# Patient Record
Sex: MALE | Race: WHITE | NOT HISPANIC OR LATINO | Employment: FULL TIME | URBAN - METROPOLITAN AREA
[De-identification: names, ages, dates, MRNs, and addresses within clinical notes are randomized per-mention and may not be internally consistent; named-entity substitution may affect disease eponyms.]

---

## 2017-02-06 ENCOUNTER — COMMUNICATION - HEALTHEAST (OUTPATIENT)
Dept: FAMILY MEDICINE | Facility: CLINIC | Age: 62
End: 2017-02-06

## 2017-02-06 DIAGNOSIS — E03.9 HYPOTHYROIDISM: ICD-10-CM

## 2017-02-20 ENCOUNTER — COMMUNICATION - HEALTHEAST (OUTPATIENT)
Dept: FAMILY MEDICINE | Facility: CLINIC | Age: 62
End: 2017-02-20

## 2017-02-20 DIAGNOSIS — I25.10 CORONARY ATHEROSCLEROSIS: ICD-10-CM

## 2017-04-06 ENCOUNTER — COMMUNICATION - HEALTHEAST (OUTPATIENT)
Dept: FAMILY MEDICINE | Facility: CLINIC | Age: 62
End: 2017-04-06

## 2017-05-02 ENCOUNTER — COMMUNICATION - HEALTHEAST (OUTPATIENT)
Dept: FAMILY MEDICINE | Facility: CLINIC | Age: 62
End: 2017-05-02

## 2017-05-02 DIAGNOSIS — I25.10 CORONARY ATHEROSCLEROSIS: ICD-10-CM

## 2017-05-12 ENCOUNTER — RECORDS - HEALTHEAST (OUTPATIENT)
Dept: ADMINISTRATIVE | Facility: OTHER | Age: 62
End: 2017-05-12

## 2017-06-02 ENCOUNTER — RECORDS - HEALTHEAST (OUTPATIENT)
Dept: ADMINISTRATIVE | Facility: OTHER | Age: 62
End: 2017-06-02

## 2017-07-01 ENCOUNTER — COMMUNICATION - HEALTHEAST (OUTPATIENT)
Dept: FAMILY MEDICINE | Facility: CLINIC | Age: 62
End: 2017-07-01

## 2017-07-01 DIAGNOSIS — I25.10 CORONARY ATHEROSCLEROSIS: ICD-10-CM

## 2017-07-01 DIAGNOSIS — K21.9 ESOPHAGEAL REFLUX: ICD-10-CM

## 2017-08-16 ENCOUNTER — COMMUNICATION - HEALTHEAST (OUTPATIENT)
Dept: FAMILY MEDICINE | Facility: CLINIC | Age: 62
End: 2017-08-16

## 2017-08-16 DIAGNOSIS — E03.9 HYPOTHYROIDISM: ICD-10-CM

## 2017-10-06 ENCOUNTER — OFFICE VISIT - HEALTHEAST (OUTPATIENT)
Dept: FAMILY MEDICINE | Facility: CLINIC | Age: 62
End: 2017-10-06

## 2017-10-06 ENCOUNTER — COMMUNICATION - HEALTHEAST (OUTPATIENT)
Dept: TELEHEALTH | Facility: CLINIC | Age: 62
End: 2017-10-06

## 2017-10-06 ENCOUNTER — COMMUNICATION - HEALTHEAST (OUTPATIENT)
Dept: FAMILY MEDICINE | Facility: CLINIC | Age: 62
End: 2017-10-06

## 2017-10-06 DIAGNOSIS — Z00.00 ROUTINE GENERAL MEDICAL EXAMINATION AT A HEALTH CARE FACILITY: ICD-10-CM

## 2017-10-06 DIAGNOSIS — Z23 NEED FOR VACCINATION: ICD-10-CM

## 2017-10-06 DIAGNOSIS — I10 ESSENTIAL HYPERTENSION: ICD-10-CM

## 2017-10-06 DIAGNOSIS — E03.1 CONGENITAL HYPOTHYROIDISM WITHOUT GOITER: ICD-10-CM

## 2017-10-06 DIAGNOSIS — Z12.5 PROSTATE CANCER SCREENING: ICD-10-CM

## 2017-10-06 DIAGNOSIS — I25.10 ATHEROSCLEROSIS OF NATIVE CORONARY ARTERY OF NATIVE HEART WITHOUT ANGINA PECTORIS: ICD-10-CM

## 2017-10-06 DIAGNOSIS — L82.0 SEBORRHEIC KERATOSES, INFLAMED: ICD-10-CM

## 2017-10-06 DIAGNOSIS — K21.9 GASTROESOPHAGEAL REFLUX DISEASE WITHOUT ESOPHAGITIS: ICD-10-CM

## 2017-10-06 DIAGNOSIS — D64.9 ANEMIA: ICD-10-CM

## 2017-10-06 DIAGNOSIS — R10.9 ABDOMINAL PAIN, UNSPECIFIED ABDOMINAL LOCATION: ICD-10-CM

## 2017-10-06 LAB
AST SERPL W P-5'-P-CCNC: 22 U/L (ref 0–40)
CHOLEST SERPL-MCNC: 106 MG/DL
FASTING STATUS PATIENT QL REPORTED: YES
HDLC SERPL-MCNC: 45 MG/DL
LDLC SERPL CALC-MCNC: 48 MG/DL
PSA SERPL-MCNC: 1.2 NG/ML (ref 0–4.5)
TRIGL SERPL-MCNC: 64 MG/DL

## 2017-10-06 ASSESSMENT — MIFFLIN-ST. JEOR: SCORE: 1540.14

## 2017-10-10 ENCOUNTER — COMMUNICATION - HEALTHEAST (OUTPATIENT)
Dept: FAMILY MEDICINE | Facility: CLINIC | Age: 62
End: 2017-10-10

## 2017-10-10 DIAGNOSIS — I25.10 CORONARY ARTERY DISEASE INVOLVING NATIVE CORONARY ARTERY OF NATIVE HEART WITHOUT ANGINA PECTORIS: ICD-10-CM

## 2017-10-11 ENCOUNTER — COMMUNICATION - HEALTHEAST (OUTPATIENT)
Dept: FAMILY MEDICINE | Facility: CLINIC | Age: 62
End: 2017-10-11

## 2017-10-11 DIAGNOSIS — K21.9 ESOPHAGEAL REFLUX: ICD-10-CM

## 2017-10-11 DIAGNOSIS — I25.10 CORONARY ATHEROSCLEROSIS: ICD-10-CM

## 2017-10-11 DIAGNOSIS — E03.9 HYPOTHYROIDISM: ICD-10-CM

## 2017-10-13 ENCOUNTER — OFFICE VISIT - HEALTHEAST (OUTPATIENT)
Dept: SURGERY | Facility: CLINIC | Age: 62
End: 2017-10-13

## 2017-10-13 DIAGNOSIS — Z85.72 HISTORY OF LYMPHOMA: ICD-10-CM

## 2017-10-13 DIAGNOSIS — R10.9 ABDOMINAL PAIN, UNSPECIFIED ABDOMINAL LOCATION: ICD-10-CM

## 2017-10-13 DIAGNOSIS — I25.10 ATHEROSCLEROSIS OF NATIVE CORONARY ARTERY OF NATIVE HEART WITHOUT ANGINA PECTORIS: ICD-10-CM

## 2017-10-13 DIAGNOSIS — K56.600 PARTIAL SMALL BOWEL OBSTRUCTION (H): ICD-10-CM

## 2017-10-13 ASSESSMENT — MIFFLIN-ST. JEOR: SCORE: 1523.82

## 2017-10-20 ENCOUNTER — OFFICE VISIT - HEALTHEAST (OUTPATIENT)
Dept: FAMILY MEDICINE | Facility: CLINIC | Age: 62
End: 2017-10-20

## 2017-10-20 DIAGNOSIS — Z01.818 PRE-OP EXAM: ICD-10-CM

## 2017-10-20 DIAGNOSIS — R10.9 ABDOMINAL PAIN, UNSPECIFIED ABDOMINAL LOCATION: ICD-10-CM

## 2017-10-20 ASSESSMENT — MIFFLIN-ST. JEOR: SCORE: 1564.64

## 2017-10-25 LAB
ATRIAL RATE - MUSE: 58 BPM
DIASTOLIC BLOOD PRESSURE - MUSE: NORMAL MMHG
INTERPRETATION ECG - MUSE: NORMAL
P AXIS - MUSE: 76 DEGREES
PR INTERVAL - MUSE: 186 MS
QRS DURATION - MUSE: 78 MS
QT - MUSE: 440 MS
QTC - MUSE: 431 MS
R AXIS - MUSE: 38 DEGREES
SYSTOLIC BLOOD PRESSURE - MUSE: NORMAL MMHG
T AXIS - MUSE: 25 DEGREES
VENTRICULAR RATE- MUSE: 58 BPM

## 2017-11-01 ENCOUNTER — COMMUNICATION - HEALTHEAST (OUTPATIENT)
Dept: FAMILY MEDICINE | Facility: CLINIC | Age: 62
End: 2017-11-01

## 2017-11-01 DIAGNOSIS — I25.10 CORONARY ATHEROSCLEROSIS: ICD-10-CM

## 2017-11-06 ENCOUNTER — ANESTHESIA - HEALTHEAST (OUTPATIENT)
Dept: SURGERY | Facility: HOSPITAL | Age: 62
End: 2017-11-06

## 2017-11-07 ENCOUNTER — SURGERY - HEALTHEAST (OUTPATIENT)
Dept: SURGERY | Facility: HOSPITAL | Age: 62
End: 2017-11-07

## 2017-11-07 ASSESSMENT — MIFFLIN-ST. JEOR: SCORE: 1560.1

## 2017-11-10 ASSESSMENT — MIFFLIN-ST. JEOR: SCORE: 1587.32

## 2017-11-11 ASSESSMENT — MIFFLIN-ST. JEOR: SCORE: 1571.44

## 2017-11-12 ASSESSMENT — MIFFLIN-ST. JEOR: SCORE: 1541.96

## 2017-11-14 ENCOUNTER — COMMUNICATION - HEALTHEAST (OUTPATIENT)
Dept: FAMILY MEDICINE | Facility: CLINIC | Age: 62
End: 2017-11-14

## 2017-11-15 ENCOUNTER — AMBULATORY - HEALTHEAST (OUTPATIENT)
Dept: LAB | Facility: CLINIC | Age: 62
End: 2017-11-15

## 2017-11-15 ENCOUNTER — COMMUNICATION - HEALTHEAST (OUTPATIENT)
Dept: FAMILY MEDICINE | Facility: CLINIC | Age: 62
End: 2017-11-15

## 2017-11-15 ENCOUNTER — AMBULATORY - HEALTHEAST (OUTPATIENT)
Dept: SURGERY | Facility: CLINIC | Age: 62
End: 2017-11-15

## 2017-11-15 DIAGNOSIS — D62 ACUTE BLOOD LOSS ANEMIA: ICD-10-CM

## 2017-11-15 DIAGNOSIS — D64.9 ANEMIA: ICD-10-CM

## 2017-11-17 ENCOUNTER — OFFICE VISIT - HEALTHEAST (OUTPATIENT)
Dept: FAMILY MEDICINE | Facility: CLINIC | Age: 62
End: 2017-11-17

## 2017-11-17 DIAGNOSIS — D64.9 ANEMIA: ICD-10-CM

## 2017-11-17 DIAGNOSIS — I25.10 CORONARY ARTERY DISEASE INVOLVING NATIVE CORONARY ARTERY OF NATIVE HEART WITHOUT ANGINA PECTORIS: ICD-10-CM

## 2017-11-17 ASSESSMENT — MIFFLIN-ST. JEOR: SCORE: 1526.99

## 2017-11-22 ENCOUNTER — OFFICE VISIT - HEALTHEAST (OUTPATIENT)
Dept: SURGERY | Facility: CLINIC | Age: 62
End: 2017-11-22

## 2017-11-22 ENCOUNTER — AMBULATORY - HEALTHEAST (OUTPATIENT)
Dept: LAB | Facility: CLINIC | Age: 62
End: 2017-11-22

## 2017-11-22 DIAGNOSIS — D64.9 ANEMIA: ICD-10-CM

## 2017-11-22 DIAGNOSIS — K56.600 PARTIAL SMALL BOWEL OBSTRUCTION (H): ICD-10-CM

## 2017-11-27 ENCOUNTER — AMBULATORY - HEALTHEAST (OUTPATIENT)
Dept: FAMILY MEDICINE | Facility: CLINIC | Age: 62
End: 2017-11-27

## 2017-11-27 ENCOUNTER — COMMUNICATION - HEALTHEAST (OUTPATIENT)
Dept: FAMILY MEDICINE | Facility: CLINIC | Age: 62
End: 2017-11-27

## 2017-11-27 DIAGNOSIS — D64.9 ANEMIA: ICD-10-CM

## 2017-12-07 ENCOUNTER — AMBULATORY - HEALTHEAST (OUTPATIENT)
Dept: LAB | Facility: CLINIC | Age: 62
End: 2017-12-07

## 2017-12-07 DIAGNOSIS — D64.9 ANEMIA: ICD-10-CM

## 2017-12-08 ENCOUNTER — AMBULATORY - HEALTHEAST (OUTPATIENT)
Dept: LAB | Facility: CLINIC | Age: 62
End: 2017-12-08

## 2017-12-08 ENCOUNTER — COMMUNICATION - HEALTHEAST (OUTPATIENT)
Dept: TELEHEALTH | Facility: CLINIC | Age: 62
End: 2017-12-08

## 2017-12-08 DIAGNOSIS — D64.9 ANEMIA: ICD-10-CM

## 2018-03-02 ENCOUNTER — COMMUNICATION - HEALTHEAST (OUTPATIENT)
Dept: FAMILY MEDICINE | Facility: CLINIC | Age: 63
End: 2018-03-02

## 2018-04-20 ENCOUNTER — COMMUNICATION - HEALTHEAST (OUTPATIENT)
Dept: FAMILY MEDICINE | Facility: CLINIC | Age: 63
End: 2018-04-20

## 2018-04-20 DIAGNOSIS — I25.10 CORONARY ATHEROSCLEROSIS: ICD-10-CM

## 2018-05-04 ENCOUNTER — RECORDS - HEALTHEAST (OUTPATIENT)
Dept: ADMINISTRATIVE | Facility: OTHER | Age: 63
End: 2018-05-04

## 2018-05-06 ENCOUNTER — OFFICE VISIT - HEALTHEAST (OUTPATIENT)
Dept: FAMILY MEDICINE | Facility: CLINIC | Age: 63
End: 2018-05-06

## 2018-05-06 DIAGNOSIS — R05.9 COUGH: ICD-10-CM

## 2018-05-06 DIAGNOSIS — R07.1 CHEST PAIN ON BREATHING: ICD-10-CM

## 2018-05-06 DIAGNOSIS — R50.9 FEVER: ICD-10-CM

## 2018-05-06 DIAGNOSIS — R68.89 INFLUENZA-LIKE SYMPTOMS: ICD-10-CM

## 2018-05-06 LAB
D DIMER PPP FEU-MCNC: 0.56 FEU UG/ML
ERYTHROCYTE [DISTWIDTH] IN BLOOD BY AUTOMATED COUNT: 18.2 % (ref 11–14.5)
FLUAV AG SPEC QL IA: NORMAL
FLUBV AG SPEC QL IA: NORMAL
HCT VFR BLD AUTO: 36.4 % (ref 40–54)
HGB BLD-MCNC: 12.8 G/DL (ref 14–18)
MCH RBC QN AUTO: 31 PG (ref 27–34)
MCHC RBC AUTO-ENTMCNC: 35.2 G/DL (ref 32–36)
MCV RBC AUTO: 88 FL (ref 80–100)
PLATELET # BLD AUTO: 277 THOU/UL (ref 140–440)
PMV BLD AUTO: 11 FL (ref 8.5–12.5)
RBC # BLD AUTO: 4.13 MILL/UL (ref 4.4–6.2)
WBC: 18.7 THOU/UL (ref 4–11)

## 2018-05-08 ENCOUNTER — AMBULATORY - HEALTHEAST (OUTPATIENT)
Dept: INTENSIVE CARE | Facility: CLINIC | Age: 63
End: 2018-05-08

## 2018-05-08 DIAGNOSIS — J18.9 CAP (COMMUNITY ACQUIRED PNEUMONIA): ICD-10-CM

## 2018-05-14 ENCOUNTER — OFFICE VISIT - HEALTHEAST (OUTPATIENT)
Dept: FAMILY MEDICINE | Facility: CLINIC | Age: 63
End: 2018-05-14

## 2018-05-14 DIAGNOSIS — N17.9 ARF (ACUTE RENAL FAILURE) (H): ICD-10-CM

## 2018-05-14 DIAGNOSIS — J18.9 COMMUNITY ACQUIRED PNEUMONIA OF LEFT LOWER LOBE OF LUNG: ICD-10-CM

## 2018-05-14 ASSESSMENT — MIFFLIN-ST. JEOR: SCORE: 1583

## 2018-06-25 ENCOUNTER — HOSPITAL ENCOUNTER (OUTPATIENT)
Dept: CT IMAGING | Facility: HOSPITAL | Age: 63
Discharge: HOME | End: 2018-06-25
Attending: INTERNAL MEDICINE

## 2018-06-25 DIAGNOSIS — J18.9 CAP (COMMUNITY ACQUIRED PNEUMONIA): ICD-10-CM

## 2018-06-28 ENCOUNTER — OFFICE VISIT - HEALTHEAST (OUTPATIENT)
Dept: PULMONOLOGY | Facility: OTHER | Age: 63
End: 2018-06-28

## 2018-06-28 DIAGNOSIS — R91.1 LUNG NODULE: ICD-10-CM

## 2018-06-28 ASSESSMENT — MIFFLIN-ST. JEOR: SCORE: 1569.85

## 2018-08-09 ENCOUNTER — COMMUNICATION - HEALTHEAST (OUTPATIENT)
Dept: FAMILY MEDICINE | Facility: CLINIC | Age: 63
End: 2018-08-09

## 2018-08-09 DIAGNOSIS — I25.10 CORONARY ATHEROSCLEROSIS: ICD-10-CM

## 2018-08-31 ENCOUNTER — OFFICE VISIT - HEALTHEAST (OUTPATIENT)
Dept: FAMILY MEDICINE | Facility: CLINIC | Age: 63
End: 2018-08-31

## 2018-08-31 DIAGNOSIS — E03.1 CONGENITAL HYPOTHYROIDISM WITHOUT GOITER: ICD-10-CM

## 2018-08-31 DIAGNOSIS — H26.9 CATARACT: ICD-10-CM

## 2018-08-31 DIAGNOSIS — I10 ESSENTIAL HYPERTENSION: ICD-10-CM

## 2018-08-31 DIAGNOSIS — Z01.818 PREOP EXAMINATION: ICD-10-CM

## 2018-08-31 LAB
ANION GAP SERPL CALCULATED.3IONS-SCNC: 9 MMOL/L (ref 5–18)
BUN SERPL-MCNC: 24 MG/DL (ref 8–22)
CALCIUM SERPL-MCNC: 9.7 MG/DL (ref 8.5–10.5)
CHLORIDE BLD-SCNC: 109 MMOL/L (ref 98–107)
CO2 SERPL-SCNC: 25 MMOL/L (ref 22–31)
CREAT SERPL-MCNC: 1.01 MG/DL (ref 0.7–1.3)
ERYTHROCYTE [DISTWIDTH] IN BLOOD BY AUTOMATED COUNT: 11.7 % (ref 11–14.5)
GFR SERPL CREATININE-BSD FRML MDRD: >60 ML/MIN/1.73M2
GLUCOSE BLD-MCNC: 80 MG/DL (ref 70–125)
HCT VFR BLD AUTO: 38.6 % (ref 40–54)
HGB BLD-MCNC: 13 G/DL (ref 14–18)
MCH RBC QN AUTO: 31.8 PG (ref 27–34)
MCHC RBC AUTO-ENTMCNC: 33.7 G/DL (ref 32–36)
MCV RBC AUTO: 94 FL (ref 80–100)
PLATELET # BLD AUTO: 304 THOU/UL (ref 140–440)
PMV BLD AUTO: 8.1 FL (ref 7–10)
POTASSIUM BLD-SCNC: 4.4 MMOL/L (ref 3.5–5)
RBC # BLD AUTO: 4.09 MILL/UL (ref 4.4–6.2)
SODIUM SERPL-SCNC: 143 MMOL/L (ref 136–145)
TSH SERPL DL<=0.005 MIU/L-ACNC: 2.96 UIU/ML (ref 0.3–5)
WBC: 8.1 THOU/UL (ref 4–11)

## 2018-08-31 ASSESSMENT — MIFFLIN-ST. JEOR: SCORE: 1571.9

## 2018-09-24 ENCOUNTER — HOSPITAL ENCOUNTER (OUTPATIENT)
Dept: CT IMAGING | Facility: HOSPITAL | Age: 63
Discharge: HOME | End: 2018-09-24
Attending: INTERNAL MEDICINE

## 2018-09-24 DIAGNOSIS — R91.1 LUNG NODULE: ICD-10-CM

## 2018-09-26 ENCOUNTER — COMMUNICATION - HEALTHEAST (OUTPATIENT)
Dept: PULMONOLOGY | Facility: OTHER | Age: 63
End: 2018-09-26

## 2018-09-26 DIAGNOSIS — R91.1 LUNG NODULE: ICD-10-CM

## 2018-09-28 ENCOUNTER — OFFICE VISIT - HEALTHEAST (OUTPATIENT)
Dept: FAMILY MEDICINE | Facility: CLINIC | Age: 63
End: 2018-09-28

## 2018-09-28 DIAGNOSIS — Z23 NEED FOR VACCINATION: ICD-10-CM

## 2018-09-28 DIAGNOSIS — I25.10 CORONARY ARTERY DISEASE INVOLVING NATIVE CORONARY ARTERY OF NATIVE HEART WITHOUT ANGINA PECTORIS: ICD-10-CM

## 2018-09-28 DIAGNOSIS — Z01.818 PREOP EXAMINATION: ICD-10-CM

## 2018-09-28 DIAGNOSIS — R91.1 LUNG NODULE: ICD-10-CM

## 2018-09-28 LAB
ANION GAP SERPL CALCULATED.3IONS-SCNC: 8 MMOL/L (ref 5–18)
BUN SERPL-MCNC: 22 MG/DL (ref 8–22)
CALCIUM SERPL-MCNC: 10 MG/DL (ref 8.5–10.5)
CHLORIDE BLD-SCNC: 107 MMOL/L (ref 98–107)
CO2 SERPL-SCNC: 25 MMOL/L (ref 22–31)
CREAT SERPL-MCNC: 1.19 MG/DL (ref 0.7–1.3)
GFR SERPL CREATININE-BSD FRML MDRD: >60 ML/MIN/1.73M2
GLUCOSE BLD-MCNC: 99 MG/DL (ref 70–125)
HGB BLD-MCNC: 13.4 G/DL (ref 14–18)
INR PPP: 1.03 (ref 0.9–1.1)
POTASSIUM BLD-SCNC: 4.6 MMOL/L (ref 3.5–5)
SODIUM SERPL-SCNC: 140 MMOL/L (ref 136–145)

## 2018-09-28 ASSESSMENT — MIFFLIN-ST. JEOR: SCORE: 1573.26

## 2018-10-02 ENCOUNTER — HOSPITAL ENCOUNTER (OUTPATIENT)
Dept: CT IMAGING | Facility: HOSPITAL | Age: 63
Discharge: HOME | End: 2018-10-02
Attending: INTERNAL MEDICINE | Admitting: RADIOLOGY

## 2018-10-02 ENCOUNTER — RECORDS - HEALTHEAST (OUTPATIENT)
Dept: ADMINISTRATIVE | Facility: OTHER | Age: 63
End: 2018-10-02

## 2018-10-02 ENCOUNTER — HOSPITAL ENCOUNTER (OUTPATIENT)
Dept: RADIOLOGY | Facility: HOSPITAL | Age: 63
Discharge: HOME | End: 2018-10-02
Attending: RADIOLOGY

## 2018-10-02 ENCOUNTER — COMMUNICATION - HEALTHEAST (OUTPATIENT)
Dept: FAMILY MEDICINE | Facility: CLINIC | Age: 63
End: 2018-10-02

## 2018-10-02 DIAGNOSIS — R91.1 LUNG NODULE: ICD-10-CM

## 2018-10-02 DIAGNOSIS — I25.10 CORONARY ARTERY DISEASE INVOLVING NATIVE CORONARY ARTERY OF NATIVE HEART WITHOUT ANGINA PECTORIS: ICD-10-CM

## 2018-10-02 LAB
HGB BLD-MCNC: 13.3 G/DL (ref 14–18)
INR PPP: 1 (ref 0.9–1.1)
PLATELET # BLD AUTO: 305 THOU/UL (ref 140–440)

## 2018-10-02 ASSESSMENT — MIFFLIN-ST. JEOR: SCORE: 1569.17

## 2018-10-03 ENCOUNTER — COMMUNICATION - HEALTHEAST (OUTPATIENT)
Dept: INTENSIVE CARE | Facility: CLINIC | Age: 63
End: 2018-10-03

## 2018-10-03 DIAGNOSIS — C34.92 ADENOCARCINOMA, LUNG, LEFT (H): ICD-10-CM

## 2018-10-04 ENCOUNTER — COMMUNICATION - HEALTHEAST (OUTPATIENT)
Dept: ONCOLOGY | Facility: CLINIC | Age: 63
End: 2018-10-04

## 2018-10-09 ENCOUNTER — COMMUNICATION - HEALTHEAST (OUTPATIENT)
Dept: TELEHEALTH | Facility: CLINIC | Age: 63
End: 2018-10-09

## 2018-10-11 ENCOUNTER — HOSPITAL ENCOUNTER (OUTPATIENT)
Dept: PET IMAGING | Facility: HOSPITAL | Age: 63
Discharge: HOME | End: 2018-10-11
Attending: INTERNAL MEDICINE

## 2018-10-11 DIAGNOSIS — C34.92 ADENOCARCINOMA, LUNG, LEFT (H): ICD-10-CM

## 2018-10-11 LAB — GLUCOSE BLDC GLUCOMTR-MCNC: 93 MG/DL

## 2018-10-15 ENCOUNTER — AMBULATORY - HEALTHEAST (OUTPATIENT)
Dept: PULMONOLOGY | Facility: OTHER | Age: 63
End: 2018-10-15

## 2018-10-15 ENCOUNTER — OFFICE VISIT - HEALTHEAST (OUTPATIENT)
Dept: ONCOLOGY | Facility: HOSPITAL | Age: 63
End: 2018-10-15

## 2018-10-15 ENCOUNTER — AMBULATORY - HEALTHEAST (OUTPATIENT)
Dept: ONCOLOGY | Facility: CLINIC | Age: 63
End: 2018-10-15

## 2018-10-15 DIAGNOSIS — C34.90 LUNG CANCER (H): ICD-10-CM

## 2018-10-15 DIAGNOSIS — C34.92 ADENOCARCINOMA, LUNG, LEFT (H): ICD-10-CM

## 2018-10-15 DIAGNOSIS — C34.32 MALIGNANT NEOPLASM OF LOWER LOBE OF LEFT LUNG (H): ICD-10-CM

## 2018-10-15 ASSESSMENT — MIFFLIN-ST. JEOR: SCORE: 1572.35

## 2018-10-16 ENCOUNTER — COMMUNICATION - HEALTHEAST (OUTPATIENT)
Dept: ONCOLOGY | Facility: CLINIC | Age: 63
End: 2018-10-16

## 2018-10-17 ENCOUNTER — RECORDS - HEALTHEAST (OUTPATIENT)
Dept: ADMINISTRATIVE | Facility: OTHER | Age: 63
End: 2018-10-17

## 2018-10-17 ENCOUNTER — RECORDS - HEALTHEAST (OUTPATIENT)
Dept: PULMONOLOGY | Facility: OTHER | Age: 63
End: 2018-10-17

## 2018-10-17 DIAGNOSIS — C34.90 MALIGNANT NEOPLASM OF UNSPECIFIED PART OF UNSPECIFIED BRONCHUS OR LUNG (H): ICD-10-CM

## 2018-10-25 ENCOUNTER — OFFICE VISIT - HEALTHEAST (OUTPATIENT)
Dept: PULMONOLOGY | Facility: OTHER | Age: 63
End: 2018-10-25

## 2018-10-25 ENCOUNTER — AMBULATORY - HEALTHEAST (OUTPATIENT)
Dept: PULMONOLOGY | Facility: OTHER | Age: 63
End: 2018-10-25

## 2018-10-25 DIAGNOSIS — C34.32 MALIGNANT NEOPLASM OF LOWER LOBE OF LEFT LUNG (H): ICD-10-CM

## 2018-10-25 DIAGNOSIS — C34.90 LUNG CANCER (H): ICD-10-CM

## 2018-10-25 ASSESSMENT — MIFFLIN-ST. JEOR: SCORE: 1574.16

## 2018-10-26 ENCOUNTER — COMMUNICATION - HEALTHEAST (OUTPATIENT)
Dept: FAMILY MEDICINE | Facility: CLINIC | Age: 63
End: 2018-10-26

## 2018-10-26 DIAGNOSIS — C34.32 MALIGNANT NEOPLASM OF LOWER LOBE OF LEFT LUNG (H): ICD-10-CM

## 2018-10-26 DIAGNOSIS — I25.10 CORONARY ARTERY DISEASE INVOLVING NATIVE CORONARY ARTERY OF NATIVE HEART WITHOUT ANGINA PECTORIS: ICD-10-CM

## 2018-10-29 ENCOUNTER — OFFICE VISIT - HEALTHEAST (OUTPATIENT)
Dept: CARDIOLOGY | Facility: CLINIC | Age: 63
End: 2018-10-29

## 2018-10-29 ENCOUNTER — HOSPITAL ENCOUNTER (OUTPATIENT)
Dept: CARDIOLOGY | Facility: HOSPITAL | Age: 63
Discharge: HOME | End: 2018-10-29
Attending: FAMILY MEDICINE

## 2018-10-29 DIAGNOSIS — I25.10 CORONARY ARTERY DISEASE INVOLVING NATIVE CORONARY ARTERY OF NATIVE HEART WITHOUT ANGINA PECTORIS: ICD-10-CM

## 2018-10-29 DIAGNOSIS — Z01.810 PRE-OPERATIVE CARDIOVASCULAR EXAMINATION: ICD-10-CM

## 2018-10-29 DIAGNOSIS — C34.32 MALIGNANT NEOPLASM OF LOWER LOBE OF LEFT LUNG (H): ICD-10-CM

## 2018-10-29 DIAGNOSIS — I10 ESSENTIAL HYPERTENSION: ICD-10-CM

## 2018-10-29 LAB
CV STRESS CURRENT BP HE: NORMAL
CV STRESS CURRENT HR HE: 100
CV STRESS CURRENT HR HE: 102
CV STRESS CURRENT HR HE: 103
CV STRESS CURRENT HR HE: 107
CV STRESS CURRENT HR HE: 108
CV STRESS CURRENT HR HE: 110
CV STRESS CURRENT HR HE: 116
CV STRESS CURRENT HR HE: 118
CV STRESS CURRENT HR HE: 122
CV STRESS CURRENT HR HE: 123
CV STRESS CURRENT HR HE: 124
CV STRESS CURRENT HR HE: 126
CV STRESS CURRENT HR HE: 128
CV STRESS CURRENT HR HE: 129
CV STRESS CURRENT HR HE: 132
CV STRESS CURRENT HR HE: 133
CV STRESS CURRENT HR HE: 135
CV STRESS CURRENT HR HE: 138
CV STRESS CURRENT HR HE: 138
CV STRESS CURRENT HR HE: 72
CV STRESS CURRENT HR HE: 84
CV STRESS CURRENT HR HE: 86
CV STRESS CURRENT HR HE: 87
CV STRESS CURRENT HR HE: 88
CV STRESS CURRENT HR HE: 89
CV STRESS CURRENT HR HE: 90
CV STRESS CURRENT HR HE: 94
CV STRESS CURRENT HR HE: 95
CV STRESS CURRENT HR HE: 96
CV STRESS CURRENT HR HE: 97
CV STRESS CURRENT HR HE: 97
CV STRESS DEVIATION TIME HE: NORMAL
CV STRESS ECHO PERCENT HR HE: NORMAL
CV STRESS EXERCISE STAGE HE: NORMAL
CV STRESS EXERCISE STAGE REACHED HE: NORMAL
CV STRESS FINAL RESTING BP HE: NORMAL
CV STRESS FINAL RESTING HR HE: 88
CV STRESS MAX HR HE: 138
CV STRESS MAX TREADMILL GRADE HE: 16
CV STRESS MAX TREADMILL SPEED HE: 4.2
CV STRESS PEAK DIA BP HE: NORMAL
CV STRESS PEAK SYS BP HE: NORMAL
CV STRESS PHASE HE: NORMAL
CV STRESS PROTOCOL HE: NORMAL
CV STRESS RESTING PT POSITION HE: NORMAL
CV STRESS RESTING PT POSITION HE: NORMAL
CV STRESS ST DEVIATION AMOUNT HE: NORMAL
CV STRESS ST DEVIATION ELEVATION HE: NORMAL
CV STRESS ST EVELATION AMOUNT HE: NORMAL
CV STRESS TEST TYPE HE: NORMAL
CV STRESS TOTAL STAGE TIME MIN 1 HE: NORMAL
STRESS ECHO BASELINE BP: NORMAL
STRESS ECHO BASELINE HR: 86
STRESS ECHO CALCULATED PERCENT HR: 88 %
STRESS ECHO LAST STRESS BP: NORMAL
STRESS ECHO LAST STRESS HR: 138
STRESS ECHO POST ESTIMATED WORKLOAD: 12.1
STRESS ECHO POST EXERCISE DUR MIN: 11
STRESS ECHO POST EXERCISE DUR SEC: 59
STRESS ECHO TARGET HR: 138.16

## 2018-10-29 ASSESSMENT — MIFFLIN-ST. JEOR: SCORE: 1569.17

## 2018-10-30 ENCOUNTER — SURGERY - HEALTHEAST (OUTPATIENT)
Dept: CARDIOLOGY | Facility: CLINIC | Age: 63
End: 2018-10-30

## 2018-10-31 ENCOUNTER — COMMUNICATION - HEALTHEAST (OUTPATIENT)
Dept: ONCOLOGY | Facility: CLINIC | Age: 63
End: 2018-10-31

## 2018-10-31 ENCOUNTER — HOSPITAL ENCOUNTER (OUTPATIENT)
Dept: MRI IMAGING | Facility: HOSPITAL | Age: 63
Discharge: HOME | End: 2018-10-31
Attending: THORACIC SURGERY (CARDIOTHORACIC VASCULAR SURGERY)

## 2018-10-31 ENCOUNTER — ANESTHESIA - HEALTHEAST (OUTPATIENT)
Dept: SURGERY | Facility: CLINIC | Age: 63
End: 2018-10-31

## 2018-10-31 DIAGNOSIS — C34.90 LUNG CANCER (H): ICD-10-CM

## 2018-10-31 LAB
CREAT BLD-MCNC: 0.9 MG/DL
CREAT BLD-MCNC: 0.9 MG/DL (ref 0.7–1.3)
POC GFR AMER AF HE - HISTORICAL: >60 ML/MIN/1.73M2
POC GFR NON AMER AF HE - HISTORICAL: >60 ML/MIN/1.73M2

## 2018-11-01 ENCOUNTER — RECORDS - HEALTHEAST (OUTPATIENT)
Dept: ADMINISTRATIVE | Facility: OTHER | Age: 63
End: 2018-11-01

## 2018-11-01 ENCOUNTER — SURGERY - HEALTHEAST (OUTPATIENT)
Dept: SURGERY | Facility: CLINIC | Age: 63
End: 2018-11-01

## 2018-11-01 ASSESSMENT — MIFFLIN-ST. JEOR: SCORE: 1551.48

## 2018-11-02 ASSESSMENT — MIFFLIN-ST. JEOR
SCORE: 1551.94
SCORE: 1551.94

## 2018-11-03 ASSESSMENT — MIFFLIN-ST. JEOR: SCORE: 1557.38

## 2018-11-04 ASSESSMENT — MIFFLIN-ST. JEOR: SCORE: 1552.39

## 2018-11-05 ENCOUNTER — AMBULATORY - HEALTHEAST (OUTPATIENT)
Dept: CARDIOLOGY | Facility: CLINIC | Age: 63
End: 2018-11-05

## 2018-11-05 ENCOUNTER — COMMUNICATION - HEALTHEAST (OUTPATIENT)
Dept: ONCOLOGY | Facility: CLINIC | Age: 63
End: 2018-11-05

## 2018-11-05 ENCOUNTER — AMBULATORY - HEALTHEAST (OUTPATIENT)
Dept: PULMONOLOGY | Facility: OTHER | Age: 63
End: 2018-11-05

## 2018-11-05 DIAGNOSIS — C34.92 MALIGNANT NEOPLASM OF LEFT LUNG, UNSPECIFIED PART OF LUNG (H): ICD-10-CM

## 2018-11-05 DIAGNOSIS — R91.1 LUNG NODULE: ICD-10-CM

## 2018-11-06 ENCOUNTER — COMMUNICATION - HEALTHEAST (OUTPATIENT)
Dept: PULMONOLOGY | Facility: OTHER | Age: 63
End: 2018-11-06

## 2018-11-06 ENCOUNTER — AMBULATORY - HEALTHEAST (OUTPATIENT)
Dept: ONCOLOGY | Facility: HOSPITAL | Age: 63
End: 2018-11-06

## 2018-11-06 ENCOUNTER — COMMUNICATION - HEALTHEAST (OUTPATIENT)
Dept: CARE COORDINATION | Facility: CLINIC | Age: 63
End: 2018-11-06

## 2018-11-06 ENCOUNTER — AMBULATORY - HEALTHEAST (OUTPATIENT)
Dept: ONCOLOGY | Facility: CLINIC | Age: 63
End: 2018-11-06

## 2018-11-20 ENCOUNTER — COMMUNICATION - HEALTHEAST (OUTPATIENT)
Dept: FAMILY MEDICINE | Facility: CLINIC | Age: 63
End: 2018-11-20

## 2018-11-20 DIAGNOSIS — I25.10 CORONARY ATHEROSCLEROSIS: ICD-10-CM

## 2018-11-20 DIAGNOSIS — K21.9 ESOPHAGEAL REFLUX: ICD-10-CM

## 2018-12-03 ENCOUNTER — OFFICE VISIT - HEALTHEAST (OUTPATIENT)
Dept: ONCOLOGY | Facility: HOSPITAL | Age: 63
End: 2018-12-03

## 2018-12-03 ENCOUNTER — INFUSION - HEALTHEAST (OUTPATIENT)
Dept: INFUSION THERAPY | Facility: HOSPITAL | Age: 63
End: 2018-12-03

## 2018-12-03 ENCOUNTER — HOSPITAL ENCOUNTER (OUTPATIENT)
Dept: RADIOLOGY | Facility: HOSPITAL | Age: 63
Discharge: HOME | End: 2018-12-03
Attending: THORACIC SURGERY (CARDIOTHORACIC VASCULAR SURGERY)

## 2018-12-03 DIAGNOSIS — C34.92 MALIGNANT NEOPLASM OF LEFT LUNG (H): ICD-10-CM

## 2018-12-03 DIAGNOSIS — Z51.11 ENCOUNTER FOR ANTINEOPLASTIC CHEMOTHERAPY: ICD-10-CM

## 2018-12-03 DIAGNOSIS — C34.32 MALIGNANT NEOPLASM OF LOWER LOBE OF LEFT LUNG (H): ICD-10-CM

## 2018-12-03 DIAGNOSIS — C34.92 MALIGNANT NEOPLASM OF LEFT LUNG, UNSPECIFIED PART OF LUNG (H): ICD-10-CM

## 2018-12-04 ENCOUNTER — COMMUNICATION - HEALTHEAST (OUTPATIENT)
Dept: ONCOLOGY | Facility: CLINIC | Age: 63
End: 2018-12-04

## 2018-12-06 ENCOUNTER — OFFICE VISIT - HEALTHEAST (OUTPATIENT)
Dept: PULMONOLOGY | Facility: OTHER | Age: 63
End: 2018-12-06

## 2018-12-06 DIAGNOSIS — C34.32 MALIGNANT NEOPLASM OF LOWER LOBE OF LEFT LUNG (H): ICD-10-CM

## 2018-12-06 ASSESSMENT — MIFFLIN-ST. JEOR: SCORE: 1569.17

## 2018-12-10 ENCOUNTER — RECORDS - HEALTHEAST (OUTPATIENT)
Dept: ADMINISTRATIVE | Facility: OTHER | Age: 63
End: 2018-12-10

## 2018-12-14 ENCOUNTER — AMBULATORY - HEALTHEAST (OUTPATIENT)
Dept: INFUSION THERAPY | Facility: HOSPITAL | Age: 63
End: 2018-12-14

## 2018-12-14 ENCOUNTER — INFUSION - HEALTHEAST (OUTPATIENT)
Dept: INFUSION THERAPY | Facility: HOSPITAL | Age: 63
End: 2018-12-14

## 2018-12-14 DIAGNOSIS — C34.32 MALIGNANT NEOPLASM OF LOWER LOBE OF LEFT LUNG (H): ICD-10-CM

## 2018-12-14 DIAGNOSIS — Z51.11 ENCOUNTER FOR ANTINEOPLASTIC CHEMOTHERAPY: ICD-10-CM

## 2018-12-14 LAB
ALBUMIN SERPL-MCNC: 3.7 G/DL (ref 3.5–5)
ALP SERPL-CCNC: 108 U/L (ref 45–120)
ALT SERPL W P-5'-P-CCNC: 10 U/L (ref 0–45)
ANION GAP SERPL CALCULATED.3IONS-SCNC: 6 MMOL/L (ref 5–18)
AST SERPL W P-5'-P-CCNC: 20 U/L (ref 0–40)
BASOPHILS # BLD AUTO: 0 THOU/UL (ref 0–0.2)
BASOPHILS NFR BLD AUTO: 0 % (ref 0–2)
BILIRUB SERPL-MCNC: 0.9 MG/DL (ref 0–1)
BUN SERPL-MCNC: 30 MG/DL (ref 8–22)
CALCIUM SERPL-MCNC: 10.3 MG/DL (ref 8.5–10.5)
CHLORIDE BLD-SCNC: 108 MMOL/L (ref 98–107)
CO2 SERPL-SCNC: 27 MMOL/L (ref 22–31)
CREAT SERPL-MCNC: 1.02 MG/DL (ref 0.7–1.3)
EOSINOPHIL # BLD AUTO: 0 THOU/UL (ref 0–0.4)
EOSINOPHIL NFR BLD AUTO: 0 % (ref 0–6)
ERYTHROCYTE [DISTWIDTH] IN BLOOD BY AUTOMATED COUNT: 16.4 % (ref 11–14.5)
GFR SERPL CREATININE-BSD FRML MDRD: >60 ML/MIN/1.73M2
GLUCOSE BLD-MCNC: 92 MG/DL (ref 70–125)
HCT VFR BLD AUTO: 37 % (ref 40–54)
HGB BLD-MCNC: 12.6 G/DL (ref 14–18)
LYMPHOCYTES # BLD AUTO: 1.7 THOU/UL (ref 0.8–4.4)
LYMPHOCYTES NFR BLD AUTO: 9 % (ref 20–40)
MAGNESIUM SERPL-MCNC: 2 MG/DL (ref 1.8–2.6)
MCH RBC QN AUTO: 32.3 PG (ref 27–34)
MCHC RBC AUTO-ENTMCNC: 34.1 G/DL (ref 32–36)
MCV RBC AUTO: 95 FL (ref 80–100)
MONOCYTES # BLD AUTO: 1.7 THOU/UL (ref 0–0.9)
MONOCYTES NFR BLD AUTO: 9 % (ref 2–10)
NEUTROPHILS # BLD AUTO: 15.7 THOU/UL (ref 2–7.7)
NEUTROPHILS NFR BLD AUTO: 82 % (ref 50–70)
PLATELET # BLD AUTO: 440 THOU/UL (ref 140–440)
PMV BLD AUTO: 10.6 FL (ref 8.5–12.5)
POTASSIUM BLD-SCNC: 4.4 MMOL/L (ref 3.5–5)
PROT SERPL-MCNC: 7.3 G/DL (ref 6–8)
RBC # BLD AUTO: 3.9 MILL/UL (ref 4.4–6.2)
SODIUM SERPL-SCNC: 141 MMOL/L (ref 136–145)
WBC: 19.3 THOU/UL (ref 4–11)

## 2018-12-14 ASSESSMENT — MIFFLIN-ST. JEOR: SCORE: 1585.33

## 2018-12-21 ENCOUNTER — OFFICE VISIT - HEALTHEAST (OUTPATIENT)
Dept: ONCOLOGY | Facility: HOSPITAL | Age: 63
End: 2018-12-21

## 2018-12-21 ENCOUNTER — INFUSION - HEALTHEAST (OUTPATIENT)
Dept: INFUSION THERAPY | Facility: HOSPITAL | Age: 63
End: 2018-12-21

## 2018-12-21 ENCOUNTER — AMBULATORY - HEALTHEAST (OUTPATIENT)
Dept: INFUSION THERAPY | Facility: HOSPITAL | Age: 63
End: 2018-12-21

## 2018-12-21 DIAGNOSIS — C34.32 MALIGNANT NEOPLASM OF LOWER LOBE OF LEFT LUNG (H): ICD-10-CM

## 2018-12-21 DIAGNOSIS — Z51.11 ENCOUNTER FOR ANTINEOPLASTIC CHEMOTHERAPY: ICD-10-CM

## 2018-12-21 DIAGNOSIS — N28.9 RENAL INSUFFICIENCY: ICD-10-CM

## 2018-12-21 LAB
ALBUMIN SERPL-MCNC: 3.1 G/DL (ref 3.5–5)
ALP SERPL-CCNC: 88 U/L (ref 45–120)
ALT SERPL W P-5'-P-CCNC: 15 U/L (ref 0–45)
ANION GAP SERPL CALCULATED.3IONS-SCNC: 8 MMOL/L (ref 5–18)
AST SERPL W P-5'-P-CCNC: 20 U/L (ref 0–40)
BASOPHILS # BLD AUTO: 0 THOU/UL (ref 0–0.2)
BASOPHILS NFR BLD AUTO: 0 % (ref 0–2)
BILIRUB SERPL-MCNC: 0.8 MG/DL (ref 0–1)
BUN SERPL-MCNC: 64 MG/DL (ref 8–22)
CALCIUM SERPL-MCNC: 9.2 MG/DL (ref 8.5–10.5)
CHLORIDE BLD-SCNC: 103 MMOL/L (ref 98–107)
CO2 SERPL-SCNC: 29 MMOL/L (ref 22–31)
CREAT SERPL-MCNC: 2.15 MG/DL (ref 0.7–1.3)
EOSINOPHIL # BLD AUTO: 0.4 THOU/UL (ref 0–0.4)
EOSINOPHIL NFR BLD AUTO: 5 % (ref 0–6)
ERYTHROCYTE [DISTWIDTH] IN BLOOD BY AUTOMATED COUNT: 14.9 % (ref 11–14.5)
GFR SERPL CREATININE-BSD FRML MDRD: 31 ML/MIN/1.73M2
GLUCOSE BLD-MCNC: 96 MG/DL (ref 70–125)
HCT VFR BLD AUTO: 38.3 % (ref 40–54)
HGB BLD-MCNC: 13.4 G/DL (ref 14–18)
LYMPHOCYTES # BLD AUTO: 2.7 THOU/UL (ref 0.8–4.4)
LYMPHOCYTES NFR BLD AUTO: 31 % (ref 20–40)
MCH RBC QN AUTO: 32.3 PG (ref 27–34)
MCHC RBC AUTO-ENTMCNC: 35 G/DL (ref 32–36)
MCV RBC AUTO: 92 FL (ref 80–100)
MONOCYTES # BLD AUTO: 1 THOU/UL (ref 0–0.9)
MONOCYTES NFR BLD AUTO: 12 % (ref 2–10)
NEUTROPHILS # BLD AUTO: 4.6 THOU/UL (ref 2–7.7)
NEUTROPHILS NFR BLD AUTO: 53 % (ref 50–70)
PLATELET # BLD AUTO: 372 THOU/UL (ref 140–440)
PMV BLD AUTO: 10.4 FL (ref 8.5–12.5)
POTASSIUM BLD-SCNC: 4.7 MMOL/L (ref 3.5–5)
PROT SERPL-MCNC: 6 G/DL (ref 6–8)
RBC # BLD AUTO: 4.15 MILL/UL (ref 4.4–6.2)
SODIUM SERPL-SCNC: 140 MMOL/L (ref 136–145)
WBC: 8.8 THOU/UL (ref 4–11)

## 2018-12-26 ENCOUNTER — INFUSION - HEALTHEAST (OUTPATIENT)
Dept: INFUSION THERAPY | Facility: HOSPITAL | Age: 63
End: 2018-12-26

## 2018-12-26 DIAGNOSIS — N28.9 RENAL INSUFFICIENCY: ICD-10-CM

## 2018-12-26 LAB
ANION GAP SERPL CALCULATED.3IONS-SCNC: 9 MMOL/L (ref 5–18)
BUN SERPL-MCNC: 38 MG/DL (ref 8–22)
CALCIUM SERPL-MCNC: 9.1 MG/DL (ref 8.5–10.5)
CHLORIDE BLD-SCNC: 108 MMOL/L (ref 98–107)
CO2 SERPL-SCNC: 21 MMOL/L (ref 22–31)
CREAT SERPL-MCNC: 1.76 MG/DL (ref 0.7–1.3)
GFR SERPL CREATININE-BSD FRML MDRD: 39 ML/MIN/1.73M2
GLUCOSE BLD-MCNC: 84 MG/DL (ref 70–125)
POTASSIUM BLD-SCNC: 4.8 MMOL/L (ref 3.5–5)
SODIUM SERPL-SCNC: 138 MMOL/L (ref 136–145)

## 2019-01-02 ENCOUNTER — COMMUNICATION - HEALTHEAST (OUTPATIENT)
Dept: FAMILY MEDICINE | Facility: CLINIC | Age: 64
End: 2019-01-02

## 2019-01-04 ENCOUNTER — OFFICE VISIT - HEALTHEAST (OUTPATIENT)
Dept: ONCOLOGY | Facility: HOSPITAL | Age: 64
End: 2019-01-04

## 2019-01-04 ENCOUNTER — INFUSION - HEALTHEAST (OUTPATIENT)
Dept: INFUSION THERAPY | Facility: HOSPITAL | Age: 64
End: 2019-01-04

## 2019-01-04 ENCOUNTER — AMBULATORY - HEALTHEAST (OUTPATIENT)
Dept: INFUSION THERAPY | Facility: HOSPITAL | Age: 64
End: 2019-01-04

## 2019-01-04 DIAGNOSIS — C34.32 MALIGNANT NEOPLASM OF LOWER LOBE OF LEFT LUNG (H): ICD-10-CM

## 2019-01-04 DIAGNOSIS — Z51.11 ENCOUNTER FOR ANTINEOPLASTIC CHEMOTHERAPY: ICD-10-CM

## 2019-01-04 LAB
ALBUMIN SERPL-MCNC: 3.4 G/DL (ref 3.5–5)
ALP SERPL-CCNC: 92 U/L (ref 45–120)
ALT SERPL W P-5'-P-CCNC: 15 U/L (ref 0–45)
ANION GAP SERPL CALCULATED.3IONS-SCNC: 9 MMOL/L (ref 5–18)
AST SERPL W P-5'-P-CCNC: 18 U/L (ref 0–40)
BASOPHILS # BLD AUTO: 0 THOU/UL (ref 0–0.2)
BASOPHILS NFR BLD AUTO: 0 % (ref 0–2)
BILIRUB SERPL-MCNC: 0.4 MG/DL (ref 0–1)
BUN SERPL-MCNC: 50 MG/DL (ref 8–22)
CALCIUM SERPL-MCNC: 9.8 MG/DL (ref 8.5–10.5)
CHLORIDE BLD-SCNC: 109 MMOL/L (ref 98–107)
CO2 SERPL-SCNC: 24 MMOL/L (ref 22–31)
CREAT SERPL-MCNC: 1.47 MG/DL (ref 0.7–1.3)
EOSINOPHIL # BLD AUTO: 0 THOU/UL (ref 0–0.4)
EOSINOPHIL NFR BLD AUTO: 0 % (ref 0–6)
ERYTHROCYTE [DISTWIDTH] IN BLOOD BY AUTOMATED COUNT: 15.6 % (ref 11–14.5)
GFR SERPL CREATININE-BSD FRML MDRD: 48 ML/MIN/1.73M2
GLUCOSE BLD-MCNC: 114 MG/DL (ref 70–125)
HCT VFR BLD AUTO: 34.3 % (ref 40–54)
HGB BLD-MCNC: 11.6 G/DL (ref 14–18)
LYMPHOCYTES # BLD AUTO: 2.4 THOU/UL (ref 0.8–4.4)
LYMPHOCYTES NFR BLD AUTO: 20 % (ref 20–40)
MAGNESIUM SERPL-MCNC: 1.8 MG/DL (ref 1.8–2.6)
MCH RBC QN AUTO: 32.1 PG (ref 27–34)
MCHC RBC AUTO-ENTMCNC: 33.8 G/DL (ref 32–36)
MCV RBC AUTO: 95 FL (ref 80–100)
MONOCYTES # BLD AUTO: 1.5 THOU/UL (ref 0–0.9)
MONOCYTES NFR BLD AUTO: 13 % (ref 2–10)
NEUTROPHILS # BLD AUTO: 7.6 THOU/UL (ref 2–7.7)
NEUTROPHILS NFR BLD AUTO: 67 % (ref 50–70)
PLATELET # BLD AUTO: 510 THOU/UL (ref 140–440)
PMV BLD AUTO: 10.6 FL (ref 8.5–12.5)
POTASSIUM BLD-SCNC: 5 MMOL/L (ref 3.5–5)
PROT SERPL-MCNC: 6.7 G/DL (ref 6–8)
RBC # BLD AUTO: 3.61 MILL/UL (ref 4.4–6.2)
SODIUM SERPL-SCNC: 142 MMOL/L (ref 136–145)
WBC: 11.5 THOU/UL (ref 4–11)

## 2019-01-08 ENCOUNTER — INFUSION - HEALTHEAST (OUTPATIENT)
Dept: INFUSION THERAPY | Facility: HOSPITAL | Age: 64
End: 2019-01-08

## 2019-01-08 ENCOUNTER — AMBULATORY - HEALTHEAST (OUTPATIENT)
Dept: INFUSION THERAPY | Facility: HOSPITAL | Age: 64
End: 2019-01-08

## 2019-01-08 DIAGNOSIS — E86.0 DEHYDRATION: ICD-10-CM

## 2019-01-08 DIAGNOSIS — N28.9 RENAL INSUFFICIENCY: ICD-10-CM

## 2019-01-08 DIAGNOSIS — C34.32 MALIGNANT NEOPLASM OF LOWER LOBE OF LEFT LUNG (H): ICD-10-CM

## 2019-01-08 LAB
ALBUMIN SERPL-MCNC: 3 G/DL (ref 3.5–5)
ALP SERPL-CCNC: 79 U/L (ref 45–120)
ALT SERPL W P-5'-P-CCNC: 18 U/L (ref 0–45)
ANION GAP SERPL CALCULATED.3IONS-SCNC: 6 MMOL/L (ref 5–18)
AST SERPL W P-5'-P-CCNC: 20 U/L (ref 0–40)
BASOPHILS # BLD AUTO: 0 THOU/UL (ref 0–0.2)
BASOPHILS NFR BLD AUTO: 0 % (ref 0–2)
BILIRUB SERPL-MCNC: 1 MG/DL (ref 0–1)
BUN SERPL-MCNC: 49 MG/DL (ref 8–22)
CALCIUM SERPL-MCNC: 8.8 MG/DL (ref 8.5–10.5)
CHLORIDE BLD-SCNC: 104 MMOL/L (ref 98–107)
CO2 SERPL-SCNC: 28 MMOL/L (ref 22–31)
CREAT SERPL-MCNC: 1.52 MG/DL (ref 0.7–1.3)
EOSINOPHIL # BLD AUTO: 0 THOU/UL (ref 0–0.4)
EOSINOPHIL NFR BLD AUTO: 0 % (ref 0–6)
ERYTHROCYTE [DISTWIDTH] IN BLOOD BY AUTOMATED COUNT: 15.1 % (ref 11–14.5)
GFR SERPL CREATININE-BSD FRML MDRD: 47 ML/MIN/1.73M2
GLUCOSE BLD-MCNC: 99 MG/DL (ref 70–125)
HCT VFR BLD AUTO: 36.7 % (ref 40–54)
HGB BLD-MCNC: 12.8 G/DL (ref 14–18)
LYMPHOCYTES # BLD AUTO: 2.9 THOU/UL (ref 0.8–4.4)
LYMPHOCYTES NFR BLD AUTO: 32 % (ref 20–40)
MCH RBC QN AUTO: 32.2 PG (ref 27–34)
MCHC RBC AUTO-ENTMCNC: 34.9 G/DL (ref 32–36)
MCV RBC AUTO: 92 FL (ref 80–100)
MONOCYTES # BLD AUTO: 0.4 THOU/UL (ref 0–0.9)
MONOCYTES NFR BLD AUTO: 4 % (ref 2–10)
NEUTROPHILS # BLD AUTO: 5.6 THOU/UL (ref 2–7.7)
NEUTROPHILS NFR BLD AUTO: 63 % (ref 50–70)
PLATELET # BLD AUTO: 528 THOU/UL (ref 140–440)
PMV BLD AUTO: 10.6 FL (ref 8.5–12.5)
POTASSIUM BLD-SCNC: 4.1 MMOL/L (ref 3.5–5)
PROT SERPL-MCNC: 5.8 G/DL (ref 6–8)
RBC # BLD AUTO: 3.97 MILL/UL (ref 4.4–6.2)
SODIUM SERPL-SCNC: 138 MMOL/L (ref 136–145)
WBC: 8.9 THOU/UL (ref 4–11)

## 2019-01-10 ENCOUNTER — COMMUNICATION - HEALTHEAST (OUTPATIENT)
Dept: ONCOLOGY | Facility: HOSPITAL | Age: 64
End: 2019-01-10

## 2019-01-10 ENCOUNTER — AMBULATORY - HEALTHEAST (OUTPATIENT)
Dept: ONCOLOGY | Facility: HOSPITAL | Age: 64
End: 2019-01-10

## 2019-01-10 ENCOUNTER — INFUSION - HEALTHEAST (OUTPATIENT)
Dept: INFUSION THERAPY | Facility: HOSPITAL | Age: 64
End: 2019-01-10

## 2019-01-10 DIAGNOSIS — N28.9 RENAL INSUFFICIENCY: ICD-10-CM

## 2019-01-10 DIAGNOSIS — C34.32 MALIGNANT NEOPLASM OF LOWER LOBE OF LEFT LUNG (H): ICD-10-CM

## 2019-01-10 LAB
ALBUMIN SERPL-MCNC: 2.9 G/DL (ref 3.5–5)
ALP SERPL-CCNC: 83 U/L (ref 45–120)
ALT SERPL W P-5'-P-CCNC: 17 U/L (ref 0–45)
ANION GAP SERPL CALCULATED.3IONS-SCNC: 8 MMOL/L (ref 5–18)
AST SERPL W P-5'-P-CCNC: 16 U/L (ref 0–40)
BILIRUB SERPL-MCNC: 0.5 MG/DL (ref 0–1)
BUN SERPL-MCNC: 51 MG/DL (ref 8–22)
CALCIUM SERPL-MCNC: 8.7 MG/DL (ref 8.5–10.5)
CHLORIDE BLD-SCNC: 108 MMOL/L (ref 98–107)
CO2 SERPL-SCNC: 24 MMOL/L (ref 22–31)
CREAT SERPL-MCNC: 1.37 MG/DL (ref 0.7–1.3)
GFR SERPL CREATININE-BSD FRML MDRD: 52 ML/MIN/1.73M2
GLUCOSE BLD-MCNC: 117 MG/DL (ref 70–125)
MAGNESIUM SERPL-MCNC: 1.5 MG/DL (ref 1.8–2.6)
POTASSIUM BLD-SCNC: 4.7 MMOL/L (ref 3.5–5)
PROT SERPL-MCNC: 5.5 G/DL (ref 6–8)
SODIUM SERPL-SCNC: 140 MMOL/L (ref 136–145)

## 2019-01-14 ENCOUNTER — INFUSION - HEALTHEAST (OUTPATIENT)
Dept: INFUSION THERAPY | Facility: HOSPITAL | Age: 64
End: 2019-01-14

## 2019-01-14 ENCOUNTER — COMMUNICATION - HEALTHEAST (OUTPATIENT)
Dept: ONCOLOGY | Facility: HOSPITAL | Age: 64
End: 2019-01-14

## 2019-01-14 ENCOUNTER — OFFICE VISIT - HEALTHEAST (OUTPATIENT)
Dept: ONCOLOGY | Facility: HOSPITAL | Age: 64
End: 2019-01-14

## 2019-01-14 ENCOUNTER — AMBULATORY - HEALTHEAST (OUTPATIENT)
Dept: INFUSION THERAPY | Facility: HOSPITAL | Age: 64
End: 2019-01-14

## 2019-01-14 DIAGNOSIS — C34.32 MALIGNANT NEOPLASM OF LOWER LOBE OF LEFT LUNG (H): ICD-10-CM

## 2019-01-14 DIAGNOSIS — Z51.11 ENCOUNTER FOR ANTINEOPLASTIC CHEMOTHERAPY: ICD-10-CM

## 2019-01-14 DIAGNOSIS — N28.9 RENAL INSUFFICIENCY: ICD-10-CM

## 2019-01-14 LAB
ANION GAP SERPL CALCULATED.3IONS-SCNC: 9 MMOL/L (ref 5–18)
BUN SERPL-MCNC: 39 MG/DL (ref 8–22)
CALCIUM SERPL-MCNC: 9.4 MG/DL (ref 8.5–10.5)
CHLORIDE BLD-SCNC: 108 MMOL/L (ref 98–107)
CO2 SERPL-SCNC: 25 MMOL/L (ref 22–31)
CREAT SERPL-MCNC: 1.52 MG/DL (ref 0.7–1.3)
GFR SERPL CREATININE-BSD FRML MDRD: 47 ML/MIN/1.73M2
GLUCOSE BLD-MCNC: 91 MG/DL (ref 70–125)
MAGNESIUM SERPL-MCNC: 1.6 MG/DL (ref 1.8–2.6)
POTASSIUM BLD-SCNC: 5.2 MMOL/L (ref 3.5–5)
SODIUM SERPL-SCNC: 142 MMOL/L (ref 136–145)

## 2019-01-24 ENCOUNTER — OFFICE VISIT - HEALTHEAST (OUTPATIENT)
Dept: ONCOLOGY | Facility: HOSPITAL | Age: 64
End: 2019-01-24

## 2019-01-24 ENCOUNTER — AMBULATORY - HEALTHEAST (OUTPATIENT)
Dept: INFUSION THERAPY | Facility: HOSPITAL | Age: 64
End: 2019-01-24

## 2019-01-24 DIAGNOSIS — Z51.11 ENCOUNTER FOR ANTINEOPLASTIC CHEMOTHERAPY: ICD-10-CM

## 2019-01-24 DIAGNOSIS — C34.32 MALIGNANT NEOPLASM OF LOWER LOBE OF LEFT LUNG (H): ICD-10-CM

## 2019-01-24 LAB
ALBUMIN SERPL-MCNC: 3.3 G/DL (ref 3.5–5)
ALP SERPL-CCNC: 98 U/L (ref 45–120)
ALT SERPL W P-5'-P-CCNC: 10 U/L (ref 0–45)
ANION GAP SERPL CALCULATED.3IONS-SCNC: 4 MMOL/L (ref 5–18)
AST SERPL W P-5'-P-CCNC: 18 U/L (ref 0–40)
BASOPHILS # BLD AUTO: 0 THOU/UL (ref 0–0.2)
BASOPHILS NFR BLD AUTO: 0 % (ref 0–2)
BILIRUB SERPL-MCNC: 0.5 MG/DL (ref 0–1)
BUN SERPL-MCNC: 36 MG/DL (ref 8–22)
CALCIUM SERPL-MCNC: 9.4 MG/DL (ref 8.5–10.5)
CHLORIDE BLD-SCNC: 107 MMOL/L (ref 98–107)
CO2 SERPL-SCNC: 30 MMOL/L (ref 22–31)
CREAT SERPL-MCNC: 1.34 MG/DL (ref 0.7–1.3)
EOSINOPHIL # BLD AUTO: 0.4 THOU/UL (ref 0–0.4)
EOSINOPHIL NFR BLD AUTO: 8 % (ref 0–6)
ERYTHROCYTE [DISTWIDTH] IN BLOOD BY AUTOMATED COUNT: 15.6 % (ref 11–14.5)
GFR SERPL CREATININE-BSD FRML MDRD: 54 ML/MIN/1.73M2
GLUCOSE BLD-MCNC: 78 MG/DL (ref 70–125)
HCT VFR BLD AUTO: 34.8 % (ref 40–54)
HGB BLD-MCNC: 12 G/DL (ref 14–18)
LYMPHOCYTES # BLD AUTO: 2.5 THOU/UL (ref 0.8–4.4)
LYMPHOCYTES NFR BLD AUTO: 46 % (ref 20–40)
MAGNESIUM SERPL-MCNC: 1.7 MG/DL (ref 1.8–2.6)
MCH RBC QN AUTO: 31.9 PG (ref 27–34)
MCHC RBC AUTO-ENTMCNC: 34.5 G/DL (ref 32–36)
MCV RBC AUTO: 93 FL (ref 80–100)
MONOCYTES # BLD AUTO: 1 THOU/UL (ref 0–0.9)
MONOCYTES NFR BLD AUTO: 18 % (ref 2–10)
NEUTROPHILS # BLD AUTO: 1.5 THOU/UL (ref 2–7.7)
NEUTROPHILS NFR BLD AUTO: 28 % (ref 50–70)
PLATELET # BLD AUTO: 443 THOU/UL (ref 140–440)
PMV BLD AUTO: 9.8 FL (ref 8.5–12.5)
POTASSIUM BLD-SCNC: 4.7 MMOL/L (ref 3.5–5)
PROT SERPL-MCNC: 6.3 G/DL (ref 6–8)
RBC # BLD AUTO: 3.76 MILL/UL (ref 4.4–6.2)
SODIUM SERPL-SCNC: 141 MMOL/L (ref 136–145)
WBC: 5.4 THOU/UL (ref 4–11)

## 2019-01-25 ENCOUNTER — AMBULATORY - HEALTHEAST (OUTPATIENT)
Dept: ONCOLOGY | Facility: HOSPITAL | Age: 64
End: 2019-01-25

## 2019-01-25 ENCOUNTER — INFUSION - HEALTHEAST (OUTPATIENT)
Dept: INFUSION THERAPY | Facility: HOSPITAL | Age: 64
End: 2019-01-25

## 2019-01-25 DIAGNOSIS — C34.32 MALIGNANT NEOPLASM OF LOWER LOBE OF LEFT LUNG (H): ICD-10-CM

## 2019-01-25 DIAGNOSIS — Z51.11 ENCOUNTER FOR ANTINEOPLASTIC CHEMOTHERAPY: ICD-10-CM

## 2019-01-29 ENCOUNTER — INFUSION - HEALTHEAST (OUTPATIENT)
Dept: INFUSION THERAPY | Facility: HOSPITAL | Age: 64
End: 2019-01-29

## 2019-01-29 DIAGNOSIS — N28.9 RENAL INSUFFICIENCY: ICD-10-CM

## 2019-02-01 ENCOUNTER — INFUSION - HEALTHEAST (OUTPATIENT)
Dept: INFUSION THERAPY | Facility: HOSPITAL | Age: 64
End: 2019-02-01

## 2019-02-01 DIAGNOSIS — N28.9 RENAL INSUFFICIENCY: ICD-10-CM

## 2019-02-01 DIAGNOSIS — C34.32 MALIGNANT NEOPLASM OF LOWER LOBE OF LEFT LUNG (H): ICD-10-CM

## 2019-02-01 LAB
ALBUMIN SERPL-MCNC: 2.7 G/DL (ref 3.5–5)
ALP SERPL-CCNC: 86 U/L (ref 45–120)
ALT SERPL W P-5'-P-CCNC: 14 U/L (ref 0–45)
ANION GAP SERPL CALCULATED.3IONS-SCNC: 5 MMOL/L (ref 5–18)
AST SERPL W P-5'-P-CCNC: 16 U/L (ref 0–40)
BILIRUB SERPL-MCNC: 0.3 MG/DL (ref 0–1)
BUN SERPL-MCNC: 44 MG/DL (ref 8–22)
CALCIUM SERPL-MCNC: 8.4 MG/DL (ref 8.5–10.5)
CHLORIDE BLD-SCNC: 107 MMOL/L (ref 98–107)
CO2 SERPL-SCNC: 26 MMOL/L (ref 22–31)
CREAT SERPL-MCNC: 1.31 MG/DL (ref 0.7–1.3)
GFR SERPL CREATININE-BSD FRML MDRD: 55 ML/MIN/1.73M2
GLUCOSE BLD-MCNC: 90 MG/DL (ref 70–125)
MAGNESIUM SERPL-MCNC: 1.4 MG/DL (ref 1.8–2.6)
POTASSIUM BLD-SCNC: 4.7 MMOL/L (ref 3.5–5)
PROT SERPL-MCNC: 5.1 G/DL (ref 6–8)
SODIUM SERPL-SCNC: 138 MMOL/L (ref 136–145)

## 2019-02-02 ENCOUNTER — COMMUNICATION - HEALTHEAST (OUTPATIENT)
Dept: ONCOLOGY | Facility: HOSPITAL | Age: 64
End: 2019-02-02

## 2019-02-02 DIAGNOSIS — Z51.11 ENCOUNTER FOR ANTINEOPLASTIC CHEMOTHERAPY: ICD-10-CM

## 2019-02-02 DIAGNOSIS — C34.32 MALIGNANT NEOPLASM OF LOWER LOBE OF LEFT LUNG (H): ICD-10-CM

## 2019-02-04 ENCOUNTER — INFUSION - HEALTHEAST (OUTPATIENT)
Dept: INFUSION THERAPY | Facility: HOSPITAL | Age: 64
End: 2019-02-04

## 2019-02-04 ENCOUNTER — AMBULATORY - HEALTHEAST (OUTPATIENT)
Dept: INFUSION THERAPY | Facility: HOSPITAL | Age: 64
End: 2019-02-04

## 2019-02-04 DIAGNOSIS — N28.9 RENAL INSUFFICIENCY: ICD-10-CM

## 2019-02-04 DIAGNOSIS — E86.0 DEHYDRATION: ICD-10-CM

## 2019-02-04 LAB
ANION GAP SERPL CALCULATED.3IONS-SCNC: 5 MMOL/L (ref 5–18)
BUN SERPL-MCNC: 35 MG/DL (ref 8–22)
CALCIUM SERPL-MCNC: 8.8 MG/DL (ref 8.5–10.5)
CHLORIDE BLD-SCNC: 106 MMOL/L (ref 98–107)
CO2 SERPL-SCNC: 28 MMOL/L (ref 22–31)
CREAT SERPL-MCNC: 1.51 MG/DL (ref 0.7–1.3)
GFR SERPL CREATININE-BSD FRML MDRD: 47 ML/MIN/1.73M2
GLUCOSE BLD-MCNC: 89 MG/DL (ref 70–125)
MAGNESIUM SERPL-MCNC: 1.5 MG/DL (ref 1.8–2.6)
POTASSIUM BLD-SCNC: 5.2 MMOL/L (ref 3.5–5)
SODIUM SERPL-SCNC: 139 MMOL/L (ref 136–145)

## 2019-02-07 ENCOUNTER — INFUSION - HEALTHEAST (OUTPATIENT)
Dept: INFUSION THERAPY | Facility: HOSPITAL | Age: 64
End: 2019-02-07

## 2019-02-07 ENCOUNTER — AMBULATORY - HEALTHEAST (OUTPATIENT)
Dept: INFUSION THERAPY | Facility: HOSPITAL | Age: 64
End: 2019-02-07

## 2019-02-07 DIAGNOSIS — N28.9 RENAL INSUFFICIENCY: ICD-10-CM

## 2019-02-07 LAB
MAGNESIUM SERPL-MCNC: 1.5 MG/DL (ref 1.8–2.6)
POTASSIUM BLD-SCNC: 4.6 MMOL/L (ref 3.5–5)

## 2019-02-15 ENCOUNTER — AMBULATORY - HEALTHEAST (OUTPATIENT)
Dept: INFUSION THERAPY | Facility: HOSPITAL | Age: 64
End: 2019-02-15

## 2019-02-15 ENCOUNTER — OFFICE VISIT - HEALTHEAST (OUTPATIENT)
Dept: ONCOLOGY | Facility: HOSPITAL | Age: 64
End: 2019-02-15

## 2019-02-15 ENCOUNTER — INFUSION - HEALTHEAST (OUTPATIENT)
Dept: INFUSION THERAPY | Facility: HOSPITAL | Age: 64
End: 2019-02-15

## 2019-02-15 ENCOUNTER — COMMUNICATION - HEALTHEAST (OUTPATIENT)
Dept: ONCOLOGY | Facility: HOSPITAL | Age: 64
End: 2019-02-15

## 2019-02-15 DIAGNOSIS — C34.32 MALIGNANT NEOPLASM OF LOWER LOBE OF LEFT LUNG (H): ICD-10-CM

## 2019-02-15 DIAGNOSIS — Z51.11 ENCOUNTER FOR ANTINEOPLASTIC CHEMOTHERAPY: ICD-10-CM

## 2019-02-15 LAB
ALBUMIN SERPL-MCNC: 3.4 G/DL (ref 3.5–5)
ALP SERPL-CCNC: 103 U/L (ref 45–120)
ALT SERPL W P-5'-P-CCNC: 10 U/L (ref 0–45)
ANION GAP SERPL CALCULATED.3IONS-SCNC: 6 MMOL/L (ref 5–18)
AST SERPL W P-5'-P-CCNC: 16 U/L (ref 0–40)
BASOPHILS # BLD AUTO: 0 THOU/UL (ref 0–0.2)
BASOPHILS NFR BLD AUTO: 0 % (ref 0–2)
BILIRUB SERPL-MCNC: 0.5 MG/DL (ref 0–1)
BUN SERPL-MCNC: 32 MG/DL (ref 8–22)
CALCIUM SERPL-MCNC: 9.9 MG/DL (ref 8.5–10.5)
CHLORIDE BLD-SCNC: 108 MMOL/L (ref 98–107)
CO2 SERPL-SCNC: 29 MMOL/L (ref 22–31)
CREAT SERPL-MCNC: 1.29 MG/DL (ref 0.7–1.3)
EOSINOPHIL COUNT (ABSOLUTE): 0.2 THOU/UL (ref 0–0.4)
EOSINOPHIL NFR BLD AUTO: 2 % (ref 0–6)
ERYTHROCYTE [DISTWIDTH] IN BLOOD BY AUTOMATED COUNT: 16.8 % (ref 11–14.5)
GFR SERPL CREATININE-BSD FRML MDRD: 56 ML/MIN/1.73M2
GLUCOSE BLD-MCNC: 72 MG/DL (ref 70–125)
HCT VFR BLD AUTO: 30.6 % (ref 40–54)
HGB BLD-MCNC: 10.6 G/DL (ref 14–18)
LYMPHOCYTES # BLD AUTO: 3.1 THOU/UL (ref 0.8–4.4)
LYMPHOCYTES NFR BLD AUTO: 34 % (ref 20–40)
MAGNESIUM SERPL-MCNC: 1.6 MG/DL (ref 1.8–2.6)
MCH RBC QN AUTO: 32.2 PG (ref 27–34)
MCHC RBC AUTO-ENTMCNC: 34.6 G/DL (ref 32–36)
MCV RBC AUTO: 93 FL (ref 80–100)
MONOCYTES # BLD AUTO: 1.7 THOU/UL (ref 0–0.9)
MONOCYTES NFR BLD AUTO: 19 % (ref 2–10)
OVALOCYTES: ABNORMAL
PLAT MORPH BLD: ABNORMAL
PLATELET # BLD AUTO: 528 THOU/UL (ref 140–440)
PMV BLD AUTO: 9.8 FL (ref 8.5–12.5)
POTASSIUM BLD-SCNC: 4.4 MMOL/L (ref 3.5–5)
PROT SERPL-MCNC: 6.7 G/DL (ref 6–8)
RBC # BLD AUTO: 3.29 MILL/UL (ref 4.4–6.2)
SCHISTOCYTES: ABNORMAL
SODIUM SERPL-SCNC: 143 MMOL/L (ref 136–145)
TOTAL NEUTROPHILS-ABS(DIFF): 4.1 THOU/UL (ref 2–7.7)
TOTAL NEUTROPHILS-REL(DIFF): 45 % (ref 50–70)
WBC: 9.2 THOU/UL (ref 4–11)

## 2019-02-18 ENCOUNTER — INFUSION - HEALTHEAST (OUTPATIENT)
Dept: INFUSION THERAPY | Facility: HOSPITAL | Age: 64
End: 2019-02-18

## 2019-02-18 DIAGNOSIS — N28.9 RENAL INSUFFICIENCY: ICD-10-CM

## 2019-02-21 ENCOUNTER — INFUSION - HEALTHEAST (OUTPATIENT)
Dept: INFUSION THERAPY | Facility: HOSPITAL | Age: 64
End: 2019-02-21

## 2019-02-21 ENCOUNTER — AMBULATORY - HEALTHEAST (OUTPATIENT)
Dept: INFUSION THERAPY | Facility: HOSPITAL | Age: 64
End: 2019-02-21

## 2019-02-21 DIAGNOSIS — C34.32 MALIGNANT NEOPLASM OF LOWER LOBE OF LEFT LUNG (H): ICD-10-CM

## 2019-02-21 DIAGNOSIS — N28.9 RENAL INSUFFICIENCY: ICD-10-CM

## 2019-02-21 LAB
ALBUMIN SERPL-MCNC: 3 G/DL (ref 3.5–5)
ALP SERPL-CCNC: 90 U/L (ref 45–120)
ALT SERPL W P-5'-P-CCNC: 16 U/L (ref 0–45)
ANION GAP SERPL CALCULATED.3IONS-SCNC: 6 MMOL/L (ref 5–18)
AST SERPL W P-5'-P-CCNC: 21 U/L (ref 0–40)
BILIRUB SERPL-MCNC: 0.6 MG/DL (ref 0–1)
BUN SERPL-MCNC: 42 MG/DL (ref 8–22)
CALCIUM SERPL-MCNC: 9.1 MG/DL (ref 8.5–10.5)
CHLORIDE BLD-SCNC: 106 MMOL/L (ref 98–107)
CO2 SERPL-SCNC: 30 MMOL/L (ref 22–31)
CREAT SERPL-MCNC: 1.45 MG/DL (ref 0.7–1.3)
GFR SERPL CREATININE-BSD FRML MDRD: 49 ML/MIN/1.73M2
GLUCOSE BLD-MCNC: 110 MG/DL (ref 70–125)
MAGNESIUM SERPL-MCNC: 1.3 MG/DL (ref 1.8–2.6)
POTASSIUM BLD-SCNC: 4.7 MMOL/L (ref 3.5–5)
PROT SERPL-MCNC: 5.8 G/DL (ref 6–8)
SODIUM SERPL-SCNC: 142 MMOL/L (ref 136–145)

## 2019-02-25 ENCOUNTER — INFUSION - HEALTHEAST (OUTPATIENT)
Dept: INFUSION THERAPY | Facility: HOSPITAL | Age: 64
End: 2019-02-25

## 2019-02-25 ENCOUNTER — AMBULATORY - HEALTHEAST (OUTPATIENT)
Dept: INFUSION THERAPY | Facility: HOSPITAL | Age: 64
End: 2019-02-25

## 2019-02-25 DIAGNOSIS — C34.32 MALIGNANT NEOPLASM OF LOWER LOBE OF LEFT LUNG (H): ICD-10-CM

## 2019-02-25 DIAGNOSIS — N28.9 RENAL INSUFFICIENCY: ICD-10-CM

## 2019-02-25 LAB
ALBUMIN SERPL-MCNC: 3.1 G/DL (ref 3.5–5)
ALP SERPL-CCNC: 102 U/L (ref 45–120)
ALT SERPL W P-5'-P-CCNC: 12 U/L (ref 0–45)
ANION GAP SERPL CALCULATED.3IONS-SCNC: 5 MMOL/L (ref 5–18)
AST SERPL W P-5'-P-CCNC: 22 U/L (ref 0–40)
BILIRUB SERPL-MCNC: 0.5 MG/DL (ref 0–1)
BUN SERPL-MCNC: 30 MG/DL (ref 8–22)
CALCIUM SERPL-MCNC: 9.2 MG/DL (ref 8.5–10.5)
CHLORIDE BLD-SCNC: 107 MMOL/L (ref 98–107)
CO2 SERPL-SCNC: 27 MMOL/L (ref 22–31)
CREAT SERPL-MCNC: 1.28 MG/DL (ref 0.7–1.3)
GFR SERPL CREATININE-BSD FRML MDRD: 57 ML/MIN/1.73M2
GLUCOSE BLD-MCNC: 91 MG/DL (ref 70–125)
MAGNESIUM SERPL-MCNC: 1.6 MG/DL (ref 1.8–2.6)
POTASSIUM BLD-SCNC: 5 MMOL/L (ref 3.5–5)
PROT SERPL-MCNC: 6.3 G/DL (ref 6–8)
SODIUM SERPL-SCNC: 139 MMOL/L (ref 136–145)

## 2019-03-07 ENCOUNTER — OFFICE VISIT - HEALTHEAST (OUTPATIENT)
Dept: ONCOLOGY | Facility: HOSPITAL | Age: 64
End: 2019-03-07

## 2019-03-07 ENCOUNTER — AMBULATORY - HEALTHEAST (OUTPATIENT)
Dept: INFUSION THERAPY | Facility: HOSPITAL | Age: 64
End: 2019-03-07

## 2019-03-07 DIAGNOSIS — C34.32 MALIGNANT NEOPLASM OF LOWER LOBE OF LEFT LUNG (H): ICD-10-CM

## 2019-03-07 LAB
ALBUMIN SERPL-MCNC: 3.1 G/DL (ref 3.5–5)
ALP SERPL-CCNC: 91 U/L (ref 45–120)
ALT SERPL W P-5'-P-CCNC: 10 U/L (ref 0–45)
ANION GAP SERPL CALCULATED.3IONS-SCNC: 6 MMOL/L (ref 5–18)
AST SERPL W P-5'-P-CCNC: 21 U/L (ref 0–40)
BASOPHILS # BLD AUTO: 0 THOU/UL (ref 0–0.2)
BASOPHILS NFR BLD AUTO: 1 % (ref 0–2)
BILIRUB SERPL-MCNC: 0.5 MG/DL (ref 0–1)
BUN SERPL-MCNC: 29 MG/DL (ref 8–22)
CALCIUM SERPL-MCNC: 9.6 MG/DL (ref 8.5–10.5)
CHLORIDE BLD-SCNC: 107 MMOL/L (ref 98–107)
CO2 SERPL-SCNC: 27 MMOL/L (ref 22–31)
CREAT SERPL-MCNC: 1.41 MG/DL (ref 0.7–1.3)
EOSINOPHIL # BLD AUTO: 0.3 THOU/UL (ref 0–0.4)
EOSINOPHIL NFR BLD AUTO: 4 % (ref 0–6)
ERYTHROCYTE [DISTWIDTH] IN BLOOD BY AUTOMATED COUNT: 18 % (ref 11–14.5)
GFR SERPL CREATININE-BSD FRML MDRD: 51 ML/MIN/1.73M2
GLUCOSE BLD-MCNC: 106 MG/DL (ref 70–125)
HCT VFR BLD AUTO: 28.8 % (ref 40–54)
HGB BLD-MCNC: 9.7 G/DL (ref 14–18)
LYMPHOCYTES # BLD AUTO: 2.1 THOU/UL (ref 0.8–4.4)
LYMPHOCYTES NFR BLD AUTO: 29 % (ref 20–40)
MAGNESIUM SERPL-MCNC: 1.5 MG/DL (ref 1.8–2.6)
MCH RBC QN AUTO: 32.2 PG (ref 27–34)
MCHC RBC AUTO-ENTMCNC: 33.7 G/DL (ref 32–36)
MCV RBC AUTO: 96 FL (ref 80–100)
MONOCYTES # BLD AUTO: 1.4 THOU/UL (ref 0–0.9)
MONOCYTES NFR BLD AUTO: 19 % (ref 2–10)
NEUTROPHILS # BLD AUTO: 3.5 THOU/UL (ref 2–7.7)
NEUTROPHILS NFR BLD AUTO: 48 % (ref 50–70)
PLATELET # BLD AUTO: 470 THOU/UL (ref 140–440)
PMV BLD AUTO: 9.3 FL (ref 8.5–12.5)
POTASSIUM BLD-SCNC: 4.6 MMOL/L (ref 3.5–5)
PROT SERPL-MCNC: 6.7 G/DL (ref 6–8)
RBC # BLD AUTO: 3.01 MILL/UL (ref 4.4–6.2)
SODIUM SERPL-SCNC: 140 MMOL/L (ref 136–145)
WBC: 7.4 THOU/UL (ref 4–11)

## 2019-03-11 ENCOUNTER — COMMUNICATION - HEALTHEAST (OUTPATIENT)
Dept: ONCOLOGY | Facility: HOSPITAL | Age: 64
End: 2019-03-11

## 2019-03-11 DIAGNOSIS — E83.42 HYPOMAGNESEMIA: ICD-10-CM

## 2019-03-12 ENCOUNTER — COMMUNICATION - HEALTHEAST (OUTPATIENT)
Dept: ONCOLOGY | Facility: HOSPITAL | Age: 64
End: 2019-03-12

## 2019-03-12 DIAGNOSIS — C34.32 MALIGNANT NEOPLASM OF LOWER LOBE OF LEFT LUNG (H): ICD-10-CM

## 2019-03-12 DIAGNOSIS — Z51.11 ENCOUNTER FOR ANTINEOPLASTIC CHEMOTHERAPY: ICD-10-CM

## 2019-04-19 ENCOUNTER — COMMUNICATION - HEALTHEAST (OUTPATIENT)
Dept: ADMINISTRATIVE | Facility: HOSPITAL | Age: 64
End: 2019-04-19

## 2019-05-21 ENCOUNTER — HOSPITAL ENCOUNTER (OUTPATIENT)
Dept: CT IMAGING | Facility: HOSPITAL | Age: 64
Setting detail: RADIATION/ONCOLOGY SERIES
Discharge: STILL ADMITTED OR ADMINISTRATIVE | End: 2019-05-21
Attending: INTERNAL MEDICINE

## 2019-05-21 DIAGNOSIS — C34.32 MALIGNANT NEOPLASM OF LOWER LOBE OF LEFT LUNG (H): ICD-10-CM

## 2019-05-24 ENCOUNTER — COMMUNICATION - HEALTHEAST (OUTPATIENT)
Dept: INFUSION THERAPY | Facility: HOSPITAL | Age: 64
End: 2019-05-24

## 2019-05-24 ENCOUNTER — AMBULATORY - HEALTHEAST (OUTPATIENT)
Dept: INFUSION THERAPY | Facility: HOSPITAL | Age: 64
End: 2019-05-24

## 2019-05-24 ENCOUNTER — OFFICE VISIT - HEALTHEAST (OUTPATIENT)
Dept: ONCOLOGY | Facility: HOSPITAL | Age: 64
End: 2019-05-24

## 2019-05-24 DIAGNOSIS — C34.32 MALIGNANT NEOPLASM OF LOWER LOBE OF LEFT LUNG (H): ICD-10-CM

## 2019-05-24 LAB
ALBUMIN SERPL-MCNC: 3.7 G/DL (ref 3.5–5)
ALP SERPL-CCNC: 111 U/L (ref 45–120)
ALT SERPL W P-5'-P-CCNC: 21 U/L (ref 0–45)
ANION GAP SERPL CALCULATED.3IONS-SCNC: 8 MMOL/L (ref 5–18)
AST SERPL W P-5'-P-CCNC: 27 U/L (ref 0–40)
BASOPHILS # BLD AUTO: 0 THOU/UL (ref 0–0.2)
BASOPHILS NFR BLD AUTO: 1 % (ref 0–2)
BILIRUB SERPL-MCNC: 0.4 MG/DL (ref 0–1)
BUN SERPL-MCNC: 26 MG/DL (ref 8–22)
CALCIUM SERPL-MCNC: 9.9 MG/DL (ref 8.5–10.5)
CHLORIDE BLD-SCNC: 106 MMOL/L (ref 98–107)
CO2 SERPL-SCNC: 28 MMOL/L (ref 22–31)
CREAT SERPL-MCNC: 1.59 MG/DL (ref 0.7–1.3)
EOSINOPHIL # BLD AUTO: 0.3 THOU/UL (ref 0–0.4)
EOSINOPHIL NFR BLD AUTO: 4 % (ref 0–6)
ERYTHROCYTE [DISTWIDTH] IN BLOOD BY AUTOMATED COUNT: 13.3 % (ref 11–14.5)
GFR SERPL CREATININE-BSD FRML MDRD: 44 ML/MIN/1.73M2
GLUCOSE BLD-MCNC: 97 MG/DL (ref 70–125)
HCT VFR BLD AUTO: 39.3 % (ref 40–54)
HGB BLD-MCNC: 12.7 G/DL (ref 14–18)
LYMPHOCYTES # BLD AUTO: 3.7 THOU/UL (ref 0.8–4.4)
LYMPHOCYTES NFR BLD AUTO: 47 % (ref 20–40)
MAGNESIUM SERPL-MCNC: 1.9 MG/DL (ref 1.8–2.6)
MCH RBC QN AUTO: 31.4 PG (ref 27–34)
MCHC RBC AUTO-ENTMCNC: 32.3 G/DL (ref 32–36)
MCV RBC AUTO: 97 FL (ref 80–100)
MONOCYTES # BLD AUTO: 1 THOU/UL (ref 0–0.9)
MONOCYTES NFR BLD AUTO: 12 % (ref 2–10)
NEUTROPHILS # BLD AUTO: 2.9 THOU/UL (ref 2–7.7)
NEUTROPHILS NFR BLD AUTO: 36 % (ref 50–70)
PLATELET # BLD AUTO: 402 THOU/UL (ref 140–440)
PMV BLD AUTO: 10.5 FL (ref 8.5–12.5)
POTASSIUM BLD-SCNC: 5 MMOL/L (ref 3.5–5)
PROT SERPL-MCNC: 7.7 G/DL (ref 6–8)
RBC # BLD AUTO: 4.04 MILL/UL (ref 4.4–6.2)
SODIUM SERPL-SCNC: 142 MMOL/L (ref 136–145)
WBC: 7.9 THOU/UL (ref 4–11)

## 2019-06-05 ENCOUNTER — COMMUNICATION - HEALTHEAST (OUTPATIENT)
Dept: ONCOLOGY | Facility: CLINIC | Age: 64
End: 2019-06-05

## 2019-06-17 ENCOUNTER — COMMUNICATION - HEALTHEAST (OUTPATIENT)
Dept: ONCOLOGY | Facility: HOSPITAL | Age: 64
End: 2019-06-17

## 2019-08-06 ENCOUNTER — COMMUNICATION - HEALTHEAST (OUTPATIENT)
Dept: ONCOLOGY | Facility: CLINIC | Age: 64
End: 2019-08-06

## 2019-08-23 ENCOUNTER — COMMUNICATION - HEALTHEAST (OUTPATIENT)
Dept: ONCOLOGY | Facility: HOSPITAL | Age: 64
End: 2019-08-23

## 2019-08-23 ENCOUNTER — AMBULATORY - HEALTHEAST (OUTPATIENT)
Dept: INFUSION THERAPY | Facility: HOSPITAL | Age: 64
End: 2019-08-23

## 2019-08-23 ENCOUNTER — OFFICE VISIT - HEALTHEAST (OUTPATIENT)
Dept: ONCOLOGY | Facility: HOSPITAL | Age: 64
End: 2019-08-23

## 2019-08-23 DIAGNOSIS — C34.32 MALIGNANT NEOPLASM OF LOWER LOBE OF LEFT LUNG (H): ICD-10-CM

## 2019-08-23 LAB
ALBUMIN SERPL-MCNC: 3.8 G/DL (ref 3.5–5)
ALP SERPL-CCNC: 112 U/L (ref 45–120)
ALT SERPL W P-5'-P-CCNC: 11 U/L (ref 0–45)
ANION GAP SERPL CALCULATED.3IONS-SCNC: 7 MMOL/L (ref 5–18)
AST SERPL W P-5'-P-CCNC: 20 U/L (ref 0–40)
BILIRUB SERPL-MCNC: 1.1 MG/DL (ref 0–1)
BUN SERPL-MCNC: 26 MG/DL (ref 8–22)
CALCIUM SERPL-MCNC: 9.9 MG/DL (ref 8.5–10.5)
CHLORIDE BLD-SCNC: 106 MMOL/L (ref 98–107)
CO2 SERPL-SCNC: 29 MMOL/L (ref 22–31)
CREAT SERPL-MCNC: 1.71 MG/DL (ref 0.7–1.3)
GFR SERPL CREATININE-BSD FRML MDRD: 41 ML/MIN/1.73M2
GLUCOSE BLD-MCNC: 101 MG/DL (ref 70–125)
POTASSIUM BLD-SCNC: 4.6 MMOL/L (ref 3.5–5)
PROT SERPL-MCNC: 7.5 G/DL (ref 6–8)
SODIUM SERPL-SCNC: 142 MMOL/L (ref 136–145)

## 2019-10-09 ENCOUNTER — RECORDS - HEALTHEAST (OUTPATIENT)
Dept: ADMINISTRATIVE | Facility: OTHER | Age: 64
End: 2019-10-09

## 2019-10-14 ENCOUNTER — RECORDS - HEALTHEAST (OUTPATIENT)
Dept: ADMINISTRATIVE | Facility: OTHER | Age: 64
End: 2019-10-14

## 2019-10-15 ENCOUNTER — OFFICE VISIT - HEALTHEAST (OUTPATIENT)
Dept: FAMILY MEDICINE | Facility: CLINIC | Age: 64
End: 2019-10-15

## 2019-10-15 DIAGNOSIS — Q89.01 ASPLENIA: ICD-10-CM

## 2019-10-15 DIAGNOSIS — R05.9 COUGH: ICD-10-CM

## 2019-10-15 DIAGNOSIS — N28.9 RENAL INSUFFICIENCY: ICD-10-CM

## 2019-10-15 DIAGNOSIS — C34.32 MALIGNANT NEOPLASM OF LOWER LOBE OF LEFT LUNG (H): ICD-10-CM

## 2019-10-18 ENCOUNTER — COMMUNICATION - HEALTHEAST (OUTPATIENT)
Dept: ONCOLOGY | Facility: HOSPITAL | Age: 64
End: 2019-10-18

## 2019-10-22 ENCOUNTER — HOSPITAL ENCOUNTER (OUTPATIENT)
Dept: ULTRASOUND IMAGING | Facility: HOSPITAL | Age: 64
Discharge: HOME | End: 2019-10-22
Attending: INTERNAL MEDICINE

## 2019-10-22 ENCOUNTER — COMMUNICATION - HEALTHEAST (OUTPATIENT)
Dept: ONCOLOGY | Facility: HOSPITAL | Age: 64
End: 2019-10-22

## 2019-10-22 DIAGNOSIS — N18.30 CKD (CHRONIC KIDNEY DISEASE) STAGE 3, GFR 30-59 ML/MIN (H): ICD-10-CM

## 2019-10-22 DIAGNOSIS — I10 HYPERTENSION: ICD-10-CM

## 2019-10-22 DIAGNOSIS — N18.30 ANEMIA IN STAGE 3 CHRONIC KIDNEY DISEASE (H): ICD-10-CM

## 2019-10-22 DIAGNOSIS — D63.1 ANEMIA IN STAGE 3 CHRONIC KIDNEY DISEASE (H): ICD-10-CM

## 2019-10-22 DIAGNOSIS — C34.32 MALIGNANT NEOPLASM OF LOWER LOBE OF LEFT LUNG (H): ICD-10-CM

## 2019-10-22 DIAGNOSIS — N25.81 SECONDARY HYPERPARATHYROIDISM OF RENAL ORIGIN (H): ICD-10-CM

## 2019-10-28 ENCOUNTER — COMMUNICATION - HEALTHEAST (OUTPATIENT)
Dept: PULMONOLOGY | Facility: OTHER | Age: 64
End: 2019-10-28

## 2019-10-28 ENCOUNTER — OFFICE VISIT - HEALTHEAST (OUTPATIENT)
Dept: PULMONOLOGY | Facility: OTHER | Age: 64
End: 2019-10-28

## 2019-10-28 ENCOUNTER — COMMUNICATION - HEALTHEAST (OUTPATIENT)
Dept: FAMILY MEDICINE | Facility: CLINIC | Age: 64
End: 2019-10-28

## 2019-10-28 DIAGNOSIS — Z00.00 HEALTHCARE MAINTENANCE: ICD-10-CM

## 2019-10-28 DIAGNOSIS — I25.10 CORONARY ARTERY DISEASE INVOLVING NATIVE CORONARY ARTERY OF NATIVE HEART WITHOUT ANGINA PECTORIS: ICD-10-CM

## 2019-11-12 ENCOUNTER — HOSPITAL ENCOUNTER (OUTPATIENT)
Dept: CT IMAGING | Facility: HOSPITAL | Age: 64
Setting detail: RADIATION/ONCOLOGY SERIES
Discharge: STILL ADMITTED OR ADMINISTRATIVE | End: 2019-11-12
Attending: INTERNAL MEDICINE

## 2019-11-12 DIAGNOSIS — C34.32 MALIGNANT NEOPLASM OF LOWER LOBE OF LEFT LUNG (H): ICD-10-CM

## 2019-11-15 ENCOUNTER — OFFICE VISIT - HEALTHEAST (OUTPATIENT)
Dept: ONCOLOGY | Facility: HOSPITAL | Age: 64
End: 2019-11-15

## 2019-11-15 ENCOUNTER — AMBULATORY - HEALTHEAST (OUTPATIENT)
Dept: INFUSION THERAPY | Facility: HOSPITAL | Age: 64
End: 2019-11-15

## 2019-11-15 DIAGNOSIS — C34.32 MALIGNANT NEOPLASM OF LOWER LOBE OF LEFT LUNG (H): ICD-10-CM

## 2019-12-17 ENCOUNTER — COMMUNICATION - HEALTHEAST (OUTPATIENT)
Dept: FAMILY MEDICINE | Facility: CLINIC | Age: 64
End: 2019-12-17

## 2019-12-17 DIAGNOSIS — K21.9 ESOPHAGEAL REFLUX: ICD-10-CM

## 2019-12-17 DIAGNOSIS — I25.10 CORONARY ATHEROSCLEROSIS: ICD-10-CM

## 2019-12-17 DIAGNOSIS — E03.1 CONGENITAL HYPOTHYROIDISM WITHOUT GOITER: ICD-10-CM

## 2019-12-26 ENCOUNTER — AMBULATORY - HEALTHEAST (OUTPATIENT)
Dept: PHARMACY | Facility: HOSPITAL | Age: 64
End: 2019-12-26

## 2020-01-28 ENCOUNTER — AMBULATORY - HEALTHEAST (OUTPATIENT)
Dept: LAB | Facility: CLINIC | Age: 65
End: 2020-01-28

## 2020-01-28 ENCOUNTER — HOSPITAL ENCOUNTER (OUTPATIENT)
Dept: LAB | Age: 65
Setting detail: SPECIMEN
Discharge: HOME | End: 2020-01-28

## 2020-01-28 DIAGNOSIS — N18.30 CHRONIC KIDNEY DISEASE, STAGE III (MODERATE) (H): ICD-10-CM

## 2020-01-28 DIAGNOSIS — E03.1 CONGENITAL HYPOTHYROIDISM WITHOUT GOITER: ICD-10-CM

## 2020-01-29 LAB
ALBUMIN SERPL-MCNC: 3.8 G/DL (ref 3.5–5)
ANION GAP SERPL CALCULATED.3IONS-SCNC: 10 MMOL/L (ref 5–18)
BUN SERPL-MCNC: 29 MG/DL (ref 8–22)
CALCIUM SERPL-MCNC: 9.5 MG/DL (ref 8.5–10.5)
CHLORIDE BLD-SCNC: 106 MMOL/L (ref 98–107)
CO2 SERPL-SCNC: 27 MMOL/L (ref 22–31)
CREAT SERPL-MCNC: 1.38 MG/DL (ref 0.7–1.3)
GFR SERPL CREATININE-BSD FRML MDRD: 52 ML/MIN/1.73M2
GLUCOSE BLD-MCNC: 102 MG/DL (ref 70–125)
PHOSPHATE SERPL-MCNC: 3.5 MG/DL (ref 2.5–4.5)
POTASSIUM BLD-SCNC: 4.3 MMOL/L (ref 3.5–5)
SODIUM SERPL-SCNC: 143 MMOL/L (ref 136–145)
TSH SERPL DL<=0.005 MIU/L-ACNC: 0.45 UIU/ML (ref 0.3–5)

## 2020-02-04 ENCOUNTER — HOSPITAL ENCOUNTER (OUTPATIENT)
Dept: CT IMAGING | Facility: HOSPITAL | Age: 65
Setting detail: RADIATION/ONCOLOGY SERIES
Discharge: STILL ADMITTED OR ADMINISTRATIVE | End: 2020-02-04
Attending: INTERNAL MEDICINE

## 2020-02-04 DIAGNOSIS — C34.32 MALIGNANT NEOPLASM OF LOWER LOBE OF LEFT LUNG (H): ICD-10-CM

## 2020-02-07 ENCOUNTER — OFFICE VISIT - HEALTHEAST (OUTPATIENT)
Dept: ONCOLOGY | Facility: HOSPITAL | Age: 65
End: 2020-02-07

## 2020-02-07 ENCOUNTER — COMMUNICATION - HEALTHEAST (OUTPATIENT)
Dept: FAMILY MEDICINE | Facility: CLINIC | Age: 65
End: 2020-02-07

## 2020-02-07 DIAGNOSIS — E03.1 CONGENITAL HYPOTHYROIDISM WITHOUT GOITER: ICD-10-CM

## 2020-02-07 DIAGNOSIS — C34.32 MALIGNANT NEOPLASM OF LOWER LOBE OF LEFT LUNG (H): ICD-10-CM

## 2020-02-07 DIAGNOSIS — I25.10 CORONARY ARTERY DISEASE INVOLVING NATIVE CORONARY ARTERY OF NATIVE HEART WITHOUT ANGINA PECTORIS: ICD-10-CM

## 2020-02-07 DIAGNOSIS — K21.9 ESOPHAGEAL REFLUX: ICD-10-CM

## 2020-02-07 DIAGNOSIS — I25.10 CORONARY ATHEROSCLEROSIS: ICD-10-CM

## 2020-02-10 ENCOUNTER — COMMUNICATION - HEALTHEAST (OUTPATIENT)
Dept: PULMONOLOGY | Facility: OTHER | Age: 65
End: 2020-02-10

## 2020-02-13 ENCOUNTER — COMMUNICATION - HEALTHEAST (OUTPATIENT)
Dept: INTENSIVE CARE | Facility: CLINIC | Age: 65
End: 2020-02-13

## 2020-02-13 ENCOUNTER — COMMUNICATION - HEALTHEAST (OUTPATIENT)
Dept: ONCOLOGY | Facility: CLINIC | Age: 65
End: 2020-02-13

## 2020-02-13 ENCOUNTER — COMMUNICATION - HEALTHEAST (OUTPATIENT)
Dept: ONCOLOGY | Facility: HOSPITAL | Age: 65
End: 2020-02-13

## 2020-02-13 ENCOUNTER — AMBULATORY - HEALTHEAST (OUTPATIENT)
Dept: ONCOLOGY | Facility: HOSPITAL | Age: 65
End: 2020-02-13

## 2020-02-13 DIAGNOSIS — R91.8 PULMONARY NODULES: ICD-10-CM

## 2020-02-14 ENCOUNTER — TELEPHONE (OUTPATIENT)
Dept: SURGERY | Facility: CLINIC | Age: 65
End: 2020-02-14

## 2020-02-14 ENCOUNTER — OFFICE VISIT - HEALTHEAST (OUTPATIENT)
Dept: FAMILY MEDICINE | Facility: CLINIC | Age: 65
End: 2020-02-14

## 2020-02-14 DIAGNOSIS — E03.1 CONGENITAL HYPOTHYROIDISM WITHOUT GOITER: ICD-10-CM

## 2020-02-14 DIAGNOSIS — I10 ESSENTIAL HYPERTENSION: ICD-10-CM

## 2020-02-14 DIAGNOSIS — N18.30 CKD (CHRONIC KIDNEY DISEASE) STAGE 3, GFR 30-59 ML/MIN (H): ICD-10-CM

## 2020-02-14 DIAGNOSIS — K21.9 GASTROESOPHAGEAL REFLUX DISEASE WITHOUT ESOPHAGITIS: ICD-10-CM

## 2020-02-14 DIAGNOSIS — Z01.818 PREOPERATIVE EXAMINATION: ICD-10-CM

## 2020-02-14 DIAGNOSIS — C34.32 MALIGNANT NEOPLASM OF LOWER LOBE OF LEFT LUNG (H): ICD-10-CM

## 2020-02-14 DIAGNOSIS — I25.10 CORONARY ARTERY DISEASE INVOLVING NATIVE CORONARY ARTERY OF NATIVE HEART WITHOUT ANGINA PECTORIS: ICD-10-CM

## 2020-02-14 LAB
ATRIAL RATE - MUSE: 74 BPM
DIASTOLIC BLOOD PRESSURE - MUSE: 72 MMHG
HGB BLD-MCNC: 12.7 G/DL (ref 14–18)
INTERPRETATION ECG - MUSE: NORMAL
P AXIS - MUSE: 72 DEGREES
PR INTERVAL - MUSE: 170 MS
QRS DURATION - MUSE: 80 MS
QT - MUSE: 388 MS
QTC - MUSE: 430 MS
R AXIS - MUSE: 46 DEGREES
SYSTOLIC BLOOD PRESSURE - MUSE: 128 MMHG
T AXIS - MUSE: 35 DEGREES
VENTRICULAR RATE- MUSE: 74 BPM

## 2020-02-14 ASSESSMENT — MIFFLIN-ST. JEOR: SCORE: 1596.39

## 2020-02-14 NOTE — TELEPHONE ENCOUNTER
I called Pardeep & left a voicemail stating the appt with Dr Saunders on 2/19/20 at the [facility] [place] is cancelled & asked him to call [facility] to schedule with Dr Saunders at [facility] for follow-up, and provided the phone numbers Toña listed below (per Toña IB request) I also requested a return call to us to confirm he received the voicemail I left. Appt cancelled & IB sent to Toña with status.

## 2020-02-14 NOTE — TELEPHONE ENCOUNTER
Action    Action Taken 2/14/20: IB sent to Clinic/Provider re: if slides should be requested, as Dr. Saunders preformed procedure @ [facility].   7:39 AM     RECORDS STATUS - ALL OTHER DIAGNOSIS      RECORDS RECEIVED FROM: [facility]   DATE RECEIVED:    NOTES STATUS DETAILS   OFFICE NOTE from referring provider [facility] Dr. Soto - Most Recent 2/7/20   OFFICE NOTE from medical oncologist [facility] Dr. Soto   DISCHARGE SUMMARY from hospital [facility] 11/1/18, 5/6/18   DISCHARGE REPORT from the ER NA    PFT [facility] 10/17/18   OPERATIVE REPORT [facility] 11/1/18: Mediastinoscopy   MEDICATION LIST [facility] 2/7/20   CLINICAL TRIAL TREATMENTS TO DATE     LABS     PATHOLOGY REPORTS [facility])  Date of Collection: 11/1/18, Case Number: [id number]   ANYTHING RELATED TO DIAGNOSIS Epic/CE    GENONOMIC TESTING     TYPE:     IMAGING (NEED IMAGES & REPORT)     XR  PACS 10/15/19, 12/3/18, 11/4/18, 11/3/18, 11/2/18, 11/1/18, 10/2/18, 5/6/18 - [facility], Reports in CE   CT SCANS PACS 2/4/20, 11/12/19, 5/21/19, 10/2/18, 9/24/18, 6/25/18, 5/6/18 - [facility], Reports in CE   MRI PACS 11/1/18 - [facility], Report in CE   MAMMO     ULTRASOUND     PET PACS 10/12/18 - [facility], Report in CE

## 2020-02-17 DIAGNOSIS — C34.90 MALIGNANT NEOPLASM OF LUNG, UNSPECIFIED LATERALITY, UNSPECIFIED PART OF LUNG (H): Primary | ICD-10-CM

## 2020-02-17 NOTE — PROGRESS NOTES
"I called Pardeep to let him know that Dr. Saunders is happy to see him at the [facility], as scheduled.   I told him that we initially thought he was directed to our office in error, instead of [facility] (where he initially saw Dr. Saunders in 2018).  I apologized for the confusion.  He said he has no problem with going back to [facility] but he wanted to be seen sooner than HE could accommodate him.   He said his case was already reviewed at a HE conference and \"Dr. Saunders said he would do wedge resections of these nodules\".          He pressed me for a date for surgery, said he already saw his primary and had a pre-op H & P this week.  His previous cancer was in left lung, and he now has multiple nodules in right lung suspicious for cancer.      I deferred any surgery plan to Dr. Saunders, will try to get PFT's at [facility] in the next week or two (unable to get here on Wed.).    Addendum:   Unable to get PFT's at any [facility] locations until mid-March.   Will need to get them arranged here if surgery is pursued.      "

## 2020-02-17 NOTE — TELEPHONE ENCOUNTER
I received a call from Pardeep on 2/4/20 at 429pm stating that he will be here to see Dr Saunders on 2/19/20 at 8am regardless of us cancelling the appt or not, he will be here. He is not going to call [facility], he will not be going to [facility] or [facility], he will be at the [facility]. He was pretty upset. I apologized for upsetting him & offered to call him back on Monday so I could connect with Dr Saunders's nurse with his concerns. He stated that would be fine, and advised me not to cancel his appt. I sent an IB to yg asking if she would like to call him and how to proceed. I did put him back on Dr Saunders's schedule to block that time until I hear back on how to proceed. I didn't want that time to be taken if it is decided he will be seen. I will await response from Yg & update as needed.

## 2020-02-19 ENCOUNTER — RECORDS - HEALTHEAST (OUTPATIENT)
Dept: ADMINISTRATIVE | Facility: OTHER | Age: 65
End: 2020-02-19

## 2020-02-19 ENCOUNTER — PREP FOR PROCEDURE (OUTPATIENT)
Dept: SURGERY | Facility: CLINIC | Age: 65
End: 2020-02-19

## 2020-02-19 ENCOUNTER — PRE VISIT (OUTPATIENT)
Dept: SURGERY | Facility: CLINIC | Age: 65
End: 2020-02-19

## 2020-02-19 ENCOUNTER — OFFICE VISIT (OUTPATIENT)
Dept: SURGERY | Facility: CLINIC | Age: 65
End: 2020-02-19
Attending: THORACIC SURGERY (CARDIOTHORACIC VASCULAR SURGERY)
Payer: COMMERCIAL

## 2020-02-19 VITALS
DIASTOLIC BLOOD PRESSURE: 94 MMHG | RESPIRATION RATE: 16 BRPM | HEART RATE: 82 BPM | TEMPERATURE: 97.8 F | HEIGHT: 69 IN | BODY MASS INDEX: 26.98 KG/M2 | WEIGHT: 182.2 LBS | SYSTOLIC BLOOD PRESSURE: 187 MMHG | OXYGEN SATURATION: 100 %

## 2020-02-19 DIAGNOSIS — C34.32 MALIGNANT NEOPLASM OF LOWER LOBE OF LEFT LUNG (H): ICD-10-CM

## 2020-02-19 DIAGNOSIS — R91.1 LUNG NODULE: Primary | ICD-10-CM

## 2020-02-19 DIAGNOSIS — R91.8 LUNG NODULES: Primary | ICD-10-CM

## 2020-02-19 PROBLEM — D64.9 ANEMIA: Status: ACTIVE | Noted: 2019-10-30

## 2020-02-19 PROBLEM — N18.30 CHRONIC RENAL INSUFFICIENCY, STAGE III (MODERATE) (H): Status: ACTIVE | Noted: 2019-10-09

## 2020-02-19 PROBLEM — K21.9 ESOPHAGEAL REFLUX: Status: ACTIVE | Noted: 2019-10-30

## 2020-02-19 PROCEDURE — G0463 HOSPITAL OUTPT CLINIC VISIT: HCPCS | Mod: ZF

## 2020-02-19 RX ORDER — PANTOPRAZOLE SODIUM 40 MG/1
TABLET, DELAYED RELEASE ORAL
Status: ON HOLD | COMMUNITY
Start: 2018-11-22 | End: 2020-02-28

## 2020-02-19 RX ORDER — LEVOTHYROXINE SODIUM 125 UG/1
TABLET ORAL
COMMUNITY
Start: 2017-02-09 | End: 2021-01-01

## 2020-02-19 RX ORDER — CLINDAMYCIN PHOSPHATE 900 MG/50ML
900 INJECTION, SOLUTION INTRAVENOUS
Status: CANCELLED | OUTPATIENT
Start: 2020-02-19

## 2020-02-19 RX ORDER — ATORVASTATIN CALCIUM 40 MG/1
40 TABLET, FILM COATED ORAL
COMMUNITY
Start: 2016-09-14 | End: 2021-01-01

## 2020-02-19 RX ORDER — FLUOCINONIDE 0.5 MG/G
0.05 CREAM TOPICAL PRN
COMMUNITY
Start: 2019-01-03 | End: 2021-01-01

## 2020-02-19 RX ORDER — SODIUM FLUORIDE 5 MG/G
GEL, DENTIFRICE DENTAL
COMMUNITY
Start: 2018-03-05 | End: 2021-01-01

## 2020-02-19 RX ORDER — CLINDAMYCIN PHOSPHATE 900 MG/50ML
900 INJECTION, SOLUTION INTRAVENOUS SEE ADMIN INSTRUCTIONS
Status: CANCELLED | OUTPATIENT
Start: 2020-02-19

## 2020-02-19 RX ORDER — ALCOHOL 70.47 ML/100ML
2 GEL TOPICAL
COMMUNITY
End: 2021-01-01

## 2020-02-19 RX ORDER — METOPROLOL SUCCINATE 25 MG/1
TABLET, EXTENDED RELEASE ORAL
COMMUNITY
Start: 2016-09-14 | End: 2021-01-01

## 2020-02-19 ASSESSMENT — PAIN SCALES - GENERAL: PAINLEVEL: NO PAIN (0)

## 2020-02-19 ASSESSMENT — MIFFLIN-ST. JEOR: SCORE: 1606.83

## 2020-02-19 NOTE — LETTER
2/19/2020       RE: Pardeep Wilson  [address] [place] [id number]     Dear Colleague,    Thank you for referring your patient, Pardeep Wilson, to the [facility]. Please see a copy of my visit note below.    THORACIC SURGERY ESTABLISHED PATIENT VISIT    Dear Dr. Morales,    I saw Mr. Wilson in follow-up today. The clinical summary follows:    PREOP DIAGNOSIS   NSCLC of the LEFT lower lobe (11/2018)  RIGHT lung nodules    PROCEDURE   1) Mediastinoscopy  2) LEFT uniportal VATS lower lobectomy    DATE OF PROCEDURE  11/1/2018    HISTOPATHOLOGY   Adenocarcinoma, predominantly acinar, 9 cm, separate 3.5 cm tumor at base of lower lobe (intrapulmonary metastasis), pT4N0    COMPLICATIONS  None    INTERVAL STUDIES  CT chest (2/4/2020):     ETOH 1 drink/week  TOB never  BMI 26    SUBJECTIVE   Mr. Wilson is asymptomatic. He is very anxious to understand what his RIGHT lung nodules are.    From a personal perspective, he is an . His wife, Eliane, is a cancer registrar in [employer] [facility] [employer], and is a respiratory therapist by training.    IMPRESSION (R91.8) Lung nodules  (primary encounter diagnosis)  (C34.32) Malignant neoplasm of lower lobe of left lung (H)       63 year-old male 15 months S/P LEFT lower lobectomy for a stage IIIA (pT4N0) adenocarcinoma   New RIGHT lung nodules    PLAN  I spent a total of 25 minutes with Mr. Wilson and his wife, Eliane, more than half were spent in counseling, coordination of care, and face-to-face time. I reviewed the plan as follows:    Procedure planned: Right VATS wedge resection, with possible thoracotomy.  I reviewed the indications, risks, and benefits of the procedure with Mr. Pardeep Wilson. We discussed the intraoperative risks of bleeding and injury to vital organs, potential postoperative complications including, but not limited to, major respiratory events, arrhythmia, bleeding, infection, reoperation, and death. I explained the  anticipated hospital course (+/- 0-1 days) and postoperative recovery including pain control, chest drain management, and variable degrees of dyspnea (or need for supplemental oxygen) and fatigue that tend to get better with time.    Type and screen    Enoxaparin 40 mg subcutaneous in preop holding    PAC    They had all their questions answered and were in agreement with the plan.  I appreciate the opportunity to participate in the care of your patient and will keep you updated.    Sincerely,    Bry Saunders MD

## 2020-02-19 NOTE — PROGRESS NOTES
THORACIC SURGERY ESTABLISHED PATIENT VISIT    Dear Dr. Morales,    I saw Mr. Wilson in follow-up today. The clinical summary follows:    PREOP DIAGNOSIS   NSCLC of the LEFT lower lobe (11/2018)  RIGHT lung nodules    PROCEDURE   1) Mediastinoscopy  2) LEFT uniportal VATS lower lobectomy    DATE OF PROCEDURE  11/1/2018    HISTOPATHOLOGY   Adenocarcinoma, predominantly acinar, 9 cm, separate 3.5 cm tumor at base of lower lobe (intrapulmonary metastasis), pT4N0    COMPLICATIONS  None    INTERVAL STUDIES  CT chest (2/4/2020):     ETOH 1 drink/week  TOB never  BMI 26    SUBJECTIVE   Mr. Wilson is asymptomatic. He is very anxious to understand what his RIGHT lung nodules are.    From a personal perspective, he is an . His wife, Eliane, is a cancer registrar in [employer], and is a respiratory therapist by training.    IMPRESSION (R91.8) Lung nodules  (primary encounter diagnosis)  (C34.32) Malignant neoplasm of lower lobe of left lung (H)       63 year-old male 15 months S/P LEFT lower lobectomy for a stage IIIA (pT4N0) adenocarcinoma   New RIGHT lung nodules    PLAN  I spent a total of 25 minutes with Mr. Wilson and his wife, Eliane, more than half were spent in counseling, coordination of care, and face-to-face time. I reviewed the plan as follows:    Procedure planned: Right VATS wedge resection, with possible thoracotomy.  I reviewed the indications, risks, and benefits of the procedure with Mr. Pardeep Wilson. We discussed the intraoperative risks of bleeding and injury to vital organs, potential postoperative complications including, but not limited to, major respiratory events, arrhythmia, bleeding, infection, reoperation, and death. I explained the anticipated hospital course (+/- 0-1 days) and postoperative recovery including pain control, chest drain management, and variable degrees of dyspnea (or need for supplemental oxygen) and fatigue that tend to get better with time.    Type and  screen    Enoxaparin 40 mg subcutaneous in preop holding    PAC    They had all their questions answered and were in agreement with the plan.  I appreciate the opportunity to participate in the care of your patient and will keep you updated.  Sincerely,

## 2020-02-20 ENCOUNTER — COMMUNICATION - HEALTHEAST (OUTPATIENT)
Dept: ONCOLOGY | Facility: CLINIC | Age: 65
End: 2020-02-20

## 2020-02-20 DIAGNOSIS — C34.90 MALIGNANT NEOPLASM OF LUNG, UNSPECIFIED LATERALITY, UNSPECIFIED PART OF LUNG (H): ICD-10-CM

## 2020-02-21 ENCOUNTER — TELEPHONE (OUTPATIENT)
Dept: OPHTHALMOLOGY | Facility: CLINIC | Age: 65
End: 2020-02-21

## 2020-02-21 PROBLEM — R91.8 LUNG NODULES: Status: ACTIVE | Noted: 2020-02-21

## 2020-02-21 NOTE — TELEPHONE ENCOUNTER
Spoke with patient to schedule procedure with Dr. Bry Saunders    Procedure was scheduled on 02/28 at [facility] OR  Patient will have H&P at [facility] on 02/14  Patient is aware a / is needed day of surgery.   Surgery letter was sent via Mail, patient has my direct contact information for any further questions.

## 2020-02-22 ENCOUNTER — COMMUNICATION - HEALTHEAST (OUTPATIENT)
Dept: FAMILY MEDICINE | Facility: CLINIC | Age: 65
End: 2020-02-22

## 2020-02-22 DIAGNOSIS — L40.8 OTHER PSORIASIS: ICD-10-CM

## 2020-02-25 LAB
DLCOCOR-%PRED-PRE: 108 %
DLCOCOR-PRE: 28.29 ML/MIN/MMHG
DLCOUNC-%PRED-PRE: 102 %
DLCOUNC-PRE: 26.65 ML/MIN/MMHG
DLCOUNC-PRED: 26.12 ML/MIN/MMHG
ERV-%PRED-PRE: 115 %
ERV-PRE: 1.3 L
ERV-PRED: 1.12 L
EXPTIME-PRE: 5.91 SEC
FEF2575-%PRED-PRE: 85 %
FEF2575-PRE: 2.31 L/SEC
FEF2575-PRED: 2.7 L/SEC
FEFMAX-%PRED-PRE: 87 %
FEFMAX-PRE: 7.59 L/SEC
FEFMAX-PRED: 8.68 L/SEC
FEV1-%PRED-PRE: 92 %
FEV1-PRE: 3.08 L
FEV1FEV6-PRE: 74 %
FEV1FEV6-PRED: 78 %
FEV1FVC-PRE: 74 %
FEV1FVC-PRED: 77 %
FEV1SVC-PRE: 72 %
FEV1SVC-PRED: 70 %
FIFMAX-PRE: 6.6 L/SEC
FRCPLETH-%PRED-PRE: 110 %
FRCPLETH-PRE: 3.95 L
FRCPLETH-PRED: 3.59 L
FVC-%PRED-PRE: 95 %
FVC-PRE: 4.14 L
FVC-PRED: 4.33 L
IC-%PRED-PRE: 81 %
IC-PRE: 2.95 L
IC-PRED: 3.6 L
RVPLETH-%PRED-PRE: 106 %
RVPLETH-PRE: 2.64 L
RVPLETH-PRED: 2.47 L
TLCPLETH-%PRED-PRE: 99 %
TLCPLETH-PRE: 6.89 L
TLCPLETH-PRED: 6.92 L
VA-%PRED-PRE: 92 %
VA-PRE: 5.8 L
VC-%PRED-PRE: 89 %
VC-PRE: 4.25 L
VC-PRED: 4.72 L

## 2020-02-27 ENCOUNTER — COMMUNICATION - HEALTHEAST (OUTPATIENT)
Dept: ONCOLOGY | Facility: CLINIC | Age: 65
End: 2020-02-27

## 2020-02-27 ENCOUNTER — ANESTHESIA EVENT (OUTPATIENT)
Dept: SURGERY | Facility: CLINIC | Age: 65
End: 2020-02-27
Payer: COMMERCIAL

## 2020-02-27 NOTE — ANESTHESIA PREPROCEDURE EVALUATION
"Anesthesia Pre-Procedure Evaluation    Patient: Pardeep Wilson   MRN:     [id number] Gender:   male   Age:    64 year old :      1955        Preoperative Diagnosis: Lung nodules [R91.8]   Procedure(s):  RIGHT THORACOSCOPIC WEDGE RESECTION     LABS:  CBC:   Lab Results   Component Value Date    WBC 6.0 2010    WBC 6.8 2010    HGB 12.5 (L) 2010    HGB 12.2 (L) 2010    HCT 37.3 (L) 2010    HCT 35.1 (L) 2010     (H) 2010     2010     BMP:   Lab Results   Component Value Date     2010     2010    POTASSIUM 5.0 2010    POTASSIUM 4.9 2010    CHLORIDE 107 2010    CHLORIDE 106 2010    CO2 29 2010    CO2 30 2010    BUN 24 2010    BUN 23 2010    CR 1.02 2010    CR 1.06 2010    GLC 77 2010    GLC 88 2010     COAGS:   Lab Results   Component Value Date    PTT 24 2010    INR 1.05 2010     POC: No results found for: BGM, HCG, HCGS  OTHER:   Lab Results   Component Value Date    ARNAUD 8.9 2010    PHOS Unsatisfactory specimen - hemolyzed 10/31/2008    MAG 2.0 10/22/2008    ALBUMIN 4.4 10/31/2008    PROTTOTAL 8.3 (H) 2008    ALT 23 2008    AST 37 2008    ALKPHOS 116 2008    BILITOTAL 0.9 2008    TSH 1.15 10/21/2008    T4 1.28 2008    T3 113 2008    CRP 6.7 2007    SED 9 2007        Preop Vitals    BP Readings from Last 3 Encounters:   20 (!) 187/94    Pulse Readings from Last 3 Encounters:   20 82      Resp Readings from Last 3 Encounters:   20 16    SpO2 Readings from Last 3 Encounters:   20 100%      Temp Readings from Last 1 Encounters:   20 36.6  C (97.8  F)    Ht Readings from Last 1 Encounters:   20 1.753 m (5' 9\")      Wt Readings from Last 1 Encounters:   20 82.6 kg (182 lb 3.2 oz)    Estimated body mass index is 26.91 kg/m  as calculated from the " "following:    Height as of 2/19/20: 1.753 m (5' 9\").    Weight as of 2/19/20: 82.6 kg (182 lb 3.2 oz).     LDA:        History reviewed. No pertinent past medical history.   History reviewed. No pertinent surgical history.   Allergies   Allergen Reactions     Penicillins Rash        Anesthesia Evaluation     . Pt has had prior anesthetic. Type: General    History of anesthetic complications          ROS/MED HX    ENT/Pulmonary:     (+), . Other pulmonary disease NSCLC of LLL 11/2018, s/p LLL lobectomy, now with nodules on right.    Neurologic:  - neg neurologic ROS     Cardiovascular:  - neg cardiovascular ROS   (+) ----. : . . . :. . Previous cardiac testing date:results:date: results:ECG reviewed date:2/2020 results:NSR date: results:          METS/Exercise Tolerance:     Hematologic:  - neg hematologic  ROS       Musculoskeletal:  - neg musculoskeletal ROS       GI/Hepatic:     (+) GERD Asymptomatic on medication,       Renal/Genitourinary:     (+) chronic renal disease, type: CRI, Pt does not require dialysis,       Endo:  - neg endo ROS       Psychiatric:  - neg psychiatric ROS       Infectious Disease:  - neg infectious disease ROS       Malignancy:   (+) Malignancy History of Lung  Lung CA status post Surgery.         Other:    (+) no H/O Chronic Pain,no other significant disability                        PHYSICAL EXAM:   Mental Status/Neuro: Age Appropriate   Airway: Facies: Feasible  Mallampati: I  Mouth/Opening: Full  TM distance: > 6 cm  Neck ROM: Full   Respiratory: Auscultation: CTAB     Resp. Rate: Normal     Resp. Effort: Normal      CV: Rhythm: Regular  Heart: Normal Sounds  Edema: None   Comments:      Dental: Normal Dentition                Assessment:   ASA SCORE: 3    H&P: History and physical reviewed and following examination; no interval change.   Smoking Status:  Non-Smoker/Unknown   NPO Status: NPO Appropriate     Plan:   Anes. Type:  General   Pre-Medication: Acetaminophen; Gabapentin "   Induction:  IV (Standard)   Airway: SHLOMO; CMAC/VL; FOB   Access/Monitoring: PIV; 2nd PIV; A-Line; FloTrac   Maintenance: Balanced     Advanced Monitoring: BIS     Postop Plan:   Postop Pain: Opioids  Postop Sedation/Airway: Not planned  Disposition: Inpatient/Admit     PONV Management:   Adult Risk Factors:, Non-Smoker, Postop Opioids   Prevention: Ondansetron, Dexamethasone     CONSENT: Direct conversation   Plan and risks discussed with: Patient   Blood Products: Consented (ALL Blood Products)                   Lina Coombs MD

## 2020-02-28 ENCOUNTER — ANESTHESIA (OUTPATIENT)
Dept: SURGERY | Facility: CLINIC | Age: 65
End: 2020-02-28
Payer: COMMERCIAL

## 2020-02-28 ENCOUNTER — APPOINTMENT (OUTPATIENT)
Dept: GENERAL RADIOLOGY | Facility: CLINIC | Age: 65
End: 2020-02-28
Attending: THORACIC SURGERY (CARDIOTHORACIC VASCULAR SURGERY)
Payer: COMMERCIAL

## 2020-02-28 ENCOUNTER — HOSPITAL ENCOUNTER (OUTPATIENT)
Facility: CLINIC | Age: 65
Setting detail: OBSERVATION
LOS: 1 days | Discharge: HOME | End: 2020-02-28
Attending: THORACIC SURGERY (CARDIOTHORACIC VASCULAR SURGERY) | Admitting: THORACIC SURGERY (CARDIOTHORACIC VASCULAR SURGERY)
Payer: COMMERCIAL

## 2020-02-28 VITALS
HEIGHT: 70 IN | DIASTOLIC BLOOD PRESSURE: 72 MMHG | SYSTOLIC BLOOD PRESSURE: 138 MMHG | BODY MASS INDEX: 25.91 KG/M2 | HEART RATE: 89 BPM | TEMPERATURE: 97.5 F | WEIGHT: 181 LBS | OXYGEN SATURATION: 97 % | RESPIRATION RATE: 12 BRPM

## 2020-02-28 DIAGNOSIS — R91.8 LUNG NODULES: Primary | ICD-10-CM

## 2020-02-28 DIAGNOSIS — K21.9 GASTROESOPHAGEAL REFLUX DISEASE, ESOPHAGITIS PRESENCE NOT SPECIFIED: ICD-10-CM

## 2020-02-28 DIAGNOSIS — R91.1 LUNG NODULE: ICD-10-CM

## 2020-02-28 DIAGNOSIS — N18.30 CHRONIC RENAL INSUFFICIENCY, STAGE III (MODERATE) (H): ICD-10-CM

## 2020-02-28 DIAGNOSIS — R91.8 LUNG NODULES: ICD-10-CM

## 2020-02-28 LAB
ABO + RH BLD: NORMAL
ABO + RH BLD: NORMAL
BLD GP AB SCN SERPL QL: NORMAL
BLOOD BANK CMNT PATIENT-IMP: NORMAL
CREAT SERPL-MCNC: 1.35 MG/DL (ref 0.66–1.25)
CREAT SERPL-MCNC: 1.38 MG/DL (ref 0.66–1.25)
GFR SERPL CREATININE-BSD FRML MDRD: 53 ML/MIN/{1.73_M2}
GFR SERPL CREATININE-BSD FRML MDRD: 55 ML/MIN/{1.73_M2}
GLUCOSE BLDC GLUCOMTR-MCNC: 84 MG/DL (ref 70–99)
PLATELET # BLD AUTO: 348 10E9/L (ref 150–450)
POTASSIUM SERPL-SCNC: 4.6 MMOL/L (ref 3.4–5.3)
SPECIMEN EXP DATE BLD: NORMAL

## 2020-02-28 PROCEDURE — 25800030 ZZH RX IP 258 OP 636: Performed by: STUDENT IN AN ORGANIZED HEALTH CARE EDUCATION/TRAINING PROGRAM

## 2020-02-28 PROCEDURE — G0378 HOSPITAL OBSERVATION PER HR: HCPCS

## 2020-02-28 PROCEDURE — 36000064 ZZH SURGERY LEVEL 4 EA 15 ADDTL MIN - UMMC: Performed by: THORACIC SURGERY (CARDIOTHORACIC VASCULAR SURGERY)

## 2020-02-28 PROCEDURE — 86901 BLOOD TYPING SEROLOGIC RH(D): CPT | Performed by: CLINICAL NURSE SPECIALIST

## 2020-02-28 PROCEDURE — 82565 ASSAY OF CREATININE: CPT | Performed by: ANESTHESIOLOGY

## 2020-02-28 PROCEDURE — 84132 ASSAY OF SERUM POTASSIUM: CPT | Performed by: ANESTHESIOLOGY

## 2020-02-28 PROCEDURE — 71000014 ZZH RECOVERY PHASE 1 LEVEL 2 FIRST HR: Performed by: THORACIC SURGERY (CARDIOTHORACIC VASCULAR SURGERY)

## 2020-02-28 PROCEDURE — 40000986 XR CHEST PORT 1 VW

## 2020-02-28 PROCEDURE — 86900 BLOOD TYPING SEROLOGIC ABO: CPT | Performed by: CLINICAL NURSE SPECIALIST

## 2020-02-28 PROCEDURE — 37000009 ZZH ANESTHESIA TECHNICAL FEE, EACH ADDTL 15 MIN: Performed by: THORACIC SURGERY (CARDIOTHORACIC VASCULAR SURGERY)

## 2020-02-28 PROCEDURE — 25000125 ZZHC RX 250: Performed by: STUDENT IN AN ORGANIZED HEALTH CARE EDUCATION/TRAINING PROGRAM

## 2020-02-28 PROCEDURE — 86850 RBC ANTIBODY SCREEN: CPT | Performed by: CLINICAL NURSE SPECIALIST

## 2020-02-28 PROCEDURE — 88342 IMHCHEM/IMCYTCHM 1ST ANTB: CPT | Performed by: THORACIC SURGERY (CARDIOTHORACIC VASCULAR SURGERY)

## 2020-02-28 PROCEDURE — 40000170 ZZH STATISTIC PRE-PROCEDURE ASSESSMENT II: Performed by: THORACIC SURGERY (CARDIOTHORACIC VASCULAR SURGERY)

## 2020-02-28 PROCEDURE — 25000132 ZZH RX MED GY IP 250 OP 250 PS 637: Performed by: STUDENT IN AN ORGANIZED HEALTH CARE EDUCATION/TRAINING PROGRAM

## 2020-02-28 PROCEDURE — 36000062 ZZH SURGERY LEVEL 4 1ST 30 MIN - UMMC: Performed by: THORACIC SURGERY (CARDIOTHORACIC VASCULAR SURGERY)

## 2020-02-28 PROCEDURE — 71046 X-RAY EXAM CHEST 2 VIEWS: CPT

## 2020-02-28 PROCEDURE — 82565 ASSAY OF CREATININE: CPT | Performed by: STUDENT IN AN ORGANIZED HEALTH CARE EDUCATION/TRAINING PROGRAM

## 2020-02-28 PROCEDURE — 36415 COLL VENOUS BLD VENIPUNCTURE: CPT | Performed by: CLINICAL NURSE SPECIALIST

## 2020-02-28 PROCEDURE — 85049 AUTOMATED PLATELET COUNT: CPT | Performed by: STUDENT IN AN ORGANIZED HEALTH CARE EDUCATION/TRAINING PROGRAM

## 2020-02-28 PROCEDURE — 00000159 ZZHCL STATISTIC H-SEND OUTS PREP: Performed by: THORACIC SURGERY (CARDIOTHORACIC VASCULAR SURGERY)

## 2020-02-28 PROCEDURE — 88307 TISSUE EXAM BY PATHOLOGIST: CPT | Performed by: THORACIC SURGERY (CARDIOTHORACIC VASCULAR SURGERY)

## 2020-02-28 PROCEDURE — 37000008 ZZH ANESTHESIA TECHNICAL FEE, 1ST 30 MIN: Performed by: THORACIC SURGERY (CARDIOTHORACIC VASCULAR SURGERY)

## 2020-02-28 PROCEDURE — 25000128 H RX IP 250 OP 636: Performed by: STUDENT IN AN ORGANIZED HEALTH CARE EDUCATION/TRAINING PROGRAM

## 2020-02-28 PROCEDURE — 88341 IMHCHEM/IMCYTCHM EA ADD ANTB: CPT | Performed by: THORACIC SURGERY (CARDIOTHORACIC VASCULAR SURGERY)

## 2020-02-28 PROCEDURE — 25000128 H RX IP 250 OP 636: Performed by: THORACIC SURGERY (CARDIOTHORACIC VASCULAR SURGERY)

## 2020-02-28 PROCEDURE — 25000125 ZZHC RX 250: Performed by: THORACIC SURGERY (CARDIOTHORACIC VASCULAR SURGERY)

## 2020-02-28 PROCEDURE — 27210794 ZZH OR GENERAL SUPPLY STERILE: Performed by: THORACIC SURGERY (CARDIOTHORACIC VASCULAR SURGERY)

## 2020-02-28 PROCEDURE — 25000128 H RX IP 250 OP 636: Performed by: CLINICAL NURSE SPECIALIST

## 2020-02-28 PROCEDURE — 88331 PATH CONSLTJ SURG 1 BLK 1SPC: CPT | Performed by: THORACIC SURGERY (CARDIOTHORACIC VASCULAR SURGERY)

## 2020-02-28 PROCEDURE — 25000566 ZZH SEVOFLURANE, EA 15 MIN: Performed by: THORACIC SURGERY (CARDIOTHORACIC VASCULAR SURGERY)

## 2020-02-28 PROCEDURE — 36415 COLL VENOUS BLD VENIPUNCTURE: CPT | Performed by: STUDENT IN AN ORGANIZED HEALTH CARE EDUCATION/TRAINING PROGRAM

## 2020-02-28 PROCEDURE — 71000015 ZZH RECOVERY PHASE 1 LEVEL 2 EA ADDTL HR: Performed by: THORACIC SURGERY (CARDIOTHORACIC VASCULAR SURGERY)

## 2020-02-28 PROCEDURE — 00000146 ZZHCL STATISTIC GLUCOSE BY METER IP

## 2020-02-28 RX ORDER — HYDROMORPHONE HYDROCHLORIDE 1 MG/ML
.3-.5 INJECTION, SOLUTION INTRAMUSCULAR; INTRAVENOUS; SUBCUTANEOUS
Status: DISCONTINUED | OUTPATIENT
Start: 2020-02-28 | End: 2020-02-28 | Stop reason: HOSPADM

## 2020-02-28 RX ORDER — OXYCODONE HYDROCHLORIDE 5 MG/1
5-10 TABLET ORAL
Status: DISCONTINUED | OUTPATIENT
Start: 2020-02-28 | End: 2020-02-28 | Stop reason: HOSPADM

## 2020-02-28 RX ORDER — FAMOTIDINE 20 MG/1
20 TABLET, FILM COATED ORAL 2 TIMES DAILY
Status: DISCONTINUED | OUTPATIENT
Start: 2020-02-28 | End: 2020-02-28 | Stop reason: HOSPADM

## 2020-02-28 RX ORDER — ONDANSETRON HYDROCHLORIDE 4 MG/5ML
4 SOLUTION ORAL EVERY 6 HOURS PRN
Qty: 100 ML | Refills: 1 | Status: SHIPPED | OUTPATIENT
Start: 2020-02-28 | End: 2020-03-09

## 2020-02-28 RX ORDER — BUPIVACAINE HYDROCHLORIDE 2.5 MG/ML
INJECTION, SOLUTION EPIDURAL; INFILTRATION; INTRACAUDAL PRN
Status: DISCONTINUED | OUTPATIENT
Start: 2020-02-28 | End: 2020-02-28 | Stop reason: HOSPADM

## 2020-02-28 RX ORDER — ACETAMINOPHEN 325 MG/1
975 TABLET ORAL ONCE
Status: DISCONTINUED | OUTPATIENT
Start: 2020-02-28 | End: 2020-02-28 | Stop reason: HOSPADM

## 2020-02-28 RX ORDER — SODIUM CHLORIDE, SODIUM LACTATE, POTASSIUM CHLORIDE, CALCIUM CHLORIDE 600; 310; 30; 20 MG/100ML; MG/100ML; MG/100ML; MG/100ML
INJECTION, SOLUTION INTRAVENOUS CONTINUOUS
Status: DISCONTINUED | OUTPATIENT
Start: 2020-02-28 | End: 2020-02-28 | Stop reason: HOSPADM

## 2020-02-28 RX ORDER — GLYCOPYRROLATE 0.2 MG/ML
INJECTION, SOLUTION INTRAMUSCULAR; INTRAVENOUS PRN
Status: DISCONTINUED | OUTPATIENT
Start: 2020-02-28 | End: 2020-02-28

## 2020-02-28 RX ORDER — GINSENG 100 MG
CAPSULE ORAL 3 TIMES DAILY
Status: DISCONTINUED | OUTPATIENT
Start: 2020-02-28 | End: 2020-02-28 | Stop reason: HOSPADM

## 2020-02-28 RX ORDER — HEPARIN SODIUM 5000 [USP'U]/.5ML
5000 INJECTION, SOLUTION INTRAVENOUS; SUBCUTANEOUS
Status: COMPLETED | OUTPATIENT
Start: 2020-02-28 | End: 2020-02-28

## 2020-02-28 RX ORDER — GABAPENTIN 300 MG/1
300 CAPSULE ORAL ONCE
Status: DISCONTINUED | OUTPATIENT
Start: 2020-02-28 | End: 2020-02-28 | Stop reason: HOSPADM

## 2020-02-28 RX ORDER — CLINDAMYCIN PHOSPHATE 900 MG/50ML
900 INJECTION, SOLUTION INTRAVENOUS
Status: COMPLETED | OUTPATIENT
Start: 2020-02-28 | End: 2020-02-28

## 2020-02-28 RX ORDER — ONDANSETRON 4 MG/1
4 TABLET, ORALLY DISINTEGRATING ORAL EVERY 6 HOURS PRN
Status: DISCONTINUED | OUTPATIENT
Start: 2020-02-28 | End: 2020-02-28 | Stop reason: HOSPADM

## 2020-02-28 RX ORDER — FENTANYL CITRATE 50 UG/ML
INJECTION, SOLUTION INTRAMUSCULAR; INTRAVENOUS PRN
Status: DISCONTINUED | OUTPATIENT
Start: 2020-02-28 | End: 2020-02-28

## 2020-02-28 RX ORDER — ACETAMINOPHEN 325 MG/1
975 TABLET ORAL EVERY 8 HOURS
Status: DISCONTINUED | OUTPATIENT
Start: 2020-02-28 | End: 2020-02-28 | Stop reason: HOSPADM

## 2020-02-28 RX ORDER — EPHEDRINE SULFATE 50 MG/ML
INJECTION, SOLUTION INTRAMUSCULAR; INTRAVENOUS; SUBCUTANEOUS PRN
Status: DISCONTINUED | OUTPATIENT
Start: 2020-02-28 | End: 2020-02-28

## 2020-02-28 RX ORDER — ACETAMINOPHEN 160 MG/5ML
1000 LIQUID ORAL EVERY 6 HOURS PRN
Qty: 473 ML | Refills: 3 | Status: ON HOLD | OUTPATIENT
Start: 2020-02-28 | End: 2021-01-01

## 2020-02-28 RX ORDER — ONDANSETRON 4 MG/1
4 TABLET, ORALLY DISINTEGRATING ORAL EVERY 30 MIN PRN
Status: DISCONTINUED | OUTPATIENT
Start: 2020-02-28 | End: 2020-02-28 | Stop reason: HOSPADM

## 2020-02-28 RX ORDER — HYDROMORPHONE HYDROCHLORIDE 1 MG/ML
.3-.5 INJECTION, SOLUTION INTRAMUSCULAR; INTRAVENOUS; SUBCUTANEOUS EVERY 5 MIN PRN
Status: DISCONTINUED | OUTPATIENT
Start: 2020-02-28 | End: 2020-02-28 | Stop reason: HOSPADM

## 2020-02-28 RX ORDER — NALOXONE HYDROCHLORIDE 0.4 MG/ML
.1-.4 INJECTION, SOLUTION INTRAMUSCULAR; INTRAVENOUS; SUBCUTANEOUS
Status: DISCONTINUED | OUTPATIENT
Start: 2020-02-28 | End: 2020-02-28 | Stop reason: HOSPADM

## 2020-02-28 RX ORDER — MULTIVIT WITH IRON,MINERALS
15 LIQUID (ML) ORAL DAILY
Qty: 236 ML | Refills: 3 | Status: ON HOLD | OUTPATIENT
Start: 2020-02-28 | End: 2021-01-01

## 2020-02-28 RX ORDER — LIDOCAINE HYDROCHLORIDE 20 MG/ML
INJECTION, SOLUTION INFILTRATION; PERINEURAL PRN
Status: DISCONTINUED | OUTPATIENT
Start: 2020-02-28 | End: 2020-02-28

## 2020-02-28 RX ORDER — ONDANSETRON 2 MG/ML
4 INJECTION INTRAMUSCULAR; INTRAVENOUS EVERY 30 MIN PRN
Status: DISCONTINUED | OUTPATIENT
Start: 2020-02-28 | End: 2020-02-28 | Stop reason: HOSPADM

## 2020-02-28 RX ORDER — CLINDAMYCIN PHOSPHATE 900 MG/50ML
900 INJECTION, SOLUTION INTRAVENOUS SEE ADMIN INSTRUCTIONS
Status: DISCONTINUED | OUTPATIENT
Start: 2020-02-28 | End: 2020-02-28 | Stop reason: HOSPADM

## 2020-02-28 RX ORDER — OXYCODONE HCL 5 MG/5 ML
5 SOLUTION, ORAL ORAL EVERY 8 HOURS PRN
Qty: 120 ML | Refills: 0 | Status: SHIPPED | OUTPATIENT
Start: 2020-02-28 | End: 2020-03-09

## 2020-02-28 RX ORDER — PROPOFOL 10 MG/ML
INJECTION, EMULSION INTRAVENOUS PRN
Status: DISCONTINUED | OUTPATIENT
Start: 2020-02-28 | End: 2020-02-28

## 2020-02-28 RX ORDER — ONDANSETRON 2 MG/ML
INJECTION INTRAMUSCULAR; INTRAVENOUS PRN
Status: DISCONTINUED | OUTPATIENT
Start: 2020-02-28 | End: 2020-02-28

## 2020-02-28 RX ORDER — LIDOCAINE 40 MG/G
CREAM TOPICAL
Status: DISCONTINUED | OUTPATIENT
Start: 2020-02-28 | End: 2020-02-28 | Stop reason: HOSPADM

## 2020-02-28 RX ORDER — ACETAMINOPHEN 325 MG/1
650 TABLET ORAL EVERY 4 HOURS PRN
Status: DISCONTINUED | OUTPATIENT
Start: 2020-03-02 | End: 2020-02-28 | Stop reason: HOSPADM

## 2020-02-28 RX ORDER — DEXAMETHASONE SODIUM PHOSPHATE 4 MG/ML
INJECTION, SOLUTION INTRA-ARTICULAR; INTRALESIONAL; INTRAMUSCULAR; INTRAVENOUS; SOFT TISSUE PRN
Status: DISCONTINUED | OUTPATIENT
Start: 2020-02-28 | End: 2020-02-28

## 2020-02-28 RX ORDER — ONDANSETRON 2 MG/ML
4 INJECTION INTRAMUSCULAR; INTRAVENOUS EVERY 6 HOURS PRN
Status: DISCONTINUED | OUTPATIENT
Start: 2020-02-28 | End: 2020-02-28 | Stop reason: HOSPADM

## 2020-02-28 RX ORDER — SODIUM CHLORIDE, SODIUM LACTATE, POTASSIUM CHLORIDE, CALCIUM CHLORIDE 600; 310; 30; 20 MG/100ML; MG/100ML; MG/100ML; MG/100ML
INJECTION, SOLUTION INTRAVENOUS CONTINUOUS PRN
Status: DISCONTINUED | OUTPATIENT
Start: 2020-02-28 | End: 2020-02-28

## 2020-02-28 RX ORDER — FENTANYL CITRATE 50 UG/ML
25-50 INJECTION, SOLUTION INTRAMUSCULAR; INTRAVENOUS
Status: DISCONTINUED | OUTPATIENT
Start: 2020-02-28 | End: 2020-02-28 | Stop reason: HOSPADM

## 2020-02-28 RX ORDER — NALOXONE HYDROCHLORIDE 0.4 MG/ML
.1-.4 INJECTION, SOLUTION INTRAMUSCULAR; INTRAVENOUS; SUBCUTANEOUS
Status: DISCONTINUED | OUTPATIENT
Start: 2020-02-28 | End: 2020-02-28

## 2020-02-28 RX ADMIN — FENTANYL CITRATE 50 MCG: 50 INJECTION, SOLUTION INTRAMUSCULAR; INTRAVENOUS at 11:52

## 2020-02-28 RX ADMIN — LIDOCAINE HYDROCHLORIDE 60 MG: 20 INJECTION, SOLUTION INFILTRATION; PERINEURAL at 10:29

## 2020-02-28 RX ADMIN — ONDANSETRON 4 MG: 2 INJECTION INTRAMUSCULAR; INTRAVENOUS at 11:30

## 2020-02-28 RX ADMIN — HYDROMORPHONE HYDROCHLORIDE 0.2 MG: 1 INJECTION, SOLUTION INTRAMUSCULAR; INTRAVENOUS; SUBCUTANEOUS at 13:46

## 2020-02-28 RX ADMIN — SUGAMMADEX 200 MG: 100 INJECTION, SOLUTION INTRAVENOUS at 11:40

## 2020-02-28 RX ADMIN — Medication 10 MG: at 11:16

## 2020-02-28 RX ADMIN — DEXAMETHASONE SODIUM PHOSPHATE 8 MG: 4 INJECTION, SOLUTION INTRA-ARTICULAR; INTRALESIONAL; INTRAMUSCULAR; INTRAVENOUS; SOFT TISSUE at 10:37

## 2020-02-28 RX ADMIN — PROPOFOL 120 MG: 10 INJECTION, EMULSION INTRAVENOUS at 10:29

## 2020-02-28 RX ADMIN — FENTANYL CITRATE 100 MCG: 50 INJECTION, SOLUTION INTRAMUSCULAR; INTRAVENOUS at 10:29

## 2020-02-28 RX ADMIN — GLYCOPYRROLATE 0.4 MG: 0.2 INJECTION, SOLUTION INTRAMUSCULAR; INTRAVENOUS at 11:19

## 2020-02-28 RX ADMIN — SODIUM CHLORIDE, POTASSIUM CHLORIDE, SODIUM LACTATE AND CALCIUM CHLORIDE: 600; 310; 30; 20 INJECTION, SOLUTION INTRAVENOUS at 10:20

## 2020-02-28 RX ADMIN — CLINDAMYCIN PHOSPHATE 900 MG: 900 INJECTION, SOLUTION INTRAVENOUS at 10:37

## 2020-02-28 RX ADMIN — ACETAMINOPHEN 975 MG: 325 TABLET, FILM COATED ORAL at 17:00

## 2020-02-28 RX ADMIN — FENTANYL CITRATE 50 MCG: 50 INJECTION, SOLUTION INTRAMUSCULAR; INTRAVENOUS at 13:01

## 2020-02-28 RX ADMIN — SODIUM CHLORIDE, POTASSIUM CHLORIDE, SODIUM LACTATE AND CALCIUM CHLORIDE: 600; 310; 30; 20 INJECTION, SOLUTION INTRAVENOUS at 14:03

## 2020-02-28 RX ADMIN — FENTANYL CITRATE 25 MCG: 50 INJECTION, SOLUTION INTRAMUSCULAR; INTRAVENOUS at 12:52

## 2020-02-28 RX ADMIN — HEPARIN SODIUM 5000 UNITS: 5000 INJECTION, SOLUTION INTRAVENOUS; SUBCUTANEOUS at 10:12

## 2020-02-28 RX ADMIN — FENTANYL CITRATE 50 MCG: 50 INJECTION, SOLUTION INTRAMUSCULAR; INTRAVENOUS at 11:42

## 2020-02-28 RX ADMIN — ROCURONIUM BROMIDE 10 MG: 10 INJECTION INTRAVENOUS at 11:15

## 2020-02-28 RX ADMIN — HYDROMORPHONE HYDROCHLORIDE 0.3 MG: 1 INJECTION, SOLUTION INTRAMUSCULAR; INTRAVENOUS; SUBCUTANEOUS at 13:27

## 2020-02-28 RX ADMIN — Medication 5 MG: at 11:18

## 2020-02-28 RX ADMIN — ROCURONIUM BROMIDE 50 MG: 10 INJECTION INTRAVENOUS at 10:30

## 2020-02-28 RX ADMIN — FENTANYL CITRATE 25 MCG: 50 INJECTION, SOLUTION INTRAMUSCULAR; INTRAVENOUS at 12:33

## 2020-02-28 ASSESSMENT — MIFFLIN-ST. JEOR: SCORE: 1609.31

## 2020-02-28 NOTE — LETTER
February 27, 2020    Dear Pardeep Torrescome back to Thoracic Surgery and Lung Nodule Clinic at the [facility]. Please read all of the following for important details about your upcoming procedure.     ---------------------------------------------------------------------------------------------------------------------   Surgeon:   Dr. rBy Saunders   Procedure:  RIGHT THORACOSCOPIC WEDGE RESECTION     Location: [address] floor- [address] [place] [id number]    Date:    Friday, February 28, 2020    Arrival Time:  8:00am  --this may change, see below--       *Please note that your surgery time may change; If so, you will receive a call from the Pre-Admission nursing department the day before surgery.     A  is REQUIRED if going home the same day.     Please arrange for someone to drive you home after surgery. If you will be having same-day surgery, you may not drive or take public transportation by yourself. You will also need to have someone stay with you 24 hours after discharge. Please plan for you and your ride to be available to the entire day.*    Please read the following reminders carefully:    Showering and Bathing Before Surgery  Surgical scrub is available for  at most pharmacies.    It is important to wash with a special antiseptic surgical soap twice before surgery.  This will decrease bacteria on your skin and help reduce your chance of getting an infection after surgery.  However, only use the surgical scrub from the neck down. It can be damaging to the eyes.  Read the directions and precautions on the bottle of antiseptic soap.  Shower or bathe using this special antiseptic soap twice before surgery.    You should wash once the evening before surgery and once the morning of surgery.  If this is not possible, wash twice the morning of surgery- wash, rinse, and then wash again.    You will do two thorough full strength  applications of the soap, using half of the bottle for each application.  You will use the entire bottle before coming for surgery.  Follow these instructions:  The evening before surgery, shower or bathe with this surgical scrub.   Pay particular attention to where incisions will be made.   Do NOT use this soap on your genital/perineal area.  Rinse thoroughly.  You may wash your hair with regular shampoo.   Use a clean towel and put clean clothes/pajamas on.    Day of surgery:  Shower again before coming to the hospital for surgery-  Again, use the surgical scrub and pay particular attention to the area where incisions will be made.  Use a clean towel to dry off and put on clean clothes.      Please do not eat starting 8 hours prior to your surgery time. You may have clear liquids up until 2 hours prior to your surgery time. Clear liquids include: water, Gatorade, Pedialyte, carbonated beverages, clear tea, black coffee, Jell-O without fruit or particles, hard candies, and chewing gum. No milk or milk products. No alcohol.      Please call Jami Yap ([phone]) or Toña Bernal ([phone]) for thoracic and lung nodule clinic before you come in for surgery if you have been ill, had a fever, or have been exposed to any illness in the few days prior to the scheduled surgery.  If these problems should arise the night before the surgery, please call the hospital  at [phone] and ask to speak to the Cardiovascular/Thoracic Surgery Fellow on call.       If applicable, please take your cardiac or blood pressure medication as normally scheduled before your surgery with small sips of water.      If applicable, please stop taking Ibuprofen or Motrin 5 days prior to your surgery.  You may take Tylenol if necessary.      If you take Coumadin or any other blood thinning medication, please contact Toña ([phone]) or Jami ([phone]) for further instruction.      If you take hypoglycemic  agents and/or insulin, please seek instruction from the prescribing physician about taking these medications on the day of your surgery.      Please note that failure to comply with these requirements may delay or cancel your surgery or procedure.      Sincerely,    Esha Torres  Rosemary-Op Coordinator  [phone]

## 2020-02-28 NOTE — ANESTHESIA CARE TRANSFER NOTE
Patient: Pardeep Wilson    Procedure(s):  RIGHT THORACOSCOPIC WEDGE RESECTION    Diagnosis: Lung nodules [R91.8]  Diagnosis Additional Information: No value filed.    Anesthesia Type:   General     Note:  Airway :Nasal Cannula  Patient transferred to:PACU  Handoff Report: Identifed the Patient, Identified the Reponsible Provider, Reviewed the pertinent medical history, Discussed the surgical course, Reviewed Intra-OP anesthesia mangement and issues during anesthesia, Set expectations for post-procedure period and Allowed opportunity for questions and acknowledgement of understanding      Vitals: (Last set prior to Anesthesia Care Transfer)    CRNA VITALS  2/28/2020 1127 - 2/28/2020 1210      2/28/2020             Pulse:  91    SpO2:  100 %                Electronically Signed By: Lina Coombs MD  February 28, 2020  12:10 PM

## 2020-02-28 NOTE — BRIEF OP NOTE
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  Complications: None.  Implants: * No implants in log *

## 2020-02-28 NOTE — OR NURSING
Dr. Alfredo Gallegos at bedside in PACU and removed chest tube at 1345.      Dr. Silveira notified patient ready for discharge to unit 6D.  Dr. Silveira will enter anesthesia sign out.

## 2020-02-28 NOTE — OR NURSING
Second page sent to Dr. Alfredo Gallegos notifying chest x-ray completed in PACU.    First page sent at [phone].

## 2020-02-28 NOTE — ANESTHESIA POSTPROCEDURE EVALUATION
Anesthesia POST Procedure Evaluation    Patient: Pardeep Wilson   MRN:     [id number] Gender:   male   Age:    64 year old :      1955        Preoperative Diagnosis: Lung nodules [R91.8]   Procedure(s):  RIGHT THORACOSCOPIC WEDGE RESECTION   Postop Comments: No value filed.     Anesthesia Type: General       Disposition: Outpatient   Postop Pain Control: Uneventful            Sign Out: Well controlled pain   PONV: No   Neuro/Psych: Uneventful            Sign Out: Acceptable/Baseline neuro status   Airway/Respiratory: Uneventful            Sign Out: Acceptable/Baseline resp. status   CV/Hemodynamics: Uneventful            Sign Out: Acceptable CV status   Other NRE: NONE   DID A NON-ROUTINE EVENT OCCUR? No         Last Anesthesia Record Vitals:  CRNA VITALS  2020 1127 - 2020 1227      2020             Pulse:  91    SpO2:  100 %          Last PACU Vitals:  Vitals Value Taken Time   /83 2020  1:50 PM   Temp 36.4  C (97.5  F) 2020 12:30 PM   Pulse 89 2020  1:50 PM   Resp 14 2020  1:30 PM   SpO2 97 % 2020  1:53 PM   Temp src     NIBP     Pulse     SpO2     Resp     Temp     Ht Rate     Temp 2     Vitals shown include unvalidated device data.      Electronically Signed By: Maria Del Carmen Silveira MD, 2020, 1:54 PM

## 2020-02-28 NOTE — PROVIDER NOTIFICATION
" Provider text paged re: \"Pt and family under impressions that he would be going home today. No orders for d/c. Please advise on plan going forward.\"  "

## 2020-02-29 NOTE — PLAN OF CARE
"AVS discussed with patient and wife. All questions answered. Sub-Q instructions given to patient and wife. Pt and wife verbalized understanding. Sites-CDI. XR completed. Pt ambulated, voided, and tolerated regular diet. VSS. No SOB, on RA. PIV's removed. Patient ambulated to main lobby with spouse.Pt discharged.     /72   Pulse 89   Temp 97.5  F (36.4  C) (Oral)   Resp 12   Ht 1.765 m (5' 9.5\")   Wt 82.1 kg (181 lb)   SpO2 97%   BMI 26.35 kg/m      "

## 2020-02-29 NOTE — PROVIDER NOTIFICATION
"Team text paged re: \"Sorry for repeat pages. Also have script for oxycodone that needs to be signed at 6D desk. Thanks,\"   "

## 2020-03-01 ENCOUNTER — PATIENT OUTREACH (OUTPATIENT)
Dept: CARE COORDINATION | Facility: CLINIC | Age: 65
End: 2020-03-01

## 2020-03-02 ENCOUNTER — COMMUNICATION - HEALTHEAST (OUTPATIENT)
Dept: ONCOLOGY | Facility: CLINIC | Age: 65
End: 2020-03-02

## 2020-03-06 ENCOUNTER — TELEPHONE (OUTPATIENT)
Dept: SURGERY | Facility: CLINIC | Age: 65
End: 2020-03-06

## 2020-03-06 NOTE — TELEPHONE ENCOUNTER
"I called Mr. Wilson to discuss his pathology results from his recent wedge resection with Dr. Saunders.   I am going to mail him a copy for his records.         He reports a good recovery and has gone back to work.  He declines a follow-up appointment/post-op evaluation, saying \"I am fine and now just want to move forward\".    He may consult at [place] to see what treatment options they may have.   In addition, he is seeing Dr. Soto (oncologist) on 3/16.       I encouraged him to return to see Dr. Saunders at [facility] to discuss his surgical questions in more detail and he might consider that.   I told him he would get a CXR at that time, as well.   He does not want to schedule this right now but will call that office if he changes his mind.  "

## 2020-03-08 NOTE — OP NOTE
Procedure Date: 2020      PREOPERATIVE DIAGNOSES:   1.  Right lung nodules.   2.  Status post left lower lobectomy for a T4 N0 nonsmall cell lung cancer.      POSTOPERATIVE DIAGNOSES:   1.  Lung metastases.   2.  Status post left lower lobectomy for a T4 N0 nonsmall cell lung cancer.      PROCEDURE PERFORMED:  Right subxiphoid thoracoscopic lung biopsy.      SURGEON:  Bry Saunders MD (present and participated for the entire procedure).      RESIDENT SURGEON:  Alfredo Gallegos MD      ANESTHESIA:  General.      ESTIMATED BLOOD LOSS:  10 mL.      COMPLICATIONS:  None immediate.      FINDINGS:  We identified a nodule on the right upper lobe that was easily accessible.  Frozen section revealed metastasis.      DESCRIPTION OF PROCEDURE:  With the patient in the semi-left lateral decubitus under general anesthesia with double-lumen tube in place, we used a 3-port approach, 1 midline subxiphoid 12 port and two 5 mm paramedian ports.  We entered the right pleural space through the subxiphoid approach first with digital palpation and then placed a 12 port and placed two 5 mm ports with digital palpation as well.  Once we entered the pleural cavity, we easily identified a nodule in the right upper lobe that we removed with a stapler with the above-mentioned findings.  We placed a Zhen drain and then reinsufflated the lung and closed with absorbable sutures in standard fashion.      The patient tolerated the procedure well.         BRY SAUNDERS MD             D: 2020   T: 2020   MT: LISA      Name:     LAWRENCE HENDRIX   MRN:      [id number]        Account:        [id number]   :      1955           Procedure Date: 2020      Document: [id number]       cc: [facility] Surgery Billing

## 2020-03-09 ENCOUNTER — AMBULATORY - HEALTHEAST (OUTPATIENT)
Dept: ONCOLOGY | Facility: HOSPITAL | Age: 65
End: 2020-03-09

## 2020-03-09 ENCOUNTER — COMMUNICATION - HEALTHEAST (OUTPATIENT)
Dept: FAMILY MEDICINE | Facility: CLINIC | Age: 65
End: 2020-03-09

## 2020-03-10 DIAGNOSIS — C34.11 CANCER OF UPPER LOBE OF RIGHT LUNG (H): Primary | ICD-10-CM

## 2020-03-10 PROCEDURE — 81445 SO NEO GSAP 5-50DNA/DNA&RNA: CPT | Performed by: THORACIC SURGERY (CARDIOTHORACIC VASCULAR SURGERY)

## 2020-03-17 ENCOUNTER — OFFICE VISIT - HEALTHEAST (OUTPATIENT)
Dept: ONCOLOGY | Facility: HOSPITAL | Age: 65
End: 2020-03-17

## 2020-03-18 LAB — COPATH REPORT: NORMAL

## 2020-03-19 LAB — COPATH REPORT: NORMAL

## 2020-03-23 ENCOUNTER — COMMUNICATION - HEALTHEAST (OUTPATIENT)
Dept: FAMILY MEDICINE | Facility: CLINIC | Age: 65
End: 2020-03-23

## 2020-03-23 DIAGNOSIS — K21.9 ESOPHAGEAL REFLUX: ICD-10-CM

## 2020-03-23 DIAGNOSIS — I25.10 CORONARY ATHEROSCLEROSIS: ICD-10-CM

## 2020-03-23 DIAGNOSIS — E03.1 CONGENITAL HYPOTHYROIDISM WITHOUT GOITER: ICD-10-CM

## 2020-03-25 ENCOUNTER — COMMUNICATION - HEALTHEAST (OUTPATIENT)
Dept: ONCOLOGY | Facility: CLINIC | Age: 65
End: 2020-03-25

## 2020-03-26 ENCOUNTER — AMBULATORY - HEALTHEAST (OUTPATIENT)
Dept: ONCOLOGY | Facility: HOSPITAL | Age: 65
End: 2020-03-26

## 2020-03-26 DIAGNOSIS — C34.32 MALIGNANT NEOPLASM OF LOWER LOBE OF LEFT LUNG (H): ICD-10-CM

## 2020-04-01 ENCOUNTER — HOSPITAL ENCOUNTER (OUTPATIENT)
Dept: PET IMAGING | Facility: HOSPITAL | Age: 65
Discharge: HOME | End: 2020-04-01
Attending: INTERNAL MEDICINE

## 2020-04-01 ENCOUNTER — HOSPITAL ENCOUNTER (OUTPATIENT)
Dept: MRI IMAGING | Facility: HOSPITAL | Age: 65
Discharge: HOME | End: 2020-04-01
Attending: INTERNAL MEDICINE

## 2020-04-01 DIAGNOSIS — C34.32 MALIGNANT NEOPLASM OF LOWER LOBE OF LEFT LUNG (H): ICD-10-CM

## 2020-04-01 LAB
CREAT BLD-MCNC: 1.4 MG/DL (ref 0.7–1.3)
GFR SERPL CREATININE-BSD FRML MDRD: 51 ML/MIN/1.73M2
GLUCOSE BLDC GLUCOMTR-MCNC: 71 MG/DL (ref 70–139)

## 2020-04-01 ASSESSMENT — MIFFLIN-ST. JEOR: SCORE: 1600.93

## 2020-04-03 LAB
LAB AP CHARGES (HE HISTORICAL CONVERSION): ABNORMAL
LAB AP INITIAL CYTO EVAL (HE HISTORICAL CONVERSION): ABNORMAL
LAB MED GENERAL PATH INTERP (HE HISTORICAL CONVERSION): ABNORMAL
PATH REPORT.ADDENDUM SPEC: ABNORMAL
PATH REPORT.COMMENTS IMP SPEC: ABNORMAL
PATH REPORT.COMMENTS IMP SPEC: ABNORMAL
PATH REPORT.FINAL DX SPEC: ABNORMAL
PATH REPORT.MICROSCOPIC SPEC OTHER STN: ABNORMAL
PATH REPORT.RELEVANT HX SPEC: ABNORMAL
SPECIMEN DESCRIPTION: ABNORMAL

## 2020-04-05 ENCOUNTER — COMMUNICATION - HEALTHEAST (OUTPATIENT)
Dept: FAMILY MEDICINE | Facility: CLINIC | Age: 65
End: 2020-04-05

## 2020-04-05 DIAGNOSIS — I25.10 CORONARY ARTERY DISEASE INVOLVING NATIVE CORONARY ARTERY OF NATIVE HEART WITHOUT ANGINA PECTORIS: ICD-10-CM

## 2020-04-13 ENCOUNTER — COMMUNICATION - HEALTHEAST (OUTPATIENT)
Dept: ONCOLOGY | Facility: CLINIC | Age: 65
End: 2020-04-13

## 2020-04-14 ENCOUNTER — OFFICE VISIT - HEALTHEAST (OUTPATIENT)
Dept: ONCOLOGY | Facility: HOSPITAL | Age: 65
End: 2020-04-14

## 2020-04-14 DIAGNOSIS — C34.32 MALIGNANT NEOPLASM OF LOWER LOBE OF LEFT LUNG (H): ICD-10-CM

## 2020-04-16 ENCOUNTER — COMMUNICATION - HEALTHEAST (OUTPATIENT)
Dept: ONCOLOGY | Facility: CLINIC | Age: 65
End: 2020-04-16

## 2020-06-01 ENCOUNTER — HOSPITAL ENCOUNTER (OUTPATIENT)
Dept: PET IMAGING | Facility: HOSPITAL | Age: 65
Setting detail: RADIATION/ONCOLOGY SERIES
Discharge: STILL ADMITTED OR ADMINISTRATIVE | End: 2020-06-01
Attending: INTERNAL MEDICINE

## 2020-06-01 DIAGNOSIS — C34.32 MALIGNANT NEOPLASM OF LOWER LOBE OF LEFT LUNG (H): ICD-10-CM

## 2020-06-01 LAB — GLUCOSE BLDC GLUCOMTR-MCNC: 95 MG/DL (ref 70–139)

## 2020-06-01 ASSESSMENT — MIFFLIN-ST. JEOR: SCORE: 1614.53

## 2020-06-04 ENCOUNTER — OFFICE VISIT - HEALTHEAST (OUTPATIENT)
Dept: ONCOLOGY | Facility: HOSPITAL | Age: 65
End: 2020-06-04

## 2020-06-04 ENCOUNTER — AMBULATORY - HEALTHEAST (OUTPATIENT)
Dept: ONCOLOGY | Facility: HOSPITAL | Age: 65
End: 2020-06-04

## 2020-06-04 DIAGNOSIS — C34.92 METASTATIC PRIMARY LUNG CANCER, LEFT (H): ICD-10-CM

## 2020-06-04 DIAGNOSIS — C34.32 MALIGNANT NEOPLASM OF LOWER LOBE OF LEFT LUNG (H): ICD-10-CM

## 2020-06-04 DIAGNOSIS — Z51.11 ENCOUNTER FOR ANTINEOPLASTIC CHEMOTHERAPY: ICD-10-CM

## 2020-06-05 ENCOUNTER — HOSPITAL ENCOUNTER (OUTPATIENT)
Dept: CT IMAGING | Facility: CLINIC | Age: 65
Setting detail: RADIATION/ONCOLOGY SERIES
Discharge: STILL ADMITTED OR ADMINISTRATIVE | End: 2020-06-05
Attending: INTERNAL MEDICINE

## 2020-06-05 DIAGNOSIS — C34.32 MALIGNANT NEOPLASM OF LOWER LOBE OF LEFT LUNG (H): ICD-10-CM

## 2020-06-12 ENCOUNTER — COMMUNICATION - HEALTHEAST (OUTPATIENT)
Dept: ONCOLOGY | Facility: CLINIC | Age: 65
End: 2020-06-12

## 2020-06-15 ENCOUNTER — AMBULATORY - HEALTHEAST (OUTPATIENT)
Dept: INFUSION THERAPY | Facility: HOSPITAL | Age: 65
End: 2020-06-15

## 2020-06-15 ENCOUNTER — INFUSION - HEALTHEAST (OUTPATIENT)
Dept: INFUSION THERAPY | Facility: HOSPITAL | Age: 65
End: 2020-06-15

## 2020-06-15 ENCOUNTER — OFFICE VISIT - HEALTHEAST (OUTPATIENT)
Dept: ONCOLOGY | Facility: HOSPITAL | Age: 65
End: 2020-06-15

## 2020-06-15 DIAGNOSIS — C34.92 METASTATIC PRIMARY LUNG CANCER, LEFT (H): ICD-10-CM

## 2020-06-15 DIAGNOSIS — N18.30 ANEMIA OF CHRONIC RENAL FAILURE, STAGE 3 (MODERATE) (H): ICD-10-CM

## 2020-06-15 DIAGNOSIS — Z51.11 ENCOUNTER FOR ANTINEOPLASTIC CHEMOTHERAPY: ICD-10-CM

## 2020-06-15 DIAGNOSIS — D63.1 ANEMIA OF CHRONIC RENAL FAILURE, STAGE 3 (MODERATE) (H): ICD-10-CM

## 2020-06-15 LAB
ALBUMIN SERPL-MCNC: 3.7 G/DL (ref 3.5–5)
ALP SERPL-CCNC: 112 U/L (ref 45–120)
ALT SERPL W P-5'-P-CCNC: 13 U/L (ref 0–45)
ANION GAP SERPL CALCULATED.3IONS-SCNC: 7 MMOL/L (ref 5–18)
AST SERPL W P-5'-P-CCNC: 21 U/L (ref 0–40)
BASOPHILS # BLD AUTO: 0.1 THOU/UL (ref 0–0.2)
BASOPHILS NFR BLD AUTO: 1 % (ref 0–2)
BILIRUB SERPL-MCNC: 1 MG/DL (ref 0–1)
BUN SERPL-MCNC: 26 MG/DL (ref 8–22)
CALCIUM SERPL-MCNC: 9.6 MG/DL (ref 8.5–10.5)
CHLORIDE BLD-SCNC: 107 MMOL/L (ref 98–107)
CO2 SERPL-SCNC: 28 MMOL/L (ref 22–31)
CREAT SERPL-MCNC: 1.45 MG/DL (ref 0.7–1.3)
EOSINOPHIL # BLD AUTO: 0.5 THOU/UL (ref 0–0.4)
EOSINOPHIL NFR BLD AUTO: 6 % (ref 0–6)
ERYTHROCYTE [DISTWIDTH] IN BLOOD BY AUTOMATED COUNT: 15.4 % (ref 11–14.5)
GFR SERPL CREATININE-BSD FRML MDRD: 49 ML/MIN/1.73M2
GLUCOSE BLD-MCNC: 77 MG/DL (ref 70–125)
HCT VFR BLD AUTO: 39.4 % (ref 40–54)
HGB BLD-MCNC: 13.1 G/DL (ref 14–18)
LYMPHOCYTES # BLD AUTO: 3.3 THOU/UL (ref 0.8–4.4)
LYMPHOCYTES NFR BLD AUTO: 39 % (ref 20–40)
MAGNESIUM SERPL-MCNC: 1.8 MG/DL (ref 1.8–2.6)
MCH RBC QN AUTO: 30.8 PG (ref 27–34)
MCHC RBC AUTO-ENTMCNC: 33.2 G/DL (ref 32–36)
MCV RBC AUTO: 93 FL (ref 80–100)
MONOCYTES # BLD AUTO: 1.1 THOU/UL (ref 0–0.9)
MONOCYTES NFR BLD AUTO: 13 % (ref 2–10)
NEUTROPHILS # BLD AUTO: 3.5 THOU/UL (ref 2–7.7)
NEUTROPHILS NFR BLD AUTO: 42 % (ref 50–70)
PLATELET # BLD AUTO: 386 THOU/UL (ref 140–440)
PMV BLD AUTO: 10.4 FL (ref 8.5–12.5)
POTASSIUM BLD-SCNC: 4.5 MMOL/L (ref 3.5–5)
PROT SERPL-MCNC: 7.3 G/DL (ref 6–8)
RBC # BLD AUTO: 4.25 MILL/UL (ref 4.4–6.2)
SODIUM SERPL-SCNC: 142 MMOL/L (ref 136–145)
WBC: 8.4 THOU/UL (ref 4–11)

## 2020-06-15 ASSESSMENT — MIFFLIN-ST. JEOR: SCORE: 1614.53

## 2020-06-16 PROCEDURE — 88360 TUMOR IMMUNOHISTOCHEM/MANUAL: CPT | Performed by: PATHOLOGY

## 2020-06-18 LAB — COPATH REPORT: NORMAL

## 2020-06-22 ENCOUNTER — AMBULATORY - HEALTHEAST (OUTPATIENT)
Dept: INFUSION THERAPY | Facility: HOSPITAL | Age: 65
End: 2020-06-22

## 2020-06-22 DIAGNOSIS — C34.92 METASTATIC PRIMARY LUNG CANCER, LEFT (H): ICD-10-CM

## 2020-06-22 DIAGNOSIS — Z51.11 ENCOUNTER FOR ANTINEOPLASTIC CHEMOTHERAPY: ICD-10-CM

## 2020-06-22 LAB
ALBUMIN SERPL-MCNC: 3.4 G/DL (ref 3.5–5)
ALP SERPL-CCNC: 96 U/L (ref 45–120)
ALT SERPL W P-5'-P-CCNC: 19 U/L (ref 0–45)
ANION GAP SERPL CALCULATED.3IONS-SCNC: 6 MMOL/L (ref 5–18)
AST SERPL W P-5'-P-CCNC: 17 U/L (ref 0–40)
BASOPHILS # BLD AUTO: 0 THOU/UL (ref 0–0.2)
BASOPHILS NFR BLD AUTO: 1 % (ref 0–2)
BILIRUB SERPL-MCNC: 0.7 MG/DL (ref 0–1)
BUN SERPL-MCNC: 40 MG/DL (ref 8–22)
CALCIUM SERPL-MCNC: 9.2 MG/DL (ref 8.5–10.5)
CHLORIDE BLD-SCNC: 106 MMOL/L (ref 98–107)
CO2 SERPL-SCNC: 25 MMOL/L (ref 22–31)
CREAT SERPL-MCNC: 1.33 MG/DL (ref 0.7–1.3)
EOSINOPHIL # BLD AUTO: 0.3 THOU/UL (ref 0–0.4)
EOSINOPHIL NFR BLD AUTO: 7 % (ref 0–6)
ERYTHROCYTE [DISTWIDTH] IN BLOOD BY AUTOMATED COUNT: 14.7 % (ref 11–14.5)
GFR SERPL CREATININE-BSD FRML MDRD: 54 ML/MIN/1.73M2
GLUCOSE BLD-MCNC: 114 MG/DL (ref 70–125)
HCT VFR BLD AUTO: 41 % (ref 40–54)
HGB BLD-MCNC: 13.8 G/DL (ref 14–18)
LYMPHOCYTES # BLD AUTO: 1.6 THOU/UL (ref 0.8–4.4)
LYMPHOCYTES NFR BLD AUTO: 37 % (ref 20–40)
MCH RBC QN AUTO: 30.5 PG (ref 27–34)
MCHC RBC AUTO-ENTMCNC: 33.7 G/DL (ref 32–36)
MCV RBC AUTO: 91 FL (ref 80–100)
MONOCYTES # BLD AUTO: 0.2 THOU/UL (ref 0–0.9)
MONOCYTES NFR BLD AUTO: 4 % (ref 2–10)
NEUTROPHILS # BLD AUTO: 2.1 THOU/UL (ref 2–7.7)
NEUTROPHILS NFR BLD AUTO: 49 % (ref 50–70)
PLATELET # BLD AUTO: 232 THOU/UL (ref 140–440)
PMV BLD AUTO: 12.1 FL (ref 8.5–12.5)
POTASSIUM BLD-SCNC: 4.9 MMOL/L (ref 3.5–5)
PROT SERPL-MCNC: 6.9 G/DL (ref 6–8)
RBC # BLD AUTO: 4.52 MILL/UL (ref 4.4–6.2)
SODIUM SERPL-SCNC: 137 MMOL/L (ref 136–145)
WBC: 4.3 THOU/UL (ref 4–11)

## 2020-06-23 ENCOUNTER — OFFICE VISIT - HEALTHEAST (OUTPATIENT)
Dept: ONCOLOGY | Facility: HOSPITAL | Age: 65
End: 2020-06-23

## 2020-06-23 DIAGNOSIS — C34.32 MALIGNANT NEOPLASM OF LOWER LOBE OF LEFT LUNG (H): ICD-10-CM

## 2020-06-23 DIAGNOSIS — Z51.11 ENCOUNTER FOR ANTINEOPLASTIC CHEMOTHERAPY: ICD-10-CM

## 2020-06-23 DIAGNOSIS — N28.9 RENAL INSUFFICIENCY: ICD-10-CM

## 2020-06-26 ENCOUNTER — COMMUNICATION - HEALTHEAST (OUTPATIENT)
Dept: ONCOLOGY | Facility: HOSPITAL | Age: 65
End: 2020-06-26

## 2020-06-29 ENCOUNTER — COMMUNICATION - HEALTHEAST (OUTPATIENT)
Dept: ONCOLOGY | Facility: HOSPITAL | Age: 65
End: 2020-06-29

## 2020-06-29 ENCOUNTER — AMBULATORY - HEALTHEAST (OUTPATIENT)
Dept: ONCOLOGY | Facility: HOSPITAL | Age: 65
End: 2020-06-29

## 2020-06-29 DIAGNOSIS — K12.31 MUCOSITIS DUE TO CHEMOTHERAPY: ICD-10-CM

## 2020-07-07 ENCOUNTER — INFUSION - HEALTHEAST (OUTPATIENT)
Dept: INFUSION THERAPY | Facility: HOSPITAL | Age: 65
End: 2020-07-07

## 2020-07-07 ENCOUNTER — OFFICE VISIT - HEALTHEAST (OUTPATIENT)
Dept: ONCOLOGY | Facility: HOSPITAL | Age: 65
End: 2020-07-07

## 2020-07-07 ENCOUNTER — AMBULATORY - HEALTHEAST (OUTPATIENT)
Dept: INFUSION THERAPY | Facility: HOSPITAL | Age: 65
End: 2020-07-07

## 2020-07-07 DIAGNOSIS — C34.92 METASTATIC PRIMARY LUNG CANCER, LEFT (H): ICD-10-CM

## 2020-07-07 DIAGNOSIS — Z51.11 ENCOUNTER FOR ANTINEOPLASTIC CHEMOTHERAPY: ICD-10-CM

## 2020-07-07 LAB
ALBUMIN SERPL-MCNC: 3.3 G/DL (ref 3.5–5)
ALP SERPL-CCNC: 109 U/L (ref 45–120)
ALT SERPL W P-5'-P-CCNC: 16 U/L (ref 0–45)
ANION GAP SERPL CALCULATED.3IONS-SCNC: 8 MMOL/L (ref 5–18)
AST SERPL W P-5'-P-CCNC: 21 U/L (ref 0–40)
BASOPHILS # BLD AUTO: 0.1 THOU/UL (ref 0–0.2)
BASOPHILS NFR BLD AUTO: 1 % (ref 0–2)
BILIRUB SERPL-MCNC: 0.5 MG/DL (ref 0–1)
BUN SERPL-MCNC: 35 MG/DL (ref 8–22)
CALCIUM SERPL-MCNC: 8.7 MG/DL (ref 8.5–10.5)
CHLORIDE BLD-SCNC: 108 MMOL/L (ref 98–107)
CO2 SERPL-SCNC: 25 MMOL/L (ref 22–31)
CREAT SERPL-MCNC: 1.18 MG/DL (ref 0.7–1.3)
EOSINOPHIL # BLD AUTO: 0.8 THOU/UL (ref 0–0.4)
EOSINOPHIL NFR BLD AUTO: 10 % (ref 0–6)
ERYTHROCYTE [DISTWIDTH] IN BLOOD BY AUTOMATED COUNT: 16.6 % (ref 11–14.5)
GFR SERPL CREATININE-BSD FRML MDRD: >60 ML/MIN/1.73M2
GLUCOSE BLD-MCNC: 95 MG/DL (ref 70–125)
HCT VFR BLD AUTO: 34.5 % (ref 40–54)
HGB BLD-MCNC: 11.6 G/DL (ref 14–18)
LYMPHOCYTES # BLD AUTO: 3.3 THOU/UL (ref 0.8–4.4)
LYMPHOCYTES NFR BLD AUTO: 41 % (ref 20–40)
MAGNESIUM SERPL-MCNC: 1.7 MG/DL (ref 1.8–2.6)
MCH RBC QN AUTO: 30.9 PG (ref 27–34)
MCHC RBC AUTO-ENTMCNC: 33.6 G/DL (ref 32–36)
MCV RBC AUTO: 92 FL (ref 80–100)
MONOCYTES # BLD AUTO: 1.1 THOU/UL (ref 0–0.9)
MONOCYTES NFR BLD AUTO: 14 % (ref 2–10)
NEUTROPHILS # BLD AUTO: 2.8 THOU/UL (ref 2–7.7)
NEUTROPHILS NFR BLD AUTO: 34 % (ref 50–70)
PLATELET # BLD AUTO: 372 THOU/UL (ref 140–440)
PMV BLD AUTO: 10.3 FL (ref 8.5–12.5)
POTASSIUM BLD-SCNC: 4.4 MMOL/L (ref 3.5–5)
PROT SERPL-MCNC: 6.7 G/DL (ref 6–8)
RBC # BLD AUTO: 3.75 MILL/UL (ref 4.4–6.2)
SODIUM SERPL-SCNC: 141 MMOL/L (ref 136–145)
WBC: 8.2 THOU/UL (ref 4–11)

## 2020-07-10 ENCOUNTER — COMMUNICATION - HEALTHEAST (OUTPATIENT)
Dept: ONCOLOGY | Facility: CLINIC | Age: 65
End: 2020-07-10

## 2020-07-10 LAB — COPATH REPORT: NORMAL

## 2020-07-19 ENCOUNTER — COMMUNICATION - HEALTHEAST (OUTPATIENT)
Dept: FAMILY MEDICINE | Facility: CLINIC | Age: 65
End: 2020-07-19

## 2020-07-19 DIAGNOSIS — I25.10 CORONARY ARTERY DISEASE INVOLVING NATIVE CORONARY ARTERY OF NATIVE HEART WITHOUT ANGINA PECTORIS: ICD-10-CM

## 2020-07-24 ENCOUNTER — HOSPITAL ENCOUNTER (OUTPATIENT)
Dept: CT IMAGING | Facility: HOSPITAL | Age: 65
Setting detail: RADIATION/ONCOLOGY SERIES
Discharge: STILL ADMITTED OR ADMINISTRATIVE | End: 2020-07-24
Attending: INTERNAL MEDICINE

## 2020-07-24 DIAGNOSIS — C34.92 METASTATIC PRIMARY LUNG CANCER, LEFT (H): ICD-10-CM

## 2020-07-28 ENCOUNTER — AMBULATORY - HEALTHEAST (OUTPATIENT)
Dept: INFUSION THERAPY | Facility: HOSPITAL | Age: 65
End: 2020-07-28

## 2020-07-28 ENCOUNTER — INFUSION - HEALTHEAST (OUTPATIENT)
Dept: INFUSION THERAPY | Facility: HOSPITAL | Age: 65
End: 2020-07-28

## 2020-07-28 ENCOUNTER — OFFICE VISIT - HEALTHEAST (OUTPATIENT)
Dept: ONCOLOGY | Facility: HOSPITAL | Age: 65
End: 2020-07-28

## 2020-07-28 DIAGNOSIS — Z51.11 ENCOUNTER FOR ANTINEOPLASTIC CHEMOTHERAPY: ICD-10-CM

## 2020-07-28 DIAGNOSIS — C34.92 METASTATIC PRIMARY LUNG CANCER, LEFT (H): ICD-10-CM

## 2020-07-28 LAB
ALBUMIN SERPL-MCNC: 3.3 G/DL (ref 3.5–5)
ALP SERPL-CCNC: 107 U/L (ref 45–120)
ALT SERPL W P-5'-P-CCNC: 16 U/L (ref 0–45)
ANION GAP SERPL CALCULATED.3IONS-SCNC: 8 MMOL/L (ref 5–18)
AST SERPL W P-5'-P-CCNC: 22 U/L (ref 0–40)
BASOPHILS # BLD AUTO: 0 THOU/UL (ref 0–0.2)
BASOPHILS NFR BLD AUTO: 0 % (ref 0–2)
BILIRUB SERPL-MCNC: 0.5 MG/DL (ref 0–1)
BUN SERPL-MCNC: 34 MG/DL (ref 8–22)
CALCIUM SERPL-MCNC: 9 MG/DL (ref 8.5–10.5)
CHLORIDE BLD-SCNC: 108 MMOL/L (ref 98–107)
CO2 SERPL-SCNC: 24 MMOL/L (ref 22–31)
CREAT SERPL-MCNC: 1.26 MG/DL (ref 0.7–1.3)
EOSINOPHIL # BLD AUTO: 0 THOU/UL (ref 0–0.4)
EOSINOPHIL NFR BLD AUTO: 0 % (ref 0–6)
ERYTHROCYTE [DISTWIDTH] IN BLOOD BY AUTOMATED COUNT: 17.8 % (ref 11–14.5)
GFR SERPL CREATININE-BSD FRML MDRD: 57 ML/MIN/1.73M2
GLUCOSE BLD-MCNC: 103 MG/DL (ref 70–125)
HCT VFR BLD AUTO: 32.6 % (ref 40–54)
HGB BLD-MCNC: 11.2 G/DL (ref 14–18)
LYMPHOCYTES # BLD AUTO: 3.3 THOU/UL (ref 0.8–4.4)
LYMPHOCYTES NFR BLD AUTO: 37 % (ref 20–40)
MAGNESIUM SERPL-MCNC: 1.5 MG/DL (ref 1.8–2.6)
MCH RBC QN AUTO: 31.8 PG (ref 27–34)
MCHC RBC AUTO-ENTMCNC: 34.4 G/DL (ref 32–36)
MCV RBC AUTO: 93 FL (ref 80–100)
MONOCYTES # BLD AUTO: 0.9 THOU/UL (ref 0–0.9)
MONOCYTES NFR BLD AUTO: 10 % (ref 2–10)
NEUTROPHILS # BLD AUTO: 4.7 THOU/UL (ref 2–7.7)
NEUTROPHILS NFR BLD AUTO: 52 % (ref 50–70)
PLATELET # BLD AUTO: 219 THOU/UL (ref 140–440)
PMV BLD AUTO: 10.8 FL (ref 8.5–12.5)
POTASSIUM BLD-SCNC: 4.8 MMOL/L (ref 3.5–5)
PROT SERPL-MCNC: 6.7 G/DL (ref 6–8)
RBC # BLD AUTO: 3.52 MILL/UL (ref 4.4–6.2)
SODIUM SERPL-SCNC: 140 MMOL/L (ref 136–145)
WBC: 9 THOU/UL (ref 4–11)

## 2020-08-18 ENCOUNTER — INFUSION - HEALTHEAST (OUTPATIENT)
Dept: INFUSION THERAPY | Facility: HOSPITAL | Age: 65
End: 2020-08-18

## 2020-08-18 ENCOUNTER — AMBULATORY - HEALTHEAST (OUTPATIENT)
Dept: INFUSION THERAPY | Facility: HOSPITAL | Age: 65
End: 2020-08-18

## 2020-08-18 ENCOUNTER — OFFICE VISIT - HEALTHEAST (OUTPATIENT)
Dept: ONCOLOGY | Facility: HOSPITAL | Age: 65
End: 2020-08-18

## 2020-08-18 DIAGNOSIS — Z51.11 ENCOUNTER FOR ANTINEOPLASTIC CHEMOTHERAPY: ICD-10-CM

## 2020-08-18 DIAGNOSIS — C34.92 METASTATIC PRIMARY LUNG CANCER, LEFT (H): ICD-10-CM

## 2020-08-18 LAB
ALBUMIN SERPL-MCNC: 3.5 G/DL (ref 3.5–5)
ALP SERPL-CCNC: 113 U/L (ref 45–120)
ALT SERPL W P-5'-P-CCNC: 16 U/L (ref 0–45)
ANION GAP SERPL CALCULATED.3IONS-SCNC: 8 MMOL/L (ref 5–18)
AST SERPL W P-5'-P-CCNC: 18 U/L (ref 0–40)
BASOPHILS # BLD AUTO: 0 THOU/UL (ref 0–0.2)
BASOPHILS NFR BLD AUTO: 1 % (ref 0–2)
BILIRUB SERPL-MCNC: 0.4 MG/DL (ref 0–1)
BUN SERPL-MCNC: 25 MG/DL (ref 8–22)
CALCIUM SERPL-MCNC: 9.2 MG/DL (ref 8.5–10.5)
CHLORIDE BLD-SCNC: 107 MMOL/L (ref 98–107)
CO2 SERPL-SCNC: 26 MMOL/L (ref 22–31)
CREAT SERPL-MCNC: 1.12 MG/DL (ref 0.7–1.3)
EOSINOPHIL # BLD AUTO: 0 THOU/UL (ref 0–0.4)
EOSINOPHIL NFR BLD AUTO: 1 % (ref 0–6)
ERYTHROCYTE [DISTWIDTH] IN BLOOD BY AUTOMATED COUNT: 19.9 % (ref 11–14.5)
GFR SERPL CREATININE-BSD FRML MDRD: >60 ML/MIN/1.73M2
GLUCOSE BLD-MCNC: 87 MG/DL (ref 70–125)
HCT VFR BLD AUTO: 33.6 % (ref 40–54)
HGB BLD-MCNC: 11.2 G/DL (ref 14–18)
LYMPHOCYTES # BLD AUTO: 3.4 THOU/UL (ref 0.8–4.4)
LYMPHOCYTES NFR BLD AUTO: 47 % (ref 20–40)
MAGNESIUM SERPL-MCNC: 1.8 MG/DL (ref 1.8–2.6)
MCH RBC QN AUTO: 31.6 PG (ref 27–34)
MCHC RBC AUTO-ENTMCNC: 33.3 G/DL (ref 32–36)
MCV RBC AUTO: 95 FL (ref 80–100)
MONOCYTES # BLD AUTO: 1.2 THOU/UL (ref 0–0.9)
MONOCYTES NFR BLD AUTO: 17 % (ref 2–10)
NEUTROPHILS # BLD AUTO: 2.5 THOU/UL (ref 2–7.7)
NEUTROPHILS NFR BLD AUTO: 34 % (ref 50–70)
PLATELET # BLD AUTO: 220 THOU/UL (ref 140–440)
PMV BLD AUTO: 9.9 FL (ref 8.5–12.5)
POTASSIUM BLD-SCNC: 4.5 MMOL/L (ref 3.5–5)
PROT SERPL-MCNC: 6.9 G/DL (ref 6–8)
RBC # BLD AUTO: 3.54 MILL/UL (ref 4.4–6.2)
SODIUM SERPL-SCNC: 141 MMOL/L (ref 136–145)
WBC: 7.2 THOU/UL (ref 4–11)

## 2020-08-19 ENCOUNTER — COMMUNICATION - HEALTHEAST (OUTPATIENT)
Dept: ONCOLOGY | Facility: HOSPITAL | Age: 65
End: 2020-08-19

## 2020-08-20 NOTE — PROVIDER NOTIFICATION
"Provider text paged re:  \"I was just talking to someone about med changes for this patient. Pt would like to continue meds as he takes them at home. Does not crush.   Please advise. Thanks,\"  " no

## 2020-09-01 ENCOUNTER — COMMUNICATION - HEALTHEAST (OUTPATIENT)
Dept: FAMILY MEDICINE | Facility: CLINIC | Age: 65
End: 2020-09-01

## 2020-09-02 ENCOUNTER — OFFICE VISIT - HEALTHEAST (OUTPATIENT)
Dept: FAMILY MEDICINE | Facility: CLINIC | Age: 65
End: 2020-09-02

## 2020-09-02 DIAGNOSIS — I25.10 CORONARY ARTERY DISEASE INVOLVING NATIVE CORONARY ARTERY OF NATIVE HEART WITHOUT ANGINA PECTORIS: ICD-10-CM

## 2020-09-02 DIAGNOSIS — Z13.220 ENCOUNTER FOR SCREENING FOR LIPOID DISORDERS: ICD-10-CM

## 2020-09-02 DIAGNOSIS — Z12.5 SCREENING FOR MALIGNANT NEOPLASM OF PROSTATE: ICD-10-CM

## 2020-09-02 DIAGNOSIS — Z00.00 ROUTINE GENERAL MEDICAL EXAMINATION AT A HEALTH CARE FACILITY: ICD-10-CM

## 2020-09-02 DIAGNOSIS — I10 ESSENTIAL HYPERTENSION: ICD-10-CM

## 2020-09-02 ASSESSMENT — MIFFLIN-ST. JEOR: SCORE: 1614.31

## 2020-09-04 ENCOUNTER — HOSPITAL ENCOUNTER (OUTPATIENT)
Dept: CT IMAGING | Facility: HOSPITAL | Age: 65
Setting detail: RADIATION/ONCOLOGY SERIES
Discharge: STILL ADMITTED OR ADMINISTRATIVE | End: 2020-09-04
Attending: INTERNAL MEDICINE

## 2020-09-04 DIAGNOSIS — C34.92 METASTATIC PRIMARY LUNG CANCER, LEFT (H): ICD-10-CM

## 2020-09-08 ENCOUNTER — OFFICE VISIT - HEALTHEAST (OUTPATIENT)
Dept: ONCOLOGY | Facility: HOSPITAL | Age: 65
End: 2020-09-08

## 2020-09-08 ENCOUNTER — AMBULATORY - HEALTHEAST (OUTPATIENT)
Dept: INFUSION THERAPY | Facility: HOSPITAL | Age: 65
End: 2020-09-08

## 2020-09-08 ENCOUNTER — INFUSION - HEALTHEAST (OUTPATIENT)
Dept: INFUSION THERAPY | Facility: HOSPITAL | Age: 65
End: 2020-09-08

## 2020-09-08 DIAGNOSIS — C34.92 METASTATIC PRIMARY LUNG CANCER, LEFT (H): ICD-10-CM

## 2020-09-08 DIAGNOSIS — Z51.11 ENCOUNTER FOR ANTINEOPLASTIC CHEMOTHERAPY: ICD-10-CM

## 2020-09-08 LAB
ALBUMIN SERPL-MCNC: 3.5 G/DL (ref 3.5–5)
ALP SERPL-CCNC: 108 U/L (ref 45–120)
ALT SERPL W P-5'-P-CCNC: 15 U/L (ref 0–45)
ANION GAP SERPL CALCULATED.3IONS-SCNC: 7 MMOL/L (ref 5–18)
AST SERPL W P-5'-P-CCNC: 18 U/L (ref 0–40)
BASOPHILS # BLD AUTO: 0.1 THOU/UL (ref 0–0.2)
BASOPHILS NFR BLD AUTO: 1 % (ref 0–2)
BILIRUB SERPL-MCNC: 0.5 MG/DL (ref 0–1)
BUN SERPL-MCNC: 25 MG/DL (ref 8–22)
CALCIUM SERPL-MCNC: 9.1 MG/DL (ref 8.5–10.5)
CHLORIDE BLD-SCNC: 107 MMOL/L (ref 98–107)
CO2 SERPL-SCNC: 26 MMOL/L (ref 22–31)
CREAT SERPL-MCNC: 1.11 MG/DL (ref 0.7–1.3)
EOSINOPHIL # BLD AUTO: 0.2 THOU/UL (ref 0–0.4)
EOSINOPHIL NFR BLD AUTO: 2 % (ref 0–6)
ERYTHROCYTE [DISTWIDTH] IN BLOOD BY AUTOMATED COUNT: 21.2 % (ref 11–14.5)
GFR SERPL CREATININE-BSD FRML MDRD: >60 ML/MIN/1.73M2
GLUCOSE BLD-MCNC: 94 MG/DL (ref 70–125)
HCT VFR BLD AUTO: 31.2 % (ref 40–54)
HGB BLD-MCNC: 10.4 G/DL (ref 14–18)
HOWELL-JOLLY BOD BLD QL SMEAR: ABNORMAL
IMM GRANULOCYTES # BLD: 0 THOU/UL
IMM GRANULOCYTES NFR BLD: 0 %
LYMPHOCYTES # BLD AUTO: 3.1 THOU/UL (ref 0.8–4.4)
LYMPHOCYTES NFR BLD AUTO: 45 % (ref 20–40)
MAGNESIUM SERPL-MCNC: 1.4 MG/DL (ref 1.8–2.6)
MCH RBC QN AUTO: 32.4 PG (ref 27–34)
MCHC RBC AUTO-ENTMCNC: 33.3 G/DL (ref 32–36)
MCV RBC AUTO: 97 FL (ref 80–100)
MONOCYTES # BLD AUTO: 1.3 THOU/UL (ref 0–0.9)
MONOCYTES NFR BLD AUTO: 18 % (ref 2–10)
NEUTROPHILS # BLD AUTO: 2.3 THOU/UL (ref 2–7.7)
NEUTROPHILS NFR BLD AUTO: 33 % (ref 50–70)
OVALOCYTES: ABNORMAL
PLAT MORPH BLD: NORMAL
PLATELET # BLD AUTO: 350 THOU/UL (ref 140–440)
PMV BLD AUTO: 10.1 FL (ref 8.5–12.5)
POLYCHROMASIA BLD QL SMEAR: ABNORMAL
POTASSIUM BLD-SCNC: 4.4 MMOL/L (ref 3.5–5)
PROT SERPL-MCNC: 6.8 G/DL (ref 6–8)
RBC # BLD AUTO: 3.21 MILL/UL (ref 4.4–6.2)
SCHISTOCYTES: ABNORMAL
SODIUM SERPL-SCNC: 140 MMOL/L (ref 136–145)
TARGETS BLD QL SMEAR: ABNORMAL
TEAR DROP: ABNORMAL
WBC: 7 THOU/UL (ref 4–11)

## 2020-09-15 ENCOUNTER — COMMUNICATION - HEALTHEAST (OUTPATIENT)
Dept: ONCOLOGY | Facility: CLINIC | Age: 65
End: 2020-09-15

## 2020-09-29 ENCOUNTER — AMBULATORY - HEALTHEAST (OUTPATIENT)
Dept: INFUSION THERAPY | Facility: HOSPITAL | Age: 65
End: 2020-09-29

## 2020-09-29 ENCOUNTER — OFFICE VISIT - HEALTHEAST (OUTPATIENT)
Dept: ONCOLOGY | Facility: HOSPITAL | Age: 65
End: 2020-09-29

## 2020-09-29 ENCOUNTER — INFUSION - HEALTHEAST (OUTPATIENT)
Dept: INFUSION THERAPY | Facility: HOSPITAL | Age: 65
End: 2020-09-29

## 2020-09-29 DIAGNOSIS — C34.92 METASTATIC PRIMARY LUNG CANCER, LEFT (H): ICD-10-CM

## 2020-09-29 DIAGNOSIS — Z51.11 ENCOUNTER FOR ANTINEOPLASTIC CHEMOTHERAPY: ICD-10-CM

## 2020-09-29 DIAGNOSIS — C34.32 MALIGNANT NEOPLASM OF LOWER LOBE OF LEFT LUNG (H): ICD-10-CM

## 2020-09-29 LAB
ALBUMIN SERPL-MCNC: 3.4 G/DL (ref 3.5–5)
ALP SERPL-CCNC: 121 U/L (ref 45–120)
ALT SERPL W P-5'-P-CCNC: 9 U/L (ref 0–45)
ANION GAP SERPL CALCULATED.3IONS-SCNC: 6 MMOL/L (ref 5–18)
AST SERPL W P-5'-P-CCNC: 22 U/L (ref 0–40)
BILIRUB SERPL-MCNC: 0.8 MG/DL (ref 0–1)
BUN SERPL-MCNC: 26 MG/DL (ref 8–22)
CALCIUM SERPL-MCNC: 9.8 MG/DL (ref 8.5–10.5)
CHLORIDE BLD-SCNC: 109 MMOL/L (ref 98–107)
CO2 SERPL-SCNC: 29 MMOL/L (ref 22–31)
CREAT SERPL-MCNC: 1.32 MG/DL (ref 0.7–1.3)
GFR SERPL CREATININE-BSD FRML MDRD: 54 ML/MIN/1.73M2
GLUCOSE BLD-MCNC: 78 MG/DL (ref 70–125)
POTASSIUM BLD-SCNC: 5.4 MMOL/L (ref 3.5–5)
PROT SERPL-MCNC: 6.8 G/DL (ref 6–8)
SODIUM SERPL-SCNC: 144 MMOL/L (ref 136–145)
TSH SERPL DL<=0.005 MIU/L-ACNC: 0.32 UIU/ML (ref 0.3–5)

## 2020-10-20 ENCOUNTER — OFFICE VISIT - HEALTHEAST (OUTPATIENT)
Dept: ONCOLOGY | Facility: HOSPITAL | Age: 65
End: 2020-10-20

## 2020-10-20 ENCOUNTER — AMBULATORY - HEALTHEAST (OUTPATIENT)
Dept: INFUSION THERAPY | Facility: HOSPITAL | Age: 65
End: 2020-10-20

## 2020-10-20 ENCOUNTER — INFUSION - HEALTHEAST (OUTPATIENT)
Dept: INFUSION THERAPY | Facility: HOSPITAL | Age: 65
End: 2020-10-20

## 2020-10-20 DIAGNOSIS — Z51.11 ENCOUNTER FOR ANTINEOPLASTIC CHEMOTHERAPY: ICD-10-CM

## 2020-10-20 DIAGNOSIS — N18.32 STAGE 3B CHRONIC KIDNEY DISEASE (H): ICD-10-CM

## 2020-10-20 DIAGNOSIS — C34.32 MALIGNANT NEOPLASM OF LOWER LOBE OF LEFT LUNG (H): ICD-10-CM

## 2020-10-20 DIAGNOSIS — C34.92 METASTATIC PRIMARY LUNG CANCER, LEFT (H): ICD-10-CM

## 2020-10-20 LAB
ALBUMIN SERPL-MCNC: 3.7 G/DL (ref 3.5–5)
ALP SERPL-CCNC: 129 U/L (ref 45–120)
ALT SERPL W P-5'-P-CCNC: 18 U/L (ref 0–45)
ANION GAP SERPL CALCULATED.3IONS-SCNC: 10 MMOL/L (ref 5–18)
AST SERPL W P-5'-P-CCNC: 26 U/L (ref 0–40)
BASOPHILS # BLD AUTO: 0.1 THOU/UL (ref 0–0.2)
BASOPHILS NFR BLD AUTO: 1 % (ref 0–2)
BILIRUB SERPL-MCNC: 0.5 MG/DL (ref 0–1)
BUN SERPL-MCNC: 24 MG/DL (ref 8–22)
CALCIUM SERPL-MCNC: 9.3 MG/DL (ref 8.5–10.5)
CHLORIDE BLD-SCNC: 107 MMOL/L (ref 98–107)
CO2 SERPL-SCNC: 26 MMOL/L (ref 22–31)
CREAT SERPL-MCNC: 1.47 MG/DL (ref 0.7–1.3)
EOSINOPHIL # BLD AUTO: 0.3 THOU/UL (ref 0–0.4)
EOSINOPHIL NFR BLD AUTO: 5 % (ref 0–6)
ERYTHROCYTE [DISTWIDTH] IN BLOOD BY AUTOMATED COUNT: 16 % (ref 11–14.5)
GFR SERPL CREATININE-BSD FRML MDRD: 48 ML/MIN/1.73M2
GLUCOSE BLD-MCNC: 98 MG/DL (ref 70–125)
HCT VFR BLD AUTO: 36.4 % (ref 40–54)
HGB BLD-MCNC: 11.7 G/DL (ref 14–18)
IMM GRANULOCYTES # BLD: 0 THOU/UL
IMM GRANULOCYTES NFR BLD: 0 %
LYMPHOCYTES # BLD AUTO: 3.3 THOU/UL (ref 0.8–4.4)
LYMPHOCYTES NFR BLD AUTO: 43 % (ref 20–40)
MAGNESIUM SERPL-MCNC: 1.6 MG/DL (ref 1.8–2.6)
MCH RBC QN AUTO: 32.7 PG (ref 27–34)
MCHC RBC AUTO-ENTMCNC: 32.1 G/DL (ref 32–36)
MCV RBC AUTO: 102 FL (ref 80–100)
MONOCYTES # BLD AUTO: 1.3 THOU/UL (ref 0–0.9)
MONOCYTES NFR BLD AUTO: 17 % (ref 2–10)
NEUTROPHILS # BLD AUTO: 2.7 THOU/UL (ref 2–7.7)
NEUTROPHILS NFR BLD AUTO: 35 % (ref 50–70)
PLATELET # BLD AUTO: 370 THOU/UL (ref 140–440)
PMV BLD AUTO: 10 FL (ref 8.5–12.5)
POTASSIUM BLD-SCNC: 4.5 MMOL/L (ref 3.5–5)
PROT SERPL-MCNC: 7.3 G/DL (ref 6–8)
RBC # BLD AUTO: 3.58 MILL/UL (ref 4.4–6.2)
SODIUM SERPL-SCNC: 143 MMOL/L (ref 136–145)
T4 FREE SERPL-MCNC: 1 NG/DL (ref 0.7–1.8)
TSH SERPL DL<=0.005 MIU/L-ACNC: 0.27 UIU/ML (ref 0.3–5)
WBC: 7.6 THOU/UL (ref 4–11)

## 2020-10-26 ENCOUNTER — COMMUNICATION - HEALTHEAST (OUTPATIENT)
Dept: ONCOLOGY | Facility: HOSPITAL | Age: 65
End: 2020-10-26

## 2020-11-10 ENCOUNTER — OFFICE VISIT - HEALTHEAST (OUTPATIENT)
Dept: ONCOLOGY | Facility: HOSPITAL | Age: 65
End: 2020-11-10

## 2020-11-10 ENCOUNTER — INFUSION - HEALTHEAST (OUTPATIENT)
Dept: INFUSION THERAPY | Facility: HOSPITAL | Age: 65
End: 2020-11-10

## 2020-11-10 ENCOUNTER — AMBULATORY - HEALTHEAST (OUTPATIENT)
Dept: INFUSION THERAPY | Facility: HOSPITAL | Age: 65
End: 2020-11-10

## 2020-11-10 DIAGNOSIS — Z51.11 ENCOUNTER FOR ANTINEOPLASTIC CHEMOTHERAPY: ICD-10-CM

## 2020-11-10 DIAGNOSIS — C34.92 METASTATIC PRIMARY LUNG CANCER, LEFT (H): ICD-10-CM

## 2020-11-10 LAB
ALBUMIN SERPL-MCNC: 3.6 G/DL (ref 3.5–5)
ALP SERPL-CCNC: 137 U/L (ref 45–120)
ALT SERPL W P-5'-P-CCNC: 10 U/L (ref 0–45)
ANION GAP SERPL CALCULATED.3IONS-SCNC: 8 MMOL/L (ref 5–18)
AST SERPL W P-5'-P-CCNC: 19 U/L (ref 0–40)
BILIRUB SERPL-MCNC: 0.7 MG/DL (ref 0–1)
BUN SERPL-MCNC: 26 MG/DL (ref 8–22)
CALCIUM SERPL-MCNC: 8.8 MG/DL (ref 8.5–10.5)
CHLORIDE BLD-SCNC: 106 MMOL/L (ref 98–107)
CO2 SERPL-SCNC: 27 MMOL/L (ref 22–31)
CREAT SERPL-MCNC: 1.28 MG/DL (ref 0.7–1.3)
GFR SERPL CREATININE-BSD FRML MDRD: 56 ML/MIN/1.73M2
GLUCOSE BLD-MCNC: 99 MG/DL (ref 70–125)
POTASSIUM BLD-SCNC: 4.6 MMOL/L (ref 3.5–5)
PROT SERPL-MCNC: 7.2 G/DL (ref 6–8)
SODIUM SERPL-SCNC: 141 MMOL/L (ref 136–145)
TSH SERPL DL<=0.005 MIU/L-ACNC: 1.22 UIU/ML (ref 0.3–5)

## 2020-11-12 ENCOUNTER — TRANSFERRED RECORDS (OUTPATIENT)
Dept: HEALTH INFORMATION MANAGEMENT | Facility: CLINIC | Age: 65
End: 2020-11-12

## 2020-11-27 ENCOUNTER — HOSPITAL ENCOUNTER (OUTPATIENT)
Dept: CT IMAGING | Facility: HOSPITAL | Age: 65
Discharge: HOME | End: 2020-11-27
Attending: INTERNAL MEDICINE

## 2020-11-27 DIAGNOSIS — C34.92 METASTATIC PRIMARY LUNG CANCER, LEFT (H): ICD-10-CM

## 2020-12-01 ENCOUNTER — AMBULATORY - HEALTHEAST (OUTPATIENT)
Dept: INFUSION THERAPY | Facility: HOSPITAL | Age: 65
End: 2020-12-01

## 2020-12-01 ENCOUNTER — OFFICE VISIT - HEALTHEAST (OUTPATIENT)
Dept: ONCOLOGY | Facility: HOSPITAL | Age: 65
End: 2020-12-01

## 2020-12-01 ENCOUNTER — INFUSION - HEALTHEAST (OUTPATIENT)
Dept: INFUSION THERAPY | Facility: HOSPITAL | Age: 65
End: 2020-12-01

## 2020-12-01 DIAGNOSIS — C34.92 METASTATIC PRIMARY LUNG CANCER, LEFT (H): ICD-10-CM

## 2020-12-01 DIAGNOSIS — Z51.11 ENCOUNTER FOR ANTINEOPLASTIC CHEMOTHERAPY: ICD-10-CM

## 2020-12-01 LAB
ALBUMIN SERPL-MCNC: 3.6 G/DL (ref 3.5–5)
ALP SERPL-CCNC: 138 U/L (ref 45–120)
ALT SERPL W P-5'-P-CCNC: 13 U/L (ref 0–45)
ANION GAP SERPL CALCULATED.3IONS-SCNC: 7 MMOL/L (ref 5–18)
AST SERPL W P-5'-P-CCNC: 27 U/L (ref 0–40)
BASOPHILS # BLD AUTO: 0.1 THOU/UL (ref 0–0.2)
BASOPHILS NFR BLD AUTO: 1 % (ref 0–2)
BILIRUB SERPL-MCNC: 0.6 MG/DL (ref 0–1)
BUN SERPL-MCNC: 23 MG/DL (ref 8–22)
CALCIUM SERPL-MCNC: 9.5 MG/DL (ref 8.5–10.5)
CHLORIDE BLD-SCNC: 104 MMOL/L (ref 98–107)
CO2 SERPL-SCNC: 29 MMOL/L (ref 22–31)
CREAT SERPL-MCNC: 1.25 MG/DL (ref 0.7–1.3)
EOSINOPHIL # BLD AUTO: 0.4 THOU/UL (ref 0–0.4)
EOSINOPHIL NFR BLD AUTO: 5 % (ref 0–6)
ERYTHROCYTE [DISTWIDTH] IN BLOOD BY AUTOMATED COUNT: 13.6 % (ref 11–14.5)
GFR SERPL CREATININE-BSD FRML MDRD: 58 ML/MIN/1.73M2
GLUCOSE BLD-MCNC: 103 MG/DL (ref 70–125)
HCT VFR BLD AUTO: 39.1 % (ref 40–54)
HGB BLD-MCNC: 12.6 G/DL (ref 14–18)
IMM GRANULOCYTES # BLD: 0 THOU/UL
IMM GRANULOCYTES NFR BLD: 0 %
LYMPHOCYTES # BLD AUTO: 3.5 THOU/UL (ref 0.8–4.4)
LYMPHOCYTES NFR BLD AUTO: 44 % (ref 20–40)
MCH RBC QN AUTO: 31.7 PG (ref 27–34)
MCHC RBC AUTO-ENTMCNC: 32.2 G/DL (ref 32–36)
MCV RBC AUTO: 98 FL (ref 80–100)
MONOCYTES # BLD AUTO: 1.1 THOU/UL (ref 0–0.9)
MONOCYTES NFR BLD AUTO: 14 % (ref 2–10)
NEUTROPHILS # BLD AUTO: 2.9 THOU/UL (ref 2–7.7)
NEUTROPHILS NFR BLD AUTO: 37 % (ref 50–70)
PLATELET # BLD AUTO: 366 THOU/UL (ref 140–440)
PMV BLD AUTO: 10.2 FL (ref 8.5–12.5)
POTASSIUM BLD-SCNC: 4.2 MMOL/L (ref 3.5–5)
PROT SERPL-MCNC: 7.4 G/DL (ref 6–8)
RBC # BLD AUTO: 3.98 MILL/UL (ref 4.4–6.2)
SODIUM SERPL-SCNC: 140 MMOL/L (ref 136–145)
TSH SERPL DL<=0.005 MIU/L-ACNC: 0.39 UIU/ML (ref 0.3–5)
WBC: 7.8 THOU/UL (ref 4–11)

## 2020-12-07 ENCOUNTER — COMMUNICATION - HEALTHEAST (OUTPATIENT)
Dept: ONCOLOGY | Facility: HOSPITAL | Age: 65
End: 2020-12-07

## 2020-12-17 ENCOUNTER — AMBULATORY - HEALTHEAST (OUTPATIENT)
Dept: ONCOLOGY | Facility: HOSPITAL | Age: 65
End: 2020-12-17

## 2020-12-17 DIAGNOSIS — C34.92 METASTATIC PRIMARY LUNG CANCER, LEFT (H): ICD-10-CM

## 2020-12-18 ENCOUNTER — COMMUNICATION - HEALTHEAST (OUTPATIENT)
Dept: ONCOLOGY | Facility: CLINIC | Age: 65
End: 2020-12-18

## 2020-12-22 ENCOUNTER — HOSPITAL ENCOUNTER (OUTPATIENT)
Dept: PET IMAGING | Facility: HOSPITAL | Age: 65
Setting detail: RADIATION/ONCOLOGY SERIES
Discharge: STILL ADMITTED OR ADMINISTRATIVE | End: 2020-12-22
Attending: INTERNAL MEDICINE

## 2020-12-22 DIAGNOSIS — C34.92 METASTATIC PRIMARY LUNG CANCER, LEFT (H): ICD-10-CM

## 2020-12-22 LAB — GLUCOSE BLDC GLUCOMTR-MCNC: 87 MG/DL (ref 70–139)

## 2020-12-23 ENCOUNTER — OFFICE VISIT - HEALTHEAST (OUTPATIENT)
Dept: RADIATION ONCOLOGY | Facility: HOSPITAL | Age: 65
End: 2020-12-23

## 2020-12-23 ENCOUNTER — INFUSION - HEALTHEAST (OUTPATIENT)
Dept: INFUSION THERAPY | Facility: HOSPITAL | Age: 65
End: 2020-12-23

## 2020-12-23 ENCOUNTER — OFFICE VISIT - HEALTHEAST (OUTPATIENT)
Dept: ONCOLOGY | Facility: HOSPITAL | Age: 65
End: 2020-12-23

## 2020-12-23 ENCOUNTER — AMBULATORY - HEALTHEAST (OUTPATIENT)
Dept: INFUSION THERAPY | Facility: HOSPITAL | Age: 65
End: 2020-12-23

## 2020-12-23 DIAGNOSIS — C34.92 METASTATIC PRIMARY LUNG CANCER, LEFT (H): ICD-10-CM

## 2020-12-23 DIAGNOSIS — Z51.11 ENCOUNTER FOR ANTINEOPLASTIC CHEMOTHERAPY: ICD-10-CM

## 2020-12-23 DIAGNOSIS — C34.32 MALIGNANT NEOPLASM OF LOWER LOBE OF LEFT LUNG (H): ICD-10-CM

## 2020-12-23 DIAGNOSIS — C34.11 MALIGNANT NEOPLASM OF UPPER LOBE OF RIGHT LUNG (H): ICD-10-CM

## 2020-12-23 LAB
ALBUMIN SERPL-MCNC: 3.4 G/DL (ref 3.5–5)
ALP SERPL-CCNC: 125 U/L (ref 45–120)
ALT SERPL W P-5'-P-CCNC: 12 U/L (ref 0–45)
ANION GAP SERPL CALCULATED.3IONS-SCNC: 6 MMOL/L (ref 5–18)
AST SERPL W P-5'-P-CCNC: 21 U/L (ref 0–40)
BILIRUB SERPL-MCNC: 0.8 MG/DL (ref 0–1)
BUN SERPL-MCNC: 27 MG/DL (ref 8–22)
CALCIUM SERPL-MCNC: 9 MG/DL (ref 8.5–10.5)
CHLORIDE BLD-SCNC: 105 MMOL/L (ref 98–107)
CO2 SERPL-SCNC: 31 MMOL/L (ref 22–31)
CREAT SERPL-MCNC: 1.41 MG/DL (ref 0.7–1.3)
GFR SERPL CREATININE-BSD FRML MDRD: 50 ML/MIN/1.73M2
GLUCOSE BLD-MCNC: 84 MG/DL (ref 70–125)
POTASSIUM BLD-SCNC: 4.4 MMOL/L (ref 3.5–5)
PROT SERPL-MCNC: 7.3 G/DL (ref 6–8)
SODIUM SERPL-SCNC: 142 MMOL/L (ref 136–145)
TSH SERPL DL<=0.005 MIU/L-ACNC: 0.73 UIU/ML (ref 0.3–5)

## 2020-12-29 ENCOUNTER — COMMUNICATION - HEALTHEAST (OUTPATIENT)
Dept: ONCOLOGY | Facility: HOSPITAL | Age: 65
End: 2020-12-29

## 2021-01-01 ENCOUNTER — LAB (OUTPATIENT)
Dept: INFUSION THERAPY | Facility: HOSPITAL | Age: 66
End: 2021-01-01
Attending: INTERNAL MEDICINE
Payer: COMMERCIAL

## 2021-01-01 ENCOUNTER — AMBULATORY - HEALTHEAST (OUTPATIENT)
Dept: ONCOLOGY | Facility: HOSPITAL | Age: 66
End: 2021-01-01

## 2021-01-01 ENCOUNTER — INFUSION - HEALTHEAST (OUTPATIENT)
Dept: INFUSION THERAPY | Facility: HOSPITAL | Age: 66
End: 2021-01-01

## 2021-01-01 ENCOUNTER — HOSPITAL ENCOUNTER (INPATIENT)
Facility: HOSPITAL | Age: 66
LOS: 2 days | Discharge: HOME | DRG: 177 | End: 2021-12-07
Attending: EMERGENCY MEDICINE | Admitting: INTERNAL MEDICINE
Payer: MEDICARE

## 2021-01-01 ENCOUNTER — AMBULATORY - HEALTHEAST (OUTPATIENT)
Dept: INFUSION THERAPY | Facility: HOSPITAL | Age: 66
End: 2021-01-01

## 2021-01-01 ENCOUNTER — TELEPHONE (OUTPATIENT)
Dept: ONCOLOGY | Facility: HOSPITAL | Age: 66
End: 2021-01-01

## 2021-01-01 ENCOUNTER — RECORDS - HEALTHEAST (OUTPATIENT)
Dept: ONCOLOGY | Facility: HOSPITAL | Age: 66
End: 2021-01-01

## 2021-01-01 ENCOUNTER — TELEPHONE (OUTPATIENT)
Dept: ONCOLOGY | Facility: HOSPITAL | Age: 66
End: 2021-01-01
Payer: MEDICARE

## 2021-01-01 ENCOUNTER — HOSPITAL ENCOUNTER (OUTPATIENT)
Dept: CARDIOLOGY | Facility: HOSPITAL | Age: 66
Discharge: HOME | End: 2021-11-01
Attending: FAMILY MEDICINE | Admitting: FAMILY MEDICINE
Payer: MEDICARE

## 2021-01-01 ENCOUNTER — MEDICAL CORRESPONDENCE (OUTPATIENT)
Dept: HEALTH INFORMATION MANAGEMENT | Facility: CLINIC | Age: 66
End: 2021-01-01
Payer: MEDICARE

## 2021-01-01 ENCOUNTER — TELEPHONE (OUTPATIENT)
Dept: FAMILY MEDICINE | Facility: CLINIC | Age: 66
End: 2021-01-01
Payer: MEDICARE

## 2021-01-01 ENCOUNTER — OFFICE VISIT - HEALTHEAST (OUTPATIENT)
Dept: RADIATION ONCOLOGY | Facility: HOSPITAL | Age: 66
End: 2021-01-01

## 2021-01-01 ENCOUNTER — OFFICE VISIT - HEALTHEAST (OUTPATIENT)
Dept: ONCOLOGY | Facility: HOSPITAL | Age: 66
End: 2021-01-01

## 2021-01-01 ENCOUNTER — AMBULATORY - HEALTHEAST (OUTPATIENT)
Dept: RADIATION ONCOLOGY | Facility: HOSPITAL | Age: 66
End: 2021-01-01

## 2021-01-01 ENCOUNTER — OFFICE VISIT - HEALTHEAST (OUTPATIENT)
Dept: FAMILY MEDICINE | Facility: CLINIC | Age: 66
End: 2021-01-01

## 2021-01-01 ENCOUNTER — COMMUNICATION - HEALTHEAST (OUTPATIENT)
Dept: ONCOLOGY | Facility: HOSPITAL | Age: 66
End: 2021-01-01

## 2021-01-01 ENCOUNTER — LAB (OUTPATIENT)
Dept: INFUSION THERAPY | Facility: HOSPITAL | Age: 66
End: 2021-01-01
Attending: INTERNAL MEDICINE
Payer: MEDICARE

## 2021-01-01 ENCOUNTER — MYC MEDICAL ADVICE (OUTPATIENT)
Dept: FAMILY MEDICINE | Facility: CLINIC | Age: 66
End: 2021-01-01

## 2021-01-01 ENCOUNTER — AMBULATORY - HEALTHEAST (OUTPATIENT)
Dept: LAB | Facility: CLINIC | Age: 66
End: 2021-01-01

## 2021-01-01 ENCOUNTER — HOSPITAL ENCOUNTER (OUTPATIENT)
Dept: CT IMAGING | Facility: HOSPITAL | Age: 66
Discharge: HOME | End: 2021-09-03
Attending: STUDENT IN AN ORGANIZED HEALTH CARE EDUCATION/TRAINING PROGRAM | Admitting: STUDENT IN AN ORGANIZED HEALTH CARE EDUCATION/TRAINING PROGRAM
Payer: MEDICARE

## 2021-01-01 ENCOUNTER — LAB (OUTPATIENT)
Dept: LAB | Facility: CLINIC | Age: 66
End: 2021-01-01
Attending: FAMILY MEDICINE
Payer: MEDICARE

## 2021-01-01 ENCOUNTER — TELEPHONE (OUTPATIENT)
Dept: RADIATION ONCOLOGY | Facility: CLINIC | Age: 66
End: 2021-01-01
Payer: MEDICARE

## 2021-01-01 ENCOUNTER — HOSPITAL ENCOUNTER (OUTPATIENT)
Dept: CT IMAGING | Facility: CLINIC | Age: 66
Discharge: HOME | End: 2021-11-24
Attending: STUDENT IN AN ORGANIZED HEALTH CARE EDUCATION/TRAINING PROGRAM | Admitting: STUDENT IN AN ORGANIZED HEALTH CARE EDUCATION/TRAINING PROGRAM
Payer: MEDICARE

## 2021-01-01 ENCOUNTER — COMMUNICATION - HEALTHEAST (OUTPATIENT)
Dept: ONCOLOGY | Facility: CLINIC | Age: 66
End: 2021-01-01

## 2021-01-01 ENCOUNTER — COMMUNICATION - HEALTHEAST (OUTPATIENT)
Dept: RADIATION ONCOLOGY | Facility: HOSPITAL | Age: 66
End: 2021-01-01

## 2021-01-01 ENCOUNTER — APPOINTMENT (OUTPATIENT)
Dept: PHYSICAL THERAPY | Facility: HOSPITAL | Age: 66
DRG: 177 | End: 2021-01-01
Attending: INTERNAL MEDICINE
Payer: MEDICARE

## 2021-01-01 ENCOUNTER — APPOINTMENT (OUTPATIENT)
Dept: RADIOLOGY | Facility: HOSPITAL | Age: 66
DRG: 177 | End: 2021-01-01
Attending: EMERGENCY MEDICINE
Payer: MEDICARE

## 2021-01-01 ENCOUNTER — IMMUNIZATION (OUTPATIENT)
Dept: NURSING | Facility: CLINIC | Age: 66
End: 2021-01-01
Payer: MEDICARE

## 2021-01-01 ENCOUNTER — ONCOLOGY VISIT (OUTPATIENT)
Dept: ONCOLOGY | Facility: HOSPITAL | Age: 66
End: 2021-01-01
Attending: INTERNAL MEDICINE
Payer: MEDICARE

## 2021-01-01 ENCOUNTER — HOSPITAL ENCOUNTER (OUTPATIENT)
Dept: MRI IMAGING | Facility: CLINIC | Age: 66
Discharge: HOME | End: 2021-12-11
Attending: INTERNAL MEDICINE | Admitting: INTERNAL MEDICINE
Payer: MEDICARE

## 2021-01-01 ENCOUNTER — COMMUNICATION - HEALTHEAST (OUTPATIENT)
Dept: INFUSION THERAPY | Facility: HOSPITAL | Age: 66
End: 2021-01-01

## 2021-01-01 ENCOUNTER — HOME INFUSION (PRE-WILLOW HOME INFUSION) (OUTPATIENT)
Dept: PHARMACY | Facility: CLINIC | Age: 66
End: 2021-01-01
Payer: MEDICARE

## 2021-01-01 ENCOUNTER — APPOINTMENT (OUTPATIENT)
Dept: OCCUPATIONAL THERAPY | Facility: HOSPITAL | Age: 66
DRG: 177 | End: 2021-01-01
Attending: INTERNAL MEDICINE
Payer: MEDICARE

## 2021-01-01 ENCOUNTER — TELEPHONE (OUTPATIENT)
Dept: RADIATION ONCOLOGY | Facility: HOSPITAL | Age: 66
End: 2021-01-01
Payer: MEDICARE

## 2021-01-01 ENCOUNTER — HOME INFUSION (PRE-WILLOW HOME INFUSION) (OUTPATIENT)
Dept: PHARMACY | Facility: CLINIC | Age: 66
End: 2021-01-01

## 2021-01-01 ENCOUNTER — HOSPITAL ENCOUNTER (OUTPATIENT)
Dept: ULTRASOUND IMAGING | Facility: CLINIC | Age: 66
Discharge: HOME | End: 2021-12-29
Attending: INTERNAL MEDICINE | Admitting: INTERNAL MEDICINE
Payer: MEDICARE

## 2021-01-01 ENCOUNTER — HOSPITAL ENCOUNTER (OUTPATIENT)
Dept: CT IMAGING | Facility: HOSPITAL | Age: 66
Setting detail: RADIATION/ONCOLOGY SERIES
Discharge: STILL ADMITTED OR ADMINISTRATIVE | End: 2021-01-12
Attending: STUDENT IN AN ORGANIZED HEALTH CARE EDUCATION/TRAINING PROGRAM

## 2021-01-01 ENCOUNTER — HOSPITAL ENCOUNTER (OUTPATIENT)
Dept: PET IMAGING | Facility: HOSPITAL | Age: 66
Discharge: HOME | End: 2021-12-14
Attending: INTERNAL MEDICINE | Admitting: INTERNAL MEDICINE
Payer: MEDICARE

## 2021-01-01 ENCOUNTER — E-VISIT (OUTPATIENT)
Dept: URGENT CARE | Facility: URGENT CARE | Age: 66
End: 2021-01-01
Payer: MEDICARE

## 2021-01-01 ENCOUNTER — OFFICE VISIT (OUTPATIENT)
Dept: FAMILY MEDICINE | Facility: CLINIC | Age: 66
End: 2021-01-01
Payer: MEDICARE

## 2021-01-01 ENCOUNTER — COMMUNICATION - HEALTHEAST (OUTPATIENT)
Dept: SCHEDULING | Facility: CLINIC | Age: 66
End: 2021-01-01

## 2021-01-01 ENCOUNTER — OFFICE VISIT (OUTPATIENT)
Dept: RADIATION ONCOLOGY | Facility: HOSPITAL | Age: 66
End: 2021-01-01
Attending: STUDENT IN AN ORGANIZED HEALTH CARE EDUCATION/TRAINING PROGRAM
Payer: MEDICARE

## 2021-01-01 ENCOUNTER — HOSPITAL ENCOUNTER (OUTPATIENT)
Dept: CT IMAGING | Facility: HOSPITAL | Age: 66
Setting detail: RADIATION/ONCOLOGY SERIES
Discharge: STILL ADMITTED OR ADMINISTRATIVE | End: 2021-05-27
Attending: STUDENT IN AN ORGANIZED HEALTH CARE EDUCATION/TRAINING PROGRAM
Payer: COMMERCIAL

## 2021-01-01 ENCOUNTER — HEALTH MAINTENANCE LETTER (OUTPATIENT)
Age: 66
End: 2021-01-01

## 2021-01-01 ENCOUNTER — TELEPHONE (OUTPATIENT)
Dept: FAMILY MEDICINE | Facility: CLINIC | Age: 66
End: 2021-01-01

## 2021-01-01 ENCOUNTER — HOSPITAL ENCOUNTER (OUTPATIENT)
Dept: RADIOLOGY | Facility: HOSPITAL | Age: 66
Discharge: HOME | End: 2021-01-12

## 2021-01-01 ENCOUNTER — APPOINTMENT (OUTPATIENT)
Dept: RADIOLOGY | Facility: HOSPITAL | Age: 66
DRG: 177 | End: 2021-01-01
Attending: INTERNAL MEDICINE
Payer: MEDICARE

## 2021-01-01 ENCOUNTER — COMMUNICATION - HEALTHEAST (OUTPATIENT)
Dept: FAMILY MEDICINE | Facility: CLINIC | Age: 66
End: 2021-01-01

## 2021-01-01 ENCOUNTER — DOCUMENTATION ONLY (OUTPATIENT)
Dept: ONCOLOGY | Facility: HOSPITAL | Age: 66
End: 2021-01-01

## 2021-01-01 ENCOUNTER — TRANSFERRED RECORDS (OUTPATIENT)
Dept: HEALTH INFORMATION MANAGEMENT | Facility: CLINIC | Age: 66
End: 2021-01-01

## 2021-01-01 ENCOUNTER — LAB (OUTPATIENT)
Dept: LAB | Facility: HOSPITAL | Age: 66
End: 2021-01-01
Attending: INTERNAL MEDICINE
Payer: MEDICARE

## 2021-01-01 ENCOUNTER — APPOINTMENT (OUTPATIENT)
Dept: ULTRASOUND IMAGING | Facility: HOSPITAL | Age: 66
DRG: 177 | End: 2021-01-01
Attending: INTERNAL MEDICINE
Payer: MEDICARE

## 2021-01-01 ENCOUNTER — HOSPITAL ENCOUNTER (OUTPATIENT)
Dept: PHYSICAL THERAPY | Facility: REHABILITATION | Age: 66
End: 2021-11-04
Attending: FAMILY MEDICINE
Payer: MEDICARE

## 2021-01-01 ENCOUNTER — RECORDS - HEALTHEAST (OUTPATIENT)
Dept: ADMINISTRATIVE | Facility: OTHER | Age: 66
End: 2021-01-01

## 2021-01-01 ENCOUNTER — ONCOLOGY VISIT (OUTPATIENT)
Dept: ONCOLOGY | Facility: HOSPITAL | Age: 66
End: 2021-01-01
Attending: INTERNAL MEDICINE
Payer: COMMERCIAL

## 2021-01-01 VITALS
SYSTOLIC BLOOD PRESSURE: 102 MMHG | OXYGEN SATURATION: 97 % | DIASTOLIC BLOOD PRESSURE: 64 MMHG | TEMPERATURE: 98.7 F | HEART RATE: 81 BPM

## 2021-01-01 VITALS
HEART RATE: 75 BPM | DIASTOLIC BLOOD PRESSURE: 77 MMHG | OXYGEN SATURATION: 98 % | SYSTOLIC BLOOD PRESSURE: 163 MMHG | TEMPERATURE: 98.2 F | RESPIRATION RATE: 16 BRPM | WEIGHT: 185 LBS | BODY MASS INDEX: 27.4 KG/M2 | HEIGHT: 69 IN

## 2021-01-01 VITALS
DIASTOLIC BLOOD PRESSURE: 74 MMHG | TEMPERATURE: 98.2 F | OXYGEN SATURATION: 97 % | WEIGHT: 189 LBS | BODY MASS INDEX: 27.91 KG/M2 | HEART RATE: 88 BPM | SYSTOLIC BLOOD PRESSURE: 152 MMHG | RESPIRATION RATE: 18 BRPM

## 2021-01-01 VITALS
SYSTOLIC BLOOD PRESSURE: 100 MMHG | DIASTOLIC BLOOD PRESSURE: 57 MMHG | OXYGEN SATURATION: 98 % | HEART RATE: 82 BPM | TEMPERATURE: 98.6 F

## 2021-01-01 VITALS
WEIGHT: 192.5 LBS | DIASTOLIC BLOOD PRESSURE: 66 MMHG | OXYGEN SATURATION: 98 % | SYSTOLIC BLOOD PRESSURE: 142 MMHG | HEART RATE: 95 BPM | BODY MASS INDEX: 28.84 KG/M2 | TEMPERATURE: 97.8 F

## 2021-01-01 VITALS — HEIGHT: 69 IN | WEIGHT: 186 LBS | BODY MASS INDEX: 27.55 KG/M2

## 2021-01-01 VITALS
WEIGHT: 184 LBS | SYSTOLIC BLOOD PRESSURE: 120 MMHG | TEMPERATURE: 98.1 F | RESPIRATION RATE: 18 BRPM | HEART RATE: 94 BPM | BODY MASS INDEX: 27.17 KG/M2 | OXYGEN SATURATION: 98 % | DIASTOLIC BLOOD PRESSURE: 60 MMHG

## 2021-01-01 VITALS
DIASTOLIC BLOOD PRESSURE: 59 MMHG | SYSTOLIC BLOOD PRESSURE: 113 MMHG | BODY MASS INDEX: 28.33 KG/M2 | OXYGEN SATURATION: 99 % | TEMPERATURE: 98.2 F | HEART RATE: 84 BPM | WEIGHT: 189.1 LBS

## 2021-01-01 VITALS
TEMPERATURE: 98.3 F | OXYGEN SATURATION: 98 % | SYSTOLIC BLOOD PRESSURE: 136 MMHG | HEART RATE: 76 BPM | BODY MASS INDEX: 27.17 KG/M2 | WEIGHT: 184 LBS | DIASTOLIC BLOOD PRESSURE: 75 MMHG

## 2021-01-01 VITALS
SYSTOLIC BLOOD PRESSURE: 104 MMHG | RESPIRATION RATE: 12 BRPM | HEART RATE: 60 BPM | TEMPERATURE: 98.1 F | DIASTOLIC BLOOD PRESSURE: 64 MMHG | BODY MASS INDEX: 27.83 KG/M2 | HEIGHT: 69 IN | WEIGHT: 187.9 LBS

## 2021-01-01 VITALS
RESPIRATION RATE: 18 BRPM | HEART RATE: 85 BPM | DIASTOLIC BLOOD PRESSURE: 87 MMHG | OXYGEN SATURATION: 97 % | SYSTOLIC BLOOD PRESSURE: 166 MMHG

## 2021-01-01 VITALS
DIASTOLIC BLOOD PRESSURE: 86 MMHG | HEART RATE: 98 BPM | BODY MASS INDEX: 25.97 KG/M2 | HEIGHT: 70 IN | SYSTOLIC BLOOD PRESSURE: 194 MMHG | TEMPERATURE: 98.4 F | WEIGHT: 181.4 LBS | OXYGEN SATURATION: 97 % | RESPIRATION RATE: 20 BRPM

## 2021-01-01 VITALS — HEIGHT: 70 IN | BODY MASS INDEX: 25.24 KG/M2 | WEIGHT: 176.3 LBS

## 2021-01-01 VITALS
SYSTOLIC BLOOD PRESSURE: 138 MMHG | HEART RATE: 96 BPM | TEMPERATURE: 98 F | WEIGHT: 187.2 LBS | DIASTOLIC BLOOD PRESSURE: 74 MMHG | OXYGEN SATURATION: 96 % | BODY MASS INDEX: 27.64 KG/M2 | RESPIRATION RATE: 20 BRPM

## 2021-01-01 VITALS
TEMPERATURE: 99.5 F | BODY MASS INDEX: 27.59 KG/M2 | RESPIRATION RATE: 12 BRPM | WEIGHT: 186.3 LBS | DIASTOLIC BLOOD PRESSURE: 64 MMHG | HEIGHT: 69 IN | SYSTOLIC BLOOD PRESSURE: 110 MMHG | HEART RATE: 72 BPM

## 2021-01-01 VITALS
HEART RATE: 80 BPM | OXYGEN SATURATION: 97 % | TEMPERATURE: 98.3 F | BODY MASS INDEX: 28.01 KG/M2 | DIASTOLIC BLOOD PRESSURE: 84 MMHG | SYSTOLIC BLOOD PRESSURE: 141 MMHG | HEIGHT: 69 IN | RESPIRATION RATE: 16 BRPM | WEIGHT: 189.1 LBS

## 2021-01-01 VITALS — BODY MASS INDEX: 25.72 KG/M2 | WEIGHT: 174.2 LBS

## 2021-01-01 VITALS
RESPIRATION RATE: 20 BRPM | HEIGHT: 70 IN | WEIGHT: 186.5 LBS | TEMPERATURE: 98.1 F | BODY MASS INDEX: 26.7 KG/M2 | SYSTOLIC BLOOD PRESSURE: 119 MMHG | HEART RATE: 84 BPM | DIASTOLIC BLOOD PRESSURE: 59 MMHG | OXYGEN SATURATION: 98 %

## 2021-01-01 VITALS — WEIGHT: 168.6 LBS | BODY MASS INDEX: 24.97 KG/M2 | HEIGHT: 69 IN

## 2021-01-01 VITALS — BODY MASS INDEX: 28.17 KG/M2 | WEIGHT: 188 LBS

## 2021-01-01 VITALS
DIASTOLIC BLOOD PRESSURE: 76 MMHG | OXYGEN SATURATION: 100 % | WEIGHT: 175.7 LBS | WEIGHT: 193.5 LBS | HEART RATE: 84 BPM | SYSTOLIC BLOOD PRESSURE: 161 MMHG | TEMPERATURE: 98.1 F | HEIGHT: 69 IN | BODY MASS INDEX: 26.02 KG/M2 | BODY MASS INDEX: 28.99 KG/M2

## 2021-01-01 VITALS
WEIGHT: 187.3 LBS | BODY MASS INDEX: 25.89 KG/M2 | HEART RATE: 88 BPM | HEIGHT: 69 IN | WEIGHT: 175.3 LBS | DIASTOLIC BLOOD PRESSURE: 57 MMHG | BODY MASS INDEX: 27.74 KG/M2 | SYSTOLIC BLOOD PRESSURE: 107 MMHG | TEMPERATURE: 97.7 F | OXYGEN SATURATION: 100 %

## 2021-01-01 VITALS
HEART RATE: 88 BPM | OXYGEN SATURATION: 98 % | WEIGHT: 190.2 LBS | SYSTOLIC BLOOD PRESSURE: 195 MMHG | DIASTOLIC BLOOD PRESSURE: 90 MMHG | BODY MASS INDEX: 27.68 KG/M2

## 2021-01-01 VITALS
WEIGHT: 186.7 LBS | TEMPERATURE: 97.9 F | SYSTOLIC BLOOD PRESSURE: 124 MMHG | RESPIRATION RATE: 12 BRPM | HEART RATE: 78 BPM | DIASTOLIC BLOOD PRESSURE: 80 MMHG | HEIGHT: 69 IN | BODY MASS INDEX: 27.65 KG/M2

## 2021-01-01 VITALS
OXYGEN SATURATION: 96 % | RESPIRATION RATE: 20 BRPM | DIASTOLIC BLOOD PRESSURE: 58 MMHG | BODY MASS INDEX: 27.47 KG/M2 | WEIGHT: 186 LBS | SYSTOLIC BLOOD PRESSURE: 112 MMHG | HEART RATE: 78 BPM

## 2021-01-01 VITALS
WEIGHT: 181 LBS | SYSTOLIC BLOOD PRESSURE: 128 MMHG | RESPIRATION RATE: 12 BRPM | BODY MASS INDEX: 26.81 KG/M2 | TEMPERATURE: 98.3 F | HEART RATE: 72 BPM | HEIGHT: 69 IN | DIASTOLIC BLOOD PRESSURE: 72 MMHG

## 2021-01-01 VITALS
SYSTOLIC BLOOD PRESSURE: 113 MMHG | OXYGEN SATURATION: 100 % | TEMPERATURE: 97.9 F | HEART RATE: 80 BPM | DIASTOLIC BLOOD PRESSURE: 60 MMHG

## 2021-01-01 VITALS — WEIGHT: 175 LBS | BODY MASS INDEX: 25.92 KG/M2 | HEIGHT: 69 IN

## 2021-01-01 VITALS
TEMPERATURE: 98.4 F | WEIGHT: 192.5 LBS | HEART RATE: 86 BPM | DIASTOLIC BLOOD PRESSURE: 65 MMHG | BODY MASS INDEX: 28.84 KG/M2 | OXYGEN SATURATION: 98 % | SYSTOLIC BLOOD PRESSURE: 147 MMHG

## 2021-01-01 VITALS
DIASTOLIC BLOOD PRESSURE: 82 MMHG | OXYGEN SATURATION: 99 % | TEMPERATURE: 98.3 F | WEIGHT: 188.1 LBS | SYSTOLIC BLOOD PRESSURE: 153 MMHG | BODY MASS INDEX: 27.78 KG/M2 | HEART RATE: 80 BPM

## 2021-01-01 VITALS
HEART RATE: 84 BPM | WEIGHT: 186.7 LBS | OXYGEN SATURATION: 99 % | TEMPERATURE: 98.3 F | BODY MASS INDEX: 27.57 KG/M2 | DIASTOLIC BLOOD PRESSURE: 69 MMHG | SYSTOLIC BLOOD PRESSURE: 131 MMHG

## 2021-01-01 VITALS
DIASTOLIC BLOOD PRESSURE: 57 MMHG | RESPIRATION RATE: 22 BRPM | HEIGHT: 69 IN | TEMPERATURE: 97.7 F | SYSTOLIC BLOOD PRESSURE: 104 MMHG | BODY MASS INDEX: 27.02 KG/M2 | OXYGEN SATURATION: 96 % | HEART RATE: 69 BPM | WEIGHT: 182.4 LBS

## 2021-01-01 VITALS — BODY MASS INDEX: 25.03 KG/M2 | HEIGHT: 69 IN | WEIGHT: 169 LBS

## 2021-01-01 VITALS
RESPIRATION RATE: 20 BRPM | TEMPERATURE: 98 F | SYSTOLIC BLOOD PRESSURE: 141 MMHG | WEIGHT: 191.2 LBS | BODY MASS INDEX: 28.24 KG/M2 | HEART RATE: 84 BPM | DIASTOLIC BLOOD PRESSURE: 79 MMHG | OXYGEN SATURATION: 96 %

## 2021-01-01 VITALS
WEIGHT: 186 LBS | SYSTOLIC BLOOD PRESSURE: 136 MMHG | RESPIRATION RATE: 18 BRPM | BODY MASS INDEX: 27.47 KG/M2 | HEART RATE: 75 BPM | DIASTOLIC BLOOD PRESSURE: 60 MMHG | TEMPERATURE: 98 F | OXYGEN SATURATION: 98 %

## 2021-01-01 VITALS
BODY MASS INDEX: 27.87 KG/M2 | WEIGHT: 186 LBS | OXYGEN SATURATION: 99 % | HEART RATE: 89 BPM | TEMPERATURE: 98.7 F | DIASTOLIC BLOOD PRESSURE: 86 MMHG | SYSTOLIC BLOOD PRESSURE: 187 MMHG

## 2021-01-01 VITALS
SYSTOLIC BLOOD PRESSURE: 147 MMHG | BODY MASS INDEX: 27.85 KG/M2 | HEART RATE: 80 BPM | DIASTOLIC BLOOD PRESSURE: 73 MMHG | WEIGHT: 191.3 LBS | OXYGEN SATURATION: 97 % | TEMPERATURE: 98.6 F

## 2021-01-01 VITALS — HEIGHT: 69 IN | BODY MASS INDEX: 25.37 KG/M2 | WEIGHT: 171.3 LBS

## 2021-01-01 VITALS — HEIGHT: 69 IN | BODY MASS INDEX: 27.4 KG/M2 | WEIGHT: 185 LBS

## 2021-01-01 VITALS — HEIGHT: 69 IN | BODY MASS INDEX: 26.01 KG/M2 | WEIGHT: 175.6 LBS

## 2021-01-01 VITALS
WEIGHT: 185 LBS | TEMPERATURE: 98.3 F | BODY MASS INDEX: 27.32 KG/M2 | DIASTOLIC BLOOD PRESSURE: 75 MMHG | SYSTOLIC BLOOD PRESSURE: 144 MMHG | HEART RATE: 80 BPM | OXYGEN SATURATION: 100 %

## 2021-01-01 VITALS
DIASTOLIC BLOOD PRESSURE: 58 MMHG | SYSTOLIC BLOOD PRESSURE: 111 MMHG | OXYGEN SATURATION: 97 % | TEMPERATURE: 99 F | HEART RATE: 85 BPM | RESPIRATION RATE: 18 BRPM

## 2021-01-01 VITALS — WEIGHT: 181.1 LBS | BODY MASS INDEX: 26.74 KG/M2

## 2021-01-01 VITALS
HEART RATE: 79 BPM | SYSTOLIC BLOOD PRESSURE: 122 MMHG | OXYGEN SATURATION: 99 % | RESPIRATION RATE: 16 BRPM | DIASTOLIC BLOOD PRESSURE: 67 MMHG | TEMPERATURE: 97.9 F

## 2021-01-01 VITALS — BODY MASS INDEX: 27.47 KG/M2 | WEIGHT: 186 LBS

## 2021-01-01 VITALS — WEIGHT: 175.9 LBS | HEIGHT: 69 IN | BODY MASS INDEX: 26.05 KG/M2

## 2021-01-01 VITALS
SYSTOLIC BLOOD PRESSURE: 147 MMHG | OXYGEN SATURATION: 96 % | HEART RATE: 94 BPM | TEMPERATURE: 97.9 F | DIASTOLIC BLOOD PRESSURE: 70 MMHG

## 2021-01-01 VITALS — WEIGHT: 165.7 LBS | HEIGHT: 69 IN | BODY MASS INDEX: 24.54 KG/M2

## 2021-01-01 VITALS
BODY MASS INDEX: 26.79 KG/M2 | OXYGEN SATURATION: 99 % | DIASTOLIC BLOOD PRESSURE: 63 MMHG | SYSTOLIC BLOOD PRESSURE: 136 MMHG | HEART RATE: 82 BPM | TEMPERATURE: 97.7 F | WEIGHT: 181.4 LBS

## 2021-01-01 VITALS
WEIGHT: 194.1 LBS | DIASTOLIC BLOOD PRESSURE: 70 MMHG | BODY MASS INDEX: 28.25 KG/M2 | TEMPERATURE: 98.3 F | HEART RATE: 80 BPM | OXYGEN SATURATION: 96 % | SYSTOLIC BLOOD PRESSURE: 146 MMHG

## 2021-01-01 VITALS
HEART RATE: 82 BPM | TEMPERATURE: 98.7 F | DIASTOLIC BLOOD PRESSURE: 58 MMHG | SYSTOLIC BLOOD PRESSURE: 124 MMHG | OXYGEN SATURATION: 97 %

## 2021-01-01 VITALS
HEART RATE: 78 BPM | TEMPERATURE: 98 F | BODY MASS INDEX: 28.85 KG/M2 | HEIGHT: 69 IN | WEIGHT: 194.8 LBS | SYSTOLIC BLOOD PRESSURE: 130 MMHG | DIASTOLIC BLOOD PRESSURE: 73 MMHG | OXYGEN SATURATION: 98 %

## 2021-01-01 VITALS
DIASTOLIC BLOOD PRESSURE: 72 MMHG | WEIGHT: 191 LBS | TEMPERATURE: 97.9 F | HEART RATE: 86 BPM | OXYGEN SATURATION: 100 % | BODY MASS INDEX: 28.29 KG/M2 | SYSTOLIC BLOOD PRESSURE: 150 MMHG | HEIGHT: 69 IN

## 2021-01-01 VITALS — HEIGHT: 69 IN | WEIGHT: 176.1 LBS | BODY MASS INDEX: 26.08 KG/M2

## 2021-01-01 VITALS
OXYGEN SATURATION: 99 % | SYSTOLIC BLOOD PRESSURE: 111 MMHG | DIASTOLIC BLOOD PRESSURE: 59 MMHG | TEMPERATURE: 98.3 F | HEART RATE: 76 BPM

## 2021-01-01 VITALS — BODY MASS INDEX: 24.82 KG/M2 | WEIGHT: 173.4 LBS | HEIGHT: 70 IN

## 2021-01-01 VITALS — WEIGHT: 176 LBS | BODY MASS INDEX: 25.99 KG/M2

## 2021-01-01 VITALS — BODY MASS INDEX: 25.92 KG/M2 | HEIGHT: 69 IN | WEIGHT: 175 LBS

## 2021-01-01 VITALS
OXYGEN SATURATION: 99 % | WEIGHT: 188.9 LBS | SYSTOLIC BLOOD PRESSURE: 144 MMHG | HEART RATE: 89 BPM | TEMPERATURE: 97.5 F | BODY MASS INDEX: 27.98 KG/M2 | DIASTOLIC BLOOD PRESSURE: 73 MMHG | HEIGHT: 69 IN

## 2021-01-01 VITALS
OXYGEN SATURATION: 100 % | BODY MASS INDEX: 27.57 KG/M2 | DIASTOLIC BLOOD PRESSURE: 63 MMHG | TEMPERATURE: 98.1 F | SYSTOLIC BLOOD PRESSURE: 109 MMHG | HEART RATE: 84 BPM | WEIGHT: 186.7 LBS

## 2021-01-01 VITALS — BODY MASS INDEX: 25.8 KG/M2 | WEIGHT: 174.7 LBS

## 2021-01-01 VITALS — HEIGHT: 69 IN | BODY MASS INDEX: 25.77 KG/M2 | WEIGHT: 174 LBS

## 2021-01-01 VITALS
BODY MASS INDEX: 27.95 KG/M2 | DIASTOLIC BLOOD PRESSURE: 72 MMHG | SYSTOLIC BLOOD PRESSURE: 134 MMHG | TEMPERATURE: 98.8 F | HEART RATE: 84 BPM | WEIGHT: 189.3 LBS | OXYGEN SATURATION: 100 %

## 2021-01-01 VITALS
BODY MASS INDEX: 27.42 KG/M2 | TEMPERATURE: 97.7 F | HEART RATE: 72 BPM | SYSTOLIC BLOOD PRESSURE: 152 MMHG | DIASTOLIC BLOOD PRESSURE: 78 MMHG | WEIGHT: 185.7 LBS | OXYGEN SATURATION: 100 %

## 2021-01-01 VITALS
TEMPERATURE: 98 F | DIASTOLIC BLOOD PRESSURE: 83 MMHG | HEART RATE: 85 BPM | SYSTOLIC BLOOD PRESSURE: 172 MMHG | OXYGEN SATURATION: 98 % | WEIGHT: 185.5 LBS | BODY MASS INDEX: 27.39 KG/M2

## 2021-01-01 VITALS — BODY MASS INDEX: 24.44 KG/M2 | HEIGHT: 69 IN | WEIGHT: 165 LBS

## 2021-01-01 VITALS
TEMPERATURE: 98.3 F | OXYGEN SATURATION: 98 % | SYSTOLIC BLOOD PRESSURE: 123 MMHG | HEART RATE: 78 BPM | DIASTOLIC BLOOD PRESSURE: 68 MMHG

## 2021-01-01 VITALS
HEART RATE: 93 BPM | OXYGEN SATURATION: 98 % | DIASTOLIC BLOOD PRESSURE: 66 MMHG | WEIGHT: 190.8 LBS | SYSTOLIC BLOOD PRESSURE: 145 MMHG | BODY MASS INDEX: 28.59 KG/M2 | TEMPERATURE: 98.6 F

## 2021-01-01 VITALS
BODY MASS INDEX: 27.57 KG/M2 | HEART RATE: 96 BPM | WEIGHT: 186.7 LBS | SYSTOLIC BLOOD PRESSURE: 140 MMHG | OXYGEN SATURATION: 96 % | DIASTOLIC BLOOD PRESSURE: 59 MMHG

## 2021-01-01 VITALS — BODY MASS INDEX: 26.37 KG/M2 | WEIGHT: 178.56 LBS

## 2021-01-01 VITALS — BODY MASS INDEX: 26.45 KG/M2 | WEIGHT: 178.56 LBS | HEIGHT: 69 IN

## 2021-01-01 VITALS — WEIGHT: 175.8 LBS | BODY MASS INDEX: 25.96 KG/M2

## 2021-01-01 VITALS — HEIGHT: 69 IN | WEIGHT: 182 LBS | BODY MASS INDEX: 26.96 KG/M2

## 2021-01-01 VITALS — BODY MASS INDEX: 27.32 KG/M2 | WEIGHT: 185 LBS

## 2021-01-01 VITALS — SYSTOLIC BLOOD PRESSURE: 172 MMHG | TEMPERATURE: 98.4 F | DIASTOLIC BLOOD PRESSURE: 79 MMHG | HEART RATE: 96 BPM

## 2021-01-01 VITALS — WEIGHT: 174.7 LBS | BODY MASS INDEX: 25.8 KG/M2

## 2021-01-01 VITALS — WEIGHT: 172 LBS | BODY MASS INDEX: 25.4 KG/M2

## 2021-01-01 DIAGNOSIS — C34.92 METASTATIC PRIMARY LUNG CANCER, LEFT (H): ICD-10-CM

## 2021-01-01 DIAGNOSIS — C34.32 MALIGNANT NEOPLASM OF LOWER LOBE OF LEFT LUNG (H): ICD-10-CM

## 2021-01-01 DIAGNOSIS — K52.9 COLITIS, ACUTE: ICD-10-CM

## 2021-01-01 DIAGNOSIS — D69.3 IDIOPATHIC THROMBOCYTOPENIC PURPURA (H): ICD-10-CM

## 2021-01-01 DIAGNOSIS — C34.11 MALIGNANT NEOPLASM OF UPPER LOBE OF RIGHT LUNG (H): Primary | ICD-10-CM

## 2021-01-01 DIAGNOSIS — C34.11 MALIGNANT NEOPLASM OF UPPER LOBE OF RIGHT LUNG (H): ICD-10-CM

## 2021-01-01 DIAGNOSIS — N28.9 RENAL INSUFFICIENCY: ICD-10-CM

## 2021-01-01 DIAGNOSIS — Z20.822 CLOSE EXPOSURE TO 2019 NOVEL CORONAVIRUS: ICD-10-CM

## 2021-01-01 DIAGNOSIS — J90 PLEURAL EFFUSION: ICD-10-CM

## 2021-01-01 DIAGNOSIS — M54.42 CHRONIC BILATERAL LOW BACK PAIN WITH LEFT-SIDED SCIATICA: Primary | ICD-10-CM

## 2021-01-01 DIAGNOSIS — R19.7 DIARRHEA: ICD-10-CM

## 2021-01-01 DIAGNOSIS — D69.3 IDIOPATHIC THROMBOCYTOPENIC PURPURA (H): Primary | ICD-10-CM

## 2021-01-01 DIAGNOSIS — R06.09 DYSPNEA ON EXERTION: ICD-10-CM

## 2021-01-01 DIAGNOSIS — I25.10 CORONARY ARTERY DISEASE INVOLVING NATIVE CORONARY ARTERY OF NATIVE HEART WITHOUT ANGINA PECTORIS: ICD-10-CM

## 2021-01-01 DIAGNOSIS — Z51.11 ENCOUNTER FOR ANTINEOPLASTIC CHEMOTHERAPY: ICD-10-CM

## 2021-01-01 DIAGNOSIS — K21.00 GASTROESOPHAGEAL REFLUX DISEASE WITH ESOPHAGITIS WITHOUT HEMORRHAGE: Primary | ICD-10-CM

## 2021-01-01 DIAGNOSIS — E86.0 DEHYDRATION: ICD-10-CM

## 2021-01-01 DIAGNOSIS — R09.02 HYPOXIA: ICD-10-CM

## 2021-01-01 DIAGNOSIS — Z11.59 ENCOUNTER FOR SCREENING FOR OTHER VIRAL DISEASES: ICD-10-CM

## 2021-01-01 DIAGNOSIS — K21.9 ESOPHAGEAL REFLUX: ICD-10-CM

## 2021-01-01 DIAGNOSIS — R06.09 DOE (DYSPNEA ON EXERTION): Primary | ICD-10-CM

## 2021-01-01 DIAGNOSIS — Z85.118 PERSONAL HISTORY OF MALIGNANT NEOPLASM OF BRONCHUS AND LUNG: ICD-10-CM

## 2021-01-01 DIAGNOSIS — K59.1 FUNCTIONAL DIARRHEA: ICD-10-CM

## 2021-01-01 DIAGNOSIS — M25.69 DECREASED RANGE OF MOTION OF TRUNK AND BACK: Primary | ICD-10-CM

## 2021-01-01 DIAGNOSIS — M54.42 CHRONIC BILATERAL LOW BACK PAIN WITH LEFT-SIDED SCIATICA: ICD-10-CM

## 2021-01-01 DIAGNOSIS — Z20.822 CLOSE EXPOSURE TO 2019 NOVEL CORONAVIRUS: Primary | ICD-10-CM

## 2021-01-01 DIAGNOSIS — E03.8 OTHER SPECIFIED HYPOTHYROIDISM: ICD-10-CM

## 2021-01-01 DIAGNOSIS — N18.32 STAGE 3B CHRONIC KIDNEY DISEASE (H): ICD-10-CM

## 2021-01-01 DIAGNOSIS — E03.9 HYPOTHYROIDISM, UNSPECIFIED TYPE: ICD-10-CM

## 2021-01-01 DIAGNOSIS — M54.2 NECK PAIN: ICD-10-CM

## 2021-01-01 DIAGNOSIS — D64.9 ANEMIA: ICD-10-CM

## 2021-01-01 DIAGNOSIS — E03.9 HYPOTHYROIDISM, UNSPECIFIED TYPE: Primary | ICD-10-CM

## 2021-01-01 DIAGNOSIS — G89.29 CHRONIC BILATERAL LOW BACK PAIN WITH LEFT-SIDED SCIATICA: Primary | ICD-10-CM

## 2021-01-01 DIAGNOSIS — K52.1 DRUG-INDUCED DIARRHEA: ICD-10-CM

## 2021-01-01 DIAGNOSIS — Z85.118 PERSONAL HISTORY OF MALIGNANT NEOPLASM OF BRONCHUS AND LUNG: Primary | ICD-10-CM

## 2021-01-01 DIAGNOSIS — G89.29 CHRONIC BILATERAL LOW BACK PAIN WITH LEFT-SIDED SCIATICA: ICD-10-CM

## 2021-01-01 DIAGNOSIS — I25.10 CORONARY ARTERY DISEASE INVOLVING NATIVE CORONARY ARTERY OF NATIVE HEART WITHOUT ANGINA PECTORIS: Primary | ICD-10-CM

## 2021-01-01 DIAGNOSIS — R29.898 DECREASED RANGE OF MOTION OF NECK: ICD-10-CM

## 2021-01-01 DIAGNOSIS — J01.00 ACUTE NON-RECURRENT MAXILLARY SINUSITIS: ICD-10-CM

## 2021-01-01 DIAGNOSIS — M62.81 MUSCLE WEAKNESS (GENERALIZED): ICD-10-CM

## 2021-01-01 DIAGNOSIS — R19.7 DIARRHEA, UNSPECIFIED TYPE: ICD-10-CM

## 2021-01-01 DIAGNOSIS — I25.10 CORONARY ATHEROSCLEROSIS: ICD-10-CM

## 2021-01-01 DIAGNOSIS — E03.1 CONGENITAL HYPOTHYROIDISM WITHOUT GOITER: ICD-10-CM

## 2021-01-01 DIAGNOSIS — U07.1 INFECTION DUE TO 2019 NOVEL CORONAVIRUS: ICD-10-CM

## 2021-01-01 DIAGNOSIS — K52.9 COLITIS: ICD-10-CM

## 2021-01-01 LAB
% LINING CELLS, BODY FLUID: 1 %
ABO/RH(D): NORMAL
ALBUMIN SERPL-MCNC: 2.5 G/DL (ref 3.5–5)
ALBUMIN SERPL-MCNC: 2.5 G/DL (ref 3.5–5)
ALBUMIN SERPL-MCNC: 2.8 G/DL (ref 3.5–5)
ALBUMIN SERPL-MCNC: 3 G/DL (ref 3.5–5)
ALBUMIN SERPL-MCNC: 3 G/DL (ref 3.5–5)
ALBUMIN SERPL-MCNC: 3.1 G/DL (ref 3.5–5)
ALBUMIN SERPL-MCNC: 3.2 G/DL (ref 3.5–5)
ALBUMIN SERPL-MCNC: 3.3 G/DL (ref 3.5–5)
ALBUMIN SERPL-MCNC: 3.3 G/DL (ref 3.5–5)
ALBUMIN SERPL-MCNC: 3.4 G/DL (ref 3.5–5)
ALBUMIN SERPL-MCNC: 3.4 G/DL (ref 3.5–5)
ALBUMIN SERPL-MCNC: 3.5 G/DL (ref 3.5–5)
ALBUMIN SERPL-MCNC: 3.5 G/DL (ref 3.5–5)
ALBUMIN SERPL-MCNC: 3.6 G/DL (ref 3.5–5)
ALBUMIN SERPL-MCNC: 3.6 G/DL (ref 3.5–5)
ALBUMIN SERPL-MCNC: 3.7 G/DL (ref 3.5–5)
ALBUMIN SERPL-MCNC: 3.8 G/DL (ref 3.5–5)
ALBUMIN SERPL-MCNC: 3.9 G/DL (ref 3.5–5)
ALP SERPL-CCNC: 102 U/L (ref 45–120)
ALP SERPL-CCNC: 103 U/L (ref 45–120)
ALP SERPL-CCNC: 124 U/L (ref 45–120)
ALP SERPL-CCNC: 85 U/L (ref 45–120)
ALP SERPL-CCNC: 86 U/L (ref 45–120)
ALP SERPL-CCNC: 88 U/L (ref 45–120)
ALP SERPL-CCNC: 90 U/L (ref 45–120)
ALP SERPL-CCNC: 91 U/L (ref 45–120)
ALP SERPL-CCNC: 92 U/L (ref 45–120)
ALP SERPL-CCNC: 94 U/L (ref 45–120)
ALP SERPL-CCNC: 97 U/L (ref 45–120)
ALP SERPL-CCNC: 98 U/L (ref 45–120)
ALP SERPL-CCNC: 98 U/L (ref 45–120)
ALP SERPL-CCNC: 99 U/L (ref 45–120)
ALT SERPL W P-5'-P-CCNC: 13 U/L (ref 0–45)
ALT SERPL W P-5'-P-CCNC: 14 U/L (ref 0–45)
ALT SERPL W P-5'-P-CCNC: 14 U/L (ref 0–45)
ALT SERPL W P-5'-P-CCNC: 15 U/L (ref 0–45)
ALT SERPL W P-5'-P-CCNC: 16 U/L (ref 0–45)
ALT SERPL W P-5'-P-CCNC: 18 U/L (ref 0–45)
ALT SERPL W P-5'-P-CCNC: 18 U/L (ref 0–45)
ALT SERPL W P-5'-P-CCNC: 19 U/L (ref 0–45)
ALT SERPL W P-5'-P-CCNC: 21 U/L (ref 0–45)
ALT SERPL W P-5'-P-CCNC: 24 U/L (ref 0–45)
ALT SERPL W P-5'-P-CCNC: 26 U/L (ref 0–45)
ALT SERPL W P-5'-P-CCNC: 29 U/L (ref 0–45)
ALT SERPL W P-5'-P-CCNC: 29 U/L (ref 0–45)
ALT SERPL W P-5'-P-CCNC: 33 U/L (ref 0–45)
ALT SERPL W P-5'-P-CCNC: 9 U/L (ref 0–45)
ALT SERPL W P-5'-P-CCNC: <9 U/L (ref 0–45)
ANION GAP SERPL CALCULATED.3IONS-SCNC: 10 MMOL/L (ref 5–18)
ANION GAP SERPL CALCULATED.3IONS-SCNC: 10 MMOL/L (ref 5–18)
ANION GAP SERPL CALCULATED.3IONS-SCNC: 13 MMOL/L (ref 5–18)
ANION GAP SERPL CALCULATED.3IONS-SCNC: 14 MMOL/L (ref 5–18)
ANION GAP SERPL CALCULATED.3IONS-SCNC: 4 MMOL/L (ref 5–18)
ANION GAP SERPL CALCULATED.3IONS-SCNC: 6 MMOL/L (ref 5–18)
ANION GAP SERPL CALCULATED.3IONS-SCNC: 7 MMOL/L (ref 5–18)
ANION GAP SERPL CALCULATED.3IONS-SCNC: 8 MMOL/L (ref 5–18)
ANTIBODY SCREEN: NEGATIVE
ANTIBODY SCREEN: NEGATIVE
APPEARANCE FLD: CLEAR
AST SERPL W P-5'-P-CCNC: 18 U/L (ref 0–40)
AST SERPL W P-5'-P-CCNC: 19 U/L (ref 0–40)
AST SERPL W P-5'-P-CCNC: 19 U/L (ref 0–40)
AST SERPL W P-5'-P-CCNC: 21 U/L (ref 0–40)
AST SERPL W P-5'-P-CCNC: 21 U/L (ref 0–40)
AST SERPL W P-5'-P-CCNC: 22 U/L (ref 0–40)
AST SERPL W P-5'-P-CCNC: 23 U/L (ref 0–40)
AST SERPL W P-5'-P-CCNC: 25 U/L (ref 0–40)
AST SERPL W P-5'-P-CCNC: 26 U/L (ref 0–40)
AST SERPL W P-5'-P-CCNC: 27 U/L (ref 0–40)
AST SERPL W P-5'-P-CCNC: 27 U/L (ref 0–40)
AST SERPL W P-5'-P-CCNC: 31 U/L (ref 0–40)
AST SERPL W P-5'-P-CCNC: 32 U/L (ref 0–40)
AST SERPL W P-5'-P-CCNC: 37 U/L (ref 0–40)
ATRIAL RATE - MUSE: 117 BPM
BACTERIA BLD CULT: NO GROWTH
BACTERIA BLD CULT: NO GROWTH
BACTERIA PLR CULT: NO GROWTH
BASE EXCESS BLDV CALC-SCNC: 5.9 MMOL/L
BASOPHILS # BLD AUTO: 0 10E3/UL (ref 0–0.2)
BASOPHILS # BLD AUTO: 0 THOU/UL (ref 0–0.2)
BASOPHILS # BLD AUTO: 0.1 10E3/UL (ref 0–0.2)
BASOPHILS # BLD AUTO: 0.1 THOU/UL (ref 0–0.2)
BASOPHILS NFR BLD AUTO: 0 %
BASOPHILS NFR BLD AUTO: 0 % (ref 0–2)
BASOPHILS NFR BLD AUTO: 1 %
BASOPHILS NFR BLD AUTO: 1 % (ref 0–2)
BILIRUB DIRECT SERPL-MCNC: 0.2 MG/DL
BILIRUB SERPL-MCNC: 0.5 MG/DL (ref 0–1)
BILIRUB SERPL-MCNC: 0.5 MG/DL (ref 0–1)
BILIRUB SERPL-MCNC: 0.6 MG/DL (ref 0–1)
BILIRUB SERPL-MCNC: 0.6 MG/DL (ref 0–1)
BILIRUB SERPL-MCNC: 0.7 MG/DL (ref 0–1)
BILIRUB SERPL-MCNC: 0.7 MG/DL (ref 0–1)
BILIRUB SERPL-MCNC: 0.8 MG/DL (ref 0–1)
BILIRUB SERPL-MCNC: 0.9 MG/DL (ref 0–1)
BILIRUB SERPL-MCNC: 1 MG/DL (ref 0–1)
BILIRUB SERPL-MCNC: 1.1 MG/DL (ref 0–1)
BILIRUB SERPL-MCNC: 1.1 MG/DL (ref 0–1)
BILIRUB SERPL-MCNC: 1.3 MG/DL (ref 0–1)
BLD PROD TYP BPU: NORMAL
BLOOD TYPE: 5100
BLOOD TYPE: 6200
BLOOD TYPE: 9500
BNP SERPL-MCNC: 82 PG/ML (ref 0–60)
BUN SERPL-MCNC: 27 MG/DL (ref 8–22)
BUN SERPL-MCNC: 28 MG/DL (ref 8–22)
BUN SERPL-MCNC: 29 MG/DL (ref 8–22)
BUN SERPL-MCNC: 29 MG/DL (ref 8–22)
BUN SERPL-MCNC: 30 MG/DL (ref 8–22)
BUN SERPL-MCNC: 31 MG/DL (ref 8–22)
BUN SERPL-MCNC: 32 MG/DL (ref 8–22)
BUN SERPL-MCNC: 33 MG/DL (ref 8–22)
BUN SERPL-MCNC: 33 MG/DL (ref 8–22)
BUN SERPL-MCNC: 34 MG/DL (ref 8–22)
BUN SERPL-MCNC: 37 MG/DL (ref 8–22)
BUN SERPL-MCNC: 38 MG/DL (ref 8–22)
BUN SERPL-MCNC: 38 MG/DL (ref 8–22)
BUN SERPL-MCNC: 39 MG/DL (ref 8–22)
BUN SERPL-MCNC: 40 MG/DL (ref 8–22)
BUN SERPL-MCNC: 42 MG/DL (ref 8–22)
BUN SERPL-MCNC: 42 MG/DL (ref 8–22)
C REACTIVE PROTEIN LHE: 14.9 MG/DL (ref 0–0.8)
C REACTIVE PROTEIN LHE: 6.9 MG/DL (ref 0–0.8)
C REACTIVE PROTEIN LHE: 7.1 MG/DL (ref 0–0.8)
CALCIUM SERPL-MCNC: 10.3 MG/DL (ref 8.5–10.5)
CALCIUM SERPL-MCNC: 8.6 MG/DL (ref 8.5–10.5)
CALCIUM SERPL-MCNC: 8.7 MG/DL (ref 8.5–10.5)
CALCIUM SERPL-MCNC: 8.8 MG/DL (ref 8.5–10.5)
CALCIUM SERPL-MCNC: 8.9 MG/DL (ref 8.5–10.5)
CALCIUM SERPL-MCNC: 9.1 MG/DL (ref 8.5–10.5)
CALCIUM SERPL-MCNC: 9.2 MG/DL (ref 8.5–10.5)
CALCIUM SERPL-MCNC: 9.3 MG/DL (ref 8.5–10.5)
CALCIUM SERPL-MCNC: 9.4 MG/DL (ref 8.5–10.5)
CALCIUM SERPL-MCNC: 9.5 MG/DL (ref 8.5–10.5)
CALCIUM SERPL-MCNC: 9.5 MG/DL (ref 8.5–10.5)
CALCIUM SERPL-MCNC: 9.6 MG/DL (ref 8.5–10.5)
CALCIUM SERPL-MCNC: 9.6 MG/DL (ref 8.5–10.5)
CALCIUM SERPL-MCNC: 9.8 MG/DL (ref 8.5–10.5)
CAP COMMENT: ABNORMAL
CHLORIDE BLD-SCNC: 103 MMOL/L (ref 98–107)
CHLORIDE BLD-SCNC: 104 MMOL/L (ref 98–107)
CHLORIDE BLD-SCNC: 105 MMOL/L (ref 98–107)
CHLORIDE BLD-SCNC: 106 MMOL/L (ref 98–107)
CHLORIDE BLD-SCNC: 107 MMOL/L (ref 98–107)
CHLORIDE BLD-SCNC: 108 MMOL/L (ref 98–107)
CHLORIDE BLD-SCNC: 109 MMOL/L (ref 98–107)
CHLORIDE BLD-SCNC: 110 MMOL/L (ref 98–107)
CHLORIDE BLD-SCNC: 110 MMOL/L (ref 98–107)
CHLORIDE BLD-SCNC: 111 MMOL/L (ref 98–107)
CK SERPL-CCNC: 106 U/L (ref 30–190)
CO2 SERPL-SCNC: 21 MMOL/L (ref 22–31)
CO2 SERPL-SCNC: 23 MMOL/L (ref 22–31)
CO2 SERPL-SCNC: 23 MMOL/L (ref 22–31)
CO2 SERPL-SCNC: 24 MMOL/L (ref 22–31)
CO2 SERPL-SCNC: 24 MMOL/L (ref 22–31)
CO2 SERPL-SCNC: 25 MMOL/L (ref 22–31)
CO2 SERPL-SCNC: 25 MMOL/L (ref 22–31)
CO2 SERPL-SCNC: 26 MMOL/L (ref 22–31)
CO2 SERPL-SCNC: 27 MMOL/L (ref 22–31)
CO2 SERPL-SCNC: 28 MMOL/L (ref 22–31)
CO2 SERPL-SCNC: 29 MMOL/L (ref 22–31)
CO2 SERPL-SCNC: 31 MMOL/L (ref 22–31)
CO2 SERPL-SCNC: 31 MMOL/L (ref 22–31)
CODING SYSTEM: NORMAL
COLOR FLD: YELLOW
COMPONENT (HISTORICAL CONVERSION): NORMAL
CREAT BLD-MCNC: 1.4 MG/DL (ref 0.7–1.3)
CREAT SERPL-MCNC: 0.88 MG/DL (ref 0.7–1.3)
CREAT SERPL-MCNC: 1.06 MG/DL (ref 0.7–1.3)
CREAT SERPL-MCNC: 1.23 MG/DL (ref 0.7–1.3)
CREAT SERPL-MCNC: 1.24 MG/DL (ref 0.7–1.3)
CREAT SERPL-MCNC: 1.25 MG/DL (ref 0.7–1.3)
CREAT SERPL-MCNC: 1.26 MG/DL (ref 0.7–1.3)
CREAT SERPL-MCNC: 1.28 MG/DL (ref 0.7–1.3)
CREAT SERPL-MCNC: 1.29 MG/DL (ref 0.7–1.3)
CREAT SERPL-MCNC: 1.3 MG/DL (ref 0.7–1.3)
CREAT SERPL-MCNC: 1.32 MG/DL (ref 0.7–1.3)
CREAT SERPL-MCNC: 1.33 MG/DL (ref 0.7–1.3)
CREAT SERPL-MCNC: 1.33 MG/DL (ref 0.7–1.3)
CREAT SERPL-MCNC: 1.34 MG/DL (ref 0.7–1.3)
CREAT SERPL-MCNC: 1.36 MG/DL (ref 0.7–1.3)
CREAT SERPL-MCNC: 1.4 MG/DL (ref 0.7–1.3)
CREAT SERPL-MCNC: 1.44 MG/DL (ref 0.7–1.3)
CREAT SERPL-MCNC: 1.59 MG/DL (ref 0.7–1.3)
D DIMER PPP FEU-MCNC: 1.4 UG/ML FEU (ref 0–0.5)
D DIMER PPP FEU-MCNC: 1.48 UG/ML FEU (ref 0–0.5)
DIASTOLIC BLOOD PRESSURE - MUSE: 74 MMHG
EOSINOPHIL # BLD AUTO: 0 10E3/UL (ref 0–0.7)
EOSINOPHIL # BLD AUTO: 0 THOU/UL (ref 0–0.4)
EOSINOPHIL # BLD AUTO: 0.1 10E3/UL (ref 0–0.7)
EOSINOPHIL # BLD AUTO: 0.1 10E3/UL (ref 0–0.7)
EOSINOPHIL # BLD AUTO: 0.1 THOU/UL (ref 0–0.4)
EOSINOPHIL # BLD AUTO: 0.2 THOU/UL (ref 0–0.4)
EOSINOPHIL # BLD AUTO: 0.3 10E3/UL (ref 0–0.7)
EOSINOPHIL # BLD AUTO: 0.3 THOU/UL (ref 0–0.4)
EOSINOPHIL # BLD AUTO: 0.4 10E3/UL (ref 0–0.7)
EOSINOPHIL # BLD AUTO: 0.4 10E3/UL (ref 0–0.7)
EOSINOPHIL # BLD AUTO: 0.4 THOU/UL (ref 0–0.4)
EOSINOPHIL # BLD AUTO: 0.5 10E3/UL (ref 0–0.7)
EOSINOPHIL # BLD AUTO: 0.5 THOU/UL (ref 0–0.4)
EOSINOPHIL # BLD AUTO: 0.6 10E3/UL (ref 0–0.7)
EOSINOPHIL # BLD AUTO: 0.6 THOU/UL (ref 0–0.4)
EOSINOPHIL # BLD AUTO: 0.7 10E3/UL (ref 0–0.7)
EOSINOPHIL COUNT (ABSOLUTE): 0 THOU/UL (ref 0–0.4)
EOSINOPHIL COUNT (ABSOLUTE): 0.2 THOU/UL (ref 0–0.4)
EOSINOPHIL COUNT (ABSOLUTE): 0.2 THOU/UL (ref 0–0.4)
EOSINOPHIL NFR BLD AUTO: 0 %
EOSINOPHIL NFR BLD AUTO: 0 % (ref 0–6)
EOSINOPHIL NFR BLD AUTO: 1 %
EOSINOPHIL NFR BLD AUTO: 1 %
EOSINOPHIL NFR BLD AUTO: 1 % (ref 0–6)
EOSINOPHIL NFR BLD AUTO: 2 % (ref 0–6)
EOSINOPHIL NFR BLD AUTO: 3 %
EOSINOPHIL NFR BLD AUTO: 3 % (ref 0–6)
EOSINOPHIL NFR BLD AUTO: 3 % (ref 0–6)
EOSINOPHIL NFR BLD AUTO: 4 %
EOSINOPHIL NFR BLD AUTO: 4 % (ref 0–6)
EOSINOPHIL NFR BLD AUTO: 4 % (ref 0–6)
EOSINOPHIL NFR BLD AUTO: 5 %
EOSINOPHIL NFR BLD AUTO: 5 % (ref 0–6)
EOSINOPHIL NFR BLD AUTO: 6 %
EOSINOPHIL NFR BLD AUTO: 6 % (ref 0–6)
EOSINOPHIL NFR BLD AUTO: 7 %
EOSINOPHIL NFR BLD AUTO: 7 %
ERYTHROCYTE [DISTWIDTH] IN BLOOD BY AUTOMATED COUNT: 14.4 % (ref 11–14.5)
ERYTHROCYTE [DISTWIDTH] IN BLOOD BY AUTOMATED COUNT: 15.4 % (ref 10–15)
ERYTHROCYTE [DISTWIDTH] IN BLOOD BY AUTOMATED COUNT: 15.6 % (ref 10–15)
ERYTHROCYTE [DISTWIDTH] IN BLOOD BY AUTOMATED COUNT: 15.6 % (ref 10–15)
ERYTHROCYTE [DISTWIDTH] IN BLOOD BY AUTOMATED COUNT: 15.8 % (ref 10–15)
ERYTHROCYTE [DISTWIDTH] IN BLOOD BY AUTOMATED COUNT: 15.9 % (ref 10–15)
ERYTHROCYTE [DISTWIDTH] IN BLOOD BY AUTOMATED COUNT: 16 % (ref 10–15)
ERYTHROCYTE [DISTWIDTH] IN BLOOD BY AUTOMATED COUNT: 16 % (ref 10–15)
ERYTHROCYTE [DISTWIDTH] IN BLOOD BY AUTOMATED COUNT: 16 % (ref 11–14.5)
ERYTHROCYTE [DISTWIDTH] IN BLOOD BY AUTOMATED COUNT: 16.1 % (ref 10–15)
ERYTHROCYTE [DISTWIDTH] IN BLOOD BY AUTOMATED COUNT: 16.1 % (ref 11–14.5)
ERYTHROCYTE [DISTWIDTH] IN BLOOD BY AUTOMATED COUNT: 16.3 % (ref 11–14.5)
ERYTHROCYTE [DISTWIDTH] IN BLOOD BY AUTOMATED COUNT: 16.4 % (ref 10–15)
ERYTHROCYTE [DISTWIDTH] IN BLOOD BY AUTOMATED COUNT: 16.4 % (ref 10–15)
ERYTHROCYTE [DISTWIDTH] IN BLOOD BY AUTOMATED COUNT: 16.5 % (ref 11–14.5)
ERYTHROCYTE [DISTWIDTH] IN BLOOD BY AUTOMATED COUNT: 16.5 % (ref 11–14.5)
ERYTHROCYTE [DISTWIDTH] IN BLOOD BY AUTOMATED COUNT: 16.7 % (ref 10–15)
ERYTHROCYTE [DISTWIDTH] IN BLOOD BY AUTOMATED COUNT: 16.7 % (ref 11–14.5)
ERYTHROCYTE [DISTWIDTH] IN BLOOD BY AUTOMATED COUNT: 16.9 % (ref 11–14.5)
ERYTHROCYTE [DISTWIDTH] IN BLOOD BY AUTOMATED COUNT: 17.1 % (ref 11–14.5)
ERYTHROCYTE [DISTWIDTH] IN BLOOD BY AUTOMATED COUNT: 17.1 % (ref 11–14.5)
ERYTHROCYTE [DISTWIDTH] IN BLOOD BY AUTOMATED COUNT: 17.2 % (ref 11–14.5)
ERYTHROCYTE [DISTWIDTH] IN BLOOD BY AUTOMATED COUNT: 17.3 % (ref 10–15)
ERYTHROCYTE [DISTWIDTH] IN BLOOD BY AUTOMATED COUNT: 17.4 % (ref 11–14.5)
ERYTHROCYTE [DISTWIDTH] IN BLOOD BY AUTOMATED COUNT: 17.6 % (ref 11–14.5)
ERYTHROCYTE [DISTWIDTH] IN BLOOD BY AUTOMATED COUNT: 17.9 % (ref 11–14.5)
ERYTHROCYTE [DISTWIDTH] IN BLOOD BY AUTOMATED COUNT: 18 % (ref 11–14.5)
ERYTHROCYTE [DISTWIDTH] IN BLOOD BY AUTOMATED COUNT: 18.2 % (ref 11–14.5)
ERYTHROCYTE [DISTWIDTH] IN BLOOD BY AUTOMATED COUNT: 18.5 % (ref 11–14.5)
ERYTHROCYTE [DISTWIDTH] IN BLOOD BY AUTOMATED COUNT: 18.6 % (ref 11–14.5)
ERYTHROCYTE [DISTWIDTH] IN BLOOD BY AUTOMATED COUNT: 18.7 % (ref 11–14.5)
ERYTHROCYTE [DISTWIDTH] IN BLOOD BY AUTOMATED COUNT: 18.8 % (ref 11–14.5)
ERYTHROCYTE [DISTWIDTH] IN BLOOD BY AUTOMATED COUNT: 18.9 % (ref 11–14.5)
ERYTHROCYTE [DISTWIDTH] IN BLOOD BY AUTOMATED COUNT: 19 % (ref 11–14.5)
ERYTHROCYTE [DISTWIDTH] IN BLOOD BY AUTOMATED COUNT: 19 % (ref 11–14.5)
ERYTHROCYTE [DISTWIDTH] IN BLOOD BY AUTOMATED COUNT: 19.1 % (ref 11–14.5)
ERYTHROCYTE [DISTWIDTH] IN BLOOD BY AUTOMATED COUNT: 19.3 % (ref 11–14.5)
ERYTHROCYTE [DISTWIDTH] IN BLOOD BY AUTOMATED COUNT: 19.5 % (ref 11–14.5)
ERYTHROCYTE [DISTWIDTH] IN BLOOD BY AUTOMATED COUNT: 19.6 % (ref 11–14.5)
ERYTHROCYTE [DISTWIDTH] IN BLOOD BY AUTOMATED COUNT: 19.9 % (ref 11–14.5)
ERYTHROCYTE [DISTWIDTH] IN BLOOD BY AUTOMATED COUNT: 20.1 % (ref 11–14.5)
FIBRINOGEN PPP-MCNC: 606 MG/DL (ref 170–490)
FIBRINOGEN PPP-MCNC: 677 MG/DL (ref 170–490)
GFR SERPL CREATININE-BSD FRML MDRD: 44 ML/MIN/1.73M2
GFR SERPL CREATININE-BSD FRML MDRD: 49 ML/MIN/1.73M2
GFR SERPL CREATININE-BSD FRML MDRD: 51 ML/MIN/1.73M2
GFR SERPL CREATININE-BSD FRML MDRD: 52 ML/MIN/1.73M2
GFR SERPL CREATININE-BSD FRML MDRD: 53 ML/MIN/1.73M2
GFR SERPL CREATININE-BSD FRML MDRD: 54 ML/MIN/1.73M2
GFR SERPL CREATININE-BSD FRML MDRD: 55 ML/MIN/1.73M2
GFR SERPL CREATININE-BSD FRML MDRD: 56 ML/MIN/1.73M2
GFR SERPL CREATININE-BSD FRML MDRD: 56 ML/MIN/1.73M2
GFR SERPL CREATININE-BSD FRML MDRD: 57 ML/MIN/1.73M2
GFR SERPL CREATININE-BSD FRML MDRD: 59 ML/MIN/1.73M2
GFR SERPL CREATININE-BSD FRML MDRD: 60 ML/MIN/1.73M2
GFR SERPL CREATININE-BSD FRML MDRD: 61 ML/MIN/1.73M2
GFR SERPL CREATININE-BSD FRML MDRD: 64 ML/MIN/1.73M2
GFR SERPL CREATININE-BSD FRML MDRD: 73 ML/MIN/1.73M2
GFR SERPL CREATININE-BSD FRML MDRD: 90 ML/MIN/1.73M2
GLUCOSE BLD-MCNC: 101 MG/DL (ref 70–125)
GLUCOSE BLD-MCNC: 101 MG/DL (ref 70–125)
GLUCOSE BLD-MCNC: 102 MG/DL (ref 70–125)
GLUCOSE BLD-MCNC: 104 MG/DL (ref 70–125)
GLUCOSE BLD-MCNC: 105 MG/DL (ref 70–125)
GLUCOSE BLD-MCNC: 106 MG/DL (ref 70–125)
GLUCOSE BLD-MCNC: 107 MG/DL (ref 70–125)
GLUCOSE BLD-MCNC: 107 MG/DL (ref 70–125)
GLUCOSE BLD-MCNC: 109 MG/DL (ref 70–125)
GLUCOSE BLD-MCNC: 110 MG/DL (ref 70–125)
GLUCOSE BLD-MCNC: 115 MG/DL (ref 70–125)
GLUCOSE BLD-MCNC: 118 MG/DL (ref 70–125)
GLUCOSE BLD-MCNC: 119 MG/DL (ref 70–125)
GLUCOSE BLD-MCNC: 129 MG/DL (ref 70–125)
GLUCOSE BLD-MCNC: 159 MG/DL (ref 70–125)
GLUCOSE BLD-MCNC: 177 MG/DL (ref 70–125)
GLUCOSE BLD-MCNC: 84 MG/DL (ref 70–125)
GLUCOSE BLD-MCNC: 97 MG/DL (ref 70–125)
GLUCOSE BLD-MCNC: 98 MG/DL (ref 70–125)
GLUCOSE BLDC GLUCOMTR-MCNC: 105 MG/DL (ref 70–99)
GLUCOSE FLD-MCNC: 119 MG/DL
GRAM STAIN RESULT: NORMAL
GRAM STAIN RESULT: NORMAL
HCO3 BLDV-SCNC: 27 MMOL/L (ref 24–30)
HCT VFR BLD AUTO: 30.2 % (ref 40–53)
HCT VFR BLD AUTO: 33.5 % (ref 40–54)
HCT VFR BLD AUTO: 33.5 % (ref 40–54)
HCT VFR BLD AUTO: 34.2 % (ref 40–54)
HCT VFR BLD AUTO: 35.6 % (ref 40–54)
HCT VFR BLD AUTO: 35.7 % (ref 40–54)
HCT VFR BLD AUTO: 35.8 % (ref 40–54)
HCT VFR BLD AUTO: 36 % (ref 40–54)
HCT VFR BLD AUTO: 36.3 % (ref 40–54)
HCT VFR BLD AUTO: 36.6 % (ref 40–54)
HCT VFR BLD AUTO: 36.8 % (ref 40–54)
HCT VFR BLD AUTO: 37 % (ref 40–54)
HCT VFR BLD AUTO: 37.2 % (ref 40–54)
HCT VFR BLD AUTO: 37.4 % (ref 40–54)
HCT VFR BLD AUTO: 37.6 % (ref 40–54)
HCT VFR BLD AUTO: 37.9 % (ref 40–54)
HCT VFR BLD AUTO: 37.9 % (ref 40–54)
HCT VFR BLD AUTO: 38.1 % (ref 40–54)
HCT VFR BLD AUTO: 38.2 % (ref 40–54)
HCT VFR BLD AUTO: 38.3 % (ref 40–54)
HCT VFR BLD AUTO: 38.5 % (ref 40–54)
HCT VFR BLD AUTO: 38.6 % (ref 40–54)
HCT VFR BLD AUTO: 38.6 % (ref 40–54)
HCT VFR BLD AUTO: 38.7 % (ref 40–53)
HCT VFR BLD AUTO: 38.9 % (ref 40–54)
HCT VFR BLD AUTO: 39 % (ref 40–53)
HCT VFR BLD AUTO: 39.1 % (ref 40–53)
HCT VFR BLD AUTO: 39.3 % (ref 40–54)
HCT VFR BLD AUTO: 39.6 % (ref 40–53)
HCT VFR BLD AUTO: 39.6 % (ref 40–54)
HCT VFR BLD AUTO: 39.7 % (ref 40–54)
HCT VFR BLD AUTO: 39.7 % (ref 40–54)
HCT VFR BLD AUTO: 39.8 % (ref 40–54)
HCT VFR BLD AUTO: 39.8 % (ref 40–54)
HCT VFR BLD AUTO: 39.9 % (ref 40–54)
HCT VFR BLD AUTO: 40 % (ref 40–54)
HCT VFR BLD AUTO: 40.1 % (ref 40–53)
HCT VFR BLD AUTO: 40.2 % (ref 40–54)
HCT VFR BLD AUTO: 40.8 % (ref 40–54)
HCT VFR BLD AUTO: 41 % (ref 40–53)
HCT VFR BLD AUTO: 41.2 % (ref 40–53)
HCT VFR BLD AUTO: 41.2 % (ref 40–54)
HCT VFR BLD AUTO: 41.3 % (ref 40–53)
HCT VFR BLD AUTO: 41.4 % (ref 40–54)
HCT VFR BLD AUTO: 41.6 % (ref 40–53)
HCT VFR BLD AUTO: 41.6 % (ref 40–54)
HCT VFR BLD AUTO: 41.8 % (ref 40–54)
HCT VFR BLD AUTO: 42 % (ref 40–53)
HCT VFR BLD AUTO: 42 % (ref 40–54)
HCT VFR BLD AUTO: 42.9 % (ref 40–53)
HCT VFR BLD AUTO: 42.9 % (ref 40–53)
HCT VFR BLD AUTO: 43.1 % (ref 40–53)
HCT VFR BLD AUTO: 43.3 % (ref 40–53)
HCT VFR BLD AUTO: 43.5 % (ref 40–53)
HCT VFR BLD AUTO: 44.8 % (ref 40–53)
HCT VFR BLD AUTO: 45.3 % (ref 40–53)
HCT VFR BLD AUTO: 45.7 % (ref 40–53)
HCT VFR BLD AUTO: 48 % (ref 40–53)
HGB BLD-MCNC: 10.7 G/DL (ref 14–18)
HGB BLD-MCNC: 10.9 G/DL (ref 14–18)
HGB BLD-MCNC: 11.1 G/DL (ref 14–18)
HGB BLD-MCNC: 11.3 G/DL (ref 14–18)
HGB BLD-MCNC: 11.4 G/DL (ref 14–18)
HGB BLD-MCNC: 11.4 G/DL (ref 14–18)
HGB BLD-MCNC: 11.6 G/DL (ref 14–18)
HGB BLD-MCNC: 11.7 G/DL (ref 14–18)
HGB BLD-MCNC: 11.7 G/DL (ref 14–18)
HGB BLD-MCNC: 11.8 G/DL (ref 14–18)
HGB BLD-MCNC: 11.9 G/DL (ref 14–18)
HGB BLD-MCNC: 12 G/DL (ref 14–18)
HGB BLD-MCNC: 12 G/DL (ref 14–18)
HGB BLD-MCNC: 12.1 G/DL (ref 14–18)
HGB BLD-MCNC: 12.3 G/DL (ref 14–18)
HGB BLD-MCNC: 12.4 G/DL (ref 14–18)
HGB BLD-MCNC: 12.4 G/DL (ref 14–18)
HGB BLD-MCNC: 12.5 G/DL (ref 14–18)
HGB BLD-MCNC: 12.5 G/DL (ref 14–18)
HGB BLD-MCNC: 12.6 G/DL (ref 13.3–17.7)
HGB BLD-MCNC: 12.6 G/DL (ref 14–18)
HGB BLD-MCNC: 12.7 G/DL (ref 13.3–17.7)
HGB BLD-MCNC: 12.7 G/DL (ref 14–18)
HGB BLD-MCNC: 12.8 G/DL (ref 14–18)
HGB BLD-MCNC: 12.9 G/DL (ref 13.3–17.7)
HGB BLD-MCNC: 12.9 G/DL (ref 14–18)
HGB BLD-MCNC: 13 G/DL (ref 14–18)
HGB BLD-MCNC: 13.2 G/DL (ref 13.3–17.7)
HGB BLD-MCNC: 13.2 G/DL (ref 13.3–17.7)
HGB BLD-MCNC: 13.2 G/DL (ref 14–18)
HGB BLD-MCNC: 13.3 G/DL (ref 13.3–17.7)
HGB BLD-MCNC: 13.3 G/DL (ref 14–18)
HGB BLD-MCNC: 13.4 G/DL (ref 14–18)
HGB BLD-MCNC: 13.5 G/DL (ref 13.3–17.7)
HGB BLD-MCNC: 13.5 G/DL (ref 14–18)
HGB BLD-MCNC: 13.5 G/DL (ref 14–18)
HGB BLD-MCNC: 13.6 G/DL (ref 13.3–17.7)
HGB BLD-MCNC: 13.7 G/DL (ref 13.3–17.7)
HGB BLD-MCNC: 13.7 G/DL (ref 13.3–17.7)
HGB BLD-MCNC: 14 G/DL (ref 13.3–17.7)
HGB BLD-MCNC: 14 G/DL (ref 13.3–17.7)
HGB BLD-MCNC: 14.1 G/DL (ref 13.3–17.7)
HGB BLD-MCNC: 14.1 G/DL (ref 13.3–17.7)
HGB BLD-MCNC: 14.2 G/DL (ref 13.3–17.7)
HGB BLD-MCNC: 14.6 G/DL (ref 13.3–17.7)
HGB BLD-MCNC: 14.9 G/DL (ref 13.3–17.7)
HGB BLD-MCNC: 15 G/DL (ref 13.3–17.7)
HGB BLD-MCNC: 15.5 G/DL (ref 13.3–17.7)
HGB BLD-MCNC: 9.7 G/DL (ref 13.3–17.7)
HOLD SPECIMEN: NORMAL
HOLD SPECIMEN: NORMAL
HOWELL-JOLLY BOD BLD QL SMEAR: ABNORMAL
IMM GRANULOCYTES # BLD: 0 10E3/UL
IMM GRANULOCYTES # BLD: 0 THOU/UL
IMM GRANULOCYTES # BLD: 0.1 10E3/UL
IMM GRANULOCYTES # BLD: 0.1 THOU/UL
IMM GRANULOCYTES # BLD: 0.2 10E3/UL
IMM GRANULOCYTES # BLD: 0.2 THOU/UL
IMM GRANULOCYTES # BLD: 0.2 THOU/UL
IMM GRANULOCYTES # BLD: 0.3 THOU/UL
IMM GRANULOCYTES # BLD: 0.3 THOU/UL
IMM GRANULOCYTES # BLD: 0.4 THOU/UL
IMM GRANULOCYTES NFR BLD: 0 %
IMM GRANULOCYTES NFR BLD: 1 %
IMM GRANULOCYTES NFR BLD: 2 %
IMM GRANULOCYTES NFR BLD: 3 %
IMM GRANULOCYTES NFR BLD: 4 %
IMMATURE GRANULOCYTE % - MAN (DIFF): 0 %
IMMATURE GRANULOCYTE % - MAN (DIFF): 1 %
IMMATURE GRANULOCYTE % - MAN (DIFF): 4 %
IMMATURE GRANULOCYTE ABSOLUTE - MAN (DIFF): 0 THOU/UL
IMMATURE GRANULOCYTE ABSOLUTE - MAN (DIFF): 0.2 THOU/UL
IMMATURE GRANULOCYTE ABSOLUTE - MAN (DIFF): 0.7 THOU/UL
INR PPP: 0.96 (ref 0.85–1.15)
INR PPP: 1.01 (ref 0.9–1.1)
INTERPRETATION ECG - MUSE: NORMAL
ISSUE DATE AND TIME: NORMAL
LAB AP CHARGES (HE HISTORICAL CONVERSION): ABNORMAL
LAB AP INITIAL CYTO EVAL (HE HISTORICAL CONVERSION): ABNORMAL
LAB MED GENERAL PATH INTERP (HE HISTORICAL CONVERSION): ABNORMAL
LACTATE SERPL-SCNC: 1.5 MMOL/L (ref 0.7–2)
LACTATE SERPL-SCNC: 1.7 MMOL/L (ref 0.7–2)
LDH FLD L TO P-CCNC: 119 U/L
LDH SERPL L TO P-CCNC: 238 U/L (ref 125–220)
LDH SERPL L TO P-CCNC: 264 U/L (ref 125–220)
LYMPHOCYTES # BLD AUTO: 1.1 THOU/UL (ref 0.8–4.4)
LYMPHOCYTES # BLD AUTO: 1.2 THOU/UL (ref 0.8–4.4)
LYMPHOCYTES # BLD AUTO: 1.3 THOU/UL (ref 0.8–4.4)
LYMPHOCYTES # BLD AUTO: 1.4 THOU/UL (ref 0.8–4.4)
LYMPHOCYTES # BLD AUTO: 1.5 THOU/UL (ref 0.8–4.4)
LYMPHOCYTES # BLD AUTO: 1.7 10E3/UL (ref 0.8–5.3)
LYMPHOCYTES # BLD AUTO: 1.7 THOU/UL (ref 0.8–4.4)
LYMPHOCYTES # BLD AUTO: 1.8 THOU/UL (ref 0.8–4.4)
LYMPHOCYTES # BLD AUTO: 1.9 THOU/UL (ref 0.8–4.4)
LYMPHOCYTES # BLD AUTO: 2 10E3/UL (ref 0.8–5.3)
LYMPHOCYTES # BLD AUTO: 2 10E3/UL (ref 0.8–5.3)
LYMPHOCYTES # BLD AUTO: 2 THOU/UL (ref 0.8–4.4)
LYMPHOCYTES # BLD AUTO: 2.1 10E3/UL (ref 0.8–5.3)
LYMPHOCYTES # BLD AUTO: 2.1 THOU/UL (ref 0.8–4.4)
LYMPHOCYTES # BLD AUTO: 2.2 THOU/UL (ref 0.8–4.4)
LYMPHOCYTES # BLD AUTO: 2.3 10E3/UL (ref 0.8–5.3)
LYMPHOCYTES # BLD AUTO: 2.4 10E3/UL (ref 0.8–5.3)
LYMPHOCYTES # BLD AUTO: 2.4 THOU/UL (ref 0.8–4.4)
LYMPHOCYTES # BLD AUTO: 2.4 THOU/UL (ref 0.8–4.4)
LYMPHOCYTES # BLD AUTO: 2.5 10E3/UL (ref 0.8–5.3)
LYMPHOCYTES # BLD AUTO: 2.5 10E3/UL (ref 0.8–5.3)
LYMPHOCYTES # BLD AUTO: 2.5 THOU/UL (ref 0.8–4.4)
LYMPHOCYTES # BLD AUTO: 2.5 THOU/UL (ref 0.8–4.4)
LYMPHOCYTES # BLD AUTO: 2.6 10E3/UL (ref 0.8–5.3)
LYMPHOCYTES # BLD AUTO: 2.6 THOU/UL (ref 0.8–4.4)
LYMPHOCYTES # BLD AUTO: 2.6 THOU/UL (ref 0.8–4.4)
LYMPHOCYTES # BLD AUTO: 2.7 10E3/UL (ref 0.8–5.3)
LYMPHOCYTES # BLD AUTO: 2.8 10E3/UL (ref 0.8–5.3)
LYMPHOCYTES # BLD AUTO: 2.8 10E3/UL (ref 0.8–5.3)
LYMPHOCYTES # BLD AUTO: 2.8 THOU/UL (ref 0.8–4.4)
LYMPHOCYTES # BLD AUTO: 2.9 THOU/UL (ref 0.8–4.4)
LYMPHOCYTES # BLD AUTO: 2.9 THOU/UL (ref 0.8–4.4)
LYMPHOCYTES # BLD AUTO: 3 10E3/UL (ref 0.8–5.3)
LYMPHOCYTES # BLD AUTO: 3.1 10E3/UL (ref 0.8–5.3)
LYMPHOCYTES # BLD AUTO: 3.1 THOU/UL (ref 0.8–4.4)
LYMPHOCYTES # BLD AUTO: 3.1 THOU/UL (ref 0.8–4.4)
LYMPHOCYTES # BLD AUTO: 3.2 THOU/UL (ref 0.8–4.4)
LYMPHOCYTES # BLD AUTO: 3.6 THOU/UL (ref 0.8–4.4)
LYMPHOCYTES # BLD AUTO: 3.6 THOU/UL (ref 0.8–4.4)
LYMPHOCYTES # BLD AUTO: 4 THOU/UL (ref 0.8–4.4)
LYMPHOCYTES # BLD AUTO: 4.4 THOU/UL (ref 0.8–4.4)
LYMPHOCYTES NFR BLD AUTO: 12 % (ref 20–40)
LYMPHOCYTES NFR BLD AUTO: 13 %
LYMPHOCYTES NFR BLD AUTO: 14 % (ref 20–40)
LYMPHOCYTES NFR BLD AUTO: 15 % (ref 20–40)
LYMPHOCYTES NFR BLD AUTO: 16 % (ref 20–40)
LYMPHOCYTES NFR BLD AUTO: 17 %
LYMPHOCYTES NFR BLD AUTO: 17 % (ref 20–40)
LYMPHOCYTES NFR BLD AUTO: 17 % (ref 20–40)
LYMPHOCYTES NFR BLD AUTO: 18 %
LYMPHOCYTES NFR BLD AUTO: 19 %
LYMPHOCYTES NFR BLD AUTO: 19 % (ref 20–40)
LYMPHOCYTES NFR BLD AUTO: 20 %
LYMPHOCYTES NFR BLD AUTO: 20 % (ref 20–40)
LYMPHOCYTES NFR BLD AUTO: 21 %
LYMPHOCYTES NFR BLD AUTO: 21 % (ref 20–40)
LYMPHOCYTES NFR BLD AUTO: 21 % (ref 20–40)
LYMPHOCYTES NFR BLD AUTO: 22 %
LYMPHOCYTES NFR BLD AUTO: 22 % (ref 20–40)
LYMPHOCYTES NFR BLD AUTO: 23 %
LYMPHOCYTES NFR BLD AUTO: 23 % (ref 20–40)
LYMPHOCYTES NFR BLD AUTO: 24 %
LYMPHOCYTES NFR BLD AUTO: 24 %
LYMPHOCYTES NFR BLD AUTO: 24 % (ref 20–40)
LYMPHOCYTES NFR BLD AUTO: 24 % (ref 20–40)
LYMPHOCYTES NFR BLD AUTO: 25 %
LYMPHOCYTES NFR BLD AUTO: 25 % (ref 20–40)
LYMPHOCYTES NFR BLD AUTO: 27 %
LYMPHOCYTES NFR BLD AUTO: 28 %
LYMPHOCYTES NFR BLD AUTO: 29 % (ref 20–40)
LYMPHOCYTES NFR BLD AUTO: 29 % (ref 20–40)
LYMPHOCYTES NFR BLD AUTO: 30 %
LYMPHOCYTES NFR BLD AUTO: 37 %
LYMPHOCYTES NFR BLD AUTO: 37 % (ref 20–40)
LYMPHOCYTES NFR BLD AUTO: 38 % (ref 20–40)
LYMPHOCYTES NFR BLD AUTO: 39 % (ref 20–40)
LYMPHOCYTES NFR BLD AUTO: 40 % (ref 20–40)
LYMPHOCYTES NFR BLD AUTO: 41 % (ref 20–40)
LYMPHOCYTES NFR BLD AUTO: 45 % (ref 20–40)
LYMPHOCYTES NFR BLD AUTO: 7 % (ref 20–40)
LYMPHOCYTES NFR BLD AUTO: 8 % (ref 20–40)
LYMPHOCYTES NFR FLD MANUAL: 71 %
MAGNESIUM SERPL-MCNC: 1.3 MG/DL (ref 1.8–2.6)
MAGNESIUM SERPL-MCNC: 1.6 MG/DL (ref 1.8–2.6)
MAGNESIUM SERPL-MCNC: 2.1 MG/DL (ref 1.8–2.6)
MANUAL NRBC PER 100 CELLS: 2
MANUAL NRBC PER 100 CELLS: 6
MCH RBC QN AUTO: 29.5 PG (ref 27–34)
MCH RBC QN AUTO: 29.6 PG (ref 27–34)
MCH RBC QN AUTO: 29.8 PG (ref 27–34)
MCH RBC QN AUTO: 29.9 PG (ref 27–34)
MCH RBC QN AUTO: 30 PG (ref 27–34)
MCH RBC QN AUTO: 30.1 PG (ref 26.5–33)
MCH RBC QN AUTO: 30.1 PG (ref 27–34)
MCH RBC QN AUTO: 30.2 PG (ref 27–34)
MCH RBC QN AUTO: 30.3 PG (ref 26.5–33)
MCH RBC QN AUTO: 30.4 PG (ref 26.5–33)
MCH RBC QN AUTO: 30.4 PG (ref 27–34)
MCH RBC QN AUTO: 30.5 PG (ref 27–34)
MCH RBC QN AUTO: 30.6 PG (ref 26.5–33)
MCH RBC QN AUTO: 30.6 PG (ref 27–34)
MCH RBC QN AUTO: 30.7 PG (ref 26.5–33)
MCH RBC QN AUTO: 30.7 PG (ref 26.5–33)
MCH RBC QN AUTO: 30.8 PG (ref 26.5–33)
MCH RBC QN AUTO: 30.8 PG (ref 26.5–33)
MCH RBC QN AUTO: 30.8 PG (ref 27–34)
MCH RBC QN AUTO: 30.9 PG (ref 26.5–33)
MCH RBC QN AUTO: 30.9 PG (ref 26.5–33)
MCH RBC QN AUTO: 30.9 PG (ref 27–34)
MCH RBC QN AUTO: 30.9 PG (ref 27–34)
MCH RBC QN AUTO: 31 PG (ref 26.5–33)
MCH RBC QN AUTO: 31 PG (ref 27–34)
MCH RBC QN AUTO: 31 PG (ref 27–34)
MCH RBC QN AUTO: 31.1 PG (ref 26.5–33)
MCH RBC QN AUTO: 31.1 PG (ref 27–34)
MCH RBC QN AUTO: 31.2 PG (ref 26.5–33)
MCH RBC QN AUTO: 31.3 PG (ref 27–34)
MCH RBC QN AUTO: 31.5 PG (ref 27–34)
MCH RBC QN AUTO: 31.6 PG (ref 27–34)
MCH RBC QN AUTO: 31.8 PG (ref 27–34)
MCHC RBC AUTO-ENTMCNC: 30.8 G/DL (ref 32–36)
MCHC RBC AUTO-ENTMCNC: 31 G/DL (ref 32–36)
MCHC RBC AUTO-ENTMCNC: 31.3 G/DL (ref 32–36)
MCHC RBC AUTO-ENTMCNC: 31.4 G/DL (ref 32–36)
MCHC RBC AUTO-ENTMCNC: 31.6 G/DL (ref 32–36)
MCHC RBC AUTO-ENTMCNC: 31.9 G/DL (ref 32–36)
MCHC RBC AUTO-ENTMCNC: 32 G/DL (ref 31.5–36.5)
MCHC RBC AUTO-ENTMCNC: 32 G/DL (ref 32–36)
MCHC RBC AUTO-ENTMCNC: 32.1 G/DL (ref 31.5–36.5)
MCHC RBC AUTO-ENTMCNC: 32.1 G/DL (ref 32–36)
MCHC RBC AUTO-ENTMCNC: 32.2 G/DL (ref 32–36)
MCHC RBC AUTO-ENTMCNC: 32.3 G/DL (ref 31.5–36.5)
MCHC RBC AUTO-ENTMCNC: 32.3 G/DL (ref 32–36)
MCHC RBC AUTO-ENTMCNC: 32.4 G/DL (ref 32–36)
MCHC RBC AUTO-ENTMCNC: 32.4 G/DL (ref 32–36)
MCHC RBC AUTO-ENTMCNC: 32.5 G/DL (ref 31.5–36.5)
MCHC RBC AUTO-ENTMCNC: 32.5 G/DL (ref 31.5–36.5)
MCHC RBC AUTO-ENTMCNC: 32.5 G/DL (ref 32–36)
MCHC RBC AUTO-ENTMCNC: 32.6 G/DL (ref 31.5–36.5)
MCHC RBC AUTO-ENTMCNC: 32.6 G/DL (ref 32–36)
MCHC RBC AUTO-ENTMCNC: 32.6 G/DL (ref 32–36)
MCHC RBC AUTO-ENTMCNC: 32.7 G/DL (ref 32–36)
MCHC RBC AUTO-ENTMCNC: 32.8 G/DL (ref 31.5–36.5)
MCHC RBC AUTO-ENTMCNC: 32.8 G/DL (ref 32–36)
MCHC RBC AUTO-ENTMCNC: 32.9 G/DL (ref 31.5–36.5)
MCHC RBC AUTO-ENTMCNC: 33 G/DL (ref 31.5–36.5)
MCHC RBC AUTO-ENTMCNC: 33.1 G/DL (ref 32–36)
MCHC RBC AUTO-ENTMCNC: 33.1 G/DL (ref 32–36)
MCHC RBC AUTO-ENTMCNC: 33.2 G/DL (ref 32–36)
MCHC RBC AUTO-ENTMCNC: 33.2 G/DL (ref 32–36)
MCHC RBC AUTO-ENTMCNC: 33.3 G/DL (ref 32–36)
MCHC RBC AUTO-ENTMCNC: 33.4 G/DL (ref 31.5–36.5)
MCHC RBC AUTO-ENTMCNC: 34 G/DL (ref 31.5–36.5)
MCV RBC AUTO: 90 FL (ref 78–100)
MCV RBC AUTO: 90 FL (ref 78–100)
MCV RBC AUTO: 90 FL (ref 80–100)
MCV RBC AUTO: 91 FL (ref 80–100)
MCV RBC AUTO: 92 FL (ref 80–100)
MCV RBC AUTO: 93 FL (ref 78–100)
MCV RBC AUTO: 93 FL (ref 78–100)
MCV RBC AUTO: 93 FL (ref 80–100)
MCV RBC AUTO: 94 FL (ref 78–100)
MCV RBC AUTO: 94 FL (ref 80–100)
MCV RBC AUTO: 95 FL (ref 78–100)
MCV RBC AUTO: 95 FL (ref 80–100)
MCV RBC AUTO: 95 FL (ref 80–100)
MCV RBC AUTO: 96 FL (ref 78–100)
MCV RBC AUTO: 96 FL (ref 80–100)
MCV RBC AUTO: 97 FL (ref 78–100)
MCV RBC AUTO: 97 FL (ref 78–100)
MCV RBC AUTO: 97 FL (ref 80–100)
MCV RBC AUTO: 98 FL (ref 80–100)
MCV RBC AUTO: 98 FL (ref 80–100)
MCV RBC AUTO: 99 FL (ref 80–100)
METAMYELOCYTES (ABSOLUTE): 0.4 THOU/UL
METAMYELOCYTES NFR BLD MANUAL: 3 %
MONOCYTES # BLD AUTO: 0.4 THOU/UL (ref 0–0.9)
MONOCYTES # BLD AUTO: 0.5 THOU/UL (ref 0–0.9)
MONOCYTES # BLD AUTO: 0.6 THOU/UL (ref 0–0.9)
MONOCYTES # BLD AUTO: 0.6 THOU/UL (ref 0–0.9)
MONOCYTES # BLD AUTO: 0.7 10E3/UL (ref 0–1.3)
MONOCYTES # BLD AUTO: 0.7 10E3/UL (ref 0–1.3)
MONOCYTES # BLD AUTO: 0.7 THOU/UL (ref 0–0.9)
MONOCYTES # BLD AUTO: 0.7 THOU/UL (ref 0–0.9)
MONOCYTES # BLD AUTO: 0.9 10E3/UL (ref 0–1.3)
MONOCYTES # BLD AUTO: 0.9 THOU/UL (ref 0–0.9)
MONOCYTES # BLD AUTO: 0.9 THOU/UL (ref 0–0.9)
MONOCYTES # BLD AUTO: 1 THOU/UL (ref 0–0.9)
MONOCYTES # BLD AUTO: 1.1 10E3/UL (ref 0–1.3)
MONOCYTES # BLD AUTO: 1.1 THOU/UL (ref 0–0.9)
MONOCYTES # BLD AUTO: 1.2 10E3/UL (ref 0–1.3)
MONOCYTES # BLD AUTO: 1.2 THOU/UL (ref 0–0.9)
MONOCYTES # BLD AUTO: 1.3 10E3/UL (ref 0–1.3)
MONOCYTES # BLD AUTO: 1.3 10E3/UL (ref 0–1.3)
MONOCYTES # BLD AUTO: 1.3 THOU/UL (ref 0–0.9)
MONOCYTES # BLD AUTO: 1.3 THOU/UL (ref 0–0.9)
MONOCYTES # BLD AUTO: 1.4 10E3/UL (ref 0–1.3)
MONOCYTES # BLD AUTO: 1.4 THOU/UL (ref 0–0.9)
MONOCYTES # BLD AUTO: 1.4 THOU/UL (ref 0–0.9)
MONOCYTES # BLD AUTO: 1.5 THOU/UL (ref 0–0.9)
MONOCYTES # BLD AUTO: 1.6 10E3/UL (ref 0–1.3)
MONOCYTES # BLD AUTO: 1.7 THOU/UL (ref 0–0.9)
MONOCYTES # BLD AUTO: 1.8 THOU/UL (ref 0–0.9)
MONOCYTES # BLD AUTO: 1.9 THOU/UL (ref 0–0.9)
MONOCYTES # BLD AUTO: 2 THOU/UL (ref 0–0.9)
MONOCYTES # BLD AUTO: 2.1 THOU/UL (ref 0–0.9)
MONOCYTES # BLD AUTO: 2.3 THOU/UL (ref 0–0.9)
MONOCYTES NFR BLD AUTO: 10 %
MONOCYTES NFR BLD AUTO: 10 % (ref 2–10)
MONOCYTES NFR BLD AUTO: 11 %
MONOCYTES NFR BLD AUTO: 11 %
MONOCYTES NFR BLD AUTO: 11 % (ref 2–10)
MONOCYTES NFR BLD AUTO: 12 %
MONOCYTES NFR BLD AUTO: 12 % (ref 2–10)
MONOCYTES NFR BLD AUTO: 13 %
MONOCYTES NFR BLD AUTO: 13 % (ref 2–10)
MONOCYTES NFR BLD AUTO: 14 %
MONOCYTES NFR BLD AUTO: 14 % (ref 2–10)
MONOCYTES NFR BLD AUTO: 15 %
MONOCYTES NFR BLD AUTO: 15 %
MONOCYTES NFR BLD AUTO: 15 % (ref 2–10)
MONOCYTES NFR BLD AUTO: 16 % (ref 2–10)
MONOCYTES NFR BLD AUTO: 16 % (ref 2–10)
MONOCYTES NFR BLD AUTO: 18 % (ref 2–10)
MONOCYTES NFR BLD AUTO: 18 % (ref 2–10)
MONOCYTES NFR BLD AUTO: 19 % (ref 2–10)
MONOCYTES NFR BLD AUTO: 23 % (ref 2–10)
MONOCYTES NFR BLD AUTO: 3 % (ref 2–10)
MONOCYTES NFR BLD AUTO: 3 % (ref 2–10)
MONOCYTES NFR BLD AUTO: 4 % (ref 2–10)
MONOCYTES NFR BLD AUTO: 6 %
MONOCYTES NFR BLD AUTO: 6 % (ref 2–10)
MONOCYTES NFR BLD AUTO: 6 % (ref 2–10)
MONOCYTES NFR BLD AUTO: 7 %
MONOCYTES NFR BLD AUTO: 8 % (ref 2–10)
MONOCYTES NFR BLD AUTO: 9 % (ref 2–10)
MONOS+MACROS NFR FLD MANUAL: 27 %
MYELOCYTES (ABSOLUTE): 0.2 THOU/UL
MYELOCYTES (ABSOLUTE): 0.3 THOU/UL
MYELOCYTES NFR BLD MANUAL: 1 %
MYELOCYTES NFR BLD MANUAL: 2 %
NEUTROPHILS # BLD AUTO: 10.4 10E3/UL (ref 1.6–8.3)
NEUTROPHILS # BLD AUTO: 10.5 THOU/UL (ref 2–7.7)
NEUTROPHILS # BLD AUTO: 11.3 THOU/UL (ref 2–7.7)
NEUTROPHILS # BLD AUTO: 11.7 THOU/UL (ref 2–7.7)
NEUTROPHILS # BLD AUTO: 2.7 THOU/UL (ref 2–7.7)
NEUTROPHILS # BLD AUTO: 3.1 THOU/UL (ref 2–7.7)
NEUTROPHILS # BLD AUTO: 3.2 10E3/UL (ref 1.6–8.3)
NEUTROPHILS # BLD AUTO: 3.4 THOU/UL (ref 2–7.7)
NEUTROPHILS # BLD AUTO: 3.4 THOU/UL (ref 2–7.7)
NEUTROPHILS # BLD AUTO: 3.8 THOU/UL (ref 2–7.7)
NEUTROPHILS # BLD AUTO: 4.6 THOU/UL (ref 2–7.7)
NEUTROPHILS # BLD AUTO: 4.7 10E3/UL (ref 1.6–8.3)
NEUTROPHILS # BLD AUTO: 5.1 THOU/UL (ref 2–7.7)
NEUTROPHILS # BLD AUTO: 5.3 THOU/UL (ref 2–7.7)
NEUTROPHILS # BLD AUTO: 5.3 THOU/UL (ref 2–7.7)
NEUTROPHILS # BLD AUTO: 5.5 10E3/UL (ref 1.6–8.3)
NEUTROPHILS # BLD AUTO: 5.5 10E3/UL (ref 1.6–8.3)
NEUTROPHILS # BLD AUTO: 5.5 THOU/UL (ref 2–7.7)
NEUTROPHILS # BLD AUTO: 5.8 10E3/UL (ref 1.6–8.3)
NEUTROPHILS # BLD AUTO: 5.9 10E3/UL (ref 1.6–8.3)
NEUTROPHILS # BLD AUTO: 6 10E3/UL (ref 1.6–8.3)
NEUTROPHILS # BLD AUTO: 6.1 10E3/UL (ref 1.6–8.3)
NEUTROPHILS # BLD AUTO: 6.1 10E3/UL (ref 1.6–8.3)
NEUTROPHILS # BLD AUTO: 6.2 THOU/UL (ref 2–7.7)
NEUTROPHILS # BLD AUTO: 6.2 THOU/UL (ref 2–7.7)
NEUTROPHILS # BLD AUTO: 6.3 THOU/UL (ref 2–7.7)
NEUTROPHILS # BLD AUTO: 6.4 10E3/UL (ref 1.6–8.3)
NEUTROPHILS # BLD AUTO: 6.4 10E3/UL (ref 1.6–8.3)
NEUTROPHILS # BLD AUTO: 6.6 10E3/UL (ref 1.6–8.3)
NEUTROPHILS # BLD AUTO: 6.9 THOU/UL (ref 2–7.7)
NEUTROPHILS # BLD AUTO: 7.2 THOU/UL (ref 2–7.7)
NEUTROPHILS # BLD AUTO: 7.2 THOU/UL (ref 2–7.7)
NEUTROPHILS # BLD AUTO: 7.3 THOU/UL (ref 2–7.7)
NEUTROPHILS # BLD AUTO: 7.4 THOU/UL (ref 2–7.7)
NEUTROPHILS # BLD AUTO: 7.6 THOU/UL (ref 2–7.7)
NEUTROPHILS # BLD AUTO: 7.7 10E3/UL (ref 1.6–8.3)
NEUTROPHILS # BLD AUTO: 7.7 10E3/UL (ref 1.6–8.3)
NEUTROPHILS # BLD AUTO: 7.7 THOU/UL (ref 2–7.7)
NEUTROPHILS # BLD AUTO: 7.7 THOU/UL (ref 2–7.7)
NEUTROPHILS # BLD AUTO: 7.8 10E3/UL (ref 1.6–8.3)
NEUTROPHILS # BLD AUTO: 7.8 THOU/UL (ref 2–7.7)
NEUTROPHILS # BLD AUTO: 8 10E3/UL (ref 1.6–8.3)
NEUTROPHILS # BLD AUTO: 8 THOU/UL (ref 2–7.7)
NEUTROPHILS # BLD AUTO: 8.1 THOU/UL (ref 2–7.7)
NEUTROPHILS # BLD AUTO: 8.2 THOU/UL (ref 2–7.7)
NEUTROPHILS # BLD AUTO: 8.2 THOU/UL (ref 2–7.7)
NEUTROPHILS # BLD AUTO: 8.6 10E3/UL (ref 1.6–8.3)
NEUTROPHILS # BLD AUTO: 8.8 THOU/UL (ref 2–7.7)
NEUTROPHILS # BLD AUTO: 8.9 THOU/UL (ref 2–7.7)
NEUTROPHILS # BLD AUTO: 9.1 THOU/UL (ref 2–7.7)
NEUTROPHILS # BLD AUTO: 9.1 THOU/UL (ref 2–7.7)
NEUTROPHILS # BLD AUTO: 9.6 THOU/UL (ref 2–7.7)
NEUTROPHILS # BLD AUTO: 9.6 THOU/UL (ref 2–7.7)
NEUTROPHILS NFR BLD AUTO: 36 % (ref 50–70)
NEUTROPHILS NFR BLD AUTO: 40 %
NEUTROPHILS NFR BLD AUTO: 40 % (ref 50–70)
NEUTROPHILS NFR BLD AUTO: 44 % (ref 50–70)
NEUTROPHILS NFR BLD AUTO: 45 % (ref 50–70)
NEUTROPHILS NFR BLD AUTO: 48 % (ref 50–70)
NEUTROPHILS NFR BLD AUTO: 50 %
NEUTROPHILS NFR BLD AUTO: 50 % (ref 50–70)
NEUTROPHILS NFR BLD AUTO: 50 % (ref 50–70)
NEUTROPHILS NFR BLD AUTO: 52 % (ref 50–70)
NEUTROPHILS NFR BLD AUTO: 54 %
NEUTROPHILS NFR BLD AUTO: 55 %
NEUTROPHILS NFR BLD AUTO: 56 %
NEUTROPHILS NFR BLD AUTO: 56 % (ref 50–70)
NEUTROPHILS NFR BLD AUTO: 57 % (ref 50–70)
NEUTROPHILS NFR BLD AUTO: 57 % (ref 50–70)
NEUTROPHILS NFR BLD AUTO: 59 %
NEUTROPHILS NFR BLD AUTO: 60 %
NEUTROPHILS NFR BLD AUTO: 61 %
NEUTROPHILS NFR BLD AUTO: 61 % (ref 50–70)
NEUTROPHILS NFR BLD AUTO: 62 % (ref 50–70)
NEUTROPHILS NFR BLD AUTO: 64 % (ref 50–70)
NEUTROPHILS NFR BLD AUTO: 64 % (ref 50–70)
NEUTROPHILS NFR BLD AUTO: 65 %
NEUTROPHILS NFR BLD AUTO: 65 % (ref 50–70)
NEUTROPHILS NFR BLD AUTO: 68 %
NEUTROPHILS NFR BLD AUTO: 68 % (ref 50–70)
NEUTROPHILS NFR BLD AUTO: 68 % (ref 50–70)
NEUTROPHILS NFR BLD AUTO: 69 % (ref 50–70)
NEUTROPHILS NFR BLD AUTO: 69 % (ref 50–70)
NEUTROPHILS NFR BLD AUTO: 70 %
NEUTROPHILS NFR BLD AUTO: 70 % (ref 50–70)
NEUTROPHILS NFR BLD AUTO: 71 % (ref 50–70)
NEUTROPHILS NFR BLD AUTO: 73 % (ref 50–70)
NEUTROPHILS NFR BLD AUTO: 75 %
NEUTROPHILS NFR BLD AUTO: 75 % (ref 50–70)
NEUTROPHILS NFR BLD AUTO: 77 % (ref 50–70)
NEUTROPHILS NFR BLD AUTO: 77 % (ref 50–70)
NEUTROPHILS NFR BLD AUTO: 81 % (ref 50–70)
NEUTROPHILS NFR BLD AUTO: 87 % (ref 50–70)
NEUTS BAND NFR FLD MANUAL: 1 %
NRBC # BLD AUTO: 0 10E3/UL
NRBC BLD AUTO-RTO: 0 /100
OVALOCYTES: ABNORMAL
OXYHGB MFR BLDV: 26.3 % (ref 70–75)
P AXIS - MUSE: 77 DEGREES
PATH REPORT.COMMENTS IMP SPEC: ABNORMAL
PATH REPORT.COMMENTS IMP SPEC: YES
PATH REPORT.COMMENTS IMP SPEC: YES
PATH REPORT.FINAL DX SPEC: ABNORMAL
PATH REPORT.FINAL DX SPEC: ABNORMAL
PATH REPORT.GROSS SPEC: ABNORMAL
PATH REPORT.MICROSCOPIC SPEC OTHER STN: ABNORMAL
PATH REPORT.MICROSCOPIC SPEC OTHER STN: ABNORMAL
PATH REPORT.RELEVANT HX SPEC: ABNORMAL
PATH REPORT.RELEVANT HX SPEC: ABNORMAL
PCO2 BLDV: 51 MM HG (ref 35–50)
PH BLDV: 7.39 [PH] (ref 7.35–7.45)
PH FLD: 8 PH
PLAT MORPH BLD: ABNORMAL
PLAT MORPH BLD: NORMAL
PLAT MORPH BLD: NORMAL
PLATELET # BLD AUTO: 110 THOU/UL (ref 140–440)
PLATELET # BLD AUTO: 116 THOU/UL (ref 140–440)
PLATELET # BLD AUTO: 119 THOU/UL (ref 140–440)
PLATELET # BLD AUTO: 12 THOU/UL (ref 140–440)
PLATELET # BLD AUTO: 124 THOU/UL (ref 140–440)
PLATELET # BLD AUTO: 136 10E3/UL (ref 150–450)
PLATELET # BLD AUTO: 143 THOU/UL (ref 140–440)
PLATELET # BLD AUTO: 143 THOU/UL (ref 140–440)
PLATELET # BLD AUTO: 15 THOU/UL (ref 140–440)
PLATELET # BLD AUTO: 15 THOU/UL (ref 140–440)
PLATELET # BLD AUTO: 160 THOU/UL (ref 140–440)
PLATELET # BLD AUTO: 162 10E3/UL (ref 150–450)
PLATELET # BLD AUTO: 162 THOU/UL (ref 140–440)
PLATELET # BLD AUTO: 164 10E3/UL (ref 150–450)
PLATELET # BLD AUTO: 17 THOU/UL (ref 140–440)
PLATELET # BLD AUTO: 194 THOU/UL (ref 140–440)
PLATELET # BLD AUTO: 20 THOU/UL (ref 140–440)
PLATELET # BLD AUTO: 200 THOU/UL (ref 140–440)
PLATELET # BLD AUTO: 201 THOU/UL (ref 140–440)
PLATELET # BLD AUTO: 210 10E3/UL (ref 150–450)
PLATELET # BLD AUTO: 215 10E3/UL (ref 150–450)
PLATELET # BLD AUTO: 216 10E3/UL (ref 150–450)
PLATELET # BLD AUTO: 228 THOU/UL (ref 140–440)
PLATELET # BLD AUTO: 239 THOU/UL (ref 140–440)
PLATELET # BLD AUTO: 243 10E3/UL (ref 150–450)
PLATELET # BLD AUTO: 245 THOU/UL (ref 140–440)
PLATELET # BLD AUTO: 252 10E3/UL (ref 150–450)
PLATELET # BLD AUTO: 266 10E3/UL (ref 150–450)
PLATELET # BLD AUTO: 275 10E3/UL (ref 150–450)
PLATELET # BLD AUTO: 280 THOU/UL (ref 140–440)
PLATELET # BLD AUTO: 287 10E3/UL (ref 150–450)
PLATELET # BLD AUTO: 29 THOU/UL (ref 140–440)
PLATELET # BLD AUTO: 291 10E3/UL (ref 150–450)
PLATELET # BLD AUTO: 3 THOU/UL (ref 140–440)
PLATELET # BLD AUTO: 303 THOU/UL (ref 140–440)
PLATELET # BLD AUTO: 306 10E3/UL (ref 150–450)
PLATELET # BLD AUTO: 314 10E3/UL (ref 150–450)
PLATELET # BLD AUTO: 366 10E3/UL (ref 150–450)
PLATELET # BLD AUTO: 38 THOU/UL (ref 140–440)
PLATELET # BLD AUTO: 380 10E3/UL (ref 150–450)
PLATELET # BLD AUTO: 4 THOU/UL (ref 140–440)
PLATELET # BLD AUTO: 40 THOU/UL (ref 140–440)
PLATELET # BLD AUTO: 401 10E3/UL (ref 150–450)
PLATELET # BLD AUTO: 50 THOU/UL (ref 140–440)
PLATELET # BLD AUTO: 51 THOU/UL (ref 140–440)
PLATELET # BLD AUTO: 54 10E3/UL (ref 150–450)
PLATELET # BLD AUTO: 57 THOU/UL (ref 140–440)
PLATELET # BLD AUTO: 6 THOU/UL (ref 140–440)
PLATELET # BLD AUTO: 63 THOU/UL (ref 140–440)
PLATELET # BLD AUTO: 656 THOU/UL (ref 140–440)
PLATELET # BLD AUTO: 7 THOU/UL (ref 140–440)
PLATELET # BLD AUTO: 795 THOU/UL (ref 140–440)
PLATELET # BLD AUTO: 81 THOU/UL (ref 140–440)
PLATELET # BLD AUTO: 83 10E3/UL (ref 150–450)
PLATELET # BLD AUTO: 92 10E3/UL (ref 150–450)
PLATELET # BLD AUTO: 92 THOU/UL (ref 140–440)
PLATELET # BLD AUTO: 94 THOU/UL (ref 140–440)
PLATELET # BLD AUTO: 95 THOU/UL (ref 140–440)
PMV BLD AUTO: 10 FL (ref 8.5–12.5)
PMV BLD AUTO: 10.1 FL (ref 8.5–12.5)
PMV BLD AUTO: 10.6 FL (ref 8.5–12.5)
PMV BLD AUTO: 10.8 FL (ref 8.5–12.5)
PMV BLD AUTO: 10.8 FL (ref 8.5–12.5)
PMV BLD AUTO: 10.9 FL (ref 8.5–12.5)
PMV BLD AUTO: 11.2 FL (ref 8.5–12.5)
PMV BLD AUTO: 11.5 FL (ref 8.5–12.5)
PMV BLD AUTO: 11.6 FL (ref 8.5–12.5)
PMV BLD AUTO: 11.7 FL (ref 8.5–12.5)
PMV BLD AUTO: 11.8 FL (ref 8.5–12.5)
PMV BLD AUTO: 12.1 FL (ref 8.5–12.5)
PMV BLD AUTO: 12.2 FL (ref 8.5–12.5)
PMV BLD AUTO: 12.4 FL (ref 8.5–12.5)
PMV BLD AUTO: ABNORMAL FL
PO2 BLDV: 19 MM HG (ref 25–47)
POLYCHROMASIA BLD QL SMEAR: ABNORMAL
POTASSIUM BLD-SCNC: 3.8 MMOL/L (ref 3.5–5)
POTASSIUM BLD-SCNC: 4 MMOL/L (ref 3.5–5)
POTASSIUM BLD-SCNC: 4.1 MMOL/L (ref 3.5–5)
POTASSIUM BLD-SCNC: 4.3 MMOL/L (ref 3.5–5)
POTASSIUM BLD-SCNC: 4.4 MMOL/L (ref 3.5–5)
POTASSIUM BLD-SCNC: 4.4 MMOL/L (ref 3.5–5)
POTASSIUM BLD-SCNC: 4.5 MMOL/L (ref 3.5–5)
POTASSIUM BLD-SCNC: 4.6 MMOL/L (ref 3.5–5)
POTASSIUM BLD-SCNC: 4.6 MMOL/L (ref 3.5–5)
POTASSIUM BLD-SCNC: 4.8 MMOL/L (ref 3.5–5)
POTASSIUM BLD-SCNC: 5.2 MMOL/L (ref 3.5–5)
POTASSIUM BLD-SCNC: 5.7 MMOL/L (ref 3.5–5)
PR INTERVAL - MUSE: 118 MS
PROCALCITONIN SERPL-MCNC: 0.07 NG/ML (ref 0–0.49)
PROCALCITONIN SERPL-MCNC: 0.17 NG/ML (ref 0–0.49)
PROT FLD-MCNC: 3.8 G/DL
PROT SERPL-MCNC: 5.7 G/DL (ref 6–8)
PROT SERPL-MCNC: 5.8 G/DL (ref 6–8)
PROT SERPL-MCNC: 6.1 G/DL (ref 6–8)
PROT SERPL-MCNC: 6.2 G/DL (ref 6–8)
PROT SERPL-MCNC: 6.3 G/DL (ref 6–8)
PROT SERPL-MCNC: 6.4 G/DL (ref 6–8)
PROT SERPL-MCNC: 6.5 G/DL (ref 6–8)
PROT SERPL-MCNC: 6.6 G/DL (ref 6–8)
PROT SERPL-MCNC: 6.6 G/DL (ref 6–8)
PROT SERPL-MCNC: 6.8 G/DL (ref 6–8)
PROT SERPL-MCNC: 6.8 G/DL (ref 6–8)
PROT SERPL-MCNC: 6.9 G/DL (ref 6–8)
PROT SERPL-MCNC: 6.9 G/DL (ref 6–8)
PROT SERPL-MCNC: 7 G/DL (ref 6–8)
PROT SERPL-MCNC: 7 G/DL (ref 6–8)
PROT SERPL-MCNC: 7.5 G/DL (ref 6–8)
PROT SERPL-MCNC: 7.6 G/DL (ref 6–8)
PROT SERPL-MCNC: 7.8 G/DL (ref 6–8)
PROT SERPL-MCNC: 7.8 G/DL (ref 6–8)
QRS DURATION - MUSE: 74 MS
QT - MUSE: 326 MS
QTC - MUSE: 454 MS
R AXIS - MUSE: -27 DEGREES
RBC # BLD AUTO: 3.12 10E6/UL (ref 4.4–5.9)
RBC # BLD AUTO: 3.45 MILL/UL (ref 4.4–6.2)
RBC # BLD AUTO: 3.57 MILL/UL (ref 4.4–6.2)
RBC # BLD AUTO: 3.69 MILL/UL (ref 4.4–6.2)
RBC # BLD AUTO: 3.7 MILL/UL (ref 4.4–6.2)
RBC # BLD AUTO: 3.7 MILL/UL (ref 4.4–6.2)
RBC # BLD AUTO: 3.74 MILL/UL (ref 4.4–6.2)
RBC # BLD AUTO: 3.79 MILL/UL (ref 4.4–6.2)
RBC # BLD AUTO: 3.86 MILL/UL (ref 4.4–6.2)
RBC # BLD AUTO: 3.92 MILL/UL (ref 4.4–6.2)
RBC # BLD AUTO: 3.92 MILL/UL (ref 4.4–6.2)
RBC # BLD AUTO: 3.93 MILL/UL (ref 4.4–6.2)
RBC # BLD AUTO: 3.95 MILL/UL (ref 4.4–6.2)
RBC # BLD AUTO: 3.97 MILL/UL (ref 4.4–6.2)
RBC # BLD AUTO: 3.98 MILL/UL (ref 4.4–6.2)
RBC # BLD AUTO: 3.98 MILL/UL (ref 4.4–6.2)
RBC # BLD AUTO: 4.03 MILL/UL (ref 4.4–6.2)
RBC # BLD AUTO: 4.03 MILL/UL (ref 4.4–6.2)
RBC # BLD AUTO: 4.06 10E6/UL (ref 4.4–5.9)
RBC # BLD AUTO: 4.06 MILL/UL (ref 4.4–6.2)
RBC # BLD AUTO: 4.07 MILL/UL (ref 4.4–6.2)
RBC # BLD AUTO: 4.08 10E6/UL (ref 4.4–5.9)
RBC # BLD AUTO: 4.09 MILL/UL (ref 4.4–6.2)
RBC # BLD AUTO: 4.12 MILL/UL (ref 4.4–6.2)
RBC # BLD AUTO: 4.12 MILL/UL (ref 4.4–6.2)
RBC # BLD AUTO: 4.13 MILL/UL (ref 4.4–6.2)
RBC # BLD AUTO: 4.15 MILL/UL (ref 4.4–6.2)
RBC # BLD AUTO: 4.16 MILL/UL (ref 4.4–6.2)
RBC # BLD AUTO: 4.16 MILL/UL (ref 4.4–6.2)
RBC # BLD AUTO: 4.18 10E6/UL (ref 4.4–5.9)
RBC # BLD AUTO: 4.22 MILL/UL (ref 4.4–6.2)
RBC # BLD AUTO: 4.26 10E6/UL (ref 4.4–5.9)
RBC # BLD AUTO: 4.27 MILL/UL (ref 4.4–6.2)
RBC # BLD AUTO: 4.27 MILL/UL (ref 4.4–6.2)
RBC # BLD AUTO: 4.28 10E6/UL (ref 4.4–5.9)
RBC # BLD AUTO: 4.3 MILL/UL (ref 4.4–6.2)
RBC # BLD AUTO: 4.32 MILL/UL (ref 4.4–6.2)
RBC # BLD AUTO: 4.34 10E6/UL (ref 4.4–5.9)
RBC # BLD AUTO: 4.34 MILL/UL (ref 4.4–6.2)
RBC # BLD AUTO: 4.37 10E6/UL (ref 4.4–5.9)
RBC # BLD AUTO: 4.37 MILL/UL (ref 4.4–6.2)
RBC # BLD AUTO: 4.38 MILL/UL (ref 4.4–6.2)
RBC # BLD AUTO: 4.39 10E6/UL (ref 4.4–5.9)
RBC # BLD AUTO: 4.39 MILL/UL (ref 4.4–6.2)
RBC # BLD AUTO: 4.43 MILL/UL (ref 4.4–6.2)
RBC # BLD AUTO: 4.44 10E6/UL (ref 4.4–5.9)
RBC # BLD AUTO: 4.55 10E6/UL (ref 4.4–5.9)
RBC # BLD AUTO: 4.55 10E6/UL (ref 4.4–5.9)
RBC # BLD AUTO: 4.56 10E6/UL (ref 4.4–5.9)
RBC # BLD AUTO: 4.6 10E6/UL (ref 4.4–5.9)
RBC # BLD AUTO: 4.62 10E6/UL (ref 4.4–5.9)
RBC # BLD AUTO: 4.65 10E6/UL (ref 4.4–5.9)
RBC # BLD AUTO: 4.77 10E6/UL (ref 4.4–5.9)
RBC # BLD AUTO: 4.77 10E6/UL (ref 4.4–5.9)
RBC # BLD AUTO: 4.84 10E6/UL (ref 4.4–5.9)
RBC # BLD AUTO: 5.06 10E6/UL (ref 4.4–5.9)
RBC # FLD: 1000 /UL
SAO2 % BLDV: 26.7 % (ref 70–75)
SARS-COV-2 PCR COMMENT: NORMAL
SARS-COV-2 RNA RESP QL NAA+PROBE: NEGATIVE
SARS-COV-2 RNA SPEC QL NAA+PROBE: NEGATIVE
SARS-COV-2 VIRUS SPECIMEN SOURCE: NORMAL
SCHISTOCYTES: ABNORMAL
SODIUM SERPL-SCNC: 138 MMOL/L (ref 136–145)
SODIUM SERPL-SCNC: 138 MMOL/L (ref 136–145)
SODIUM SERPL-SCNC: 139 MMOL/L (ref 136–145)
SODIUM SERPL-SCNC: 140 MMOL/L (ref 136–145)
SODIUM SERPL-SCNC: 141 MMOL/L (ref 136–145)
SODIUM SERPL-SCNC: 142 MMOL/L (ref 136–145)
SODIUM SERPL-SCNC: 143 MMOL/L (ref 136–145)
SODIUM SERPL-SCNC: 145 MMOL/L (ref 136–145)
SPECIMEN DESCRIPTION: ABNORMAL
STATUS (HISTORICAL CONVERSION): NORMAL
SYSTOLIC BLOOD PRESSURE - MUSE: 134 MMHG
T AXIS - MUSE: 69 DEGREES
T4 FREE SERPL-MCNC: 1.01 NG/DL (ref 0.7–1.8)
T4 FREE SERPL-MCNC: 1.13 NG/DL (ref 0.7–1.8)
TARGETS BLD QL SMEAR: ABNORMAL
TEAR DROP: ABNORMAL
TOTAL NEUTROPHILS-ABS(DIFF): 12.5 THOU/UL (ref 2–7.7)
TOTAL NEUTROPHILS-ABS(DIFF): 15.5 THOU/UL (ref 2–7.7)
TOTAL NEUTROPHILS-ABS(DIFF): 2.5 THOU/UL (ref 2–7.7)
TOTAL NEUTROPHILS-REL(DIFF): 31 % (ref 50–70)
TOTAL NEUTROPHILS-REL(DIFF): 74 % (ref 50–70)
TOTAL NEUTROPHILS-REL(DIFF): 88 % (ref 50–70)
TROPONIN I SERPL-MCNC: 0.06 NG/ML (ref 0–0.29)
TSH SERPL DL<=0.005 MIU/L-ACNC: 0.27 UIU/ML (ref 0.3–5)
TSH SERPL DL<=0.005 MIU/L-ACNC: 0.27 UIU/ML (ref 0.3–5)
TSH SERPL DL<=0.005 MIU/L-ACNC: 0.54 UIU/ML (ref 0.3–5)
UNIT ABO/RH (HISTORICAL CONVERSION): NORMAL
UNIT NUMBER: NORMAL
VENTRICULAR RATE- MUSE: 117 BPM
WBC # BLD AUTO: 10.2 10E3/UL (ref 4–11)
WBC # BLD AUTO: 10.4 10E3/UL (ref 4–11)
WBC # BLD AUTO: 10.7 10E3/UL (ref 4–11)
WBC # BLD AUTO: 10.7 10E3/UL (ref 4–11)
WBC # BLD AUTO: 10.8 10E3/UL (ref 4–11)
WBC # BLD AUTO: 11 10E3/UL (ref 4–11)
WBC # BLD AUTO: 11 10E3/UL (ref 4–11)
WBC # BLD AUTO: 11.1 10E3/UL (ref 4–11)
WBC # BLD AUTO: 11.5 10E3/UL (ref 4–11)
WBC # BLD AUTO: 11.6 10E3/UL (ref 4–11)
WBC # BLD AUTO: 11.7 10E3/UL (ref 4–11)
WBC # BLD AUTO: 12.3 10E3/UL (ref 4–11)
WBC # BLD AUTO: 13.6 10E3/UL (ref 4–11)
WBC # BLD AUTO: 16.1 10E3/UL (ref 4–11)
WBC # BLD AUTO: 22.4 10E3/UL (ref 4–11)
WBC # BLD AUTO: 7.7 10E3/UL (ref 4–11)
WBC # BLD AUTO: 9.4 10E3/UL (ref 4–11)
WBC # BLD AUTO: 9.6 10E3/UL (ref 4–11)
WBC # BLD AUTO: 9.7 10E3/UL (ref 4–11)
WBC # BLD AUTO: 9.8 10E3/UL (ref 4–11)
WBC # FLD AUTO: 394 /UL
WBC: 10.3 THOU/UL (ref 4–11)
WBC: 10.5 THOU/UL (ref 4–11)
WBC: 10.6 THOU/UL (ref 4–11)
WBC: 10.6 THOU/UL (ref 4–11)
WBC: 10.7 THOU/UL (ref 4–11)
WBC: 10.8 THOU/UL (ref 4–11)
WBC: 10.8 THOU/UL (ref 4–11)
WBC: 11 THOU/UL (ref 4–11)
WBC: 11.1 THOU/UL (ref 4–11)
WBC: 11.3 THOU/UL (ref 4–11)
WBC: 11.4 THOU/UL (ref 4–11)
WBC: 11.6 THOU/UL (ref 4–11)
WBC: 11.7 THOU/UL (ref 4–11)
WBC: 11.8 THOU/UL (ref 4–11)
WBC: 12.4 THOU/UL (ref 4–11)
WBC: 12.5 THOU/UL (ref 4–11)
WBC: 12.5 THOU/UL (ref 4–11)
WBC: 12.6 THOU/UL (ref 4–11)
WBC: 12.9 THOU/UL (ref 4–11)
WBC: 13 THOU/UL (ref 4–11)
WBC: 13.5 THOU/UL (ref 4–11)
WBC: 13.6 THOU/UL (ref 4–11)
WBC: 13.8 THOU/UL (ref 4–11)
WBC: 13.8 THOU/UL (ref 4–11)
WBC: 14 THOU/UL (ref 4–11)
WBC: 14.6 THOU/UL (ref 4–11)
WBC: 17 THOU/UL (ref 4–11)
WBC: 17.6 THOU/UL (ref 4–11)
WBC: 5.8 THOU/UL (ref 4–11)
WBC: 6 THOU/UL (ref 4–11)
WBC: 6.1 THOU/UL (ref 4–11)
WBC: 7.5 THOU/UL (ref 4–11)
WBC: 7.9 THOU/UL (ref 4–11)
WBC: 8.1 THOU/UL (ref 4–11)
WBC: 9.2 THOU/UL (ref 4–11)
WBC: 9.8 THOU/UL (ref 4–11)

## 2021-01-01 PROCEDURE — 85025 COMPLETE CBC W/AUTO DIFF WBC: CPT

## 2021-01-01 PROCEDURE — 36415 COLL VENOUS BLD VENIPUNCTURE: CPT

## 2021-01-01 PROCEDURE — 88342 IMHCHEM/IMCYTCHM 1ST ANTB: CPT | Mod: TC | Performed by: INTERNAL MEDICINE

## 2021-01-01 PROCEDURE — 0W993ZZ DRAINAGE OF RIGHT PLEURAL CAVITY, PERCUTANEOUS APPROACH: ICD-10-PCS | Performed by: RADIOLOGY

## 2021-01-01 PROCEDURE — 99232 SBSQ HOSP IP/OBS MODERATE 35: CPT | Performed by: INTERNAL MEDICINE

## 2021-01-01 PROCEDURE — 85025 COMPLETE CBC W/AUTO DIFF WBC: CPT | Performed by: EMERGENCY MEDICINE

## 2021-01-01 PROCEDURE — 78816 PET IMAGE W/CT FULL BODY: CPT | Mod: 26

## 2021-01-01 PROCEDURE — 93005 ELECTROCARDIOGRAM TRACING: CPT | Performed by: EMERGENCY MEDICINE

## 2021-01-01 PROCEDURE — 93018 CV STRESS TEST I&R ONLY: CPT | Performed by: INTERNAL MEDICINE

## 2021-01-01 PROCEDURE — 99214 OFFICE O/P EST MOD 30 MIN: CPT | Performed by: STUDENT IN AN ORGANIZED HEALTH CARE EDUCATION/TRAINING PROGRAM

## 2021-01-01 PROCEDURE — 99207 PR CDG-CORRECTLY CODED, REVIEWED AND AGREE: CPT | Performed by: INTERNAL MEDICINE

## 2021-01-01 PROCEDURE — 99223 1ST HOSP IP/OBS HIGH 75: CPT | Mod: AI | Performed by: INTERNAL MEDICINE

## 2021-01-01 PROCEDURE — 71260 CT THORAX DX C+: CPT | Mod: ME

## 2021-01-01 PROCEDURE — 36415 COLL VENOUS BLD VENIPUNCTURE: CPT | Performed by: INTERNAL MEDICINE

## 2021-01-01 PROCEDURE — 88305 TISSUE EXAM BY PATHOLOGIST: CPT | Mod: TC | Performed by: INTERNAL MEDICINE

## 2021-01-01 PROCEDURE — 85379 FIBRIN DEGRADATION QUANT: CPT | Performed by: INTERNAL MEDICINE

## 2021-01-01 PROCEDURE — 99207 PR NO CHARGE LOS: CPT | Performed by: INTERNAL MEDICINE

## 2021-01-01 PROCEDURE — 999N000127 HC STATISTIC PERIPHERAL IV START W US GUIDANCE

## 2021-01-01 PROCEDURE — 250N000011 HC RX IP 250 OP 636: Performed by: INTERNAL MEDICINE

## 2021-01-01 PROCEDURE — 88305 TISSUE EXAM BY PATHOLOGIST: CPT | Mod: 26 | Performed by: PATHOLOGY

## 2021-01-01 PROCEDURE — 89050 BODY FLUID CELL COUNT: CPT | Performed by: INTERNAL MEDICINE

## 2021-01-01 PROCEDURE — 250N000013 HC RX MED GY IP 250 OP 250 PS 637: Performed by: EMERGENCY MEDICINE

## 2021-01-01 PROCEDURE — 250N000009 HC RX 250: Performed by: EMERGENCY MEDICINE

## 2021-01-01 PROCEDURE — 80053 COMPREHEN METABOLIC PANEL: CPT

## 2021-01-01 PROCEDURE — 99214 OFFICE O/P EST MOD 30 MIN: CPT | Performed by: INTERNAL MEDICINE

## 2021-01-01 PROCEDURE — 82310 ASSAY OF CALCIUM: CPT | Performed by: INTERNAL MEDICINE

## 2021-01-01 PROCEDURE — 85027 COMPLETE CBC AUTOMATED: CPT | Performed by: INTERNAL MEDICINE

## 2021-01-01 PROCEDURE — 250N000011 HC RX IP 250 OP 636: Performed by: EMERGENCY MEDICINE

## 2021-01-01 PROCEDURE — 84155 ASSAY OF PROTEIN SERUM: CPT | Performed by: INTERNAL MEDICINE

## 2021-01-01 PROCEDURE — 250N000012 HC RX MED GY IP 250 OP 636 PS 637: Performed by: INTERNAL MEDICINE

## 2021-01-01 PROCEDURE — 99214 OFFICE O/P EST MOD 30 MIN: CPT | Performed by: FAMILY MEDICINE

## 2021-01-01 PROCEDURE — 99215 OFFICE O/P EST HI 40 MIN: CPT | Performed by: STUDENT IN AN ORGANIZED HEALTH CARE EDUCATION/TRAINING PROGRAM

## 2021-01-01 PROCEDURE — 83735 ASSAY OF MAGNESIUM: CPT | Performed by: EMERGENCY MEDICINE

## 2021-01-01 PROCEDURE — 80053 COMPREHEN METABOLIC PANEL: CPT | Performed by: INTERNAL MEDICINE

## 2021-01-01 PROCEDURE — 96375 TX/PRO/DX INJ NEW DRUG ADDON: CPT

## 2021-01-01 PROCEDURE — 82550 ASSAY OF CK (CPK): CPT | Performed by: INTERNAL MEDICINE

## 2021-01-01 PROCEDURE — 84439 ASSAY OF FREE THYROXINE: CPT

## 2021-01-01 PROCEDURE — 89051 BODY FLUID CELL COUNT: CPT | Performed by: INTERNAL MEDICINE

## 2021-01-01 PROCEDURE — 96361 HYDRATE IV INFUSION ADD-ON: CPT

## 2021-01-01 PROCEDURE — 83615 LACTATE (LD) (LDH) ENZYME: CPT | Performed by: INTERNAL MEDICINE

## 2021-01-01 PROCEDURE — 82565 ASSAY OF CREATININE: CPT

## 2021-01-01 PROCEDURE — U0003 INFECTIOUS AGENT DETECTION BY NUCLEIC ACID (DNA OR RNA); SEVERE ACUTE RESPIRATORY SYNDROME CORONAVIRUS 2 (SARS-COV-2) (CORONAVIRUS DISEASE [COVID-19]), AMPLIFIED PROBE TECHNIQUE, MAKING USE OF HIGH THROUGHPUT TECHNOLOGIES AS DESCRIBED BY CMS-2020-01-R: HCPCS

## 2021-01-01 PROCEDURE — 99285 EMERGENCY DEPT VISIT HI MDM: CPT | Mod: 25

## 2021-01-01 PROCEDURE — 99213 OFFICE O/P EST LOW 20 MIN: CPT | Performed by: STUDENT IN AN ORGANIZED HEALTH CARE EDUCATION/TRAINING PROGRAM

## 2021-01-01 PROCEDURE — 83735 ASSAY OF MAGNESIUM: CPT | Performed by: INTERNAL MEDICINE

## 2021-01-01 PROCEDURE — 85004 AUTOMATED DIFF WBC COUNT: CPT

## 2021-01-01 PROCEDURE — 97165 OT EVAL LOW COMPLEX 30 MIN: CPT | Mod: GO

## 2021-01-01 PROCEDURE — 83605 ASSAY OF LACTIC ACID: CPT | Performed by: INTERNAL MEDICINE

## 2021-01-01 PROCEDURE — 250N000013 HC RX MED GY IP 250 OP 250 PS 637: Performed by: INTERNAL MEDICINE

## 2021-01-01 PROCEDURE — 88112 CYTOPATH CELL ENHANCE TECH: CPT | Mod: 26 | Performed by: PATHOLOGY

## 2021-01-01 PROCEDURE — 93325 DOPPLER ECHO COLOR FLOW MAPG: CPT | Mod: 26 | Performed by: INTERNAL MEDICINE

## 2021-01-01 PROCEDURE — 99239 HOSP IP/OBS DSCHRG MGMT >30: CPT | Performed by: INTERNAL MEDICINE

## 2021-01-01 PROCEDURE — 97535 SELF CARE MNGMENT TRAINING: CPT | Mod: GO

## 2021-01-01 PROCEDURE — 82962 GLUCOSE BLOOD TEST: CPT

## 2021-01-01 PROCEDURE — 258N000003 HC RX IP 258 OP 636: Performed by: EMERGENCY MEDICINE

## 2021-01-01 PROCEDURE — 93350 STRESS TTE ONLY: CPT | Mod: 26 | Performed by: INTERNAL MEDICINE

## 2021-01-01 PROCEDURE — 86141 C-REACTIVE PROTEIN HS: CPT | Performed by: INTERNAL MEDICINE

## 2021-01-01 PROCEDURE — 93325 DOPPLER ECHO COLOR FLOW MAPG: CPT | Mod: TC

## 2021-01-01 PROCEDURE — G0463 HOSPITAL OUTPT CLINIC VISIT: HCPCS

## 2021-01-01 PROCEDURE — 83615 LACTATE (LD) (LDH) ENZYME: CPT | Performed by: EMERGENCY MEDICINE

## 2021-01-01 PROCEDURE — 272N000706 US THORACENTESIS

## 2021-01-01 PROCEDURE — 88342 IMHCHEM/IMCYTCHM 1ST ANTB: CPT | Mod: 26 | Performed by: PATHOLOGY

## 2021-01-01 PROCEDURE — 87116 MYCOBACTERIA CULTURE: CPT | Performed by: INTERNAL MEDICINE

## 2021-01-01 PROCEDURE — 97161 PT EVAL LOW COMPLEX 20 MIN: CPT | Mod: GP | Performed by: PHYSICAL THERAPIST

## 2021-01-01 PROCEDURE — 85610 PROTHROMBIN TIME: CPT | Performed by: INTERNAL MEDICINE

## 2021-01-01 PROCEDURE — 97110 THERAPEUTIC EXERCISES: CPT | Mod: GP | Performed by: PHYSICAL THERAPIST

## 2021-01-01 PROCEDURE — 82374 ASSAY BLOOD CARBON DIOXIDE: CPT

## 2021-01-01 PROCEDURE — 83605 ASSAY OF LACTIC ACID: CPT | Performed by: EMERGENCY MEDICINE

## 2021-01-01 PROCEDURE — 80048 BASIC METABOLIC PNL TOTAL CA: CPT | Performed by: INTERNAL MEDICINE

## 2021-01-01 PROCEDURE — 84157 ASSAY OF PROTEIN OTHER: CPT | Performed by: INTERNAL MEDICINE

## 2021-01-01 PROCEDURE — 96365 THER/PROPH/DIAG IV INF INIT: CPT

## 2021-01-01 PROCEDURE — 70553 MRI BRAIN STEM W/O & W/DYE: CPT

## 2021-01-01 PROCEDURE — 82945 GLUCOSE OTHER FLUID: CPT | Performed by: INTERNAL MEDICINE

## 2021-01-01 PROCEDURE — 93321 DOPPLER ECHO F-UP/LMTD STD: CPT | Mod: 26 | Performed by: INTERNAL MEDICINE

## 2021-01-01 PROCEDURE — 99223 1ST HOSP IP/OBS HIGH 75: CPT | Performed by: STUDENT IN AN ORGANIZED HEALTH CARE EDUCATION/TRAINING PROGRAM

## 2021-01-01 PROCEDURE — 85384 FIBRINOGEN ACTIVITY: CPT | Performed by: INTERNAL MEDICINE

## 2021-01-01 PROCEDURE — 71045 X-RAY EXAM CHEST 1 VIEW: CPT

## 2021-01-01 PROCEDURE — 97116 GAIT TRAINING THERAPY: CPT | Mod: GP | Performed by: PHYSICAL THERAPIST

## 2021-01-01 PROCEDURE — 84443 ASSAY THYROID STIM HORMONE: CPT

## 2021-01-01 PROCEDURE — 120N000001 HC R&B MED SURG/OB

## 2021-01-01 PROCEDURE — 83880 ASSAY OF NATRIURETIC PEPTIDE: CPT | Performed by: INTERNAL MEDICINE

## 2021-01-01 PROCEDURE — 82805 BLOOD GASES W/O2 SATURATION: CPT | Performed by: EMERGENCY MEDICINE

## 2021-01-01 PROCEDURE — 99215 OFFICE O/P EST HI 40 MIN: CPT | Mod: 95 | Performed by: INTERNAL MEDICINE

## 2021-01-01 PROCEDURE — 71250 CT THORAX DX C-: CPT | Mod: MG

## 2021-01-01 PROCEDURE — 99207 PR NO BILLABLE SERVICE THIS VISIT: CPT | Performed by: INTERNAL MEDICINE

## 2021-01-01 PROCEDURE — 82040 ASSAY OF SERUM ALBUMIN: CPT

## 2021-01-01 PROCEDURE — 82947 ASSAY GLUCOSE BLOOD QUANT: CPT

## 2021-01-01 PROCEDURE — 343N000001 HC RX 343: Performed by: INTERNAL MEDICINE

## 2021-01-01 PROCEDURE — 250N000009 HC RX 250: Performed by: RADIOLOGY

## 2021-01-01 PROCEDURE — 78816 PET IMAGE W/CT FULL BODY: CPT | Mod: PS,KX

## 2021-01-01 PROCEDURE — A9552 F18 FDG: HCPCS | Performed by: INTERNAL MEDICINE

## 2021-01-01 PROCEDURE — 255N000002 HC RX 255 OP 636: Performed by: INTERNAL MEDICINE

## 2021-01-01 PROCEDURE — 84145 PROCALCITONIN (PCT): CPT | Performed by: INTERNAL MEDICINE

## 2021-01-01 PROCEDURE — 90662 IIV NO PRSV INCREASED AG IM: CPT

## 2021-01-01 PROCEDURE — XW033E5 INTRODUCTION OF REMDESIVIR ANTI-INFECTIVE INTO PERIPHERAL VEIN, PERCUTANEOUS APPROACH, NEW TECHNOLOGY GROUP 5: ICD-10-PCS | Performed by: INTERNAL MEDICINE

## 2021-01-01 PROCEDURE — 84484 ASSAY OF TROPONIN QUANT: CPT | Performed by: EMERGENCY MEDICINE

## 2021-01-01 PROCEDURE — 83986 ASSAY PH BODY FLUID NOS: CPT | Performed by: INTERNAL MEDICINE

## 2021-01-01 PROCEDURE — 87040 BLOOD CULTURE FOR BACTERIA: CPT | Performed by: EMERGENCY MEDICINE

## 2021-01-01 PROCEDURE — 88341 IMHCHEM/IMCYTCHM EA ADD ANTB: CPT | Mod: 26 | Performed by: PATHOLOGY

## 2021-01-01 PROCEDURE — 82248 BILIRUBIN DIRECT: CPT | Performed by: INTERNAL MEDICINE

## 2021-01-01 PROCEDURE — 250N000011 HC RX IP 250 OP 636: Performed by: STUDENT IN AN ORGANIZED HEALTH CARE EDUCATION/TRAINING PROGRAM

## 2021-01-01 PROCEDURE — A9585 GADOBUTROL INJECTION: HCPCS | Performed by: INTERNAL MEDICINE

## 2021-01-01 PROCEDURE — 87206 SMEAR FLUORESCENT/ACID STAI: CPT | Performed by: INTERNAL MEDICINE

## 2021-01-01 PROCEDURE — 87205 SMEAR GRAM STAIN: CPT | Performed by: INTERNAL MEDICINE

## 2021-01-01 PROCEDURE — 80053 COMPREHEN METABOLIC PANEL: CPT | Performed by: EMERGENCY MEDICINE

## 2021-01-01 PROCEDURE — 99207 PR NO BILLABLE SERVICE THIS VISIT: CPT | Performed by: FAMILY MEDICINE

## 2021-01-01 PROCEDURE — 36415 COLL VENOUS BLD VENIPUNCTURE: CPT | Performed by: EMERGENCY MEDICINE

## 2021-01-01 PROCEDURE — 94640 AIRWAY INHALATION TREATMENT: CPT

## 2021-01-01 PROCEDURE — 93016 CV STRESS TEST SUPVJ ONLY: CPT | Performed by: INTERNAL MEDICINE

## 2021-01-01 PROCEDURE — 91300 PR COVID VAC PFIZER DIL RECON 30 MCG/0.3 ML IM: CPT

## 2021-01-01 PROCEDURE — 32555 ASPIRATE PLEURA W/ IMAGING: CPT

## 2021-01-01 PROCEDURE — 0004A PR COVID VAC PFIZER DIL RECON 30 MCG/0.3 ML IM: CPT

## 2021-01-01 PROCEDURE — G0008 ADMIN INFLUENZA VIRUS VAC: HCPCS

## 2021-01-01 PROCEDURE — G0463 HOSPITAL OUTPT CLINIC VISIT: HCPCS | Mod: 25

## 2021-01-01 RX ORDER — MEROPENEM 1 G/1
1 INJECTION, POWDER, FOR SOLUTION INTRAVENOUS EVERY 8 HOURS
Status: DISCONTINUED | OUTPATIENT
Start: 2021-01-01 | End: 2021-01-01

## 2021-01-01 RX ORDER — DEXAMETHASONE SODIUM PHOSPHATE 10 MG/ML
6 INJECTION, SOLUTION INTRAMUSCULAR; INTRAVENOUS ONCE
Status: COMPLETED | OUTPATIENT
Start: 2021-01-01 | End: 2021-01-01

## 2021-01-01 RX ORDER — HEPARIN SODIUM,PORCINE 10 UNIT/ML
5 VIAL (ML) INTRAVENOUS
Status: CANCELLED | OUTPATIENT
Start: 2022-01-01

## 2021-01-01 RX ORDER — DEXAMETHASONE 4 MG/1
4 TABLET ORAL 2 TIMES DAILY WITH MEALS
Qty: 30 TABLET | Refills: 1 | Status: SHIPPED | OUTPATIENT
Start: 2021-01-01

## 2021-01-01 RX ORDER — LORAZEPAM 2 MG/ML
0.5 INJECTION INTRAMUSCULAR EVERY 4 HOURS PRN
Status: CANCELLED | OUTPATIENT
Start: 2022-01-01

## 2021-01-01 RX ORDER — IOPAMIDOL 755 MG/ML
100 INJECTION, SOLUTION INTRAVASCULAR ONCE
Status: COMPLETED | OUTPATIENT
Start: 2021-01-01 | End: 2021-01-01

## 2021-01-01 RX ORDER — GADOBUTROL 604.72 MG/ML
8 INJECTION INTRAVENOUS ONCE
Status: COMPLETED | OUTPATIENT
Start: 2021-01-01 | End: 2021-01-01

## 2021-01-01 RX ORDER — METHYLPREDNISOLONE SODIUM SUCCINATE 125 MG/2ML
125 INJECTION, POWDER, LYOPHILIZED, FOR SOLUTION INTRAMUSCULAR; INTRAVENOUS
Status: CANCELLED
Start: 2022-01-01

## 2021-01-01 RX ORDER — PANTOPRAZOLE SODIUM 40 MG/1
40 TABLET, DELAYED RELEASE ORAL DAILY
Qty: 90 TABLET | Refills: 1 | Status: SHIPPED | OUTPATIENT
Start: 2021-01-01

## 2021-01-01 RX ORDER — METOPROLOL SUCCINATE 25 MG/1
12.5 TABLET, EXTENDED RELEASE ORAL DAILY
Qty: 45 TABLET | Refills: 2 | Status: CANCELLED | OUTPATIENT
Start: 2021-01-01

## 2021-01-01 RX ORDER — ALBUTEROL SULFATE 90 UG/1
6 AEROSOL, METERED RESPIRATORY (INHALATION) ONCE
Status: COMPLETED | OUTPATIENT
Start: 2021-01-01 | End: 2021-01-01

## 2021-01-01 RX ORDER — LEVOTHYROXINE SODIUM 125 UG/1
125 TABLET ORAL DAILY
Qty: 30 TABLET | Refills: 0 | Status: SHIPPED | OUTPATIENT
Start: 2021-01-01 | End: 2021-01-01

## 2021-01-01 RX ORDER — HEPARIN SODIUM (PORCINE) LOCK FLUSH IV SOLN 100 UNIT/ML 100 UNIT/ML
5 SOLUTION INTRAVENOUS
Status: CANCELLED | OUTPATIENT
Start: 2022-01-01

## 2021-01-01 RX ORDER — SODIUM FLUORIDE 5 MG/G
GEL, DENTIFRICE DENTAL DAILY
Qty: 100 ML | Refills: 3 | Status: SHIPPED | OUTPATIENT
Start: 2021-01-01

## 2021-01-01 RX ORDER — EPINEPHRINE 1 MG/ML
0.3 INJECTION, SOLUTION INTRAMUSCULAR; SUBCUTANEOUS EVERY 5 MIN PRN
Status: CANCELLED | OUTPATIENT
Start: 2022-01-01

## 2021-01-01 RX ORDER — HEPARIN SODIUM (PORCINE) LOCK FLUSH IV SOLN 100 UNIT/ML 100 UNIT/ML
5 SOLUTION INTRAVENOUS
Status: CANCELLED | OUTPATIENT
Start: 2021-01-01

## 2021-01-01 RX ORDER — PREDNISONE 10 MG/1
7.5 TABLET ORAL
COMMUNITY
Start: 2021-01-01 | End: 2021-01-01

## 2021-01-01 RX ORDER — LEVOTHYROXINE SODIUM 112 UG/1
112 TABLET ORAL DAILY
Qty: 90 TABLET | Refills: 3 | Status: SHIPPED | OUTPATIENT
Start: 2021-01-01

## 2021-01-01 RX ORDER — LEVOTHYROXINE SODIUM 112 UG/1
112 TABLET ORAL DAILY
Qty: 90 TABLET | Refills: 3 | Status: SHIPPED | OUTPATIENT
Start: 2021-01-01 | End: 2021-01-01

## 2021-01-01 RX ORDER — SODIUM CHLORIDE 9 MG/ML
INJECTION, SOLUTION INTRAVENOUS CONTINUOUS
Status: DISCONTINUED | OUTPATIENT
Start: 2021-01-01 | End: 2021-01-01

## 2021-01-01 RX ORDER — METOPROLOL SUCCINATE 25 MG/1
12.5 TABLET, EXTENDED RELEASE ORAL DAILY
Qty: 90 TABLET | Refills: 3 | Status: SHIPPED | OUTPATIENT
Start: 2021-01-01

## 2021-01-01 RX ORDER — MEPERIDINE HYDROCHLORIDE 25 MG/ML
25 INJECTION INTRAMUSCULAR; INTRAVENOUS; SUBCUTANEOUS EVERY 30 MIN PRN
Status: CANCELLED | OUTPATIENT
Start: 2022-01-01

## 2021-01-01 RX ORDER — ALBUTEROL SULFATE 90 UG/1
1-2 AEROSOL, METERED RESPIRATORY (INHALATION)
Status: CANCELLED
Start: 2022-01-01

## 2021-01-01 RX ORDER — PANTOPRAZOLE SODIUM 40 MG/1
TABLET, DELAYED RELEASE ORAL
COMMUNITY
Start: 2021-01-01 | End: 2021-01-01

## 2021-01-01 RX ORDER — PREDNISONE 2.5 MG/1
7.5 TABLET ORAL DAILY
Qty: 270 TABLET | Refills: 0 | Status: SHIPPED | OUTPATIENT
Start: 2021-01-01 | End: 2021-01-01

## 2021-01-01 RX ORDER — DIPHENHYDRAMINE HYDROCHLORIDE 50 MG/ML
50 INJECTION INTRAMUSCULAR; INTRAVENOUS
Status: CANCELLED
Start: 2022-01-01

## 2021-01-01 RX ORDER — PANTOPRAZOLE SODIUM 40 MG/1
40 TABLET, DELAYED RELEASE ORAL DAILY
Status: DISCONTINUED | OUTPATIENT
Start: 2021-01-01 | End: 2021-01-01 | Stop reason: HOSPADM

## 2021-01-01 RX ORDER — LEVOTHYROXINE SODIUM 125 UG/1
TABLET ORAL
Qty: 90 TABLET | Refills: 3 | Status: SHIPPED | OUTPATIENT
Start: 2021-01-01 | End: 2021-01-01

## 2021-01-01 RX ORDER — LEVOTHYROXINE SODIUM 112 UG/1
112 TABLET ORAL DAILY
Status: DISCONTINUED | OUTPATIENT
Start: 2021-01-01 | End: 2021-01-01 | Stop reason: HOSPADM

## 2021-01-01 RX ORDER — NALOXONE HYDROCHLORIDE 0.4 MG/ML
0.2 INJECTION, SOLUTION INTRAMUSCULAR; INTRAVENOUS; SUBCUTANEOUS
Status: CANCELLED | OUTPATIENT
Start: 2022-01-01

## 2021-01-01 RX ORDER — HEPARIN SODIUM,PORCINE 10 UNIT/ML
5 VIAL (ML) INTRAVENOUS
Status: CANCELLED | OUTPATIENT
Start: 2021-01-01

## 2021-01-01 RX ORDER — DEXAMETHASONE 4 MG/1
8 TABLET ORAL 2 TIMES DAILY WITH MEALS
Qty: 12 TABLET | Refills: 2 | Status: SHIPPED | OUTPATIENT
Start: 2022-01-01 | End: 2022-01-01

## 2021-01-01 RX ORDER — MAGNESIUM SULFATE 4 G/50ML
4 INJECTION INTRAVENOUS ONCE
Status: COMPLETED | OUTPATIENT
Start: 2021-01-01 | End: 2021-01-01

## 2021-01-01 RX ORDER — KETOROLAC TROMETHAMINE 30 MG/ML
15 INJECTION, SOLUTION INTRAMUSCULAR; INTRAVENOUS ONCE
Status: COMPLETED | OUTPATIENT
Start: 2021-01-01 | End: 2021-01-01

## 2021-01-01 RX ORDER — PREDNISONE 2.5 MG/1
TABLET ORAL
Qty: 180 TABLET | Refills: 1 | Status: ON HOLD | OUTPATIENT
Start: 2021-01-01 | End: 2021-01-01

## 2021-01-01 RX ORDER — METOPROLOL SUCCINATE 25 MG/1
12.5 TABLET, EXTENDED RELEASE ORAL DAILY
Qty: 7 TABLET | Refills: 0 | Status: ON HOLD | OUTPATIENT
Start: 2021-01-01 | End: 2021-01-01

## 2021-01-01 RX ORDER — ATORVASTATIN CALCIUM 40 MG/1
40 TABLET, FILM COATED ORAL DAILY
Qty: 90 TABLET | Refills: 3 | Status: SHIPPED | OUTPATIENT
Start: 2021-01-01

## 2021-01-01 RX ORDER — DIPHENHYDRAMINE HCL 50 MG
50 CAPSULE ORAL ONCE
Status: CANCELLED
Start: 2022-01-01

## 2021-01-01 RX ORDER — PROCHLORPERAZINE MALEATE 10 MG
10 TABLET ORAL EVERY 6 HOURS PRN
Qty: 30 TABLET | Refills: 2 | Status: SHIPPED | OUTPATIENT
Start: 2022-01-01

## 2021-01-01 RX ORDER — ALBUTEROL SULFATE 0.83 MG/ML
2.5 SOLUTION RESPIRATORY (INHALATION)
Status: CANCELLED | OUTPATIENT
Start: 2022-01-01

## 2021-01-01 RX ADMIN — MEROPENEM 1 G: 1 INJECTION, POWDER, FOR SOLUTION INTRAVENOUS at 05:39

## 2021-01-01 RX ADMIN — ALBUTEROL SULFATE 6 PUFF: 90 AEROSOL, METERED RESPIRATORY (INHALATION) at 02:45

## 2021-01-01 RX ADMIN — DEXAMETHASONE 6 MG: 2 TABLET ORAL at 13:50

## 2021-01-01 RX ADMIN — REMDESIVIR 200 MG: 100 INJECTION, POWDER, LYOPHILIZED, FOR SOLUTION INTRAVENOUS at 13:54

## 2021-01-01 RX ADMIN — MEROPENEM 1 G: 1 INJECTION, POWDER, FOR SOLUTION INTRAVENOUS at 21:48

## 2021-01-01 RX ADMIN — LIDOCAINE HYDROCHLORIDE 8 ML: 10 INJECTION, SOLUTION EPIDURAL; INFILTRATION; INTRACAUDAL; PERINEURAL at 12:26

## 2021-01-01 RX ADMIN — MEROPENEM 1 G: 1 INJECTION, POWDER, FOR SOLUTION INTRAVENOUS at 21:07

## 2021-01-01 RX ADMIN — DEXAMETHASONE SODIUM PHOSPHATE 6 MG: 10 INJECTION, SOLUTION INTRAMUSCULAR; INTRAVENOUS at 04:33

## 2021-01-01 RX ADMIN — IOPAMIDOL 75 ML: 755 INJECTION, SOLUTION INTRAVENOUS at 19:33

## 2021-01-01 RX ADMIN — FLUDEOXYGLUCOSE F-18 11.02 MCI.: 500 INJECTION, SOLUTION INTRAVENOUS at 14:22

## 2021-01-01 RX ADMIN — GADOBUTROL 8 ML: 604.72 INJECTION INTRAVENOUS at 11:28

## 2021-01-01 RX ADMIN — SODIUM CHLORIDE: 9 INJECTION, SOLUTION INTRAVENOUS at 04:27

## 2021-01-01 RX ADMIN — METOPROLOL SUCCINATE 12.5 MG: 25 TABLET, EXTENDED RELEASE ORAL at 07:57

## 2021-01-01 RX ADMIN — LEVOTHYROXINE SODIUM 112 MCG: 0.11 TABLET ORAL at 13:50

## 2021-01-01 RX ADMIN — MEROPENEM 1 G: 1 INJECTION, POWDER, FOR SOLUTION INTRAVENOUS at 06:33

## 2021-01-01 RX ADMIN — MEROPENEM 1 G: 1 INJECTION, POWDER, FOR SOLUTION INTRAVENOUS at 06:46

## 2021-01-01 RX ADMIN — LEVOTHYROXINE SODIUM 112 MCG: 0.11 TABLET ORAL at 07:50

## 2021-01-01 RX ADMIN — SODIUM CHLORIDE 50 ML: 9 INJECTION, SOLUTION INTRAVENOUS at 15:31

## 2021-01-01 RX ADMIN — ENOXAPARIN SODIUM 40 MG: 40 INJECTION SUBCUTANEOUS at 10:42

## 2021-01-01 RX ADMIN — PANTOPRAZOLE SODIUM 40 MG: 40 TABLET, DELAYED RELEASE ORAL at 13:50

## 2021-01-01 RX ADMIN — METOPROLOL SUCCINATE 12.5 MG: 25 TABLET, EXTENDED RELEASE ORAL at 13:50

## 2021-01-01 RX ADMIN — LEVOTHYROXINE SODIUM 112 MCG: 0.11 TABLET ORAL at 08:14

## 2021-01-01 RX ADMIN — ENOXAPARIN SODIUM 40 MG: 40 INJECTION SUBCUTANEOUS at 10:11

## 2021-01-01 RX ADMIN — KETOROLAC TROMETHAMINE 15 MG: 30 INJECTION, SOLUTION INTRAMUSCULAR; INTRAVENOUS at 04:28

## 2021-01-01 RX ADMIN — MEROPENEM 1 G: 1 INJECTION, POWDER, FOR SOLUTION INTRAVENOUS at 15:32

## 2021-01-01 RX ADMIN — SODIUM CHLORIDE 50 ML: 9 INJECTION, SOLUTION INTRAVENOUS at 17:31

## 2021-01-01 RX ADMIN — MEROPENEM 1 G: 1 INJECTION, POWDER, FOR SOLUTION INTRAVENOUS at 13:13

## 2021-01-01 RX ADMIN — MAGNESIUM SULFATE HEPTAHYDRATE 4 G: 80 INJECTION, SOLUTION INTRAVENOUS at 11:09

## 2021-01-01 RX ADMIN — REMDESIVIR 100 MG: 100 INJECTION, POWDER, LYOPHILIZED, FOR SOLUTION INTRAVENOUS at 17:24

## 2021-01-01 RX ADMIN — DEXAMETHASONE 6 MG: 2 TABLET ORAL at 13:12

## 2021-01-01 RX ADMIN — PANTOPRAZOLE SODIUM 40 MG: 40 TABLET, DELAYED RELEASE ORAL at 08:14

## 2021-01-01 RX ADMIN — PANTOPRAZOLE SODIUM 40 MG: 40 TABLET, DELAYED RELEASE ORAL at 07:50

## 2021-01-01 ASSESSMENT — ACTIVITIES OF DAILY LIVING (ADL)
ADLS_ACUITY_SCORE: 4
DOING_ERRANDS_INDEPENDENTLY_DIFFICULTY: NO
ADLS_ACUITY_SCORE: 4
FALL_HISTORY_WITHIN_LAST_SIX_MONTHS: NO
ADLS_ACUITY_SCORE: 4
DIFFICULTY_EATING/SWALLOWING: NO
ADLS_ACUITY_SCORE: 4
TOILETING_ISSUES: NO
ADLS_ACUITY_SCORE: 4
CONCENTRATING,_REMEMBERING_OR_MAKING_DECISIONS_DIFFICULTY: NO
ADLS_ACUITY_SCORE: 4
WALKING_OR_CLIMBING_STAIRS_DIFFICULTY: NO
ADLS_ACUITY_SCORE: 4
PREVIOUS_RESPONSIBILITIES: MEAL PREP;HOUSEKEEPING;LAUNDRY;SHOPPING;YARDWORK;MEDICATION MANAGEMENT;FINANCES;DRIVING
ADLS_ACUITY_SCORE: 4
ADLS_ACUITY_SCORE: 4
DIFFICULTY_COMMUNICATING: NO
WEAR_GLASSES_OR_BLIND: YES
ADLS_ACUITY_SCORE: 4
DRESSING/BATHING_DIFFICULTY: NO
ADLS_ACUITY_SCORE: 4
ADLS_ACUITY_SCORE: 4
HEARING_DIFFICULTY_OR_DEAF: NO
ADLS_ACUITY_SCORE: 4

## 2021-01-01 ASSESSMENT — ENCOUNTER SYMPTOMS
NAUSEA: 1
DIARRHEA: 0
FATIGUE: 1
ABDOMINAL PAIN: 0
CHILLS: 0
HEADACHES: 0
FEVER: 1
SORE THROAT: 0
SHORTNESS OF BREATH: 1
DYSURIA: 0
VOMITING: 0
HEMATURIA: 0
LIGHT-HEADEDNESS: 0
APPETITE CHANGE: 1
DIZZINESS: 0
COUGH: 0
BLOOD IN STOOL: 0

## 2021-01-01 ASSESSMENT — MIFFLIN-ST. JEOR
SCORE: 1597.74
SCORE: 1622.69
SCORE: 1609.53
SCORE: 1624.97
SCORE: 1615.1
SCORE: 1632.22
SCORE: 1629.27
SCORE: 1601.14
SCORE: 1641.75
SCORE: 1653.99
SCORE: 1628.13

## 2021-01-01 ASSESSMENT — PAIN SCALES - GENERAL
PAINLEVEL: NO PAIN (0)
PAINLEVEL: SEVERE PAIN (6)

## 2021-01-04 ENCOUNTER — COMMUNICATION - HEALTHEAST (OUTPATIENT)
Dept: ONCOLOGY | Facility: CLINIC | Age: 66
End: 2021-01-04

## 2021-01-07 ENCOUNTER — AMBULATORY - HEALTHEAST (OUTPATIENT)
Dept: CT IMAGING | Facility: HOSPITAL | Age: 66
End: 2021-01-07

## 2021-01-07 ENCOUNTER — COMMUNICATION - HEALTHEAST (OUTPATIENT)
Dept: FAMILY MEDICINE | Facility: CLINIC | Age: 66
End: 2021-01-07

## 2021-01-07 DIAGNOSIS — Z11.59 ENCOUNTER FOR SCREENING FOR OTHER VIRAL DISEASES: ICD-10-CM

## 2021-01-08 ENCOUNTER — AMBULATORY - HEALTHEAST (OUTPATIENT)
Dept: LAB | Facility: CLINIC | Age: 66
End: 2021-01-08

## 2021-01-08 ENCOUNTER — OFFICE VISIT - HEALTHEAST (OUTPATIENT)
Dept: FAMILY MEDICINE | Facility: CLINIC | Age: 66
End: 2021-01-08

## 2021-01-08 DIAGNOSIS — L40.8 OTHER PSORIASIS: ICD-10-CM

## 2021-01-08 DIAGNOSIS — N18.32 STAGE 3B CHRONIC KIDNEY DISEASE (H): ICD-10-CM

## 2021-01-08 DIAGNOSIS — Z11.59 ENCOUNTER FOR SCREENING FOR OTHER VIRAL DISEASES: ICD-10-CM

## 2021-01-08 DIAGNOSIS — I25.10 CORONARY ARTERY DISEASE INVOLVING NATIVE CORONARY ARTERY OF NATIVE HEART WITHOUT ANGINA PECTORIS: ICD-10-CM

## 2021-01-08 DIAGNOSIS — E03.1 CONGENITAL HYPOTHYROIDISM WITHOUT GOITER: ICD-10-CM

## 2021-01-08 DIAGNOSIS — C34.32 MALIGNANT NEOPLASM OF LOWER LOBE OF LEFT LUNG (H): ICD-10-CM

## 2021-01-08 DIAGNOSIS — Z01.818 PREOPERATIVE EXAMINATION: ICD-10-CM

## 2021-01-08 DIAGNOSIS — I10 ESSENTIAL HYPERTENSION: ICD-10-CM

## 2021-01-08 LAB
ATRIAL RATE - MUSE: 73 BPM
DIASTOLIC BLOOD PRESSURE - MUSE: 64 MMHG
HGB BLD-MCNC: 12.1 G/DL (ref 14–18)
INTERPRETATION ECG - MUSE: NORMAL
P AXIS - MUSE: 74 DEGREES
PR INTERVAL - MUSE: 182 MS
QRS DURATION - MUSE: 76 MS
QT - MUSE: 388 MS
QTC - MUSE: 427 MS
R AXIS - MUSE: 16 DEGREES
SYSTOLIC BLOOD PRESSURE - MUSE: 110 MMHG
T AXIS - MUSE: 38 DEGREES
VENTRICULAR RATE- MUSE: 73 BPM

## 2021-01-08 ASSESSMENT — MIFFLIN-ST. JEOR: SCORE: 1616.46

## 2021-01-09 ENCOUNTER — AMBULATORY - HEALTHEAST (OUTPATIENT)
Dept: ONCOLOGY | Facility: HOSPITAL | Age: 66
End: 2021-01-09

## 2021-01-09 DIAGNOSIS — K59.1 FUNCTIONAL DIARRHEA: ICD-10-CM

## 2021-01-09 DIAGNOSIS — C34.32 MALIGNANT NEOPLASM OF LOWER LOBE OF LEFT LUNG (H): ICD-10-CM

## 2021-01-09 LAB
SARS-COV-2 PCR COMMENT: NORMAL
SARS-COV-2 RNA SPEC QL NAA+PROBE: NEGATIVE
SARS-COV-2 VIRUS SPECIMEN SOURCE: NORMAL

## 2021-05-29 NOTE — PROGRESS NOTES
I met with Pardeep and presented his SCP. I reviewed the contents of the plan as well as the Treatment Summary in its' entirety. We talked about adv dir and the rationale for the document. I cautioned him about not signing it but bring it into your next visit and we will provide the witness. He expressed understanding. I pointed out the information on the Thrive series and my business card should he want to attend the fall series. I asked if he could read thru the plan as I would like to call him for feedback. He agreed. I congratulated him on his survivorship and thanked him for his time. Mandie

## 2021-05-29 NOTE — TELEPHONE ENCOUNTER
I called Melecio to see how he's doing.  He had a recent f/u with Dr. Soto and his CT scan showed no new concerns.  He said the main issue is his kidney functioning.  His Cr has been elevated and he's been instructed to drink more fluids.  He primarily drinks coffee and pop and is working on drinking more water.  I suggested he try using a water bottle that shows oz on the container so he knows how much water he's getting in a day.  Other than the elevated Cr he has no concerns.  I will continue to check in with eMlecio and I invited calls if questions or needs arise prior to his f/u appt in August.

## 2021-05-29 NOTE — PROGRESS NOTES
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  Patient recovering very slowly after surgery and chemotherapy.  Explained that some people may require more time to return to their baseline.  Encouraged him to continue with his activity levels.  CBC has almost normalized.    He will follow-up with cardiology as well.  Reviewed symptoms that he needs to watch for and call us about.  We will see him again in 3 months with possible recheck of labs.  Next set of scans will be in 6 months.    Creatinine is further elevated.  Encouraged hydration.  Asked him to avoid nonsteroidals.  We will recheck with return in 3 months.  Magnesium is normal and he can stop supplementation.    Plan: Follow-up in 3 months    Measurable disease: None postoperatively    Current therapy: Observation      Treatment history:     Cisplatin and Alimta adjuvant chemotherapy, for 4 cycles: Day 1 cycle 4, February 15, 2019  First cycle  started December 14, 2018    Patient underwent mediastinoscopy, bronchoscopy, thoracoscopic surgery and left lower lobectomy on November 1, 2018      Cancer Staging  Malignant neoplasm of lower lobe of left lung (H)  Staging form: Lung, AJCC 8th Edition  - Clinical stage from 10/15/2018:  Stage IIIA (cT4, cN0, cM0) - Signed by Melody Segovia, IVANNA on 12/21/2018      ECOG Performance   ECOG Performance Status: 1    Distress Assessment  Distress Assessment Score: 2    Pain           Problem List    No diagnosis found.     CC: Dov Morales,     ______________________________________________________________________________    History of Present Illness    Mr. Pardeep Wilson returns for follow-up.  He was seen about 2 months ago.  Recovering slowly after chemotherapy.  Still with some fatigue.  No headaches.  No fever or mouth sores.  No change with his breathing.  ECOG status is 0.      Pain Status  Currently in Pain: No/denies    Review of Systems    Constitutional  Constitutional (WDL): Exceptions to WDL  Fatigue: Fatigue not relieved by rest - Limiting instrumental ADL  Neurosensory  Neurosensory (WDL): Exceptions to WDL  Cardiovascular  Cardiovascular (WDL): Exceptions to WDL  Palpitations: Definition: A disorder characterized by inflammation of the muscle tissue of the heart.  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     Esophageal reflux      Hodgkin's lymphoma (H)     s/p radiation to the mediastinum at age 17     Hyperlipemia      Hypertension      Hypothyroidism      Psoriasis          Past Surgical History:   Procedure Laterality Date     CORONARY ANGIOPLASTY      Stent Placement     EYE SURGERY Bilateral      Cataracts     MEDIASTINOSCOPY N/A 11/1/2018    Procedure: MEDIASTINOSCOPY;  Surgeon: Bry Saunders MD;  Location: [facility];  Service:      PAROTIDECTOMY       RI LAP,DIAGNOSTIC ABDOMEN N/A 11/7/2017    Procedure: DIAGNOSTIC LAPAROSCOPY,  LYSIS OF ADHESIONS, SMALL BOWEL RESECTION;  Surgeon: Brian Granados MD;  Location: [facility];  Service: General     SPLENECTOMY, TOTAL  1973     WISDOM TOOTH EXTRACTION         Physical Exam    Recent Vitals 5/24/2019   Weight 185 lbs   BSA (m2) 2.02 m2   /60   Pulse 83   Temp 98.2   Temp src 1   SpO2 100   Some recent data might be hidden       GENERAL: Alert and oriented. Seated comfortably. In no distress.    HEAD: Atraumatic and normocephalic.  Has a full head of hair.    EYES: SOPHIE, EOMI.  No pallor.  No icterus.    Oral cavity: no mucosal lesion or tonsillar enlargement.    NECK: supple. JVP normal.  No thyroid enlargement.    LYMPH NODES: No palpable, cervical, axillary or inguinal lymphadenopathy.    CHEST: clear to auscultation bilaterally.  Resonant to percussion throughout bilaterally.  Symmetrical breath movements bilaterally.    CVS: S1 and S2 are heard. Regular rate and rhythm.  No murmur or gallop or rub heard.    ABDOMEN: Soft. Not tender. Not distended.  No palpable hepatomegaly or splenomegaly.  No other mass palpable.  Bowel sounds heard.    EXTREMITIES: Warm.  No peripheral edema.    SKIN: no rash, or bruising or purpura.        Lab Results    Recent Results (from the past 168 hour(s))   HM1 (CBC with Diff)   Result Value Ref Range    WBC 7.9 4.0 - 11.0 thou/uL    RBC 4.04 (L) 4.40 - 6.20 mill/uL    Hemoglobin 12.7 (L) 14.0 - 18.0 g/dL    Hematocrit 39.3 (L) 40.0 - 54.0 %    MCV 97 80 - 100 fL    MCH 31.4 27.0 - 34.0 pg    MCHC 32.3 32.0 - 36.0 g/dL    RDW 13.3 11.0 - 14.5 %    Platelets 402 140 - 440 thou/uL    MPV 10.5 8.5 - 12.5 fL    Neutrophils % 36 (L) 50 - 70 %    Lymphocytes % 47 (H) 20 - 40 %    Monocytes % 12 (H) 2  - 10 %    Eosinophils % 4 0 - 6 %    Basophils % 1 0 - 2 %    Neutrophils Absolute 2.9 2.0 - 7.7 thou/uL    Lymphocytes Absolute 3.7 0.8 - 4.4 thou/uL    Monocytes Absolute 1.0 (H) 0.0 - 0.9 thou/uL    Eosinophils Absolute 0.3 0.0 - 0.4 thou/uL    Basophils Absolute 0.0 0.0 - 0.2 thou/uL       Imaging    Ct Chest Without Contrast    Result Date: 5/21/2019  EXAM: CT CHEST WO CONTRAST LOCATION: [facility] DATE/TIME: 5/21/2019 3:59 PM INDICATION: fu lung cancer COMPARISON: 09/24/2018 CT and PET scan 10/11/2018 TECHNIQUE: Helical images were obtained through the chest. Multiplanar reformats were obtained. Dose reduction techniques were used. CONTRAST: None. FINDINGS: LUNGS AND PLEURA: Postop left lower lobectomy since the prior study with resection of the previous mass. Small left pleural effusion. Nothing concerning for tumor recurrence in the chest. Slight fibrosis in the lung apices unchanged. MEDIASTINUM: Negative. No lymphadenopathy. LIMITED UPPER ABDOMEN: Negative. MUSCULOSKELETAL: Negative.     CONCLUSION: 1.  Interval new postop left lower lobectomy. 2.  Nothing for recurrent tumor. 3.  Small left pleural effusion.         Signed by: Kevin Soto MD

## 2021-05-29 NOTE — TELEPHONE ENCOUNTER
----- Message from Vanessa Grover RN sent at 5/24/2019  1:09 PM CDT -----  I presented [facility]- lung- thrive fall ?

## 2021-05-29 NOTE — TELEPHONE ENCOUNTER
"I was able to speak with Pardeep looking for feedback on his SCP. He responded that he has been dealing with cancer and a turbulent health hx for decades and so does not \"feel like he is plowing new ground\". He did feel that the information was useful. I asked him if we needed to change anything about our cancer care program to improve it and he said no. I reminded him of the Thrive series beginning again in Sept and that I had included a flyer with my contact information should he be interested in attending, to contact me or I can put him on the invitation list once the dates are est. He said ok. I congratulated him again on his survivorship. Mandie  "

## 2021-05-31 NOTE — PROGRESS NOTES
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  No clinical evidence of recurrence of lung cancer.  We will continue observation.  We will see him again in 3 months with repeat CT of the chest without contrast.    Creatinine is further elevated.  Could partly be related to chemotherapy with cisplatin.  But that would not explain further rise in the creatinine.  He does have underlying hypertension.  Has been on lisinopril for several years.  Denies any symptoms to suggest urinary obstruction.  We will have him evaluated by nephrology.    Describes some low back pain related to activity.  Does have previous history of sciatica-like pain.  Will observe this for now.  He should call us if this becomes constant in which case we would need to consider imaging.    Plan: Follow-up in 3 months with repeat CT of the chest without contrast  Referral to nephrology for rising creatinine      Measurable disease: None postoperatively    Current therapy: Observation      Treatment history:     Cisplatin and Alimta adjuvant chemotherapy, for 4 cycles: Day 1 cycle 4, February  15, 2019  First cycle  started December 14, 2018    Patient underwent mediastinoscopy, bronchoscopy, thoracoscopic surgery and left lower lobectomy on November 1, 2018      Cancer Staging  Malignant neoplasm of lower lobe of left lung (H)  Staging form: Lung, AJCC 8th Edition  - Clinical stage from 10/15/2018: Stage IIIA (cT4, cN0, cM0) - Signed by Melody Segovia CNP on 12/21/2018      ECOG Performance   ECOG Performance Status: 1    Distress Assessment  Distress Assessment Score: No distress    Pain  Pain Score (Initial OR Reassessment): 3        Problem List    1. Malignant neoplasm of lower lobe of left lung (H)  CT Chest Without Contrast    Ambulatory referral to Nephrology        CC: Dov Morales,     ______________________________________________________________________________    History of Present Illness    Mr. Pardeep Wilson returns for follow-up.  He was seen 3 months ago.  Reports improvement in his activity and energy levels and almost back to his baseline.  Has some low back pain related to activity.  No constant pain.  Not taking any pain medications.  No headaches or dizziness.  No change with breathing.  No other abdominal or bone pain.  ECOG status is 0.      Pain Status  Currently in Pain: Yes    Review of Systems    Constitutional  Constitutional (WDL): Exceptions to WDL  Fatigue: Fatigue relieved by rest  Neurosensory  Neurosensory (WDL): Exceptions to WDL  Cardiovascular  Cardiovascular (WDL): All cardiovascular elements are within defined limits  Pulmonary  Respiratory (WDL): Within Defined Limits  Gastrointestinal  Gastrointestinal (WDL): All gastrointestinal elements are within defined limits  Genitourinary  Genitourinary (WDL): All genitourinary elements are within defined limits  Integumentary  Integumentary (WDL): All integumentary elements are within defined limits  Patient Coping  Patient Coping: Accepting  Distress Assessment  Distress Assessment Score: No  distress  Accompanied by  Accompanied by: Family Member    Past History  Past Medical History:   Diagnosis Date     Anemia      Coronary artery disease     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     Esophageal reflux      Hodgkin's lymphoma (H)     s/p radiation to the mediastinum at age 17     Hyperlipemia      Hypertension      Hypothyroidism      Psoriasis          Past Surgical History:   Procedure Laterality Date     CORONARY ANGIOPLASTY      Stent Placement     EYE SURGERY Bilateral     Cataracts     MEDIASTINOSCOPY N/A 11/1/2018    Procedure: MEDIASTINOSCOPY;  Surgeon: Bry Saunders MD;  Location: [facility];  Service:      PAROTIDECTOMY       IA LAP,DIAGNOSTIC ABDOMEN N/A 11/7/2017    Procedure: DIAGNOSTIC LAPAROSCOPY,  LYSIS OF ADHESIONS, SMALL BOWEL RESECTION;  Surgeon: Brian Granados MD;  Location: [facility];  Service: General     SPLENECTOMY, TOTAL  1973     WISDOM TOOTH EXTRACTION         Physical Exam    Recent Vitals 8/23/2019   Weight 186 lbs   BSA (m2) 2.03 m2   /67   Pulse 74   Temp 97.8   Temp src 1   SpO2 98   Some recent data might be hidden       GENERAL: Alert and oriented. Seated comfortably. In no distress.    HEAD: Atraumatic and normocephalic.  Has a full head of hair.    EYES: SOPHIE, EOMI.  No pallor.  No icterus.    Oral cavity: no mucosal lesion or tonsillar enlargement.    NECK: supple. JVP normal.  No thyroid enlargement.    LYMPH NODES: No palpable, cervical, axillary or inguinal lymphadenopathy.    CHEST: clear to auscultation bilaterally.  Resonant to percussion throughout bilaterally.  Symmetrical breath movements bilaterally.    CVS: S1 and S2 are heard. Regular rate and rhythm.  No murmur or gallop or rub heard.    ABDOMEN: Soft. Not tender. Not distended.  No palpable hepatomegaly or splenomegaly.  No other mass palpable.  Bowel sounds heard.    EXTREMITIES: Warm.  No peripheral edema.    SKIN: no rash, or bruising or purpura.        Lab  Results    Recent Results (from the past 168 hour(s))   Comprehensive Metabolic Panel   Result Value Ref Range    Sodium 142 136 - 145 mmol/L    Potassium 4.6 3.5 - 5.0 mmol/L    Chloride 106 98 - 107 mmol/L    CO2 29 22 - 31 mmol/L    Anion Gap, Calculation 7 5 - 18 mmol/L    Glucose 101 70 - 125 mg/dL    BUN 26 (H) 8 - 22 mg/dL    Creatinine 1.71 (H) 0.70 - 1.30 mg/dL    GFR MDRD Af Amer 49 (L) >60 mL/min/1.73m2    GFR MDRD Non Af Amer 41 (L) >60 mL/min/1.73m2    Bilirubin, Total 1.1 (H) 0.0 - 1.0 mg/dL    Calcium 9.9 8.5 - 10.5 mg/dL    Protein, Total 7.5 6.0 - 8.0 g/dL    Albumin 3.8 3.5 - 5.0 g/dL    Alkaline Phosphatase 112 45 - 120 U/L    AST 20 0 - 40 U/L    ALT 11 0 - 45 U/L       Imaging    No results found.      Signed by: Kevin Soto MD

## 2021-06-02 NOTE — TELEPHONE ENCOUNTER
I talked with patient last Friday and gave him the number to the lung clinic as he has questions for his pulmonologist that he saw last year.  He said taht he called them and left a message last Friday and has heard nothing back.  I let him know that I will call over there and see what I can do so that they call him back as he is wanting answers sooner than later as the hyperbaric treatment is needed in the near future.  I called and left a detailed message on the nurse line in the lung clinic asking them to call him back on [phone].  I did leave my direct line if she had any questions for me as well.    Sylvia Su RN

## 2021-06-02 NOTE — PROGRESS NOTES
Pulmonary Clinic Follow Up    Cc: follow up cough/lung cancer    HPI: 64yoM with history of hodgkin's Lymphoma s/p radiation to chest and splenectomy with stage IIIA mucinous lung cancer s/p LLL lobectomy and chemotherapy without evidence of recurrence currently who presents to follow up cough. Last CT chest was 5/2019 without changes in lobectomy site.     Caught a bad cold 3-4 weeks ago, wife had it too. Got some Levaquin for this cough. It is significantly better-no longer feeling the gurgling, never was short of breath. He feels this is nearly resolved.     Needs oral surgery for implants and the radiation and will need hyperbaric oxygen to help improve blood flow to the jaw in preparation for the implants/oral surgery. Had done this 10-12 years ago for similar issue.     ROS: 12-point review performed and notable only for poor dentition, chronic dry mouth and cough which is improving. The remainder reviewed and negative.    Current Outpatient Medications on File Prior to Visit   Medication Sig Dispense Refill     aspirin 81 mg chewable tablet Chew 1 tablet (81 mg total) daily.  0     atorvastatin (LIPITOR) 40 MG tablet Take 1 tablet (40 mg total) by mouth at bedtime. 90 tablet 3     biotin 300 mcg Tab Take 300 mg by mouth daily.       fluocinonide (LIDEX) 0.05 % cream APPLY EXTERNALLY TO THE AFFECTED AREA TWICE DAILY SPARINGLY AS NEEDED 30 g 0     fluoride, sodium, (SF) 1.1 % Gel dental gel UTD TAT 1 application bedtime. UTD TAT (Patient taking differently: Take 1 application by mouth at bedtime. UTD TAT 1 application bedtime. UTD TAT ) 56 g 1     folic acid (FOLVITE) 1 MG tablet Take 1 tablet daily starting 7 days before Pemetrexed therapy and continuing throughout treatment.. 100 tablet 0     levothyroxine (SYNTHROID, LEVOTHROID) 125 MCG tablet TAKE 1 TABLET DAILY 90 tablet 3     lisinopril (PRINIVIL,ZESTRIL) 5 MG tablet Take 5 mg by mouth daily. Instructed to HOLD this medication (per Dr. Soto) -  01/10/19 DANI Ramirez RN             metoprolol succinate (TOPROL-XL) 25 MG Take 12.5 mg by mouth daily. Instructed to HOLD this medcation (per Dr. Soto) - 01/10/19 DANI Ramirez RN.             multivitamin therapeutic tablet Take 2 tablets by mouth daily.       pantoprazole (PROTONIX) 40 MG tablet Take 1 tablet (40 mg total) by mouth daily. 90 tablet 3     No current facility-administered medications on file prior to visit.      Vitals:    10/28/19 0810   BP: 112/58   Pulse: 78   Resp: 20   SpO2: 96%   RA  Gen: thin male in no distress  HEENT: clear oropharynx  Neck: trachea midline, no lymphadenopathy  C/V: RRR, S1 and S2  Resp: clear bilaterally with good air entry  Ext: no edema or cyanosis, no clubbing  Neuro: Grossly normal  Skin: no lesions or rashes    CXR: personally reviewed with patient.   EXAM: XR CHEST 2 VIEWS  LOCATION: [facility]  DATE/TIME: 10/15/2019 5:04 PM     INDICATION: Cough  COMPARISON: 12/3/2018     IMPRESSION:   Persistent blunting at left costophrenic angle and thickening along lateral left chest consistent with chronic pleural thickening. No new abnormalities with right lung remain clear. Heart size normal.    ASSESSMENT/PLAN:  1) mucinous adenocarcinoma IIIA s/p resection and chemotherapy without evidence of recurrence  -Repeat CT due 11/20/19. Offered earlier due to cough but he declined since cough almost gone  2) Normal lung function prior to resection  -I have no objection to hyperbaric oxygen therapy for his jaw. I spoke with Dr. Saunders, Thoracic surgeon who did his lobectomy and he has no objections either. Let patient know it is ok to proceed.  Already had flu shot    Patient has no underlying lung disease, therefore he can follow up as needed.  Lay Powers MD  Electronically signed on 10/28/2019 10:01 AM

## 2021-06-02 NOTE — TELEPHONE ENCOUNTER
RN cannot approve Refill Request    RN can NOT refill this medication historical medication requested.         Trinity Pedraza, [facility] Triage/Med Refill 10/28/2019    Requested Prescriptions   Pending Prescriptions Disp Refills     metoprolol succinate (TOPROL-XL) 25 MG [Pharmacy Med Name: METOPROLOL SUCCINATE ER TABS 25MG] 45 tablet 4     Sig: TAKE ONE-HALF (1/2) TABLET DAILY       Beta-Blockers Refill Protocol Passed - 10/28/2019  3:26 PM        Passed - PCP or prescribing provider visit in past 12 months or next 3 months     Last office visit with prescriber/PCP: 5/14/2018 Dov Morales DO OR same dept: 10/15/2019 Joaquín Oglesby MD OR same specialty: 10/15/2019 Joaquín Oglesby MD  Last physical: 9/28/2018 Last MTM visit: Visit date not found   Next visit within 3 mo: Visit date not found  Next physical within 3 mo: Visit date not found  Prescriber OR PCP: Dov Morales DO  Last diagnosis associated with med order: There are no diagnoses linked to this encounter.  If protocol passes may refill for 12 months if within 3 months of last provider visit (or a total of 15 months).             Passed - Blood pressure filed in past 12 months     BP Readings from Last 1 Encounters:   10/28/19 112/58

## 2021-06-02 NOTE — PROGRESS NOTES
"Assessment/Plan:    Pardeep was seen today for cough.    Diagnoses and all orders for this visit:    Cough/Malignant neoplasm of lower lobe of left lung (H) The patient has a cough and objective changes on lung exam.  There is a wheeze in the left upper lobe.  Energy is fine.  Is been afebrile.  He is status post chemotherapy with conclusion of therapy approximately 8 months ago.  He also has a splenic.  Vital signs have been normal.  Chest x-ray is without abnormalities.  We were unsuccessful with a blood draw to check a CBC.  Given the duration of his symptoms as well as his risk factors for fast-moving opportunistic infection (relatively recent chemotherapy and asplenia) I prescribed levofloxacin at 750 mg x 5 days.  The antibiotic dosing does not need to be adjusted based on a creatinine clearance calculated with his lab work from 10/9 (nephrology clinic).     Return in about 1 week (around 10/22/2019) for recheck if not improving.    Joaquín Oglesby MD  _______________________________    Chief Complaint   Patient presents with     Cough     Subjective: Pardeep Wilson is a 64 y.o. year old male who I have not seen in clinic before who presents with the following acute complaint(s):    Cough and congestion:   - history of lung cancer and pneumonia   - \"hacking cough\" for the past two weeks.   - Wants to confirm that there is not something.   - he has been off treatment for chemo 8 months   - no shortness of breath. No wheezing.  Some tightness.  Cough has been productive.   - no fevers.  No chills.  Sick contacts: grandchildren and wife with similar symptoms.     - palliative: none.   - energy has been okay.  Has been able to exercise.     -Asplenic    ROS: Complete review of systems obtained.  Pertinent items are listed above.     The following portions of the patient's history were reviewed and updated as appropriate: allergies, current medications, past medical history, past surgical history and problem " list.     Objective:   /60 (Patient Site: Left Arm, Patient Position: Sitting, Cuff Size: Adult Regular)   Pulse 94   Temp 98.1  F (36.7  C) (Oral)   Resp 18   Wt 184 lb (83.5 kg)   SpO2 98% Comment: room air  BMI 27.17 kg/m    Gen.: No acute distress  HEENT: Bilateral cerumen.  Mild anterior cervical lymph adenopathy.  The posterior fact without tonsillar exudate or erythema.  Cardiac: Regular rate and rhythm, normal S1/S2, no murmurs of the gallops.  Respiratory: Right lobe is clear.  Left lobe with decreased sound to the left lung base.  And inspiratory wheeze in the left upper lobe.    Skin: No rashes or lesions.    No results found for this or any previous visit (from the past 24 hour(s)).  No results found.    Chest x-ray: My personal interpretation- no acute infiltrate    Additional History from Old Records Summarized (2): no  Decision to Obtain Records (1): no  Radiology Tests Summarized or Ordered (1): yes  Labs Reviewed or Ordered (1): yes  Medicine Test Summarized or Ordered (1): no  Independent Review of EKG or X-RAY(2 each): yes    This note has been dictated using voice recognition software. Any grammatical or context distortions are unintentional and inherent to the software

## 2021-06-02 NOTE — TELEPHONE ENCOUNTER
Pardeep calls in today to speak with Dr. Lay Powers regarding some upcoming treatment he has to have.  He tells me he needs to have hyperbaric treatment at [facility] for oral surgery.  He wants to know if she has any information or recommendations or concerns with this.  I told him he was calling Dr. Soto's office and I was able to give him Dr. Powers's number as the phones would not let me transfer him through.  I said if he needed anything further from our office to please give us a call.  He verbalized understanding.    Dr. Soto has been made aware.    Sylvia Su RN

## 2021-06-03 NOTE — PROGRESS NOTES
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  New pulmonary nodules are noted but they are very small in size and appear benign.  Will do follow-up CT in 3 months.  Explained that these cannot be biopsied, appear benign and PET scan will not be helpful.    Did have visit with nephrology.  Ultrasound of the kidneys was negative except for cysts.  Recommended conservative management and will follow-up again in January.    Plan: Follow-up in 3 months with repeat CT of the chest without contrast    Measurable disease: None postoperatively    Current therapy: Observation      Treatment history:     Cisplatin and Alimta adjuvant chemotherapy, for 4 cycles: Day 1 cycle 4, February 15, 2019  First cycle  started December 14, 2018    Patient underwent mediastinoscopy, bronchoscopy, thoracoscopic surgery and left lower lobectomy on November 1, 2018      Cancer Staging  Malignant neoplasm of lower lobe of left lung (H)  Staging form: Lung, AJCC 8th Edition  - Clinical stage from 10/15/2018: Stage IIIA (cT4, cN0, cM0) - Signed by Melody Segovia  IVANNA Harkins on 12/21/2018      ECOG Performance   ECOG Performance Status: 1    Distress Assessment  Distress Assessment Score: No distress    Pain           Problem List    1. Malignant neoplasm of lower lobe of left lung (H)  CT Chest Without Contrast        CC: Dov Morales DO    ______________________________________________________________________________    History of Present Illness    Mr. Pardeep Wilson returns for follow-up.  Seen 3 months ago.  No new headaches dizziness or focal weakness.  No shortness of breath or cough.  No new bone or abdominal pain.  Appetite and weight are stable.  ECOG status is 1.  Did have lung infection in October requiring antibiotics.  Did see nephrology and recommended observation for now.  Did have kidney ultrasound which only showed cysts.    Pain Status  Currently in Pain: No/denies    Review of Systems    Constitutional  Constitutional (WDL): Exceptions to WDL  Fatigue: Fatigue relieved by rest  Neurosensory  Neurosensory (WDL): Exceptions to WDL  Cardiovascular  Cardiovascular (WDL): All cardiovascular elements are within defined limits  Pulmonary  Respiratory (WDL): Within Defined Limits  Gastrointestinal  Gastrointestinal (WDL): Exceptions to WDL  Anorexia: Loss of appetite without alteration in eating habits(More reflux lately. 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     Esophageal reflux      Hodgkin's lymphoma (H)     s/p  radiation to the mediastinum at age 17     Hyperlipemia      Hypertension      Hypothyroidism      Psoriasis          Past Surgical History:   Procedure Laterality Date     CORONARY ANGIOPLASTY      Stent Placement     EYE SURGERY Bilateral     Cataracts     MEDIASTINOSCOPY N/A 11/1/2018    Procedure: MEDIASTINOSCOPY;  Surgeon: Bry Saunders MD;  Location: [facility];  Service:      PAROTIDECTOMY       [place] LAP,DIAGNOSTIC ABDOMEN N/A 11/7/2017    Procedure: DIAGNOSTIC LAPAROSCOPY,  LYSIS OF ADHESIONS, SMALL BOWEL RESECTION;  Surgeon: Brian Granados MD;  Location: [facility];  Service: General     SPLENECTOMY, TOTAL  1973     WISDOM TOOTH EXTRACTION         Physical Exam    Recent Vitals 11/15/2019   Weight 185 lbs 11 oz   BSA (m2) 2.02 m2   /78   Pulse 72   Temp 97.7   Temp src 1   SpO2 100   Some recent data might be hidden       GENERAL: Alert and oriented. Seated comfortably. In no distress.    HEAD: Atraumatic and normocephalic.  Has a full head of hair.    EYES: SOPHIE, EOMI.  No pallor.  No icterus.    Oral cavity: no mucosal lesion or tonsillar enlargement.    NECK: supple. JVP normal.  No thyroid enlargement.    LYMPH NODES: No palpable, cervical, axillary or inguinal lymphadenopathy.    CHEST: clear to auscultation bilaterally.  Resonant to percussion throughout bilaterally.  Symmetrical breath movements bilaterally.    CVS: S1 and S2 are heard. Regular rate and rhythm.  No murmur or gallop or rub heard.    ABDOMEN: Soft. Not tender. Not distended.  No palpable hepatomegaly or splenomegaly.  No other mass palpable.  Bowel sounds heard.    EXTREMITIES: Warm.  No peripheral edema.    SKIN: no rash, or bruising or purpura.        Lab Results    No results found for this or any previous visit (from the past 168 hour(s)).    Imaging    Ct Chest Without Contrast    Result Date: 11/12/2019  EXAM: CT CHEST WO CONTRAST LOCATION: [facility] DATE/TIME: 11/12/2019 5:13 PM  INDICATION: Lung cancer COMPARISON: 05/21/2019 TECHNIQUE: CT chest without IV contrast. Multiplanar reformats were obtained. Dose reduction techniques were used. CONTRAST: None. FINDINGS: LUNGS AND PLEURA: There are new connie fissural nodules in the right right upper lobe measuring 5 mm on axial image 44, 5 mm on axial image 47, 5 mm on axial image 34. No pulmonary mass or consolidation. Small left pleural effusion has decreased in size. Adjacent atelectasis and/or scarring is performed, measuring approximately 1 x 3 cm. Prior left lower lobectomy. Mild biapical scarring. MEDIASTINUM/AXILLAE: No abnormally enlarged lymph nodes. Great vessels normal in caliber. No pericardial effusion. UPPER ABDOMEN: No significant finding. MUSCULOSKELETAL: Unremarkable.     1.  There are a 3 new nodules in the right upper lobe. These nodules typically would be considered benign given overall appearance and location. Given the patient's history however, short-term follow-up recommended in 3 months. 2.  Prior left lower lobectomy. 3.  Interval decrease in left pleural effusion, now small.     Us Kidney Bilateral    Result Date: 10/22/2019  EXAM: US KIDNEY BILATERAL WITH BLADDER LOCATION: [facility] DATE/TIME: 10/22/2019 4:12 PM INDICATION: Chronic kidney disease, stage 3 (moderate) COMPARISON: None. TECHNIQUE: Routine kidneys and bladder. FINDINGS: RIGHT KIDNEY: 9.9 x 4.5 x 4.4 cm. No hydronephrosis. Large simple cysts. Normal parenchymal echogenicity. LEFT KIDNEY: 9.9 x 5.6 x 4.5 cm. Normal without hydronephrosis or masses. BLADDER: Normal.     1.  No hydronephrosis. Large simple cyst right kidney. Normal renal parenchymal echogenicity.        Signed by: Kevin Soto MD

## 2021-06-04 NOTE — TELEPHONE ENCOUNTER
RN cannot approve Refill Request    RN can NOT refill this medication Protocol failed and NO refill given.         Christy Armstrong, [facility] Triage/Med Refill 12/19/2019    Requested Prescriptions   Pending Prescriptions Disp Refills     levothyroxine (SYNTHROID, LEVOTHROID) 125 MCG tablet [Pharmacy Med Name: L-THYROXINE (SYNTHROID) TABS 125MCG] 90 tablet 4     Sig: TAKE 1 TABLET DAILY       Thyroid Hormones Protocol Failed - 12/17/2019 11:39 AM        Failed - TSH on file in past 12 months for patient age 12 & older     TSH   Date Value Ref Range Status   08/31/2018 2.96 0.30 - 5.00 uIU/mL Final                   Passed - Provider visit in past 12 months or next 3 months     Last office visit with prescriber/PCP: 5/14/2018 Dov Morales DO OR same dept: 10/15/2019 Joaquín Oglesby MD OR same specialty: 10/15/2019 Joaquín Oglesby MD  Last physical: 9/28/2018 Last MTM visit: Visit date not found   Next visit within 3 mo: Visit date not found  Next physical within 3 mo: Visit date not found  Prescriber OR PCP: Dov Morales DO  Last diagnosis associated with med order: 1. Esophageal reflux  - pantoprazole (PROTONIX) 40 MG tablet; TAKE 1 TABLET DAILY  Dispense: 90 tablet; Refill: 3    2. Coronary atherosclerosis  - atorvastatin (LIPITOR) 40 MG tablet; TAKE 1 TABLET AT BEDTIME  Dispense: 90 tablet; Refill: 3    If protocol passes may refill for 12 months if within 3 months of last provider visit (or a total of 15 months).           Signed Prescriptions Disp Refills    pantoprazole (PROTONIX) 40 MG tablet 90 tablet 3     Sig: TAKE 1 TABLET DAILY       GI Medications Refill Protocol Passed - 12/17/2019 11:39 AM        Passed - PCP or prescribing provider visit in last 12 or next 3 months.     Last office visit with prescriber/PCP: 5/14/2018 Dov Morales DO OR same dept: 10/15/2019 Joaquín Oglesby MD OR same specialty: 10/15/2019 Joaquín Oglesby MD  Last physical:  9/28/2018 Last MTM visit: Visit date not found   Next visit within 3 mo: Visit date not found  Next physical within 3 mo: Visit date not found  Prescriber OR PCP: Dov Morales DO  Last diagnosis associated with med order: 1. Esophageal reflux  - pantoprazole (PROTONIX) 40 MG tablet; TAKE 1 TABLET DAILY  Dispense: 90 tablet; Refill: 3    2. Coronary atherosclerosis  - atorvastatin (LIPITOR) 40 MG tablet; TAKE 1 TABLET AT BEDTIME  Dispense: 90 tablet; Refill: 3    If protocol passes may refill for 12 months if within 3 months of last provider visit (or a total of 15 months).            atorvastatin (LIPITOR) 40 MG tablet 90 tablet 3     Sig: TAKE 1 TABLET AT BEDTIME       Statins Refill Protocol (Hmg CoA Reductase Inhibitors) Passed - 12/17/2019 11:39 AM        Passed - PCP or prescribing provider visit in past 12 months      Last office visit with prescriber/PCP: 5/14/2018 Dov Morales DO OR same dept: 10/15/2019 Joaquín Oglesby MD OR same specialty: 10/15/2019 Joaquín Oglesby MD  Last physical: 9/28/2018 Last MTM visit: Visit date not found   Next visit within 3 mo: Visit date not found  Next physical within 3 mo: Visit date not found  Prescriber OR PCP: Dov Morales DO  Last diagnosis associated with med order: 1. Esophageal reflux  - pantoprazole (PROTONIX) 40 MG tablet; TAKE 1 TABLET DAILY  Dispense: 90 tablet; Refill: 3    2. Coronary atherosclerosis  - atorvastatin (LIPITOR) 40 MG tablet; TAKE 1 TABLET AT BEDTIME  Dispense: 90 tablet; Refill: 3    If protocol passes may refill for 12 months if within 3 months of last provider visit (or a total of 15 months).

## 2021-06-04 NOTE — TELEPHONE ENCOUNTER
Refill sent to pharmacy on file. TSH also ordered and can be accomplished at his next blood draw as scheduled with oncology.    Respectfully,  Garcia Morales DO

## 2021-06-04 NOTE — TELEPHONE ENCOUNTER
Reason contacted:  Lab Order  Information relayed:  As per PCP note below.  Additional questions:  No  Further follow-up needed:  No  Okay to leave a detailed message:  Yes

## 2021-06-04 NOTE — TELEPHONE ENCOUNTER
Refill Approved    Rx renewed per Medication Renewal Policy. Medication was last renewed on 11/22/18.    Christy Armstrong, [facility] Triage/Med Refill 12/19/2019     Requested Prescriptions   Pending Prescriptions Disp Refills     levothyroxine (SYNTHROID, LEVOTHROID) 125 MCG tablet [Pharmacy Med Name: L-THYROXINE (SYNTHROID) TABS 125MCG] 90 tablet 4     Sig: TAKE 1 TABLET DAILY       Thyroid Hormones Protocol Failed - 12/17/2019 11:39 AM        Failed - TSH on file in past 12 months for patient age 12 & older     TSH   Date Value Ref Range Status   08/31/2018 2.96 0.30 - 5.00 uIU/mL Final                   Passed - Provider visit in past 12 months or next 3 months     Last office visit with prescriber/PCP: 5/14/2018 Dov Morales DO OR same dept: 10/15/2019 Joaquín Oglesby MD OR same specialty: 10/15/2019 Joaquín Oglesby MD  Last physical: 9/28/2018 Last MTM visit: Visit date not found   Next visit within 3 mo: Visit date not found  Next physical within 3 mo: Visit date not found  Prescriber OR PCP: Dov Morales DO  Last diagnosis associated with med order: 1. Esophageal reflux  - pantoprazole (PROTONIX) 40 MG tablet [Pharmacy Med Name: PANTOPRAZOLE SOD DR TABS 40MG]; TAKE 1 TABLET DAILY  Dispense: 90 tablet; Refill: 4    2. Coronary atherosclerosis  - atorvastatin (LIPITOR) 40 MG tablet [Pharmacy Med Name: ATORVASTATIN TABS 40MG]; TAKE 1 TABLET AT BEDTIME  Dispense: 90 tablet; Refill: 4    If protocol passes may refill for 12 months if within 3 months of last provider visit (or a total of 15 months).             pantoprazole (PROTONIX) 40 MG tablet [Pharmacy Med Name: PANTOPRAZOLE SOD DR TABS 40MG] 90 tablet 4     Sig: TAKE 1 TABLET DAILY       GI Medications Refill Protocol Passed - 12/17/2019 11:39 AM        Passed - PCP or prescribing provider visit in last 12 or next 3 months.     Last office visit with prescriber/PCP: 5/14/2018 Dov Morales DO OR [place] dept:  10/15/2019 Joaquín Oglesby MD OR same specialty: 10/15/2019 Joaquín Oglesby MD  Last physical: 9/28/2018 Last MTM visit: Visit date not found   Next visit within 3 mo: Visit date not found  Next physical within 3 mo: Visit date not found  Prescriber OR PCP: Dov Morales DO  Last diagnosis associated with med order: 1. Esophageal reflux  - pantoprazole (PROTONIX) 40 MG tablet [Pharmacy Med Name: PANTOPRAZOLE SOD DR TABS 40MG]; TAKE 1 TABLET DAILY  Dispense: 90 tablet; Refill: 4    2. Coronary atherosclerosis  - atorvastatin (LIPITOR) 40 MG tablet [Pharmacy Med Name: ATORVASTATIN TABS 40MG]; TAKE 1 TABLET AT BEDTIME  Dispense: 90 tablet; Refill: 4    If protocol passes may refill for 12 months if within 3 months of last provider visit (or a total of 15 months).             atorvastatin (LIPITOR) 40 MG tablet [Pharmacy Med Name: ATORVASTATIN TABS 40MG] 90 tablet 4     Sig: TAKE 1 TABLET AT BEDTIME       Statins Refill Protocol (Hmg CoA Reductase Inhibitors) Passed - 12/17/2019 11:39 AM        Passed - PCP or prescribing provider visit in past 12 months      Last office visit with prescriber/PCP: 5/14/2018 Dov Morales DO OR same dept: 10/15/2019 Joaquín Oglesby MD OR same specialty: 10/15/2019 Joaquín Oglesby MD  Last physical: 9/28/2018 Last MTM visit: Visit date not found   Next visit within 3 mo: Visit date not found  Next physical within 3 mo: Visit date not found  Prescriber OR PCP: Dov Morales DO  Last diagnosis associated with med order: 1. Esophageal reflux  - pantoprazole (PROTONIX) 40 MG tablet [Pharmacy Med Name: PANTOPRAZOLE SOD DR TABS 40MG]; TAKE 1 TABLET DAILY  Dispense: 90 tablet; Refill: 4    2. Coronary atherosclerosis  - atorvastatin (LIPITOR) 40 MG tablet [Pharmacy Med Name: ATORVASTATIN TABS 40MG]; TAKE 1 TABLET AT BEDTIME  Dispense: 90 tablet; Refill: 4    If protocol passes may refill for 12 months if within 3 months of last provider visit (or  a total of 15 months).

## 2021-06-05 NOTE — TELEPHONE ENCOUNTER
Pt is switching pharmacies:    Requested Prescriptions     Pending Prescriptions Disp Refills     levothyroxine (SYNTHROID, LEVOTHROID) 125 MCG tablet 90 tablet 3     Sig: TAKE 1 TABLET DAILY     atorvastatin (LIPITOR) 40 MG tablet 90 tablet 3     Sig: Take 1 tablet (40 mg total) by mouth at bedtime.     pantoprazole (PROTONIX) 40 MG tablet 90 tablet 3     Sig: TAKE 1 TABLET DAILY     metoprolol succinate (TOPROL-XL) 25 MG 45 tablet 4     Sig: TAKE ONE-HALF (1/2) TABLET DAILY

## 2021-06-05 NOTE — PROGRESS NOTES
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  Patient did have significant infection last September and these nodules were first noted 3 months ago in November 2019.  I think this could be postinfectious.  I think it is less likely related to metastases from his previous left lower lobe lung cancer as there is no mediastinal adenopathy.  He did radiation therapy for Hodgkin's disease in the field could have involve the right lung, which may increase risk for lung cancer.  It would be unusual to develop 3 separate primary tumors.    Discussed options with patient and wife.  Lesions are too small for further characterization by PET scan.  Could continue with observation with repeat CT scan again in 3 months.  Patient is fairly anxious about the findings.  Will review at tumor conference.    He could have biopsy attempted or possibly wedge resection of the nodules.    Plan: We will review case at tumor conference and contact him with recommendations  Tentatively will schedule a 3-month follow-up in clinic      Measurable  disease: None postoperatively    Current therapy: Observation      Treatment history:     Cisplatin and Alimta adjuvant chemotherapy, for 4 cycles: Day 1 cycle 4, February 15, 2019  First cycle  started December 14, 2018    Patient underwent mediastinoscopy, bronchoscopy, thoracoscopic surgery and left lower lobectomy on November 1, 2018      Cancer Staging  Malignant neoplasm of lower lobe of left lung (H)  Staging form: Lung, AJCC 8th Edition  - Clinical stage from 10/15/2018: Stage IIIA (cT4, cN0, cM0) - Signed by Melody Segovia CNP on 12/21/2018      ECOG Performance   ECOG Performance Status: 1    Distress Assessment  Distress Assessment Score: 2    Pain           Problem List    1. Malignant neoplasm of lower lobe of left lung (H)          CC: Dov Morales,     ______________________________________________________________________________    History of Present Illness    Mr. Pardeep Wilson returns for follow-up.  He was seen 3 months ago.  No new symptoms or problems.  ECOG status is 0.  Spent time reviewing his CT scans and discussing options regarding the 3 new right upper lung pulmonary nodules.  Pain Status  Currently in Pain: No/denies    Review of Systems    Constitutional  Constitutional (WDL): All constitutional elements are within defined limits  Neurosensory  Neurosensory (WDL): Exceptions to WDL  Peripheral Sensory Neuropathy: Asymptomatic, loss of deep tendon reflexes or paresthesia(feet numb)  Cardiovascular  Cardiovascular (WDL): All cardiovascular elements are within defined limits  Pulmonary  Respiratory (WDL): Within Defined Limits  Gastrointestinal  Gastrointestinal (WDL): All gastrointestinal elements are within defined limits  Genitourinary  Genitourinary (WDL): All genitourinary elements are within defined limits  Integumentary  Integumentary (WDL): All integumentary elements are within defined limits  Patient Coping  Patient Coping: Open/discussion  Distress  Assessment  Distress Assessment Score: 2  Accompanied by  Accompanied by: Family Member    Past History  Past Medical History:   Diagnosis Date     Anemia      Coronary artery disease     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     Esophageal reflux      Hodgkin's lymphoma (H)     s/p radiation to the mediastinum at age 17     Hyperlipemia      Hypertension      Hypothyroidism      Psoriasis          Past Surgical History:   Procedure Laterality Date     CORONARY ANGIOPLASTY      Stent Placement     EYE SURGERY Bilateral     Cataracts     MEDIASTINOSCOPY N/A 11/1/2018    Procedure: MEDIASTINOSCOPY;  Surgeon: Bry Saunders MD;  Location: [facility];  Service:      PAROTIDECTOMY       KS LAP,DIAGNOSTIC ABDOMEN N/A 11/7/2017    Procedure: DIAGNOSTIC LAPAROSCOPY,  LYSIS OF ADHESIONS, SMALL BOWEL RESECTION;  Surgeon: Brian Granados MD;  Location: [facility];  Service: General     SPLENECTOMY, TOTAL  1973     WISDOM TOOTH EXTRACTION         Physical Exam    Recent Vitals 2/7/2020   Weight 181 lbs 6 oz   BSA (m2) 2 m2   /63   Pulse 82   Temp 97.7   Temp src 1   SpO2 99   Some recent data might be hidden       GENERAL: Alert and oriented. Seated comfortably. In no distress.    HEAD: Atraumatic and normocephalic.  Has a full head of hair.    EYES: SOPHIE, EOMI.  No pallor.  No icterus.    Oral cavity: no mucosal lesion or tonsillar enlargement.    NECK: supple. JVP normal.  No thyroid enlargement.    LYMPH NODES: No palpable, cervical, axillary or inguinal lymphadenopathy.    CHEST: clear to auscultation bilaterally.  Resonant to percussion throughout bilaterally.  Symmetrical breath movements bilaterally.    CVS: S1 and S2 are heard. Regular rate and rhythm.  No murmur or gallop or rub heard.    ABDOMEN: Soft. Not tender. Not distended.  No palpable hepatomegaly or splenomegaly.  No other mass palpable.  Bowel sounds heard.    EXTREMITIES: Warm.  No peripheral edema.    SKIN: no rash, or  bruising or purpura.        Lab Results    No results found for this or any previous visit (from the past 168 hour(s)).    Imaging    Ct Chest Without Contrast    Result Date: 2/4/2020  EXAM: CT CHEST WO CONTRAST LOCATION: [facility] DATE/TIME: 2/4/2020 4:28 PM INDICATION: Left lower lobectomy for adenocarcinoma November 2018. Follow-up right lung nodules. COMPARISON: 11/12/2019, 5/21/2019 CT TECHNIQUE: CT chest without IV contrast. Multiplanar reformats were obtained. Dose reduction techniques were used. CONTRAST: None. FINDINGS: LUNGS AND PLEURA: Enlarging right connie-fissural nodules now measure 6-7 mm compared to 5 mm previously. Stable left inferior pleural thickening. MEDIASTINUM/AXILLAE: No adenopathy. Coronary atherosclerosis or stents. UPPER ABDOMEN: Gastric staple line. Surgical clips upper abdomen. MUSCULOSKELETAL: No metastases seen.     1.  Malignant progression manifest by enlarging right lung/pleural nodules. 2.  Coronary atherosclerosis.         Signed by: Kevin Soto MD

## 2021-06-05 NOTE — TELEPHONE ENCOUNTER
Medication Request  Medication name:    Disp Refills Start End    levothyroxine (SYNTHROID, LEVOTHROID) 125 MCG tablet 90 tablet 4 12/19/2019     Sig: TAKE 1 TABLET DAILY    Sent to pharmacy as: levothyroxine 125 mcg tablet (SYNTHROID, LEVOTHROID)    E-Prescribing Status: Receipt confirmed by pharmacy (12/19/2019 12:55 PM CST)       Disp Refills Start End    atorvastatin (LIPITOR) 40 MG tablet 90 tablet 3 12/19/2019     Sig: TAKE 1 TABLET AT BEDTIME    Sent to pharmacy as: atorvastatin 40 mg tablet (LIPITOR)    E-Prescribing Status: Receipt confirmed by pharmacy (12/19/2019  6:21 AM CST)       Disp Refills Start End    pantoprazole (PROTONIX) 40 MG tablet 90 tablet 3 12/19/2019     Sig: TAKE 1 TABLET DAILY    Sent to pharmacy as: pantoprazole 40 mg tablet,delayed release (PROTONIX)    E-Prescribing Status: Receipt confirmed by pharmacy (12/19/2019  6:21 AM CST)       Disp Refills Start End    metoprolol succinate (TOPROL-XL) 25 MG 45 tablet 4 10/29/2019     Sig: TAKE ONE-HALF (1/2) TABLET DAILY    Sent to pharmacy as: metoprolol succinate ER 25 mg tablet,extended release 24 hr (TOPROL-XL)    E-Prescribing Status: Receipt confirmed by pharmacy (10/29/2019 10:53 AM CDT)        [facility]ed [facility]acy: [facility] [phone]  Reason for request: caller stated that he is now switching pharmacy and will for Carmen, Dov Narvaez, DO to call over to transfer the medications.   When did you use medication last?:  n/a  Patient offered appointment:  patient declined  Okay to leave a detailed message: yes  [phone]

## 2021-06-05 NOTE — TELEPHONE ENCOUNTER
Patient is due for follow up in office (I have not personally seen patient since Sept 2018) . 90 day refill sent to pharmacy on file. Please schedule follow up prior to next refill. Thank you.

## 2021-06-06 NOTE — PROGRESS NOTES
Preoperative Exam    Scheduled Procedure: Lung Surgery; ; [facility];   Surgery Date:  Date TBD  Surgery Location: [facility], fax [phone]    Surgeon:  Dr. Saunders    Assessment/Plan:     Problem List Items Addressed This Visit     Hypothyroidism     Recent TSH shows in normal range on levothyroxine 125 mcg daily.  Will continue at current dosing.         Hypertension     Below goal of 130/80 with the use of metoprolol.  Continue current dosing.         Esophageal Reflux     Controlled well on Protonix.  Patient does have slight decrease in GFR however risk-benefit ratio is on the side of continuing the Protonix         Coronary artery disease involving native coronary artery of native heart without angina pectoris     Tolerating statin well.  Will continue on current dosing.  Did discuss taking his metoprolol the morning of surgery at least 2 hours prior to arrival time with small sip of water, otherwise hold all other medications which he can restart the evening after the surgery or the next morning.         Malignant neoplasm of lower lobe of left lung (H)     Given his history, the results of the tumor board were discussed with patient.  He will be seeing the thoracic surgeon to discuss a possible wedge biopsy/removal for definitive diagnosis given his history.         Relevant Orders    Hemoglobin (Completed)    CKD (chronic kidney disease) stage 3, GFR 30-59 ml/min (H)     Has improved in the past few months and GFR now at 52 with creatinine at 1.38.  Continue current hydration.           Other Visit Diagnoses     Preoperative examination    -  Primary    Relevant Orders    Electrocardiogram Perform and Read (Completed)    Hemoglobin (Completed)            Surgical Procedure Risk: Intermediate (reported cardiac risk generally 1-5%)  Have you had prior anesthesia?: Yes  Have you or any family members had a previous anesthesia reaction:  No  Do you or any family members have a history of a clotting  or bleeding disorder?: No  Cardiac Risk Assessment: increased risk for major cardiac complications based on  myocardial infarction history    APPROVAL GIVEN to proceed with proposed procedure, without further diagnostic evaluation    Please Note:  Will take metoprolol the morning of surgery.     Functional Status: Independent  Patient plans to recover at home with family.     Subjective:      Pardeep Wilson is a 64 y.o. male who presents for a preoperative consultation.  Patient presented for medication check which is being accomplished.  But also in reviewing his oncology notes it is possible patient is going to need a lung biopsy in the near future.  As such we will plan as if he is having lung biopsy and prep for preop at this time.  Weight is been remaining stable.  Patient is staying active.    All other systems reviewed and are negative, other than those listed in the HPI.    Pertinent History  Do you have difficulty breathing or chest pain after walking up a flight of stairs: No  History of obstructive sleep apnea: No  Steroid use in the last 6 months: No  Frequent Aspirin/NSAID use: No  Prior Blood Transfusion: Yes: Tolerated previously  Prior Blood Transfusion Reaction: No  If for some reason prior to, during or after the procedure, if it is medically indicated, would you be willing to have a blood transfusion?:  There is no transfusion refusal.    Current Outpatient Medications   Medication Sig Dispense Refill     atorvastatin (LIPITOR) 40 MG tablet Take 1 tablet (40 mg total) by mouth at bedtime. 90 tablet 0     biotin 300 mcg Tab Take 300 mg by mouth daily.       fluocinonide (LIDEX) 0.05 % cream APPLY EXTERNALLY TO THE AFFECTED AREA TWICE DAILY SPARINGLY AS NEEDED 30 g 0     fluoride, sodium, (SF) 1.1 % Gel dental gel UTD TAT 1 application bedtime. UTD TAT (Patient taking differently: Take 1 application by mouth at bedtime. UTD TAT 1 application bedtime. UTD TAT ) 56 g 1     levothyroxine (SYNTHROID,  LEVOTHROID) 125 MCG tablet TAKE 1 TABLET DAILY 90 tablet 0     metoprolol succinate (TOPROL-XL) 25 MG TAKE ONE-HALF (1/2) TABLET DAILY 45 tablet 0     multivitamin (ONE A DAY) per tablet Take 2 tablets by mouth.       pantoprazole (PROTONIX) 40 MG tablet TAKE 1 TABLET DAILY 90 tablet 0     No current facility-administered medications for this visit.         Allergies   Allergen Reactions     Penicillins Rash       Patient Active Problem List   Diagnosis     Psoriasis     Anemia     Hypothyroidism     Hyperlipidemia     Hypertension     Esophageal Reflux     Coronary artery disease involving native coronary artery of native heart without angina pectoris     Malignant neoplasm of lower lobe of left lung (H)     Encounter for antineoplastic chemotherapy     Malignant neoplasm of left lung (H)     Renal insufficiency     CKD (chronic kidney disease) stage 3, GFR 30-59 ml/min (H)       Past Medical History:   Diagnosis Date     Anemia      Coronary artery disease     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 [place];  Service:      PAROTIDECTOMY       AK LAP,DIAGNOSTIC ABDOMEN N/A 11/7/2017    Procedure: DIAGNOSTIC LAPAROSCOPY,  LYSIS OF ADHESIONS, SMALL BOWEL RESECTION;  Surgeon: Brian Granados MD;  Location: [facility];  Service: General     SPLENECTOMY, TOTAL  1973     WISDOM TOOTH EXTRACTION         Social History     Socioeconomic History     Marital status:      Spouse name: Eliane     Number of children: 2     Years of education: 18     Highest education level:  Master's degree (e.g., MA, MS, Iain, MEd, MSW, ABAD)   Occupational History     Occupation:      Employer: OTHER     Comment: [employer]   Social Needs     Financial resource strain: Not on file     Food insecurity:     Worry: Not on file     Inability: Not on file     Transportation needs:     Medical: Not on file     Non-medical: Not on file   Tobacco Use     Smoking status: Never Smoker     Smokeless tobacco: Never Used   Substance and Sexual Activity     Alcohol use: No     Frequency: Never     Binge frequency: Never     Drug use: No     Sexual activity: Never     Partners: Female     Birth control/protection: Surgical     Comment: Partner = Hysterectomy   Lifestyle     Physical activity:     Days per week: Not on file     Minutes per session: Not on file     Stress: Not on file   Relationships     Social connections:     Talks on phone: Not on file     Gets together: Not on file     Attends Caodaism service: Not on file     Active member of club or organization: Not on file     Attends meetings of clubs or organizations: Not on file     Relationship status: Not on file     Intimate partner violence:     Fear of current or ex partner: Not on file     Emotionally abused: Not on file     Physically abused: Not on file     Forced sexual activity: Not on file   Other Topics Concern     Not on file   Social History Narrative     Not on file       Patient Care Team:  Dov Morales DO as PCP - General  Ricardo Iglesias MD as Physician (Ophthalmology)  Armin Suarez MD as Physician (Cardiology)  Lay Powers MD as Physician (Pulmonary Disease)  Kevin Soto MD as Physician (Hematology and Oncology)  Lady Luevano RN as Oncology Nurse Navigator (Hematology and Oncology)  Bry Saunders MD (Cardiovascular and Thoracic Surgery)  Joaquín Oglesby MD as Assigned PCP          Objective:     Vitals:    02/14/20 0807   BP: 128/72   Pulse: 72   Resp: 12   Temp:  "98.3  F (36.8  C)   TempSrc: Oral   Weight: 181 lb (82.1 kg)   Height: 5' 9\" (1.753 m)         Physical Exam:  Physical Exam  Vitals signs and nursing note reviewed.   Constitutional:       General: He is not in acute distress.     Appearance: Normal appearance.   HENT:      Head: Normocephalic and atraumatic.      Right Ear: Tympanic membrane, ear canal and external ear normal.      Left Ear: Tympanic membrane, ear canal and external ear normal.      Nose: Nose normal.      Mouth/Throat:      Pharynx: Oropharynx is clear. No posterior oropharyngeal erythema.   Neck:      Musculoskeletal: Normal range of motion.      Vascular: No carotid bruit.   Cardiovascular:      Rate and Rhythm: Normal rate and regular rhythm.      Pulses: Normal pulses.      Heart sounds: Normal heart sounds. No murmur.   Pulmonary:      Effort: Pulmonary effort is normal.      Breath sounds: Normal breath sounds. No wheezing.   Musculoskeletal:      Right lower leg: No edema.      Left lower leg: No edema.   Lymphadenopathy:      Cervical: No cervical adenopathy.   Skin:     Capillary Refill: Capillary refill takes less than 2 seconds.   Neurological:      Mental Status: He is alert and oriented to person, place, and time.   Psychiatric:         Mood and Affect: Mood normal.          Patient Instructions      Before Your Surgery       Call your surgeon if there is any change in your health. This includes signs of a cold or flu (such as a sore throat, runny nose, cough, rash or fever).       Do not smoke, drink alcohol or take over the counter medicine (unless your surgeon or primary care doctor tells you to) for the 24 hours before and after surgery.       If you take prescribed drugs: Follow your doctor s orders about which medicines to take and which to stop until after surgery.      Eating and drinking prior to surgery: follow the instructions from your surgeon.      Take a shower or bath the night before surgery. Use the soap your surgeon " gave you to gently clean your skin. If you do not have soap from your surgeon, use your regular soap. Do not shave or scrub the surgery site. Wear clean pajamas and have clean sheets on your bed.       Hold all supplements, aspirin and NSAIDs for 7 days prior to surgery.    Follow your surgeon's direction on when to stop eating and drinking prior to surgery.    Your surgeon will be managing your pain after your surgery.      Remove all jewelry and metal piercings before your surgery.     Remove nail polish from fingers before surgery.      Hold all medications the day of surgery except your metoprolol which you should take with small sip of water at least 2 hours prior to arrival time.        Labs:  Recent Results (from the past 24 hour(s))   Electrocardiogram Perform and Read    Collection Time: 02/14/20  8:40 AM   Result Value Ref Range    SYSTOLIC BLOOD PRESSURE 128 mmHg    DIASTOLIC BLOOD PRESSURE 72 mmHg    VENTRICULAR RATE 74 BPM    ATRIAL RATE 74 BPM    P-R INTERVAL 170 ms    QRS DURATION 80 ms    Q-T INTERVAL 388 ms    QTC CALCULATION (BEZET) 430 ms    P Axis 72 degrees    R AXIS 46 degrees    T AXIS 35 degrees    MUSE DIAGNOSIS       Normal sinus rhythm  Minimal voltage criteria for LVH, may be normal variant  Borderline ECG  When compared with ECG of 06-MAY-2018 13:53,  No significant change was found  Confirmed by CRESCENCIO MOORE MD LOC:[facility] ([id number]) on 2/14/2020 2:18:34 PM     Hemoglobin    Collection Time: 02/14/20  9:10 AM   Result Value Ref Range    Hemoglobin 12.7 (L) 14.0 - 18.0 g/dL     Results for orders placed or performed in visit on 02/04/19   Basic Metabolic Panel  (add-ons/treatment plan)   Result Value Ref Range    Sodium 139 136 - 145 mmol/L    Potassium 5.2 (H) 3.5 - 5.0 mmol/L    Chloride 106 98 - 107 mmol/L    CO2 28 22 - 31 mmol/L    Anion Gap, Calculation 5 5 - 18 mmol/L    Glucose 89 70 - 125 mg/dL    Calcium 8.8 8.5 - 10.5 mg/dL    BUN 35 (H) 8 - 22 mg/dL    Creatinine 1.51 (H) 0.70 - 1.30  mg/dL    GFR MDRD Af Amer 57 (L) >60 mL/min/1.73m2    GFR MDRD Non Af Amer 47 (L) >60 mL/min/1.73m2        Lab Results   Component Value Date    TSH 0.45 01/28/2020         Immunization History   Administered Date(s) Administered     INFLUENZA,RECOMBINANT,INJ,PF QUADRIVALENT 18+YRS 09/28/2018     Influenza, inj, historic,unspecified 09/26/2012     Influenza,seasonal quad, PF, =/> 6months 10/06/2017, 10/28/2019     Influenza,seasonal,quad inj =/> 6months 09/14/2016     Pneumo Polysac 23-V 10/06/2017           Electronically signed by oDv Morales DO 02/14/20 8:18 AM

## 2021-06-06 NOTE — TELEPHONE ENCOUNTER
I spoke with Raven at the [facility] and she will call Pardeep this afternoon to schedule his consult with Dr. Saunders.  Noted Pardeep is scheduled for a consult with Dr. Saunders at the  on 2/19.

## 2021-06-06 NOTE — PROGRESS NOTES
Dr. Soto requests that path additional testing be done on Pardeep's latest wedge resection. This was done at the [facility], 2/28/20.    I call their pathology department to get this ordered. I spoke specifically with Dr. Christianson, [phone]. He will order this on the specimen and get it taken care of.     Case ID: [id number]  Order: [facility] Panel

## 2021-06-06 NOTE — TELEPHONE ENCOUNTER
Melecio left a message after clinic hours yesterday stating he hasn't received the surgery letter from the  and his surgery is tomorrow.    I called Melecio back this morning and encouraged him to call the OR  directly at [phone] as she could provide details and email the letter if needed.  I asked that he give me a call back if he needs further assistance.  I provided my direct number.

## 2021-06-06 NOTE — TELEPHONE ENCOUNTER
I called Melecio to follow up on his appt with Dr. Saunders yesterday but he could not be reached.  I left a message inviting calls.

## 2021-06-06 NOTE — TELEPHONE ENCOUNTER
"Pardeep returned my call.  He said his appt with Dr. Saunders went well and he is planning to move forward with surgery.  He understands the concerning nodules are too small in size to get a biopsy at this time and he just \"wants them out.\"  He had PFTs done today.  Pardeep is eager for a surgery date and was hoping to get scheduled next week.  I connected with Dr. Saunders's staff at the  and was informed it takes at least a week from the date of consult to schedule.  He can call the OR  at [phone] late next week if he hasn't heard from them.  I called Pardeep back and relayed the above.  He is currently scheduled for f/u with Dr. Soto May 8th and he's wondering if that' still the plan.  I told Melecio I will notify Dr. Soto of his surgery date and be back in touch with him on the f/u plan.  He was appreciative of the call and he has no further questions today.  "

## 2021-06-06 NOTE — TELEPHONE ENCOUNTER
"Called patient to relay tumor board discussion. He has been \"sitting on pins and needles for a week\" waiting for a decision. Nodules are 6mm in size and on the small size for reliable biopsy. Wedge resection offered by Dr. Saunders based on concern level of the patient.     He wants to pursue this and meet with him as soon as possible. I explained I can't give him many contingency plans until we know what these nodules are. Remained anxious and contineud to ask similar questions.    Will refer to Dr. Saunders and clinic will call today to arrange.    Lay Powers MD  Electronically signed on 2/13/2020 1:23 PM      "

## 2021-06-06 NOTE — TELEPHONE ENCOUNTER
Melecio called today requesting to schedule a f/u appt with Dr. Brittany chavez.  He had a wedge resection last Friday and the U with Dr. Saunders and was informed the preliminary results show cancer.  I connected with Dr. Soto and he would like to see Melecio in a couple of weeks.  The final path report is pending at this time and Dr. Soto will be out of the office through 3/16. I spoke with Melecio and he is comfortable with this plan.  I spoke with Gaviota in scheduling and she will call Melecio to schedule a f/u with Dr. Soto on 3/17.

## 2021-06-06 NOTE — TELEPHONE ENCOUNTER
Talked about his case at tumor board.  Dr. Saunders the surgeon thought he could possibly wedge out 1 of the nodules to evaluate and sent for path.  He will be set up to see the surgeon.  The nurse navigator Lady will contact patient

## 2021-06-06 NOTE — PATIENT INSTRUCTIONS - HE
Before Your Surgery       Call your surgeon if there is any change in your health. This includes signs of a cold or flu (such as a sore throat, runny nose, cough, rash or fever).       Do not smoke, drink alcohol or take over the counter medicine (unless your surgeon or primary care doctor tells you to) for the 24 hours before and after surgery.       If you take prescribed drugs: Follow your doctor s orders about which medicines to take and which to stop until after surgery.      Eating and drinking prior to surgery: follow the instructions from your surgeon.      Take a shower or bath the night before surgery. Use the soap your surgeon gave you to gently clean your skin. If you do not have soap from your surgeon, use your regular soap. Do not shave or scrub the surgery site. Wear clean pajamas and have clean sheets on your bed.       Hold all supplements, aspirin and NSAIDs for 7 days prior to surgery.    Follow your surgeon's direction on when to stop eating and drinking prior to surgery.    Your surgeon will be managing your pain after your surgery.      Remove all jewelry and metal piercings before your surgery.     Remove nail polish from fingers before surgery.      Hold all medications the day of surgery except your metoprolol which you should take with small sip of water at least 2 hours prior to arrival time.

## 2021-06-06 NOTE — TELEPHONE ENCOUNTER
RN cannot approve Refill Request    RN can NOT refill this medication med is not covered by policy/route to provider. Last office visit: 2/14/2020 Dov Morales DO Last Physical: 9/28/2018 Last MTM visit: Visit date not found Last visit same specialty: 2/14/2020 Dov Morales DO.  Next visit within 3 mo: Visit date not found  Next physical within 3 mo: Visit date not found      Shira Walsh, [facility] Triage/Med Refill 2/22/2020    Requested Prescriptions   Pending Prescriptions Disp Refills     fluocinonide (LIDEX) 0.05 % cream [Pharmacy Med Name: FLUOCINONIDE 0.05% CREAM 30GM] 30 g 0     Sig: APPLY EXTERNALLY TO THE AFFECTED AREA TWICE DAILY SPARINGLY AS NEEDED       There is no refill protocol information for this order

## 2021-06-07 NOTE — TELEPHONE ENCOUNTER
Melecio called to verify we have the correct phone number on file for him as he is scheduled for a tele visit with Dr. Soto tomorrow.  I relayed that [phone] is the only number we have on file for him and he stated that is his preferred number.    Melecio his currently working from home which is a change for him.  He's adjusting to it and has found he appreciates not having the long commute.    He has no additional questions at this time.  I invited calls.

## 2021-06-07 NOTE — PROGRESS NOTES
"Pardeep Wilson is a 64 y.o. male who is being evaluated via a billable telephone visit regarding lung cancer.      The patient has been notified of following:     \"This telephone visit will be conducted via a call between you and your physician/provider. We have found that certain health care needs can be provided without the need for a physical exam.  This service lets us provide the care you need with a short phone conversation.  If a prescription is necessary we can send it directly to your pharmacy.  If lab work is needed we can place an order for that and you can then stop by our lab to have the test done at a later time.    Telephone visits are billed at different rates depending on your insurance coverage. During this emergency period, for some insurers they may be billed the same as an in-person visit.  Please reach out to your insurance provider with any questions.    If during the course of the call the physician/provider feels a telephone visit is not appropriate, you will not be charged for this service.\"    Patient has given verbal consent to a Telephone visit? Yes    Phone call duration: 5 minutes    Shira Abdullahi RN      "

## 2021-06-07 NOTE — PROGRESS NOTES
[facility] Hematology and Oncology Progress Note    Patient: Pardeep Wilson  MRN: [id number]  Date of Service: 04/14/2020        Reason for Visit    Chief Complaint   Patient presents with     HE Cancer     Malignant neoplasm of lower lobe of left lung       Assessment and Plan    Current and metastatic mucinous lung adenocarcinoma, status post wedge resection, February 28, 2020  T4 N0 M0, stage III a mucinous left lung adenocarcinoma status post left lower lobe resection on November 1, 2018  Tumor 9 cm with 3.5 cm nodule, grade 2 out of 4 mucinous adenocarcinoma  Tumor is PDL 1-, no EGFR mutation.  No rearrangement of ALK, evaluation on the Alchemist trial  Tumor negative for ALK, ROS 1, RET, NTRK1, MET e14, EGFR MUTATIONS, April 2020  Remote history of stage I a right axillary Hodgkin's disease status post mantle field radiation and splenectomy about 45 years ago  Right parotid cancer with lymph node involvement status post surgery and adjuvant radiation for 6 weeks in 1991  Coronary artery disease  Elevated BUN and creatinine  New right lung pulmonary nodules  Left pleural effusion/enhancement, 4/2020    PET reviewed with radiology and left pleural changes are not new.  No other evidence of cancer at this time.  Brain MRI is negative.  Recommend observation with repeat PET in 2 months.  Patient agrees to this.    No benefit to chemotherapy at this time.    Additional pathologic studies are negative for targetable mutations.  If treatment is needed, will need chemotherapy with immunotherapy.    PLAN:    As above        Measurable disease: CT of the chest  Current therapy: Observation      Treatment history:     Wedge resection of right upper lobe nodule, February 28, 2020    Cisplatin and Alimta adjuvant chemotherapy, for 4 cycles: Day 1 cycle 4, February 15, 2019  First cycle  started December 14, 2018    Patient underwent mediastinoscopy, bronchoscopy, thoracoscopic surgery and left lower lobectomy on November  1, 2018      Cancer Staging  Malignant neoplasm of lower lobe of left lung (H)  Staging form: Lung, AJCC 8th Edition  - Clinical stage from 10/15/2018: Stage IIIA (cT4, cN0, cM0) - Signed by Melody Segovia CNP on 12/21/2018      ECOG Performance   ECOG Performance Status: 1    Distress Assessment  Distress Assessment Score: 2    Pain           Problem List    1. Malignant neoplasm of lower lobe of left lung (H)  NM PET CT Skull to Mid Thigh        CC: Dov Morales,     ______________________________________________________________________________    History of Present Illness    Mr. Pardeep Wilson was spoken to for a telephone visit.  Doing fine with no new symptoms.  Reviewed PET and brain MRI results.    Pain Status  Currently in Pain: No/denies    Review of Systems    Constitutional  Constitutional (WDL): All constitutional elements are within defined limits  Neurosensory  Neurosensory (WDL): Exceptions to WDL  Peripheral Sensory Neuropathy: Asymptomatic, loss of deep tendon reflexes or paresthesia(Numbness to feet.)  Cardiovascular  Cardiovascular (WDL): All cardiovascular elements are within defined limits  Pulmonary  Respiratory (WDL): Within Defined Limits  Gastrointestinal  Gastrointestinal (WDL): Exceptions to WDL  Dysphagia: Symptomatic, able to eat regular diet(Chronic.)  Genitourinary  Genitourinary (WDL): All genitourinary elements are within defined limits  Integumentary  Integumentary (WDL): Exceptions to WDL  Pruritus: Mild or localized, topical intervention indicated  Patient Coping  Patient Coping: Accepting  Distress Assessment  Distress Assessment Score: 2  Accompanied by       Past History  Past Medical History:   Diagnosis Date     Anemia      Coronary artery disease     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     Esophageal reflux      Hodgkin's lymphoma (H)     s/p radiation to the mediastinum at age 17     Hyperlipemia      Hypertension      Hypothyroidism   "    Psoriasis          Past Surgical History:   Procedure Laterality Date     CORONARY ANGIOPLASTY      Stent Placement     EYE SURGERY Bilateral     Cataracts     MEDIASTINOSCOPY N/A 11/1/2018    Procedure: MEDIASTINOSCOPY;  Surgeon: Bry Saunders MD;  Location: [facility];  Service:      PAROTIDECTOMY       NM LAP,DIAGNOSTIC ABDOMEN N/A 11/7/2017    Procedure: DIAGNOSTIC LAPAROSCOPY,  LYSIS OF ADHESIONS, SMALL BOWEL RESECTION;  Surgeon: Brian Granados MD;  Location: [facility] [place];  Service: General     SPLENECTOMY, TOTAL  1973     WISDOM TOOTH EXTRACTION         Physical Exam    Recent Vitals 4/1/2020   Height 5' 9\"   Weight 182 lbs   BSA (m2) 2.01 m2   BP -   Pulse -   Temp -   Temp src -   SpO2 -   Some recent data might be hidden       No physical exam done            Lab Results    No results found for this or any previous visit (from the past 168 hour(s)).    Imaging    Mr Brain With Without Contrast    Result Date: 4/1/2020  EXAM: MR BRAIN W WO CONTRAST LOCATION: [facility] DATE/TIME: 4/1/2020 1:12 PM INDICATION: Staging recurrent lung cancer COMPARISON: 10/31/2018 CONTRAST: Gadavist 8 mL TECHNIQUE: Routine multiplanar multisequence head MRI without and with intravenous contrast. FINDINGS: INTRACRANIAL CONTENTS: No evidence for acute or subacute infarction based on diffusion-weighted imaging. No mass, acute hemorrhage, or extra-axial fluid collections. Scattered nonspecific T2/FLAIR hyperintensities within the cerebral white matter most consistent with mild chronic microvascular ischemic change. Normal ventricles and sulci, without hydrocephalus. Normal position of the cerebellar tonsils. No pathologic brain parenchymal or meningeal contrast enhancement. SELLA: No abnormality accounting for technique. OSSEOUS STRUCTURES/SOFT TISSUES: Normal marrow signal. The major intracranial vascular flow voids are maintained. ORBITS: Prior bilateral cataract surgery. Visualized portions of " the orbits are otherwise unremarkable. SINUSES/MASTOIDS: Mild mucosal thickening scattered about the paranasal sinuses. No middle ear or mastoid effusion.     1.  No evidence of intracranial metastatic disease. 2.  No acute/subacute infarction, intracranial hemorrhage, mass effect, or hydrocephalus. 3.  Presumed sequelae of mild chronic small vessel ischemic disease. 4.  Mild paranasal sinus disease.     Nm Pet Ct Skull To Mid Thigh    Result Date: 4/1/2020  EXAM: NM PET CT SKULL TO MID THIGH LOCATION: [facility] DATE/TIME: 4/1/2020 12:35 PM INDICATION: Subsequent treatment planning and restaging for malignant neoplasm of lower lobe, left bronchus or lung. Prior left lower lobectomy in 2018 with recent right upper lobe wedge resection revealing mucinous lung adenocarcinoma. 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 COMPARISON: Thoracic CT dated 02/04/2020 and FDG PET/CT dated 10/11/2019 TECHNIQUE: Serum glucose level 71 mg/dL. One hour post intravenous administration of 7.6 mCi F-18 FDG, PET imaging was performed from the skull base to the mid thighs utilizing attenuation correction with concurrent axial CT and PET/CT image fusion. Dose  reduction techniques were used. FINDINGS: FDG avid small pleural effusion/pleural thickening (max SUV 3.7) suspicious for neoplastic involvement. The remaining FDG uptake is physiologic from the skull base to mid thigh. Mild senescent intracranial changes. Postoperative change of the bilateral lenses. Mild carotid artery bifurcation calcification. Severe coronary artery calcium/stenting. Biapical scarring. Interval right upper lobectomy. Left lower lobectomy. Right renal cyst. Splenectomy. Pelvic phleboliths. Multilevel degenerative changes of the spine.     Findings suspicious for isolated neoplastic involvement of the left pleura.        Signed by: Kvein Soto MD

## 2021-06-07 NOTE — TELEPHONE ENCOUNTER
Refill Approved    Rx renewed per Medication Renewal Policy. Medication was last renewed on 2/7/20.    Christy Armstrong, [facility] Triage/Med Refill 3/24/2020     Requested Prescriptions   Pending Prescriptions Disp Refills     pantoprazole (PROTONIX) 40 MG tablet [Pharmacy Med Name: PANTOPRAZOLE SOD 40MG EC TABLET] 90 tablet 0     Sig: TAKE 1 TABLET BY MOUTH  DAILY       GI Medications Refill Protocol Passed - 3/23/2020 11:56 AM        Passed - PCP or prescribing provider visit in last 12 or next 3 months.     Last office visit with prescriber/PCP: 2/14/2020 Dov Morales DO OR same dept: 2/14/2020 Dov Morales DO OR same specialty: 2/14/2020 Dov Morales DO  Last physical: 9/28/2018 Last MTM visit: Visit date not found   Next visit within 3 mo: Visit date not found  Next physical within 3 mo: Visit date not found  Prescriber OR PCP: Dov Morales DO  Last diagnosis associated with med order: 1. Esophageal reflux  - pantoprazole (PROTONIX) 40 MG tablet [Pharmacy Med Name: PANTOPRAZOLE SOD 40MG EC TABLET]; TAKE 1 TABLET BY MOUTH  DAILY  Dispense: 90 tablet; Refill: 0    2. Coronary atherosclerosis  - atorvastatin (LIPITOR) 40 MG tablet [Pharmacy Med Name: ATORVASTATIN  40MG  TAB]; TAKE 1 TABLET BY MOUTH AT  BEDTIME  Dispense: 90 tablet; Refill: 0    3. Congenital hypothyroidism without goiter  - levothyroxine (SYNTHROID, LEVOTHROID) 125 MCG tablet [Pharmacy Med Name: LEVOTHYROXINE  125MCG  TAB]; TAKE 1 TABLET BY MOUTH  DAILY  Dispense: 90 tablet; Refill: 0    If protocol passes may refill for 12 months if within 3 months of last provider visit (or a total of 15 months).                atorvastatin (LIPITOR) 40 MG tablet [Pharmacy Med Name: ATORVASTATIN  40MG  TAB] 90 tablet 0     Sig: TAKE 1 TABLET BY MOUTH AT  BEDTIME       Statins Refill Protocol (Hmg CoA Reductase Inhibitors) Passed - 3/23/2020 11:56 AM        Passed - PCP or prescribing provider visit in  past 12 months      Last office visit with prescriber/PCP: 2/14/2020 Dov Morales DO OR same dept: 2/14/2020 Dov Morales DO OR same specialty: 2/14/2020 Dov Morales DO  Last physical: 9/28/2018 Last MTM visit: Visit date not found   Next visit within 3 mo: Visit date not found  Next physical within 3 mo: Visit date not found  Prescriber OR PCP: Dov Morales DO  Last diagnosis associated with med order: 1. Esophageal reflux  - pantoprazole (PROTONIX) 40 MG tablet [Pharmacy Med Name: PANTOPRAZOLE SOD 40MG EC TABLET]; TAKE 1 TABLET BY MOUTH  DAILY  Dispense: 90 tablet; Refill: 0    2. Coronary atherosclerosis  - atorvastatin (LIPITOR) 40 MG tablet [Pharmacy Med Name: ATORVASTATIN  40MG  TAB]; TAKE 1 TABLET BY MOUTH AT  BEDTIME  Dispense: 90 tablet; Refill: 0    3. Congenital hypothyroidism without goiter  - levothyroxine (SYNTHROID, LEVOTHROID) 125 MCG tablet [Pharmacy Med Name: LEVOTHYROXINE  125MCG  TAB]; TAKE 1 TABLET BY MOUTH  DAILY  Dispense: 90 tablet; Refill: 0    If protocol passes may refill for 12 months if within 3 months of last provider visit (or a total of 15 months).                levothyroxine (SYNTHROID, LEVOTHROID) 125 MCG tablet [Pharmacy Med Name: LEVOTHYROXINE  125MCG  TAB] 90 tablet 0     Sig: TAKE 1 TABLET BY MOUTH  DAILY       Thyroid Hormones Protocol Passed - 3/23/2020 11:56 AM        Passed - Provider visit in past 12 months or next 3 months     Last office visit with prescriber/PCP: 2/14/2020 Dov Morales DO OR same dept: 2/14/2020 Dov Morales DO OR same specialty: 2/14/2020 Dov Morales DO  Last physical: 9/28/2018 Last MTM visit: Visit date not found   Next visit within 3 mo: Visit date not found  Next physical within 3 mo: Visit date not found  Prescriber OR PCP: Dov Morales DO  Last diagnosis associated with med order: 1. Esophageal reflux  - pantoprazole (PROTONIX) 40 MG  tablet [Pharmacy Med Name: PANTOPRAZOLE SOD 40MG EC TABLET]; TAKE 1 TABLET BY MOUTH  DAILY  Dispense: 90 tablet; Refill: 0    2. Coronary atherosclerosis  - atorvastatin (LIPITOR) 40 MG tablet [Pharmacy Med Name: ATORVASTATIN  40MG  TAB]; TAKE 1 TABLET BY MOUTH AT  BEDTIME  Dispense: 90 tablet; Refill: 0    3. Congenital hypothyroidism without goiter  - levothyroxine (SYNTHROID, LEVOTHROID) 125 MCG tablet [Pharmacy Med Name: LEVOTHYROXINE  125MCG  TAB]; TAKE 1 TABLET BY MOUTH  DAILY  Dispense: 90 tablet; Refill: 0    If protocol passes may refill for 12 months if within 3 months of last provider visit (or a total of 15 months).             Passed - TSH on file in past 12 months for patient age 12 & older     TSH   Date Value Ref Range Status   01/28/2020 0.45 0.30 - 5.00 uIU/mL Final

## 2021-06-07 NOTE — TELEPHONE ENCOUNTER
Pardeep called today stating his tests and appt at [place] that were scheduled for this week have been canceled due to the COVID-19 situation.  Appts will not get rescheduled for three months at the earliest.  Dr. Soto recommended a brain MRI and CT for restaging at Pardeep's last appt.  Pardeep is asking if Dr. Soto could order the scans so he can have them done at [facility] as soon as possible.  He is requesting a f/u appt with Dr. Soto as well.  Message sent to Dr. Soto and LO Silva, today.  I told Pardeep I would be back in touch with him after I connect with Dr. Soto.  Dr. Soto placed orders for MRI, PET/CT and additional path testing and would like a tele visit in two weeks.  I notified Pardeep of the plan of care and assisted with scheduling his scans.  He has been scheduled for a tele visit on 4/14.

## 2021-06-08 NOTE — PROGRESS NOTES
[facility] Hematology and Oncology Progress Note    Patient: Pardeep Wilson  MRN: [id number]  Date of Service: 06/15/2020        Reason for Visit    Chief Complaint   Patient presents with     HE Cancer     Malignant neoplasm of lower lobe of left lung        Assessment and Plan  Cancer Staging  Malignant neoplasm of lower lobe of left lung (H)  Staging form: Lung, AJCC 8th Edition  - Clinical stage from 10/15/2018: Stage IIIA (cT4, cN0, cM0) - Signed by Melody Segovia CNP on 12/21/2018  ALK-, EGFR-, BRAF-,ROS1-, RET-, NTRK-, MET-      1. Lung cancer, originally Stage IIIa in 2018. Recurrent disease in 2019/2020 with stage 4: had wedge resection in Feb 2020 at [facility]. Continues to have multiple nodule in right upper lobe, ?pleural mets?. Here today to start palliative chemotherapy. This was felt to be the best choice after discussing his case at tumor board. He will start Carboplatin, Taxol and Keytruda.  We signed a consent today. They felt educated. They understands the risks and benefits of treatment.  they understand how to use the post medications for nausea.  they know the side effects include, but are not limited to: alopecia, diarrhea/constipation, nausea, vomiting, fatigue/weakness, myelosuppression, allergic reaction, peripheral neuropathy, taste alterations, poor appetite. Risk of immunotherapy include: hepatitis, thyroiditis, colitis, pulmonary toxicity, rash and dry skin, myalgias.   They understand this is with palliative intent. He will return in 10 days for a midcycle check. We will then have him return in 3 weeks for cycle 2. We will reimage after that cycle 2. The plan will be to give 4 cycles of treatment. If cancer is stable or improved, we will the have him do maintenance keytruda.     2. Mild Anemia: likely from chronic disease, past chemo and abnormal kidney function. He is asymptomatic       ECOG Performance   ECOG Performance Status: 0     Distress Assessment  Distress  Assessment Score: Unable to rate(diagnosis related)    Pain  Currently in Pain: No/denies      Problem List    1. Metastatic primary lung cancer, left (H)  Other (See Scheduling Instructions)    CANCELED: CC OFFICE VISIT LONG   2. Encounter for antineoplastic chemotherapy  Other (See Scheduling Instructions)    CANCELED: CC OFFICE VISIT LONG        ______________________________________________________________________________    History of Present Illness    Measurable Disease: CT, PET    Current Treatment: He will start chemo today with Carboplatin, Taxol, Keytruda    Past Treatment:   Wedge resection of right upper lobe nodule, February 28, 2020     Cisplatin and Alimta adjuvant chemotherapy, for 4 cycles: Day 1 cycle 4, February 15, 2019  First cycle  started December 14, 2018     Patient underwent mediastinoscopy, bronchoscopy, thoracoscopic surgery and left lower lobectomy on November 1, 2018     Interim History:   Pt is here today to start chemo again. He states he is a little anxious to start. He otherwise is feeling fine. Recovered well from surgery in February. Energy is good. Mild neuropathy in feet. Worried about kidney function. Wife a little anxious with Covid infection and him being on chemo.     Pain Status  Currently in Pain: No/denies    Review of Systems    Oncology Nurse Assessment/CMA Intake: Constitutional  Constitutional (WDL): All constitutional elements are within defined limits  Neurosensory  Neurosensory (WDL): Exceptions to WDL  Peripheral Motor Neuropathy: Concerns(feet)  Eye   Eye Disorder (WDL): All eye disorder elements are within defined limits  Ear  Ear Disorder (WDL): All ear disorder elements are within defined limits  Cardiovascular  Cardiovascular (WDL): All cardiovascular elements are within defined limits  Pulmonary  Respiratory (WDL): Within Defined Limits  Gastrointestinal  Gastrointestinal (WDL): All gastrointestinal elements are within defined  "limits  Genitourinary  Genitourinary (WDL): All genitourinary elements are within defined limits  Lymphatic  Lymph (WDL): All lymph disorder elements are within defined limits  Musculoskeletal and Connective Tissue  Musculoskeletal and Connetive Tissue Disorders (WDL): All Musculoskeletal and Connetive Tissue Disorder elements are within defined limits  Integumentary  Integumentary (WDL): All integumentary elements are within defined limits  Patient Coping  Patient Coping: Accepting;Open/discussion  Accompanied by  Accompanied by: Family Member(wife Eliane)  Oral Chemo Adherence         Past History  Past Medical History:   Diagnosis Date     Anemia      Coronary artery disease     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 Cooperative in conversation. In clinic alone due to visitor restrictions. Wife on the phone  RESP: Regular respiratory rate. No expiratory wheezes   NEURO: non focal. Alert and oriented x3.   PSYCH: within normal limits. No depression or anxiety.  SKIN: exposed skin is dry intact.     Lab Results    Recent Results (from the past 168 hour(s))   Magnesium   Result Value Ref Range    Magnesium 1.8 1.8 - 2.6 mg/dL   Comprehensive Metabolic Panel   Result Value Ref Range    Sodium 142 136 - 145 mmol/L    Potassium 4.5 3.5 - 5.0 mmol/L    Chloride 107 98 - 107 mmol/L    CO2 28 22 - 31 mmol/L    Anion Gap, Calculation 7 5 - 18 mmol/L    Glucose 77 70 - 125 mg/dL    BUN 26 (H) 8 - 22 mg/dL    Creatinine 1.45 (H) 0.70 - 1.30 mg/dL    GFR MDRD Af Amer 59 (L) >60 mL/min/1.73m2 "    GFR MDRD Non Af Amer 49 (L) >60 mL/min/1.73m2    Bilirubin, Total 1.0 0.0 - 1.0 mg/dL    Calcium 9.6 8.5 - 10.5 mg/dL    Protein, Total 7.3 6.0 - 8.0 g/dL    Albumin 3.7 3.5 - 5.0 g/dL    Alkaline Phosphatase 112 45 - 120 U/L    AST 21 0 - 40 U/L    ALT 13 0 - 45 U/L   HM1 (CBC with Diff)   Result Value Ref Range    WBC 8.4 4.0 - 11.0 thou/uL    RBC 4.25 (L) 4.40 - 6.20 mill/uL    Hemoglobin 13.1 (L) 14.0 - 18.0 g/dL    Hematocrit 39.4 (L) 40.0 - 54.0 %    MCV 93 80 - 100 fL    MCH 30.8 27.0 - 34.0 pg    MCHC 33.2 32.0 - 36.0 g/dL    RDW 15.4 (H) 11.0 - 14.5 %    Platelets 386 140 - 440 thou/uL    MPV 10.4 8.5 - 12.5 fL    Neutrophils % 42 (L) 50 - 70 %    Lymphocytes % 39 20 - 40 %    Monocytes % 13 (H) 2 - 10 %    Eosinophils % 6 0 - 6 %    Basophils % 1 0 - 2 %    Neutrophils Absolute 3.5 2.0 - 7.7 thou/uL    Lymphocytes Absolute 3.3 0.8 - 4.4 thou/uL    Monocytes Absolute 1.1 (H) 0.0 - 0.9 thou/uL    Eosinophils Absolute 0.5 (H) 0.0 - 0.4 thou/uL    Basophils Absolute 0.1 0.0 - 0.2 thou/uL       Imaging    Ct Chest High Resolution Without Contrast    Result Date: 6/5/2020  EXAM: CT CHEST HIGH RESOLUTION WO CONTRAST LOCATION: [facility] DATE/TIME: 6/5/2020 2:33 PM INDICATION: Restage lung cancer, ? pulmonary nodules COMPARISON: 2/4/20. TECHNIQUE: High resolution images were obtained through the chest during inspiration with select expiratory views. Prone imaging was performed. Multiplanar reformats were obtained. Dose reduction techniques were used. CONTRAST: None. FINDINGS: LUNGS AND PLEURA: Interval right upper lobe wedge resection. Prior left lower lobectomy again seen. No new soft tissue at the suture line. No tracheobronchomalacia or air trapping. A 9 mm x 9 mm nodule with peripheral spiculation adjacent to the right minor fissure was previously 6 mm x 6 mm (series 5 image 57). There is a 7 mm x 3 mm left upper lobe nodule which is new (series 5 image 135). Apical scarring is present. There is  scarring in the medial left upper lobe. Loculated left pleural fluid has not changed. MEDIASTINUM/AXILLAE: Normal size of the heart. Normal esophagus. No thoracic lymphadenopathy.  There is atherosclerotic disease including coronary artery calcification. UPPER ABDOMEN: A low-attenuation right renal lesion likely represents a simple cyst and does not require follow-up. There are metallic objects in the upper abdomen which are likely embolization coils. MUSCULOSKELETAL: Unremarkable.     1.  Interval right upper lobe wedge resection. Older left lower lobectomy. 2.  Increasing size of a right upper lobe pulmonary nodule adjacent to the minor fissure. This is concerning for increased malignancy. A new left upper lobe nodule could also represent malignancy. PET/CT may be of benefit. Recommend attention on follow-up imaging. 3.  Atherosclerotic disease including coronary artery calcification.     Nm Pet Ct Skull To Mid Thigh    Result Date: 6/1/2020  EXAM: NM PET CT SKULL TO MID THIGH LOCATION: [facility] [place] DATE/TIME: 6/1/2020 10:20 AM INDICATION: Subsequent treatment planning and restaging for malignant neoplasm of lower lobe, left bronchus or lung. Status post left lower lobectomy in 2018 with recent right upper lobe wedge resection revealing mucinous lung adenocarcinoma. 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 COMPARISON: FDG PET/CT dated 04/01/2020 TECHNIQUE: Serum glucose level 95 mg/dL. One hour post intravenous administration of 8.9 mCi F-18 FDG, PET imaging was performed from the skull base to the mid thighs utilizing attenuation correction with concurrent axial CT and PET/CT image fusion. Dose  reduction techniques were used. FINDINGS: Slight interval increase in size of the 0.9 x 0.7 cm (previously measured 0.7 x 0.5 cm) connie-fissural nodule along the lateral aspect of the right upper lobe wedge resection which now demonstrates mild FDG uptake (Max SUV 1.6).  "Redemonstrated mildly FDG avid small pleural effusion/pleural thickening (max SUV 3.7, previously 3.7). The remaining FDG uptake is physiologic from the skull base to mid thigh. Mild senescent intracranial changes. Postoperative change of the bilateral lenses. Mild carotid artery bifurcation calcification. Severe coronary artery calcium/stenting. Biapical scarring. Right upper lobectomy. Left lower lobectomy. Right renal cyst. Splenectomy. Pelvic phleboliths. Multilevel degenerative changes of the spine.     1. Slight interval increase in size of a connie-fissural nodule along the lateral aspect of the right upper lobe wedge resection with development of associated mild FDG uptake. The exact etiology is indeterminate but close attention on follow-up examinations is recommended. 2. Persistently FDG avid small left pleural effusion/pleural thickening.    Pardeep Wilson is a 64 y.o. male who is being evaluated via a billable video visit.      The patient has been notified of following:     \"This video visit will be conducted via a call between you and your physician/provider. We have found that certain health care needs can be provided without the need for an in-person physical exam.  This service lets us provide the care you need with a video conversation.  If a prescription is necessary we can send it directly to your pharmacy.  If lab work is needed we can place an order for that and you can then stop by our lab to have the test done at a later time.    Video visits are billed at different rates depending on your insurance coverage. Please reach out to your insurance provider with any questions.    If during the course of the call the physician/provider feels a video visit is not appropriate, you will not be charged for this service.\"    Patient has given verbal consent to a Video visit? Yes    Will anyone else be joining your video visit? Yes: Wife is on telephone. How would they like to receive their invitation? " Text to cell phone: nurse placed the phone in exam room on speaker.         Video Start Time: 8:39    Additional provider notes: see above      Video-Visit Details    Type of service:  Video Visit    Video End Time (time video stopped): 9:02  Originating Location (pt. Location): cancer clinic    Distant Location (provider location):  [facility] CANCER CARE AND HEMATOLOGY     Platform used for Video Visit: Amie Segovia CNP      Signed by: Melody Segovia CNP

## 2021-06-08 NOTE — PROGRESS NOTES
"Pardeep Wilson is a 64 y.o. male who is being evaluated via a billable video visit regarding Malignant neoplasm of lower lobe of left lung.    The patient has been notified of following:     \"This video visit will be conducted via a call between you and your physician/provider. We have found that certain health care needs can be provided without the need for an in-person physical exam.  This service lets us provide the care you need with a video conversation.  If a prescription is necessary we can send it directly to your pharmacy.  If lab work is needed we can place an order for that and you can then stop by our lab to have the test done at a later time.    Video visits are billed at different rates depending on your insurance coverage. Please reach out to your insurance provider with any questions.    If during the course of the call the physician/provider feels a video visit is not appropriate, you will not be charged for this service.\"    Patient has given verbal consent to a Video visit? Yes    Will anyone else be joining your video visit? Wife Eliane [phone]              Melissa Hagen, [facility]  "

## 2021-06-08 NOTE — PROGRESS NOTES
Pt here ambulatory after NP visit for txt. Lab results reviewed and approved for txt . Reviewed txt with pt and duration. Txt administered via iv placed with brisk blood return. txt completed/ pt tolerated without any problems. Iv dc'd with pressure dressing to site. Follow up reviewed and pt dc'd steady gait

## 2021-06-08 NOTE — PROGRESS NOTES
[facility] Hematology and Oncology Progress Note    Patient: Pardeep Wilson  MRN: [id number]  Date of Service: 06/04/2020        Reason for Visit    Chief Complaint   Patient presents with     HE Cancer     Malignant neoplasm of lower lobe of left lung       Assessment and Plan    Current and metastatic mucinous lung adenocarcinoma, status post wedge resection, February 28, 2020  T4 N0 M0, stage III a mucinous left lung adenocarcinoma status post left lower lobe resection on November 1, 2018  Tumor 9 cm with 3.5 cm nodule, grade 2 out of 4 mucinous adenocarcinoma  Tumor is PDL 1-, no EGFR mutation.  No rearrangement of ALK, evaluation on the Alchemist trial  Tumor negative for ALK, ROS 1, RET, NTRK1, MET e14, EGFR MUTATIONS, April 2020  Remote history of stage I a right axillary Hodgkin's disease status post mantle field radiation and splenectomy about 45 years ago  Right parotid cancer with lymph node involvement status post surgery and adjuvant radiation for 6 weeks in 1991  Coronary artery disease  Elevated BUN and creatinine  New right lung pulmonary nodules  Left pleural effusion/enhancement, 4/2020    PET scan is reviewed personally and with radiology.  Slight increase in 1 of 2 remaining right upper lobe nodules, the one superior and posterior to the staple line now measuring 10 mm, previously 7 mm.  The second lesion is stable.    Discussed with patient and his wife that various options for management are reasonable.  Could consider surgical resection followed by adjuvant chemotherapy.  Could consider stereotactic radiation but need to remember that the patient received mantle radiation for Hodgkin's almost 50 years ago.  Chemotherapy alone could be an option as well.    We will review at tumor conference and get back to the patient.    Plan: As above    Addendum: Case reviewed at tumor conference.  Concerned that he has multiple nodules in the right upper lobe and is at high risk to develop other sites of  metastatic disease.  He would require right upper lobectomy for surgical control and is currently not a candidate for radiation therapy.  Therefore the best option would appear to be systemic therapy.    We will get CT of the chest without contrast.  We will have him return next week to start carboplatin, Taxol and Keytruda.  Discussed how this is typically administered 1 day every 3 weeks for 4 cycles followed by Keytruda maintenance.  Reviewed potential side effects including but not limited to alopecia, nausea and vomiting, fatigue, myelosuppression with risk for infection also reviewed side effects of immunotherapy.  He will return June 11 to start treatment.  We will sign consent at that time.      Measurable disease: CT of the chest  Current therapy: Observation      Treatment history:     Wedge resection of right upper lobe nodule, February 28, 2020    Cisplatin and Alimta adjuvant chemotherapy, for 4 cycles: Day 1 cycle 4, February 15, 2019  First cycle  started December 14, 2018    Patient underwent mediastinoscopy, bronchoscopy, thoracoscopic surgery and left lower lobectomy on November 1, 2018      Cancer Staging  Malignant neoplasm of lower lobe of left lung (H)  Staging form: Lung, AJCC 8th Edition  - Clinical stage from 10/15/2018: Stage IIIA (cT4, cN0, cM0) - Signed by Melody Segovia, IVANNA on 12/21/2018      ECOG Performance   ECOG Performance Status: 0    Distress Assessment  Distress Assessment Score: No distress    Pain           Problem List    1. Malignant neoplasm of lower lobe of left lung (H)          CC: Dov Morales, DO    ______________________________________________________________________________    History of Present Illness    Mr. Pardeep Wilson was spoken to for a telephone visit.  Doing fine with no new symptoms.  Reviewed PET scan results.  His medications are unchanged.    No cardiac issues.  ECOG status is 0.    Pain Status  Currently in Pain:  "No/denies    Review of Systems    Constitutional  Constitutional (WDL): All constitutional elements are within defined limits  Neurosensory  Neurosensory (WDL): Exceptions to WDL  Peripheral Sensory Neuropathy: Asymptomatic, loss of deep tendon reflexes or paresthesia(Feet; numbness.)  Cardiovascular  Cardiovascular (WDL): All cardiovascular elements are within defined limits  Pulmonary  Respiratory (WDL): Within Defined Limits  Gastrointestinal  Gastrointestinal (WDL): All gastrointestinal elements are within defined limits  Dysphagia: Symptomatic, able to eat regular diet(Chronic.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     Esophageal reflux      Hodgkin's lymphoma (H)     s/p radiation to the mediastinum at age 17     Hyperlipemia      Hypertension      Hypothyroidism      Psoriasis          Past Surgical History:   Procedure Laterality Date     CORONARY ANGIOPLASTY      Stent Placement     EYE SURGERY Bilateral     Cataracts     MEDIASTINOSCOPY N/A 11/1/2018    Procedure: MEDIASTINOSCOPY;  Surgeon: Bry Saunders MD;  Location: [facility];  Service:      PAROTIDECTOMY       PA LAP,DIAGNOSTIC ABDOMEN N/A 11/7/2017    Procedure: DIAGNOSTIC LAPAROSCOPY,  LYSIS OF ADHESIONS, SMALL BOWEL RESECTION;  Surgeon: Brian Granados MD;  [place] [facility] [place];  Service: General     SPLENECTOMY, TOTAL  1973     WISDOM TOOTH EXTRACTION         Physical Exam    Recent Vitals 6/1/2020   Height 5' 9\"   Weight 185 lbs   BSA (m2) 2.02 m2   BP -   Pulse -   Temp -   Temp src - "   SpO2 -   Some recent data might be hidden       No physical exam done            Lab Results    Recent Results (from the past 168 hour(s))   POCT Glucose    Specimen: Capillary; Blood   Result Value Ref Range    Glucose 95 70 - 139 mg/dL       Imaging    Nm Pet Ct Skull To Mid Thigh    Result Date: 6/1/2020  EXAM: NM PET CT SKULL TO MID THIGH LOCATION: [facility] DATE/TIME: 6/1/2020 10:20 AM INDICATION: Subsequent treatment planning and restaging for malignant neoplasm of lower lobe, left bronchus or lung. Status post left lower lobectomy in 2018 with recent right upper lobe wedge resection revealing mucinous lung adenocarcinoma. 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 COMPARISON: FDG PET/CT dated 04/01/2020 TECHNIQUE: Serum glucose level 95 mg/dL. One hour post intravenous administration of 8.9 mCi F-18 FDG, PET imaging was performed from the skull base to the mid thighs utilizing attenuation correction with concurrent axial CT and PET/CT image fusion. Dose  reduction techniques were used. FINDINGS: Slight interval increase in size of the 0.9 x 0.7 cm (previously measured 0.7 x 0.5 cm) connie-fissural nodule along the lateral aspect of the right upper lobe wedge resection which now demonstrates mild FDG uptake (Max SUV 1.6). Redemonstrated mildly FDG avid small pleural effusion/pleural thickening (max SUV 3.7, previously 3.7). The remaining FDG uptake is physiologic from the skull base to mid thigh. Mild senescent intracranial changes. Postoperative change of the bilateral lenses. Mild carotid artery bifurcation calcification. Severe coronary artery calcium/stenting. Biapical scarring. Right upper lobectomy. Left lower lobectomy. Right renal cyst. Splenectomy. Pelvic phleboliths. Multilevel degenerative changes of the spine.     1. Slight interval increase in size of a connie-fissural nodule along the lateral aspect of the right upper lobe wedge resection with  development of associated mild FDG uptake. The exact etiology is indeterminate but close attention on follow-up examinations is recommended. 2. Persistently FDG avid small left pleural effusion/pleural thickening.        Signed by: Kevin Soto MD

## 2021-06-08 NOTE — TELEPHONE ENCOUNTER
Melecio is beginning tx this coming Monday.  I called his cell phone to answer any questions he may have and offer resources as needed but he could not be reached.  I left a message on his cell phone inviting calls.  I relayed that I am working remotely at this time.  He can leave a message on my VM and I will call him back.    Melecio returned my call and placed the phone on speaker so his spouse, Eliane, could join the conversation.  We reviewed appt details together.  Eliane asked if she could accompany Melecio to his appt and I informed them of the current visitor restrictions d/t the COVID-19 pandemic.  Melecio will arrive unaccompanied.  Eliane requested to be a part of the appt by phone.  I updated the appt note with her contact number.  We reviewed the tx plan as Melecio was surprised by the long infusion appt.  I informed him he will get benadryl as a premed and advised a  for at least his first tx as he may feel groggy after. I also encouraged him to bring snacks as meal trays are currently not being distributed with the pandemic.  Melecio is currently working from home until at least labor day.  He is hoping to continue to work full time and flex hours as needed.  I asked that he let us know if it becomes too much as a letter/FMLA can be completed.  Melecio and Eliane have no further questions or needs today.  I will continue to check in with him and I invited calls as well.

## 2021-06-08 NOTE — PROGRESS NOTES
"Pardeep Wilson is a 64 y.o. male who is being evaluated via a billable telephone visit.      The patient has been notified of following:     \"This telephone visit will be conducted via a call between you and your physician/provider. We have found that certain health care needs can be provided without the need for a physical exam.  This service lets us provide the care you need with a short phone conversation.  If a prescription is necessary we can send it directly to your pharmacy.  If lab work is needed we can place an order for that and you can then stop by our lab to have the test done at a later time.    Telephone visits are billed at different rates depending on your insurance coverage. During this emergency period, for some insurers they may be billed the same as an in-person visit.  Please reach out to your insurance provider with any questions.    If during the course of the call the physician/provider feels a telephone visit is not appropriate, you will not be charged for this service.\"    Patient has given verbal consent to a Telephone visit? Yes    What phone number would you like to be contacted at? [phone]    Phone call duration: 6 minutes    Shira Abdullahi RN      "

## 2021-06-09 NOTE — PROGRESS NOTES
[facility] Hematology and Oncology Progress Note    Patient: Pardeep Wilson  MRN: [id number]  Date of Service: 06/23/2020        Reason for Visit    Chief Complaint   Patient presents with     HE Cancer     Malignant neoplasm of lower lobe of left lung       Assessment and Plan  Cancer Staging  Malignant neoplasm of lower lobe of left lung (H)  Staging form: Lung, AJCC 8th Edition  - Clinical stage from 10/15/2018: Stage IIIA (cT4, cN0, cM0) - Signed by Melody Segovia CNP on 12/21/2018  ALK-, EGFR-, BRAF-,ROS1-, RET-, NTRK-, MET-  PDL1 0% expressed      1. Lung cancer, originally Stage IIIa in 2018. Recurrent disease in 2019/2020 with stage 4: had wedge resection in Feb 2020 at [facility]. Continues to have multiple nodule in right upper lobe, ?pleural mets?. Has started palliative chemotherapy with Carboplatin, Taxol and Keytruda.  He will return 7/7 for cycle 2. reimage after that cycle. Did quite well with treatment.     2. Mild Anemia: likely from chronic disease, past chemo and abnormal kidney function. This is actually better. He is asymptomatic. Continue to follow    3. Mild Renal insufficiency: likely from medications. Avoid nephrotoxic medications.  Stay hydrated.  Keep blood pressure well controlled.        ECOG Performance   ECOG Performance Status: 0     Distress Assessment  Distress Assessment Score: No distress    Pain  Currently in Pain: No/denies      Problem List    1. Malignant neoplasm of lower lobe of left lung (H)     2. Encounter for antineoplastic chemotherapy     3. Renal insufficiency          ______________________________________________________________________________    History of Present Illness    Measurable Disease: CT, PET    Current Treatment: Carboplatin, Taxol, Keytruda, first cycle 6/15/20. Today is cycle 1, day 9    Past Treatment:   Wedge resection of right upper lobe nodule, February 28, 2020     Cisplatin and Alimta adjuvant chemotherapy, for 4 cycles: Day 1 cycle  4, February 15, 2019  First cycle  started December 14, 2018     Patient underwent mediastinoscopy, bronchoscopy, thoracoscopic surgery and left lower lobectomy on November 1, 2018     Interim History:   Pt restarted chemo last week. He states it was a lot like his last chemo.  He said that he felt pretty good until Friday and then Friday and Saturday he felt pretty down.  He had a lot of weakness and fatigue.  Poor appetite.  He felt bloated and gassy.  Over the last couple days he has gotten a little bit better.  No fevers or infectious complaints    Pain Status  Currently in Pain: No/denies    Review of Systems    Oncology Nurse Assessment/CMA Intake: Constitutional  Constitutional (WDL): Exceptions to WDL  Fatigue: Concerns(intermittent)  Hot flashes/Night Sweats: Concerns(occ warm sensation)  Neurosensory  Neurosensory (WDL): Exceptions to WDL  Peripheral Motor Neuropathy: Concerns(feet)  Ataxia: Concerns  Eye   Eye Disorder (WDL): All eye disorder elements are within defined limits  Ear  Ear Disorder (WDL): Exceptions to WDL  Tinnitus: Concerns  Cardiovascular  Cardiovascular (WDL): All cardiovascular elements are within defined limits  Pulmonary  Respiratory (WDL): Within Defined Limits  Gastrointestinal  Gastrointestinal (WDL): Exceptions to WDL  Constipation: Concerns(controlled with Miralax)  Genitourinary  Genitourinary (WDL): All genitourinary elements are within defined limits  Lymphatic  Lymph (WDL): All lymph disorder elements are within defined limits  Musculoskeletal and Connective Tissue  Musculoskeletal and Connetive Tissue Disorders (WDL): Exceptions to WDL  Arthralgia: Concerns(related to chemo)  Myalgia: Concerns(related to aches)  Integumentary  Integumentary (WDL): All integumentary elements are within defined limits  Patient Coping  Patient Coping: Accepting;Open/discussion  Accompanied by  Accompanied by: Alone  Oral Chemo Adherence         Past History  Past Medical History:   Diagnosis  Date     Anemia      Coronary artery disease     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    GENERAL: no acute distress. Cooperative in conversation. Alone on video  RESP: Regular respiratory rate. No expiratory wheezes   NEURO: non focal. Alert and oriented x3.   PSYCH: within normal limits. No depression or anxiety.  SKIN: exposed skin is dry intact.     Lab Results    Recent Results (from the past 168 hour(s))   Comprehensive Metabolic Panel   Result Value Ref Range    Sodium 137 136 - 145 mmol/L    Potassium 4.9 3.5 - 5.0 mmol/L    Chloride 106 98 - 107 mmol/L    CO2 25 22 - 31 mmol/L    Anion Gap, Calculation 6 5 - 18 mmol/L    Glucose 114 70 - 125 mg/dL    BUN 40 (H) 8 - 22 mg/dL    Creatinine 1.33 (H) 0.70 - 1.30 mg/dL    GFR MDRD Af Amer >60 >60 mL/min/1.73m2    GFR MDRD Non Af Amer 54 (L) >60 mL/min/1.73m2    Bilirubin, Total 0.7 0.0 - 1.0 mg/dL    Calcium 9.2 8.5 - 10.5 mg/dL    Protein, Total 6.9 6.0 - 8.0 g/dL    Albumin 3.4 (L) 3.5 - 5.0 g/dL    Alkaline Phosphatase 96 45 - 120 U/L    AST 17 0 - 40 U/L    ALT 19 0 - 45 U/L   HM1 (CBC with Diff)   Result Value Ref Range    WBC 4.3 4.0 - 11.0 thou/uL    RBC 4.52 4.40 - 6.20 mill/uL    Hemoglobin 13.8 (L) 14.0 - 18.0 g/dL    Hematocrit 41.0 40.0 - 54.0 %    MCV 91 80 - 100 fL    MCH 30.5 27.0 - 34.0 pg    MCHC 33.7 32.0 - 36.0 g/dL    RDW 14.7 (H) 11.0 - 14.5 %    Platelets 232 140 - 440 thou/uL    MPV 12.1 8.5 - 12.5 fL    Neutrophils % 49 (L) 50 - 70 %    Lymphocytes % 37 20 - 40 %    Monocytes % 4 2 - 10 %    Eosinophils % 7 (H) 0 - 6 %    Basophils % 1 0 - 2 %    Neutrophils Absolute 2.1 2.0 - 7.7 thou/uL    Lymphocytes Absolute 1.6 0.8 - 4.4 thou/uL    Monocytes Absolute 0.2 0.0 - 0.9 thou/uL    Eosinophils Absolute  0.3 0.0 - 0.4 thou/uL    Basophils Absolute 0.0 0.0 - 0.2 thou/uL       Imaging    Ct Chest High Resolution Without Contrast    Result Date: 6/5/2020  EXAM: CT CHEST HIGH RESOLUTION WO CONTRAST LOCATION: [facility] DATE/TIME: 6/5/2020 2:33 PM INDICATION: Restage lung cancer, ? pulmonary nodules COMPARISON: 2/4/20. TECHNIQUE: High resolution images were obtained through the chest during inspiration with select expiratory views. Prone imaging was performed. Multiplanar reformats were obtained. Dose reduction techniques were used. CONTRAST: None. FINDINGS: LUNGS AND PLEURA: Interval right upper lobe wedge resection. Prior left lower lobectomy again seen. No new soft tissue at the suture line. No tracheobronchomalacia or air trapping. A 9 mm x 9 mm nodule with peripheral spiculation adjacent to the right minor fissure was previously 6 mm x 6 mm (series 5 image 57). There is a 7 mm x 3 mm left upper lobe nodule which is new (series 5 image 135). Apical scarring is present. There is scarring in the medial left upper lobe. Loculated left pleural fluid has not changed. MEDIASTINUM/AXILLAE: Normal size of the heart. Normal esophagus. No thoracic lymphadenopathy.  There is atherosclerotic disease including coronary artery calcification. UPPER ABDOMEN: A low-attenuation right renal lesion likely represents a simple cyst and does not require follow-up. There are metallic objects in the upper abdomen which are likely embolization coils. MUSCULOSKELETAL: Unremarkable.     1.  Interval right upper lobe wedge resection. Older left lower lobectomy. 2.  Increasing size of a right upper lobe pulmonary nodule adjacent to the minor fissure. This is concerning for increased malignancy. A new left upper lobe nodule could also represent malignancy. PET/CT may be of benefit. Recommend attention on follow-up imaging. 3.  Atherosclerotic disease including coronary artery calcification.     Nm Pet Ct Skull To Mid Thigh    Result Date:  6/1/2020  EXAM: NM PET CT SKULL TO MID THIGH LOCATION: [facility] DATE/TIME: 6/1/2020 10:20 AM INDICATION: Subsequent treatment planning and restaging for malignant neoplasm of lower lobe, left bronchus or lung. Status post left lower lobectomy in 2018 with recent right upper lobe wedge resection revealing mucinous lung adenocarcinoma. 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 COMPARISON: FDG PET/CT dated 04/01/2020 TECHNIQUE: Serum glucose level 95 mg/dL. One hour post intravenous administration of 8.9 mCi F-18 FDG, PET imaging was performed from the skull base to the mid thighs utilizing attenuation correction with concurrent axial CT and PET/CT image fusion. Dose  reduction techniques were used. FINDINGS: Slight interval increase in size of the 0.9 x 0.7 cm (previously measured 0.7 x 0.5 cm) connie-fissural nodule along the lateral aspect of the right upper lobe wedge resection which now demonstrates mild FDG uptake (Max SUV 1.6). Redemonstrated mildly FDG avid small pleural effusion/pleural thickening (max SUV 3.7, previously 3.7). The remaining FDG uptake is physiologic from the skull base to mid thigh. Mild senescent intracranial changes. Postoperative change of the bilateral lenses. Mild carotid artery bifurcation calcification. Severe coronary artery calcium/stenting. Biapical scarring. Right upper lobectomy. Left lower lobectomy. Right renal cyst. Splenectomy. Pelvic phleboliths. Multilevel degenerative changes of the spine.     1. Slight interval increase in size of a connie-fissural nodule along the lateral aspect of the right upper lobe wedge resection with development of associated mild FDG uptake. The exact etiology is indeterminate but close attention on follow-up examinations is recommended. 2. Persistently FDG avid small left pleural effusion/pleural thickening.    Pardeep Minerpela is a 64 y.o. male who is being evaluated via a billable video visit.   "    The patient has been notified of following:     \"This video visit will be conducted via a call between you and your physician/provider. We have found that certain health care needs can be provided without the need for an in-person physical exam.  This service lets us provide the care you need with a video conversation.  If a prescription is necessary we can send it directly to your pharmacy.  If lab work is needed we can place an order for that and you can then stop by our lab to have the test done at a later time.    Video visits are billed at different rates depending on your insurance coverage. Please reach out to your insurance provider with any questions.    If during the course of the call the physician/provider feels a video visit is not appropriate, you will not be charged for this service.\"    Patient has given verbal consent to a Video visit? Yes    Will anyone else be joining your video visit? Yes: Wife is on telephone. How would they like to receive their invitation? Text to cell phone: nurse placed the phone in exam room on speaker.         Video Start Time: 8:39    Additional provider notes: see above      Video-Visit Details    Type of service:  Video Visit    Video End Time (time video stopped): 9:02  Originating Location (pt. Location): cancer clinic    Distant Location (provider location):  [facility] CANCER CARE AND HEMATOLOGY     Platform used for Video Visit: Amie Segovia CNP  Pardeep GAMEZ Ronaldola is a 64 y.o. male who is being evaluated via a billable video visit.      The patient has been notified of following:     \"This video visit will be conducted via a call between you and your physician/provider. We have found that certain health care needs can be provided without the need for an in-person physical exam.  This service lets us provide the care you need with a video conversation.  If a prescription is necessary we can send it directly to your pharmacy.  If lab work is " "needed we can place an order for that and you can then stop by our lab to have the test done at a later time.    Video visits are billed at different rates depending on your insurance coverage. Please reach out to your insurance provider with any questions.    If during the course of the call the physician/provider feels a video visit is not appropriate, you will not be charged for this service.\"    Patient has given verbal consent to a Video visit? Yes    Will anyone else be joining your video visit? No        Video Start Time: 9:54    Additional provider notes: see above      Video-Visit Details    Type of service:  Video Visit    Video End Time (time video stopped): 10:07 AM  Originating Location (pt. Location): Home    Distant Location (provider location):  [facility] CANCER CARE AND HEMATOLOGY     Platform used for Video Visit: Emmanuel Holloway would not connect      Melody Segovia CNP        Signed by: Melody Segovia CNP  "

## 2021-06-09 NOTE — PROGRESS NOTES
[facility] Hematology and Oncology Progress Note    Patient: Pardeep Wilson  MRN: [id number]  Date of Service: 07/07/2020        Reason for Visit    Chief Complaint   Patient presents with     HE Cancer     Malignant neoplasm of lower lobe of left lung       Assessment and Plan    Recurrent and metastatic mucinous lung adenocarcinoma, status post wedge resection, February 28, 2020  T4 N0 M0, stage III a mucinous left lung adenocarcinoma status post left lower lobe resection on November 1, 2018  Tumor 9 cm with 3.5 cm nodule, grade 2 out of 4 mucinous adenocarcinoma  Tumor is PDL 1-, no EGFR mutation.  No rearrangement of ALK, evaluation on the Alchemist trial  Tumor negative for ALK, ROS 1, RET, NTRK1, MET e14, EGFR MUTATIONS, April 2020  Remote history of stage I a right axillary Hodgkin's disease status post mantle field radiation and splenectomy about 45 years ago  Right parotid cancer with lymph node involvement status post surgery and adjuvant radiation for 6 weeks in 1991  Coronary artery disease  Elevated BUN and creatinine  New right lung pulmonary nodules   Left pleural effusion/enhancement, 4/2020    Patient has tolerated first cycle of chemotherapy with carboplatin, Taxol and Keytruda fairly well.  We will proceed with cycle #2 today.  We will see him back in 3 weeks with repeat CT scan for restaging.  Plan is for 4 cycles of combination treatment followed by Keytruda maintenance.    He did develop rash which is probably related to immunotherapy.  He can try Benadryl and hydrocortisone to manage this.  He should call if it is worse and we can consider steroids.    Coronary artery disease and kidney function are stable.    Plan: Continue with cycle 2 of treatment with carboplatin, Taxol and Keytruda  Follow-up in 3 weeks with restaging scans  Benadryl and hydrocortisone cream for rash      Addendum: Case reviewed at tumor conference.  Concerned that he has multiple nodules in the right upper lobe and is at  high risk to develop other sites of metastatic disease.  He would require right upper lobectomy for surgical control and is currently not a candidate for radiation therapy.  Therefore the best option would appear to be systemic therapy.    We will get CT of the chest without contrast.  We will have him return next week to start carboplatin, Taxol and Keytruda.  Discussed how this is typically administered 1 day every 3 weeks for 4 cycles followed by Keytruda maintenance.  Reviewed potential side effects including but not limited to alopecia, nausea and vomiting, fatigue, myelosuppression with risk for infection also reviewed side effects of immunotherapy.  He will return June 11 to start treatment.  We will sign consent at that time.      Measurable disease: CT of the chest      Current therapy: Carboplatin, Taxol and Keytruda, cycle 2 today  Treatment started Collette 15, 2020      Treatment history:     Wedge resection of right upper lobe nodule, February 28, 2020    Cisplatin and Alimta adjuvant chemotherapy, for 4 cycles: Day 1 cycle 4, February 15, 2019  First cycle  started December 14, 2018    Patient underwent mediastinoscopy, bronchoscopy, thoracoscopic surgery and left lower lobectomy on November 1, 2018      Cancer Staging  Malignant neoplasm of lower lobe of left lung (H)  Staging form: Lung, AJCC 8th Edition  - Clinical stage from 10/15/2018: Stage IIIA (cT4, cN0, cM0) - Signed by Melody Segovia, IVANNA on 12/21/2018      ECOG Performance   ECOG Performance Status: 0    Distress Assessment  Distress Assessment Score: No distress    Pain           Problem List    1. Metastatic primary lung cancer, left (H)  CC OFFICE VISIT LONG    CT Chest High Resolution Without Contrast   2. Encounter for antineoplastic chemotherapy  CC OFFICE VISIT LONG        CC: Dov Morales, DO    ______________________________________________________________________________    History of Present Illness    Mr. Roberto  VERA Wilson is seen for follow-up.  He is started back on systemic therapy with carboplatin, Taxol and Keytruda 3 weeks ago.  Did develop rash with some itching and peeling of the skin but this is now back to normal.  Also had mouth sores for which she used Magic mouthwash.  No other significant problems.  He is eager to continue with treatment.      Pain Status  Currently in Pain: No/denies    Review of Systems    Constitutional  Constitutional (WDL): All constitutional elements are within defined limits  Neurosensory  Neurosensory (WDL): Exceptions to WDL  Peripheral Sensory Neuropathy: Asymptomatic, loss of deep tendon reflexes or paresthesia(Hands; numbness.)  Cardiovascular  Cardiovascular (WDL): Exceptions to WDL  Edema: Yes  Edema Limbs: 5 - 10% inter-limb discrepancy in volume or circumference at point of greatest visible difference, swelling or obscuration of anatomic architecture on close inspection(Reports forearm swelling.)  Pulmonary  Respiratory (WDL): Within Defined Limits  Gastrointestinal  Gastrointestinal (WDL): Exceptions to WDL  Constipation: Occasional or intermittent symptoms, occasional use of stool softeners, laxatives, dietary modification, or enema  Genitourinary  Genitourinary (WDL): All genitourinary elements are within defined limits  Integumentary  Integumentary (WDL): Exceptions to WDL  Palmar-Plantar Erythrodysesthesia Syndrome: Minimal skin changes or dermatitis (e.g., erythema, edema, or hyperkeratosis) without pain  Patient Coping  Patient Coping: Accepting  Distress Assessment  Distress Assessment Score: No distress  Accompanied by  Accompanied by: Alone    Past History  Past Medical History:   Diagnosis Date     Anemia      Coronary artery disease     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     Esophageal reflux      Hodgkin's lymphoma (H)     s/p radiation to the mediastinum at age 17     Hyperlipemia      Hypertension      Hypothyroidism      Psoriasis          Past  Surgical History:   Procedure Laterality Date     CORONARY ANGIOPLASTY      Stent Placement     EYE SURGERY Bilateral     Cataracts     MEDIASTINOSCOPY N/A 11/1/2018    Procedure: MEDIASTINOSCOPY;  Surgeon: Bry Saunders MD;  Location: [facility];  Service:      PAROTIDECTOMY       CO LAP,DIAGNOSTIC ABDOMEN N/A 11/7/2017    Procedure: DIAGNOSTIC LAPAROSCOPY,  LYSIS OF ADHESIONS, SMALL BOWEL RESECTION;  Surgeon: Brian Granados MD;  [place] [facility] [place];  Service: General     SPLENECTOMY, TOTAL  1973     WISDOM TOOTH EXTRACTION         Physical Exam    Recent Vitals 7/7/2020   Height -   Weight 184 lbs   BSA (m2) 2.02 m2   /75   Pulse 76   Temp 98.3   Temp src 1   SpO2 98   Some recent data might be hidden       No physical exam done            Lab Results    Recent Results (from the past 168 hour(s))   HM1 (CBC with Diff)   Result Value Ref Range    WBC 8.2 4.0 - 11.0 thou/uL    RBC 3.75 (L) 4.40 - 6.20 mill/uL    Hemoglobin 11.6 (L) 14.0 - 18.0 g/dL    Hematocrit 34.5 (L) 40.0 - 54.0 %    MCV 92 80 - 100 fL    MCH 30.9 27.0 - 34.0 pg    MCHC 33.6 32.0 - 36.0 g/dL    RDW 16.6 (H) 11.0 - 14.5 %    Platelets 372 140 - 440 thou/uL    MPV 10.3 8.5 - 12.5 fL    Neutrophils % 34 (L) 50 - 70 %    Lymphocytes % 41 (H) 20 - 40 %    Monocytes % 14 (H) 2 - 10 %    Eosinophils % 10 (H) 0 - 6 %    Basophils % 1 0 - 2 %    Neutrophils Absolute 2.8 2.0 - 7.7 thou/uL    Lymphocytes Absolute 3.3 0.8 - 4.4 thou/uL    Monocytes Absolute 1.1 (H) 0.0 - 0.9 thou/uL    Eosinophils Absolute 0.8 (H) 0.0 - 0.4 thou/uL    Basophils Absolute 0.1 0.0 - 0.2 thou/uL       Imaging    No results found.      Signed by: Kevin Soto MD

## 2021-06-09 NOTE — PROGRESS NOTES
PT here for txt after exam with MD. Lab results approved for txt. txt reviewed with pt. Pt had a rash that is now peeling all over from first txt. Iv placed with brisk blood return/taped and secured. Txt administered as ordered and pt tolerated without any problems.PT did report pain up arm during infusion/ no swelling noted and brisk blood return from iv. Warm blankets applied. txt completed and iv dc'd with pressure dressing to site. PT did state arm was sore that iv was in; brisk blood return obtained again upon completion of txt. No swelling noted or redness. Instructed pt to try ice pack if needed. Follow up reviewed and pt dc'd steady gait

## 2021-06-09 NOTE — TELEPHONE ENCOUNTER
I called Melecio to ask how he's doing and offer support and resources as needed.  He had his last infusion 3 days ago and so far he's feeling pretty good.  He does not feel fatigued.  He said he developed a massive rash on his hands and arms about a week after his first infusion.  He plans to take benadryl and hydrocortisone to help manage this and he understands he can call the clinic to speak with triage if these measures are ineffective.  Dr. Soto would consider steroids.  Melecio would take the steroids if the rash is really bad but he hopes to avoid that.   His hair began to fall out so he decided to shave the remaining hair and he is adjusting to this new look.  I encouraged wearing a hat when he's outside.  Melecio was appreciative of the call today.  He does not identify a need for additional resources at this time. I wished him an early happy birthday.

## 2021-06-09 NOTE — TELEPHONE ENCOUNTER
Refill Approved    Rx renewed per Medication Renewal Policy. Medication was last renewed on 04/07/2020.  Last office visit was 02/14/2020 with PCP.    Dee Maki, [facility] Triage/Med Refill 7/19/2020     Requested Prescriptions   Pending Prescriptions Disp Refills     metoprolol succinate (TOPROL-XL) 25 MG [Pharmacy Med Name: METOPROLOL SUCC ER 25MG TABLET] 45 tablet 0     Sig: TAKE ONE-HALF TABLET BY  MOUTH DAILY       Beta-Blockers Refill Protocol Passed - 7/19/2020  5:34 AM        Passed - PCP or prescribing provider visit in past 12 months or next 3 months     Last office visit with prescriber/PCP: 2/14/2020 Dov Morales DO OR same dept: 2/14/2020 Dov Morales DO OR same specialty: 2/14/2020 Dov Morales DO  Last physical: 9/28/2018 Last MTM visit: Visit date not found   Next visit within 3 mo: Visit date not found  Next physical within 3 mo: Visit date not found  Prescriber OR PCP: Dov Morales DO  Last diagnosis associated with med order: 1. Coronary artery disease involving native coronary artery of native heart without angina pectoris  - metoprolol succinate (TOPROL-XL) 25 MG [Pharmacy Med Name: METOPROLOL SUCC ER 25MG TABLET]; TAKE ONE-HALF TABLET BY  MOUTH DAILY  Dispense: 45 tablet; Refill: 0    If protocol passes may refill for 12 months if within 3 months of last provider visit (or a total of 15 months).             Passed - Blood pressure filed in past 12 months     BP Readings from Last 1 Encounters:   07/07/20 136/75

## 2021-06-09 NOTE — PROGRESS NOTES
"Pardeep Wilson is a 64 y.o. male who is being evaluated via a billable video visit regarding Malignant neoplasm of lower lobe of left lung.    The patient has been notified of following:     \"This video visit will be conducted via a call between you and your physician/provider. We have found that certain health care needs can be provided without the need for an in-person physical exam.  This service lets us provide the care you need with a video conversation.  If a prescription is necessary we can send it directly to your pharmacy.  If lab work is needed we can place an order for that and you can then stop by our lab to have the test done at a later time.    Video visits are billed at different rates depending on your insurance coverage. Please reach out to your insurance provider with any questions.    If during the course of the call the physician/provider feels a video visit is not appropriate, you will not be charged for this service.\"    Patient has given verbal consent to a Video visit? Yes    Will anyone else be joining your video visit? No              Melissa Hagen CMA  "

## 2021-06-09 NOTE — TELEPHONE ENCOUNTER
Could you please send in rx for mouthwash and once completed I will let him know?    Thank you,  Melissa

## 2021-06-10 NOTE — PROGRESS NOTES
Pt arrived ambulatory to clinic for Cycle # 4 Day # 1 of his chemotherapy regimen.  IV was started using aseptic technique without difficulties with excellent blood return.  Administered premedications and chemotherapy per MD order.  IV was flushed with NS then D/C'd using 2x2 and Coban.  Pt verbalized understanding of plan of care and return to clinic.

## 2021-06-10 NOTE — PROGRESS NOTES
[facility] Hematology and Oncology Progress Note    Patient: Pardeep Wilson  MRN: [id number]  Date of Service: 08/18/2020        Reason for Visit    Chief Complaint   Patient presents with     HE Cancer     Lung Cancer       Assessment and Plan  Cancer Staging  Malignant neoplasm of lower lobe of left lung (H)  Staging form: Lung, AJCC 8th Edition  - Clinical stage from 10/15/2018: Stage IIIA (cT4, cN0, cM0) - Signed by Melody Segovia, CNP on 12/21/2018  ALK-, EGFR-, BRAF-,ROS1-, RET-, NTRK-, MET-  PDL1 0% expressed      1. Lung cancer, originally Stage IIIa in 2018. Recurrent disease in 2019/2020 with stage 4: had wedge resection in Feb 2020 [facility]. Continues to have multiple nodule in right upper lobe, ?pleural mets?. Has started palliative chemotherapy with Carboplatin, Taxol and Keytruda.  HE will get cycle 4 today. Return in 3 weeks with CT. He may get 4-6 cycles total and will then go to maintenance Keytruda.     2. Mild Anemia: likely from chronic disease, past chemo and abnormal kidney function. This is actually better. He is asymptomatic. Continue to follow    3. Mild Renal insufficiency: likely from medications. Avoid nephrotoxic medications.  Stay hydrated.  Keep blood pressure well controlled.    4. Insomnia: could be medication induced/chemo. Could be anxiety. We discussed good sleep hygiene. Use lavendar essential oils. Warms baths before sleep. Meditation. Trying to exercise more during the day, but not close to bedtime. No screens in bedroom. He tried melatonin, didn't work. Encourage otc benadryl. Not interested in prescription meds. Try to avoid naps during the day.       ECOG Performance   ECOG Performance Status: 1     Distress Assessment  Distress Assessment Score: 3(related to tx and future state of health)    Pain  Currently in Pain: No/denies      Problem List    1. Encounter for antineoplastic chemotherapy  CC OFFICE VISIT LONG    CC OFFICE VISIT LONG    Infusion Appointment     DISCONTINUED: sodium chloride 0.9% 250 mL infusion    DISCONTINUED: diphenhydrAMINE injection 50 mg (BENADRYL)    DISCONTINUED: famotidine 20 mg/2 mL injection 20 mg (PEPCID)    DISCONTINUED: palonosetron injection 0.25 mg (ALOXI)    DISCONTINUED: fosaprepitant 150 mg, dexAMETHasone 12 mg in sodium chloride 0.9% 150 mL    DISCONTINUED: pembrolizumab 200 mg in sodium chloride 0.9% 100 mL (KEYTRUDA)    DISCONTINUED: PACLitaxeL 404 mg in dextrose 5% (non-PVC) 500 mL (TAXOL)    DISCONTINUED: CARBOplatin 620 mg in dextrose 5% 250 mL (PARAPLATIN)    DISCONTINUED: sodium chloride flush 20 mL (NS)    DISCONTINUED: heparin lockflush (PF) porcine 300-600 Units    DISCONTINUED: diphenhydrAMINE injection 50 mg (BENADRYL)    DISCONTINUED: famotidine 20 mg/2 mL injection 20 mg (PEPCID)    DISCONTINUED: hydrocortisone sod succ (PF) injection 100 mg    DISCONTINUED: acetaminophen tablet 1,000 mg (TYLENOL)    DISCONTINUED: sodium chloride 0.9% 500 mL   2. Metastatic primary lung cancer, left (H)  CC OFFICE VISIT LONG    CT Chest High Resolution Without Contrast    CC OFFICE VISIT LONG    Infusion Appointment    DISCONTINUED: sodium chloride 0.9% 250 mL infusion    DISCONTINUED: diphenhydrAMINE injection 50 mg (BENADRYL)    DISCONTINUED: famotidine 20 mg/2 mL injection 20 mg (PEPCID)    DISCONTINUED: palonosetron injection 0.25 mg (ALOXI)    DISCONTINUED: fosaprepitant 150 mg, dexAMETHasone 12 mg in sodium chloride 0.9% 150 mL    DISCONTINUED: pembrolizumab 200 mg in sodium chloride 0.9% 100 mL (KEYTRUDA)    DISCONTINUED: PACLitaxeL 404 mg in dextrose 5% (non-PVC) 500 mL (TAXOL)    DISCONTINUED: CARBOplatin 620 mg in dextrose 5% 250 mL (PARAPLATIN)    DISCONTINUED: sodium chloride flush 20 mL (NS)    DISCONTINUED: heparin lockflush (PF) porcine 300-600 Units    DISCONTINUED: diphenhydrAMINE injection 50 mg (BENADRYL)    DISCONTINUED: famotidine 20 mg/2 mL injection 20 mg (PEPCID)    DISCONTINUED: hydrocortisone sod succ (PF)  injection 100 mg    DISCONTINUED: acetaminophen tablet 1,000 mg (TYLENOL)    DISCONTINUED: sodium chloride 0.9% 500 mL        ______________________________________________________________________________    History of Present Illness    Measurable Disease: CT, PET    Current Treatment: Carboplatin, Taxol, Keytruda, first cycle 6/15/20. Today is cycle 4    Past Treatment:   Wedge resection of right upper lobe nodule, February 28, 2020     Cisplatin and Alimta adjuvant chemotherapy, for 4 cycles: Day 1 cycle 4, February 15, 2019  First cycle  started December 14, 2018     Patient underwent mediastinoscopy, bronchoscopy, thoracoscopic surgery and left lower lobectomy on November 1, 2018     Interim History:   Pt is here to continue on chemo. He is doing pretty well on it. Having issues sleeping. Feels like he has a hard time falling asleep and then only sleeps a couple of hours and then cannot sleep anymore. Takes small naps during the day. Mild neuropathy. Some eye focusing issues that come and go.     Pain Status  Currently in Pain: No/denies    Review of Systems    Constitutional  Constitutional (WDL): Exceptions to WDL  Fatigue: Fatigue relieved by rest(insomnia)  Neurosensory  Neurosensory (WDL): Exceptions to WDL  Peripheral Sensory Neuropathy: Asymptomatic, loss of deep tendon reflexes or paresthesia(feet - stable)  Eye   Eye Disorder (WDL): Exceptions to WDL(poor focusing of eyes recently)  Blurred Vision: Intervention not indicated  Ear  Ear Disorder (WDL): Exceptions to WDL  Ear Pain: Mild Pain(after tx)  Cardiovascular  Cardiovascular (WDL): All cardiovascular elements are within defined limits  Pulmonary  Respiratory (WDL): Exceptions to WDL  Cough: Mild symptoms, nonprescription intervention indicated(back of throat tickle, phlegmy)  Dyspnea: Shortness of breath with moderate exertion  Gastrointestinal  Gastrointestinal (WDL): All gastrointestinal elements are within defined  limits  Genitourinary  Genitourinary (WDL): All genitourinary elements are within defined limits  Lymphatic  Lymph (WDL): All lymph disorder elements are within defined limits  Musculoskeletal and Connective Tissue  Musculoskeletal and Connetive Tissue Disorders (WDL): All Musculoskeletal and Connetive Tissue Disorder elements are within defined limits  Integumentary  Integumentary (WDL): Exceptions to WDL  Alopecia: Hair loss of >50% normal for that individual that is readily apparent to others, a wig or hair piece is necessary if the patient desires to completely camouflage the hair loss, associated with psychosocial impact  Patient Coping  Patient Coping: Open/discussion  Distress Assessment  Distress Assessment Score: 3(related to tx and future state of health)  Accompanied by  Accompanied by: Alone  Oral Chemo Adherence         Past History  Past Medical History:   Diagnosis Date     Anemia      Coronary artery disease     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 Cooperative in conversation. Here alone due to visitor restrictions. Mask on  RESP: Regular respiratory rate. No expiratory wheezes   MUSCULOSKELETAL: no bilateral leg swelling  NEURO: non focal. Alert and oriented x3.   PSYCH: within normal limits. No depression or anxiety.  SKIN: exposed skin is dry intact.     Lab Results    Recent Results (from the past 168 hour(s))   Magnesium   Result Value Ref Range    Magnesium 1.8 1.8 - 2.6 mg/dL   Comprehensive Metabolic Panel   Result Value Ref Range    Sodium 141 136 - 145 mmol/L    Potassium 4.5 3.5 - 5.0 mmol/L    Chloride 107 98 - 107 mmol/L    CO2 26 22 -  31 mmol/L    Anion Gap, Calculation 8 5 - 18 mmol/L    Glucose 87 70 - 125 mg/dL    BUN 25 (H) 8 - 22 mg/dL    Creatinine 1.12 0.70 - 1.30 mg/dL    GFR MDRD Af Amer >60 >60 mL/min/1.73m2    GFR MDRD Non Af Amer >60 >60 mL/min/1.73m2    Bilirubin, Total 0.4 0.0 - 1.0 mg/dL    Calcium 9.2 8.5 - 10.5 mg/dL    Protein, Total 6.9 6.0 - 8.0 g/dL    Albumin 3.5 3.5 - 5.0 g/dL    Alkaline Phosphatase 113 45 - 120 U/L    AST 18 0 - 40 U/L    ALT 16 0 - 45 U/L   HM1 (CBC with Diff)   Result Value Ref Range    WBC 7.2 4.0 - 11.0 thou/uL    RBC 3.54 (L) 4.40 - 6.20 mill/uL    Hemoglobin 11.2 (L) 14.0 - 18.0 g/dL    Hematocrit 33.6 (L) 40.0 - 54.0 %    MCV 95 80 - 100 fL    MCH 31.6 27.0 - 34.0 pg    MCHC 33.3 32.0 - 36.0 g/dL    RDW 19.9 (H) 11.0 - 14.5 %    Platelets 220 140 - 440 thou/uL    MPV 9.9 8.5 - 12.5 fL    Neutrophils % 34 (L) 50 - 70 %    Lymphocytes % 47 (H) 20 - 40 %    Monocytes % 17 (H) 2 - 10 %    Eosinophils % 1 0 - 6 %    Basophils % 1 0 - 2 %    Neutrophils Absolute 2.5 2.0 - 7.7 thou/uL    Lymphocytes Absolute 3.4 0.8 - 4.4 thou/uL    Monocytes Absolute 1.2 (H) 0.0 - 0.9 thou/uL    Eosinophils Absolute 0.0 0.0 - 0.4 thou/uL    Basophils Absolute 0.0 0.0 - 0.2 thou/uL       Imaging    Ct Chest High Resolution Without Contrast    Result Date: 7/24/2020  EXAM: CT CHEST HIGH RESOLUTION WO CONTRAST LOCATION: [facility] DATE/TIME: 7/24/2020 12:03 PM INDICATION: fu lung cancer COMPARISON: High-resolution chest CT 06/05/2020; PET/CT 06/01/2020 TECHNIQUE: High resolution images were obtained through the chest during inspiration with select expiratory views. Prone imaging was performed. Multiplanar reformats were obtained. Dose reduction techniques were used. CONTRAST: None. FINDINGS: LUNGS AND PLEURA: Status post left lower lobectomy. No soft tissue thickening or nodularity along the margin of bronchial resection. A small focus of groundglass attenuation in the posterior left upper lobe 06/05/2020 has  resolved and a few new indistinct foci of groundglass opacity have developed in the more caudal subpleural left lower lobe of similar character which are likely small foci of nonspecific inflammation. Status post wedge resection in the anterior right upper lobe. Unchanged soft tissue thickening along the resection staple line including a 7 mm nodule (series 5, image 121) which has a tiny peripheral calcification. An additional solid nodule posterior to the wedge resection along the minor fissure is unchanged in size measuring 7 x 9 mm (series 5, image 95). Unchanged left pleural thickening and basal pleural fluid. Subpleural opacity in the lateral basal left upper lobe represents a rounded atelectasis. Additional subpleural opacities are present in both apices including along the mediastinal pleura associated with internal air-filled bronchiectatic airways consistent with parenchymal scarring. There are no new lung nodules. MEDIASTINUM: Cardiac chambers are normal in size. Circumflex distribution coronary artery calcifications and/or stent. Unchanged degenerative calcification of the aortic root. No pericardial effusion. Mild atheromatous calcification of the thoracic aorta. Conventional arch anatomy. Small hiatal hernia. Esophagus is decompressed. No new hilar or mediastinal lymph node enlargement. UPPER ABDOMEN: Benign cyst arising from the upper lateral aspect of the right kidney requires no additional workup or follow-up. There is a bowel anastomosis in the upper central abdomen. MUSCULOSKELETAL: Mild anterior wedging of mid thoracic vertebra. Unchanged multilevel degenerative disc disease with marginal osteophytes. No aggressive or destructive bone lesions. Chest wall soft tissues are symmetric.     1.  Status post left lower lobectomy and wedge resection of the anterior right upper lobe. 2.  Two small nodules in the left upper lobe adjacent to the staple line and along the minor fissure are unchanged from  06/05/2020. Continued attention on short-term follow-up CT chest is suggested. 3.  Small focus of groundglass attenuation in the posterior left upper lobe has resolved with a few new indistinct foci of groundglass in the posterior basal left upper lobe consistent with nonspecific inflammation. 4.  Other incidental findings as above, unchanged.        Signed by: Melody Segovia, IVANNA

## 2021-06-10 NOTE — PROGRESS NOTES
Pt here for treatment after seeing MD. IV started without incident and positive blood return through out. No problem with infusion as directed. Upon completion IV removed and pt d/c ambulatory to lobby alone. Pt aware of treatment plan.

## 2021-06-10 NOTE — CONSULTS
[facility] Hematology and Oncology Progress Note    Patient: Pardeep Wilson  MRN: [id number]  Date of Service: 07/28/2020        Reason for Visit    Chief Complaint   Patient presents with     HE Cancer     Lung Cancer       Assessment and Plan    Recurrent and metastatic mucinous lung adenocarcinoma, status post wedge resection, February 28, 2020  T4 N0 M0, stage III a mucinous left lung adenocarcinoma status post left lower lobe resection on November 1, 2018  Tumor 9 cm with 3.5 cm nodule, grade 2 out of 4 mucinous adenocarcinoma  Tumor is PDL 1-, no EGFR mutation.  No rearrangement of ALK, evaluation on the Alchemist trial  Tumor negative for ALK, ROS 1, RET, NTRK1, MET e14, EGFR MUTATIONS, April 2020  Remote history of stage I a right axillary Hodgkin's disease status post mantle field radiation and splenectomy about 45 years ago  Right parotid cancer with lymph node involvement status post surgery and adjuvant radiation for 6 weeks in 1991  Coronary artery disease  Elevated BUN and creatinine  New right lung pulmonary nodules   Left pleural effusion/enhancement, 4/2020    Patient has tolerated second cycle of treatment much better.  No recurrence or rash.  CT scan is reviewed and shows decrease in size of the pulmonary nodules and no new lesions are noted.  Patient reassured.  We will proceed with cycle 3 of the treatment today.  We will see him back in 3 weeks for cycle 4.  Plan is to restage after cycle 4 again.    We are planning 4-6 cycles of combined therapy and then immunotherapy maintenance.    Plan: Proceed with cycle 3 of carboplatin, Taxol and Keytruda  Follow-up in 3 weeks      Addendum: Case reviewed at tumor conference.  Concerned that he has multiple nodules in the right upper lobe and is at high risk to develop other sites of metastatic disease.  He would require right upper lobectomy for surgical control and is currently not a candidate for radiation therapy.  Therefore the best option would  appear to be systemic therapy.    We will get CT of the chest without contrast.  We will have him return next week to start carboplatin, Taxol and Keytruda.  Discussed how this is typically administered 1 day every 3 weeks for 4 cycles followed by Keytruda maintenance.  Reviewed potential side effects including but not limited to alopecia, nausea and vomiting, fatigue, myelosuppression with risk for infection also reviewed side effects of immunotherapy.  He will return June 11 to start treatment.  We will sign consent at that time.      Measurable disease: CT of the chest      Current therapy: Carboplatin, Taxol and Keytruda, cycle 3 today  Treatment started Collette 15, 2020      Treatment history:     Wedge resection of right upper lobe nodule, February 28, 2020    Cisplatin and Alimta adjuvant chemotherapy, for 4 cycles: Day 1 cycle 4, February 15, 2019  First cycle  started December 14, 2018    Patient underwent mediastinoscopy, bronchoscopy, thoracoscopic surgery and left lower lobectomy on November 1, 2018      Cancer Staging  Malignant neoplasm of lower lobe of left lung (H)  Staging form: Lung, AJCC 8th Edition  - Clinical stage from 10/15/2018: Stage IIIA (cT4, cN0, cM0) - Signed by Melody Segovia, [facility] on 12/21/2018      ECOG Performance   ECOG Performance Status: 0    Distress Assessment  Distress Assessment Score: 3    Pain           Problem List    1. Metastatic primary lung cancer, left (H)  CC OFFICE VISIT LONG    Infusion Appointment   2. Encounter for antineoplastic chemotherapy  CC OFFICE VISIT LONG    Infusion Appointment        CC: Dov Morales, DO    ______________________________________________________________________________    History of Present Illness    Mr. Pardeep Wilson is seen for follow-up.  He had cycle 2 of treatment 3 weeks ago.  Overall has done well.  No recurrence of rash.  Some exfoliation of the skin but this is pretty much resolved.  Alopecia is noted.  No  problems with mouth sores or fever.  Breathing is stable.  Denies problems with bowels or urine.  No significant neuropathy.  ECOG status is 0.      Pain Status  Currently in Pain: No/denies    Review of Systems    Constitutional  Constitutional (WDL): Exceptions to WDL  Fatigue: Fatigue relieved by rest  Neurosensory  Neurosensory (WDL): Exceptions to WDL  Peripheral Sensory Neuropathy: Asymptomatic, loss of deep tendon reflexes or paresthesia(feet)  Cardiovascular  Cardiovascular (WDL): All cardiovascular elements are within defined limits  Pulmonary  Respiratory (WDL): Within Defined Limits  Gastrointestinal  Gastrointestinal (WDL): Exceptions to WDL  Constipation: Occasional or intermittent symptoms, occasional use of stool softeners, laxatives, dietary modification, or enema  Genitourinary  Genitourinary (WDL): All genitourinary elements are within defined limits  Integumentary  Integumentary (WDL): Exceptions to WDL  Palmar-Plantar Erythrodysesthesia Syndrome: Minimal skin changes or dermatitis (e.g., erythema, edema, or hyperkeratosis) without pain(healing on rt hand)  Patient Coping  Patient Coping: Accepting  Distress Assessment  Distress Assessment Score: 3  Accompanied by  Accompanied by: Alone    Past History  Past Medical History:   Diagnosis Date     Anemia      Coronary artery disease     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     Esophageal reflux      Hodgkin's lymphoma (H)     s/p radiation to the mediastinum at age 17     Hyperlipemia      Hypertension      Hypothyroidism      Lung cancer (H)      Psoriasis          Past Surgical History:   Procedure Laterality Date     CORONARY ANGIOPLASTY      Stent Placement     EYE SURGERY Bilateral     Cataracts     MEDIASTINOSCOPY N/A 11/1/2018    Procedure: MEDIASTINOSCOPY;  Surgeon: Bry Saunders MD;  Location: [facility];  Service:      PAROTIDECTOMY       SC LAP,DIAGNOSTIC ABDOMEN N/A 11/7/2017    Procedure: DIAGNOSTIC LAPAROSCOPY,   LYSIS OF ADHESIONS, SMALL BOWEL RESECTION;  Surgeon: Brian Granados MD;  Location: [facility] [place];  Service: General     SPLENECTOMY, TOTAL  1973     WISDOM TOOTH EXTRACTION         Physical Exam    Recent Vitals 7/28/2020   Height -   Weight -   BSA (m2) -   /83   Pulse -   Temp -   Temp src -   SpO2 -   Some recent data might be hidden         GENERAL: Alert and oriented. Seated comfortably. In no distress.     HEAD: Atraumatic and normocephalic.  Alopecia     EYES: SOPHIE, EOMI.  No pallor.  No icterus.     Oral cavity: no mucosal lesion or tonsillar enlargement.     NECK: supple. JVP normal.  No thyroid enlargement.     LYMPH NODES: No palpable, cervical, axillary or inguinal lymphadenopathy.     CHEST: clear to auscultation bilaterally.  Resonant to percussion throughout bilaterally.  Symmetrical breath movements bilaterally.     CVS: S1 and S2 are heard. Regular rate and rhythm.  No murmur or gallop or rub heard.     ABDOMEN: Soft. Not tender. Not distended.  No palpable hepatomegaly or splenomegaly.  No other mass palpable.  Bowel sounds heard.     EXTREMITIES: Warm.  No peripheral edema.     SKIN: no rash, or bruising or purpura.    CNS: Nonfocal          Lab Results    Recent Results (from the past 168 hour(s))   HM1 (CBC with Diff)   Result Value Ref Range    WBC 9.0 4.0 - 11.0 thou/uL    RBC 3.52 (L) 4.40 - 6.20 mill/uL    Hemoglobin 11.2 (L) 14.0 - 18.0 g/dL    Hematocrit 32.6 (L) 40.0 - 54.0 %    MCV 93 80 - 100 fL    MCH 31.8 27.0 - 34.0 pg    MCHC 34.4 32.0 - 36.0 g/dL    RDW 17.8 (H) 11.0 - 14.5 %    Platelets 219 140 - 440 thou/uL    MPV 10.8 8.5 - 12.5 fL    Neutrophils % 52 50 - 70 %    Lymphocytes % 37 20 - 40 %    Monocytes % 10 2 - 10 %    Eosinophils % 0 0 - 6 %    Basophils % 0 0 - 2 %    Neutrophils Absolute 4.7 2.0 - 7.7 thou/uL    Lymphocytes Absolute 3.3 0.8 - 4.4 thou/uL    Monocytes Absolute 0.9 0.0 - 0.9 thou/uL    Eosinophils Absolute 0.0 0.0 - 0.4 thou/uL    Basophils  Absolute 0.0 0.0 - 0.2 thou/uL       Imaging    Ct Chest High Resolution Without Contrast    Result Date: 7/24/2020  EXAM: CT CHEST HIGH RESOLUTION WO CONTRAST LOCATION: [facility] DATE/TIME: 7/24/2020 12:03 PM INDICATION: fu lung cancer COMPARISON: High-resolution chest CT 06/05/2020; PET/CT 06/01/2020 TECHNIQUE: High resolution images were obtained through the chest during inspiration with select expiratory views. Prone imaging was performed. Multiplanar reformats were obtained. Dose reduction techniques were used. CONTRAST: None. FINDINGS: LUNGS AND PLEURA: Status post left lower lobectomy. No soft tissue thickening or nodularity along the margin of bronchial resection. A small focus of groundglass attenuation in the posterior left upper lobe 06/05/2020 has resolved and a few new indistinct foci of groundglass opacity have developed in the more caudal subpleural left lower lobe of similar character which are likely small foci of nonspecific inflammation. Status post wedge resection in the anterior right upper lobe. Unchanged soft tissue thickening along the resection staple line including a 7 mm nodule (series 5, image 121) which has a tiny peripheral calcification. An additional solid nodule posterior to the wedge resection along the minor fissure is unchanged in size measuring 7 x 9 mm (series 5, image 95). Unchanged left pleural thickening and basal pleural fluid. Subpleural opacity in the lateral basal left upper lobe represents a rounded atelectasis. Additional subpleural opacities are present in both apices including along the mediastinal pleura associated with internal air-filled bronchiectatic airways consistent with parenchymal scarring. There are no new lung nodules. MEDIASTINUM: Cardiac chambers are normal in size. Circumflex distribution coronary artery calcifications and/or stent. Unchanged degenerative calcification of the aortic root. No pericardial effusion. Mild atheromatous  calcification of the thoracic aorta. Conventional arch anatomy. Small hiatal hernia. Esophagus is decompressed. No new hilar or mediastinal lymph node enlargement. UPPER ABDOMEN: Benign cyst arising from the upper lateral aspect of the right kidney requires no additional workup or follow-up. There is a bowel anastomosis in the upper central abdomen. MUSCULOSKELETAL: Mild anterior wedging of mid thoracic vertebra. Unchanged multilevel degenerative disc disease with marginal osteophytes. No aggressive or destructive bone lesions. Chest wall soft tissues are symmetric.     1.  Status post left lower lobectomy and wedge resection of the anterior right upper lobe. 2.  Two small nodules in the left upper lobe adjacent to the staple line and along the minor fissure are unchanged from 06/05/2020. Continued attention on short-term follow-up CT chest is suggested. 3.  Small focus of groundglass attenuation in the posterior left upper lobe has resolved with a few new indistinct foci of groundglass in the posterior basal left upper lobe consistent with nonspecific inflammation. 4.  Other incidental findings as above, unchanged.        Signed by: Kevin Soto MD

## 2021-06-10 NOTE — PROGRESS NOTES
Pardeep is here today for ongoing management and tx of lung cancer. Today is D1C4 Carbo/taxol pending labs and OV with Cherise Segovia NP. Basia Felder

## 2021-06-11 NOTE — PROGRESS NOTES
Pt arrived at [facility] to see Melody CHAMPION. Pt has Lung Cancer and is here for treatment.

## 2021-06-11 NOTE — PROGRESS NOTES
PT here ambulatory for keytruda infusion. Lab results approved for txt. Flu vaccine assessment completed and flu vaccine administered in left deltoid/bandaid placed over site. Iv placed in left hand and keytruda infused without any side effects. Infusion completed and iv dc'd with pressure dressing to site.follow up reviewed and pt dc'd steady gait

## 2021-06-11 NOTE — PROGRESS NOTES
[facility] Hematology and Oncology Progress Note    Patient: Pardeep Wilson  MRN: [id number]  Date of Service: 09/29/2020        Reason for Visit    Chief Complaint   Patient presents with     HE Cancer       Assessment and Plan  Cancer Staging  Malignant neoplasm of lower lobe of left lung (H)  Staging form: Lung, AJCC 8th Edition  - Clinical stage from 10/15/2018: Stage IIIA (cT4, cN0, cM0) - Signed by Melody Segovia CNP on 12/21/2018  ALK-, EGFR-, BRAF-,ROS1-, RET-, NTRK-, MET-  PDL1 0% expressed    1. Lung cancer, originally Stage IIIa in 2018. Recurrent disease in 2019/2020 with stage 4: had wedge resection in Feb 2020 at the [facility]. Had multiple nodules in right upper lobe, ?pleural mets?. Has started palliative chemotherapy with Carboplatin, Taxol and Keytruda. He has had 4 cycles. CT overall shows stable disease in right upper lobe. 2 new tiny nodules in left upper lobe. Then started maintenance keytruda. He will return in 3 weeks. Likely repeat imaging after 3 doses. Today is cycle 2.     2. Renal insufficiency: likely from medications, previous cisplatin. Avoid nephrotoxic medications.  Stay hydrated.  Keep blood pressure well controlled.    3. 2 new small nodules: follow closely on subsequent imaging.     4. Hyperkalemia: could be from kidney function being slightly worse. He is also taking more vitamins. I encourage him to back off on vitamins, especially the potassium. Monitor at next visit.     ECOG Performance   ECOG Performance Status: 1     Distress Assessment  Distress Assessment Score: 5:    Pain  Currently in Pain: No/denies      Problem List    1. Malignant neoplasm of lower lobe of left lung (H)     2. Encounter for antineoplastic chemotherapy  CC OFFICE VISIT LONG    CC OFFICE VISIT LONG    Infusion Appointment   3. Metastatic primary lung cancer, left (H)  CC OFFICE VISIT LONG    CC OFFICE VISIT LONG    Infusion Appointment         ______________________________________________________________________________    History of Present Illness    Measurable Disease: CT, PET    Current Treatment: Maintenance Keytruda. Started 9/8/20  Carboplatin, Taxol, Keytruda, first cycle 6/15/20. Had 4 cycles.     Past Treatment:   Wedge resection of right upper lobe nodule, February 28, 2020     Cisplatin and Alimta adjuvant chemotherapy, for 4 cycles: Day 1 cycle 4, February 15, 2019  First cycle  started December 14, 2018     Patient underwent mediastinoscopy, bronchoscopy, thoracoscopic surgery and left lower lobectomy on November 1, 2018     Interim History:   Pt is here to continue on immunotherapy. He is doing fine. Mild muscle achiness in arms, but not severe. Trying to increase activity. Still concerned and anxious about situation.     Pain Status  Currently in Pain: No/denies    Review of Systems    Constitutional  Constitutional (WDL): All constitutional elements are within defined limits  Neurosensory  Neurosensory (WDL): Exceptions to WDL  Peripheral Sensory Neuropathy: Asymptomatic, loss of deep tendon reflexes or paresthesia(hands and feet)  Eye   Eye Disorder (WDL): All eye disorder elements are within defined limits  Ear  Ear Disorder (WDL): All ear disorder elements are within defined limits  Cardiovascular  Cardiovascular (WDL): All cardiovascular elements are within defined limits  Pulmonary  Respiratory (WDL): Exceptions to WDL  Dyspnea: Shortness of breath with moderate exertion  Gastrointestinal  Gastrointestinal (WDL): Exceptions to WDL  Anorexia: Loss of appetite without alteration in eating habits  Genitourinary  Genitourinary (WDL): All genitourinary elements are within defined limits  Lymphatic  Lymph (WDL): All lymph disorder elements are within defined limits  Musculoskeletal and Connective Tissue  Musculoskeletal and Connetive Tissue Disorders (WDL): Exceptions to WDL  Arthralgia: Mild pain(back,  neck)  Integumentary  Integumentary (WDL): Exceptions to WDL  Alopecia: Hair loss of >50% normal for that individual that is readily apparent to others, a wig or hair piece is necessary if the patient desires to completely camouflage the hair loss, associated with psychosocial impact  Patient Coping  Patient Coping: Open/discussion  Distress Assessment  Distress Assessment Score: 5  Accompanied by  Accompanied by: Alone  Oral Chemo Adherence         Past History  Past Medical History:   Diagnosis Date     Anemia      Coronary artery disease     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 Cooperative in conversation. Here alone due to visitor restrictions. Mask on  RESP: Regular respiratory rate. No expiratory wheezes   MUSCULOSKELETAL: no bilateral leg swelling  NEURO: non focal. Alert and oriented x3.   PSYCH: within normal limits. No depression or anxiety.  SKIN: exposed skin is dry intact.     Lab Results    Recent Results (from the past 168 hour(s))   Comprehensive Metabolic Panel   Result Value Ref Range    Sodium 144 136 - 145 mmol/L    Potassium 5.4 (H) 3.5 - 5.0 mmol/L    Chloride 109 (H) 98 - 107 mmol/L    CO2 29 22 - 31 mmol/L    Anion Gap, Calculation 6 5 - 18 mmol/L    Glucose 78 70 - 125 mg/dL    BUN 26 (H) 8 - 22 mg/dL    Creatinine 1.32 (H) 0.70 - 1.30 mg/dL    GFR MDRD Af Amer >60 >60 mL/min/1.73m2    GFR MDRD Non Af Amer 54 (L) >60 mL/min/1.73m2    Bilirubin, Total 0.8 0.0 - 1.0 mg/dL    Calcium 9.8 8.5 - 10.5 mg/dL    Protein, Total 6.8 6.0 - 8.0 g/dL    Albumin 3.4 (L) 3.5 - 5.0 g/dL    Alkaline Phosphatase 121 (H) 45 - 120 U/L    AST 22 0 - 40 U/L     ALT 9 0 - 45 U/L       Imaging    Ct Chest Without Contrast    Result Date: 9/4/2020  EXAM: CT CHEST WO CONTRAST LOCATION: [facility] DATE/TIME: 9/4/2020 1:12 PM INDICATION: lung cancer COMPARISON: 07/24/2020 TECHNIQUE: CT chest without IV contrast. Multiplanar reformats were obtained. Dose reduction techniques were used. CONTRAST: None. FINDINGS: LUNGS AND PLEURA: Status post right upper lobe wedge resection and left lower lobectomy. There is no new soft tissue at the suture line. An 8 mm x 5 mm opacity in the right upper lobe adjacent to the right minor fissure has not changed (series 3 image 37).  There are two 4 mm opacities in the left upper lobe which are newly identified (images 63 and 69). Loculated left pleural fluid is again seen. Rounded atelectasis is again seen. A 6 mm x 4 mm opacity adjacent to the medial right upper lobe has not changed (series 3 image 28). Apical scarring is present. MEDIASTINUM/AXILLAE: Normal size of the heart. Normal esophagus. No thoracic lymphadenopathy. There is coronary artery disease. UPPER ABDOMEN: Metallic coils or clips in the left upper quadrant. A low-attenuation right renal lesion is likely a simple cyst which does not require follow-up. There is surgical change in the included bowel. MUSCULOSKELETAL: Unremarkable.     1.  Left lower lobectomy and right upper lobe wedge resection. There are two new 4 mm pulmonary nodules in the left upper lobe; recommend attention on follow-up imaging. Other opacities have not changed. Loculated left pleural fluid and round atelectasis  have not changed. 2.  No lymphadenopathy. 3.  Coronary artery disease.         Signed by: Melody Segovia, CNP

## 2021-06-11 NOTE — TELEPHONE ENCOUNTER
Spoke with patient and tried to explain he does need to see you at least yearly to continue medications.  He feels this is redundant he had his blood drawn on 8/18 with oncology and feels seeing you Is not needed.   I tried to explain there are guidelines we have to follow he just is not happy with keeping appt. Please advise on how to proceed

## 2021-06-11 NOTE — PROGRESS NOTES
[facility] Hematology and Oncology Progress Note    Patient: Pardeep Wilson  MRN: [id number]  Date of Service: 09/08/2020        Reason for Visit    Chief Complaint   Patient presents with     HE Cancer       Assessment and Plan  Cancer Staging  Malignant neoplasm of lower lobe of left lung (H)  Staging form: Lung, AJCC 8th Edition  - Clinical stage from 10/15/2018: Stage IIIA (cT4, cN0, cM0) - Signed by Melody Segovia CNP on 12/21/2018  ALK-, EGFR-, BRAF-,ROS1-, RET-, NTRK-, MET-  PDL1 0% expressed    1. Lung cancer, originally Stage IIIa in 2018. Recurrent disease in 2019/2020 with stage 4: had wedge resection in Feb 2020 at the [facility]. Continues to have multiple nodules in right upper lobe, ?pleural mets?. Has started palliative chemotherapy with Carboplatin, Taxol and Keytruda. He has had 4 cycles. CT overall shows stable disease in right upper lobe. 2 new tiny nodules in left upper lobe. We will start maintenance keytruda today. He will return in 3 weeks. Likely repeat imaging after 2-3 doses.     2. Low magnesium: mild. Asymptomatic. Educated about foods high in magnesium. Will continue to monitor.     3. 2 new small nodules: follow closely on subsequent imaging. Will discuss with radiology or tumor board.     ECOG Performance   ECOG Performance Status: 1     Distress Assessment  Distress Assessment Score: 5: Ct results. Declined any need for assistance with this. We will continue to monitor and give resources as needed.     Pain  Currently in Pain: No/denies      Problem List    1. Encounter for antineoplastic chemotherapy  CC OFFICE VISIT LONG    CC OFFICE VISIT LONG    Infusion Appointment    CC OFFICE VISIT LONG    Infusion Appointment    CC OFFICE VISIT LONG    DISCONTINUED: sodium chloride 0.9% 250 mL infusion    DISCONTINUED: pembrolizumab 200 mg in sodium chloride 0.9% 100 mL (KEYTRUDA)    DISCONTINUED: sodium chloride flush 20 mL (NS)    DISCONTINUED: heparin lockflush (PF) porcine 300-600  Units    DISCONTINUED: diphenhydrAMINE injection 50 mg (BENADRYL)    DISCONTINUED: famotidine 20 mg/2 mL injection 20 mg (PEPCID)    DISCONTINUED: hydrocortisone sod succ (PF) injection 100 mg    DISCONTINUED: acetaminophen tablet 1,000 mg (TYLENOL)    DISCONTINUED: sodium chloride 0.9% 500 mL   2. Metastatic primary lung cancer, left (H)  CC OFFICE VISIT LONG    CC OFFICE VISIT LONG    Infusion Appointment    CC OFFICE VISIT LONG    Infusion Appointment    CC OFFICE VISIT LONG    DISCONTINUED: sodium chloride 0.9% 250 mL infusion    DISCONTINUED: pembrolizumab 200 mg in sodium chloride 0.9% 100 mL (KEYTRUDA)    DISCONTINUED: sodium chloride flush 20 mL (NS)    DISCONTINUED: heparin lockflush (PF) porcine 300-600 Units    DISCONTINUED: diphenhydrAMINE injection 50 mg (BENADRYL)    DISCONTINUED: famotidine 20 mg/2 mL injection 20 mg (PEPCID)    DISCONTINUED: hydrocortisone sod succ (PF) injection 100 mg    DISCONTINUED: acetaminophen tablet 1,000 mg (TYLENOL)    DISCONTINUED: sodium chloride 0.9% 500 mL        ______________________________________________________________________________    History of Present Illness    Measurable Disease: CT, PET    Current Treatment: Carboplatin, Taxol, Keytruda, first cycle 6/15/20. Today is cycle 4    Past Treatment:   Wedge resection of right upper lobe nodule, February 28, 2020     Cisplatin and Alimta adjuvant chemotherapy, for 4 cycles: Day 1 cycle 4, February 15, 2019  First cycle  started December 14, 2018     Patient underwent mediastinoscopy, bronchoscopy, thoracoscopic surgery and left lower lobectomy on November 1, 2018     Interim History:   Pt is here to continue on chemo. He is doing pretty well on it. He is anxious about scan and what the next steps will be. Seems to tolerate chemo well. Mild fatigue, nausea for about 4 days.     Pain Status  Currently in Pain: No/denies    Review of Systems    Constitutional  Constitutional (WDL): All constitutional elements are  within defined limits  Neurosensory  Neurosensory (WDL): Exceptions to WDL  Peripheral Sensory Neuropathy: Asymptomatic, loss of deep tendon reflexes or paresthesia  Eye   Eye Disorder (WDL): All eye disorder elements are within defined limits  Ear  Ear Disorder (WDL): All ear disorder elements are within defined limits  Cardiovascular  Cardiovascular (WDL): All cardiovascular elements are within defined limits  Pulmonary  Respiratory (WDL): Within Defined Limits  Gastrointestinal  Gastrointestinal (WDL): All gastrointestinal elements are within defined limits  Genitourinary  Genitourinary (WDL): All genitourinary elements are within defined limits  Lymphatic  Lymph (WDL): All lymph disorder elements are within defined limits  Musculoskeletal and Connective Tissue  Musculoskeletal and Connetive Tissue Disorders (WDL): All Musculoskeletal and Connetive Tissue Disorder elements are within defined limits  Integumentary  Integumentary (WDL): Exceptions to WDL  Alopecia: Hair loss of >50% normal for that individual that is readily apparent to others, a wig or hair piece is necessary if the patient desires to completely camouflage the hair loss, associated with psychosocial impact  Patient Coping  Patient Coping: Accepting  Distress Assessment  Distress Assessment Score: 5  Accompanied by  Accompanied by: Alone  Oral Chemo Adherence         Past History  Past Medical History:   Diagnosis Date     Anemia      Coronary artery disease     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 Cooperative in conversation. Here alone due to visitor restrictions. Mask on  RESP: Regular  respiratory rate. No expiratory wheezes   MUSCULOSKELETAL: no bilateral leg swelling  NEURO: non focal. Alert and oriented x3.   PSYCH: within normal limits. No depression or anxiety.  SKIN: exposed skin is dry intact.     Lab Results    Recent Results (from the past 168 hour(s))   Magnesium   Result Value Ref Range    Magnesium 1.4 (L) 1.8 - 2.6 mg/dL   Comprehensive Metabolic Panel   Result Value Ref Range    Sodium 140 136 - 145 mmol/L    Potassium 4.4 3.5 - 5.0 mmol/L    Chloride 107 98 - 107 mmol/L    CO2 26 22 - 31 mmol/L    Anion Gap, Calculation 7 5 - 18 mmol/L    Glucose 94 70 - 125 mg/dL    BUN 25 (H) 8 - 22 mg/dL    Creatinine 1.11 0.70 - 1.30 mg/dL    GFR MDRD Af Amer >60 >60 mL/min/1.73m2    GFR MDRD Non Af Amer >60 >60 mL/min/1.73m2    Bilirubin, Total 0.5 0.0 - 1.0 mg/dL    Calcium 9.1 8.5 - 10.5 mg/dL    Protein, Total 6.8 6.0 - 8.0 g/dL    Albumin 3.5 3.5 - 5.0 g/dL    Alkaline Phosphatase 108 45 - 120 U/L    AST 18 0 - 40 U/L    ALT 15 0 - 45 U/L   HM1 (CBC with Diff)   Result Value Ref Range    WBC 7.0 4.0 - 11.0 thou/uL    RBC 3.21 (L) 4.40 - 6.20 mill/uL    Hemoglobin 10.4 (L) 14.0 - 18.0 g/dL    Hematocrit 31.2 (L) 40.0 - 54.0 %    MCV 97 80 - 100 fL    MCH 32.4 27.0 - 34.0 pg    MCHC 33.3 32.0 - 36.0 g/dL    RDW 21.2 (H) 11.0 - 14.5 %    Platelets 350 140 - 440 thou/uL    MPV 10.1 8.5 - 12.5 fL    Neutrophils % 33 (L) 50 - 70 %    Lymphocytes % 45 (H) 20 - 40 %    Monocytes % 18 (H) 2 - 10 %    Eosinophils % 2 0 - 6 %    Basophils % 1 0 - 2 %    Immature Granulocyte % 0 <=0 %    Neutrophils Absolute 2.3 2.0 - 7.7 thou/uL    Lymphocytes Absolute 3.1 0.8 - 4.4 thou/uL    Monocytes Absolute 1.3 (H) 0.0 - 0.9 thou/uL    Eosinophils Absolute 0.2 0.0 - 0.4 thou/uL    Basophils Absolute 0.1 0.0 - 0.2 thou/uL    Immature Granulocyte Absolute 0.0 <=0.0 thou/uL   Manual Differential   Result Value Ref Range    Platelet Estimate Normal Normal    Ovalocytes 1+ (!) Negative    Polychromasia 1+ (!) Negative     Target Cells 1+ (!) Negative    Tear Drop Cells 1+ (!) Negative    Schistocytes 1+ (!) Negative    Eastman-Jolly Bodies 1+ (!) Negative       Imaging    Ct Chest Without Contrast    Result Date: 9/4/2020  EXAM: CT CHEST WO CONTRAST LOCATION: [facility] DATE/TIME: 9/4/2020 1:12 PM INDICATION: lung cancer COMPARISON: 07/24/2020 TECHNIQUE: CT chest without IV contrast. Multiplanar reformats were obtained. Dose reduction techniques were used. CONTRAST: None. FINDINGS: LUNGS AND PLEURA: Status post right upper lobe wedge resection and left lower lobectomy. There is no new soft tissue at the suture line. An 8 mm x 5 mm opacity in the right upper lobe adjacent to the right minor fissure has not changed (series 3 image 37).  There are two 4 mm opacities in the left upper lobe which are newly identified (images 63 and 69). Loculated left pleural fluid is again seen. Rounded atelectasis is again seen. A 6 mm x 4 mm opacity adjacent to the medial right upper lobe has not changed (series 3 image 28). Apical scarring is present. MEDIASTINUM/AXILLAE: Normal size of the heart. Normal esophagus. No thoracic lymphadenopathy. There is coronary artery disease. UPPER ABDOMEN: Metallic coils or clips in the left upper quadrant. A low-attenuation right renal lesion is likely a simple cyst which does not require follow-up. There is surgical change in the included bowel. MUSCULOSKELETAL: Unremarkable.     1.  Left lower lobectomy and right upper lobe wedge resection. There are two new 4 mm pulmonary nodules in the left upper lobe; recommend attention on follow-up imaging. Other opacities have not changed. Loculated left pleural fluid and round atelectasis  have not changed. 2.  No lymphadenopathy. 3.  Coronary artery disease.         Signed by: Melody Segovia, CNP

## 2021-06-11 NOTE — PROGRESS NOTES
Assessment and Plan:     Problem List Items Addressed This Visit     Hypertension     Controlled on current regimen of metoprolol.  Will continue at current dosing.  Recent kidney function reviewed         Coronary artery disease involving native coronary artery of native heart without angina pectoris     Tolerating high-dose atorvastatin at 40 mg daily.  Will check cholesterol panel at next lab draw.  Continue current therapy.           Other Visit Diagnoses     Routine general medical examination at a health care facility    -  Primary    Encounter for screening for lipoid disorders        Relevant Orders    Lipid [place], FASTING    Screening for malignant neoplasm of prostate        Relevant Orders    PSA (Prostatic-Specific Antigen), Annual Screen            The patient's current medical problems were reviewed.    I have had an Advance Directives discussion with the patient.  The following health maintenance schedule was reviewed with the patient and provided in printed form in the after visit summary:   Health Maintenance   Topic Date Due     HIV SCREENING  07/11/1970     MEDICARE ANNUAL WELLNESS VISIT  07/11/2020     TD 18+ HE  10/02/2020 (Originally 7/11/1973)     PNEUMOCOCCAL IMMUNIZATION 65+ HIGH/HIGHEST RISK (1 of 2 - PCV13) 10/02/2020 (Originally 7/11/2020)     INFLUENZA VACCINE RULE BASED (1) 10/02/2020 (Originally 8/1/2020)     TDAP ADULT ONE TIME DOSE  10/02/2020 (Originally 7/11/1973)     ZOSTER VACCINES (1 of 2) 10/02/2020 (Originally 7/11/2005)     CORONARY ARTERY DISEASE FOLLOW-UP  03/02/2021     HYPERTENSION FOLLOW-UP  03/02/2021     FALL RISK ASSESSMENT  07/07/2021     LIPID  10/06/2022     ADVANCE CARE PLANNING  10/06/2022     COLORECTAL CANCER SCREENING  02/07/2024     HEPATITIS C SCREENING  Completed     HEPATITIS B VACCINES  Aged Out        Subjective:   Chief Complaint: Pardeep Wilson is an 65 y.o. male here for a Welcome to Medicare visit.   HPI:  Denies any chest pain, shortness of  breath, dyspnea exertion, palpitations, nausea or vomiting.  Denies any changes in vision or hearing, or urinary or bowel habits.  He has been tolerating therapy through oncology well.  He has a very good routine set.  Does admit he is not exercising as much due to fatigue but is trying to keep on top of it as best he can.    Review of Systems:  Please see above.  The rest of the review of systems are negative for all systems.    Patient Care Team:  Dov Morales DO as PCP - General  Ricardo Iglesias MD as Physician (Ophthalmology)  Armin Suarez MD as Physician (Cardiology)  Lay Powers MD as Physician (Pulmonary Disease)  Kevin Soto MD as Physician (Hematology and Oncology)  Lady Luevano, RN as Oncology Nurse Navigator (Hematology and Oncology)  Bry Saunders MD (Cardiovascular and Thoracic Surgery)  Dov Morales DO as Assigned PCP     Patient Active Problem List   Diagnosis     Psoriasis     Anemia     Hypothyroidism     Hyperlipidemia     Hypertension     Esophageal Reflux     Coronary artery disease involving native coronary artery of native heart without angina pectoris     Malignant neoplasm of lower lobe of left lung (H)     Encounter for antineoplastic chemotherapy     Metastatic primary lung cancer, left (H)     Renal insufficiency     CKD (chronic kidney disease) stage 3, GFR 30-59 ml/min (H)     Past Medical History:   Diagnosis Date     Anemia      Coronary artery disease     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     Esophageal reflux      Hodgkin's lymphoma (H)     s/p radiation to the mediastinum at age 17     Hyperlipemia      Hypertension      Hypothyroidism      Lung cancer (H)      Psoriasis       Past Surgical History:   Procedure Laterality Date     CORONARY ANGIOPLASTY      Stent Placement     EYE SURGERY Bilateral     Cataracts     MEDIASTINOSCOPY N/A 11/1/2018    Procedure: MEDIASTINOSCOPY;  Surgeon: Bry LOUISE  MD Chip;  Location: [facility] OR;  Service:      PAROTIDECTOMY       NE LAP,DIAGNOSTIC ABDOMEN N/A 11/7/2017    Procedure: DIAGNOSTIC LAPAROSCOPY,  LYSIS OF ADHESIONS, SMALL BOWEL RESECTION;  Surgeon: Brian Granados MD;  Location: [facility];  Service: General     SPLENECTOMY, TOTAL  1973     WISDOM TOOTH EXTRACTION        Family History   Problem Relation Age of Onset     Dementia Mother      Cancer Father 67        Thinks of the diaphragm, history of working with noxious chemicals     Kidney disease Brother      No Medical Problems Maternal Grandmother      No Medical Problems Maternal Grandfather      No Medical Problems Paternal Grandmother      Heart disease Paternal Grandfather      No Medical Problems Son      No Medical Problems Daughter       Social History     Socioeconomic History     Marital status:      Spouse name: Eliane     Number of children: 2     Years of education: 18     Highest education level: Master's degree (e.g., MA, MS, Iain, MEd, MSW, ABAD)   Occupational History     Occupation:      Employer: OTHER     Comment: [employer]   Social Needs     Financial resource strain: Not on file     Food insecurity     Worry: Not on file     Inability: Not on file     Transportation needs     Medical: Not on file     Non-medical: Not on file   Tobacco Use     Smoking status: Never Smoker     Smokeless tobacco: Never Used   Substance and Sexual Activity     Alcohol use: Not Currently     Frequency: Never     Binge frequency: Never     Drug use: No     Sexual activity: Not on file   Lifestyle     Physical activity     Days per week: Not on file     Minutes per session: Not on file     Stress: Not on file   Relationships     Social connections     Talks on phone: Not on file     Gets together: Not on file     Attends Religion service: Not on file     Active member of club or organization: Not on file     Attends meetings of clubs or organizations: Not on file      "Relationship status: Not on file     Intimate partner violence     Fear of current or ex partner: Not on file     Emotionally abused: Not on file     Physically abused: Not on file     Forced sexual activity: Not on file   Other Topics Concern     Not on file   Social History Narrative     Not on file       Current Outpatient Medications   Medication Sig Dispense Refill     atorvastatin (LIPITOR) 40 MG tablet TAKE 1 TABLET BY MOUTH AT  BEDTIME 90 tablet 3     biotin 300 mcg Tab Take 300 mg by mouth daily.       dexAMETHasone (DECADRON) 4 MG tablet Take 8mg once daily for 3 days. Start taking the day after receiving Carboplatin.. 6 tablet 5     metoprolol succinate (TOPROL-XL) 25 MG TAKE ONE-HALF TABLET BY  MOUTH DAILY 45 tablet 2     multivitamin (ONE A DAY) per tablet Take 2 tablets by mouth.       pantoprazole (PROTONIX) 40 MG tablet TAKE 1 TABLET BY MOUTH  DAILY 90 tablet 3     prochlorperazine (COMPAZINE) 10 MG tablet Take 1 tablet (10 mg total) by mouth every 6 (six) hours as needed (For breakthrough nausea/vomiting). 30 tablet 1     No current facility-administered medications for this visit.       Objective:   Vital Signs:   Visit Vitals  /80 (Patient Site: Left Arm, Patient Position: Sitting, Cuff Size: Adult Large)   Pulse 78   Temp 97.9  F (36.6  C) (Oral)   Resp 12   Ht 5' 8.5\" (1.74 m)   Wt 186 lb 11.2 oz (84.7 kg)   BMI 27.97 kg/m         VisionScreening:   Hearing Screening    125Hz 250Hz 500Hz 1000Hz 2000Hz 3000Hz 4000Hz 6000Hz 8000Hz   Right ear:            Left ear:               Visual Acuity Screening    Right eye Left eye Both eyes   Without correction:      With correction: 20/20 20/20 20/20        PHYSICAL EXAM  Physical Exam  Vitals signs and nursing note reviewed.   Constitutional:       Appearance: He is well-developed.   HENT:      Head: Normocephalic and atraumatic.   Eyes:      Conjunctiva/sclera: Conjunctivae normal.      Pupils: Pupils are equal, round, and reactive to light. "   Cardiovascular:      Rate and Rhythm: Normal rate and regular rhythm.      Pulses: Normal pulses.      Heart sounds: Normal heart sounds.   Pulmonary:      Effort: Pulmonary effort is normal.      Breath sounds: Normal breath sounds.   Musculoskeletal:      Right lower leg: No edema.      Left lower leg: No edema.   Skin:     General: Skin is warm and dry.      Capillary Refill: Capillary refill takes less than 2 seconds.   Neurological:      Mental Status: He is alert and oriented to person, place, and time.   Psychiatric:         Mood and Affect: Mood normal.         Thought Content: Thought content normal.          Assessment Results 9/2/2020   Activities of Daily Living No help needed   Instrumental Activities of Daily Living No help needed   Mini Cog Total Score 5   Some recent data might be hidden     A Mini Cog score of 0-2 suggests the possibility of dementia, score of 3-5 suggests no dementia    Identified Health Risks:     The patient was provided with suggestions to help him develop a healthy physical lifestyle.   He is at risk for lack of exercise and has been provided with information to increase physical activity for the benefit of his well-being.  Information regarding advance directives (living patel), including where he can download the appropriate form, was provided to the patient via the AVS.

## 2021-06-11 NOTE — TELEPHONE ENCOUNTER
Who is calling:  Patient  Reason for Call:  Questioning if appointment scheduled on 09/02/2020 is necessary. Patient stated he is being seen by Oncology every three weeks, and would like to cancel his scheduled visit if not needed. Please reach out to the patient and advise.  Date of last appointment with primary care: 02/14/2020  Okay to leave a detailed message: Yes

## 2021-06-11 NOTE — PROGRESS NOTES
Pt ambulates to infusion center for treatment following NP visit.  Pt was seen by TURNER Segovia CNP today and orders were approved.  Peripheral IV started w/excellent blood return noted.  Treatment administered as ordered without difficulty.  Peripheral IV flushed w/NS et dc'd.  Pt left clinic stable to [facility].  Plan RTC as scheduled.

## 2021-06-11 NOTE — TELEPHONE ENCOUNTER
I called Melecio to ask how he is doing and offer support and resources as needed.  Melecio said he's been doing fine.  He began maintenance tx with Keytruda last week.  Other than feeling a little low energy he is doing ok, no pain, no nausea.  He feels out of shape and attributes some of his fatigue to that as well.  He is hopeful he will respond to Keytruda and eager to see what his next scan shows.  Melecio is doing well at home and he does not identify a need for additional resources at this time.  I will continue to check in with him and I invited calls as well.

## 2021-06-11 NOTE — TELEPHONE ENCOUNTER
Let's make it a video visit. Oncology is focusing on one part of his treatment. My job is to check on how everything else is going.

## 2021-06-12 NOTE — PROGRESS NOTES
[facility] Hematology and Oncology Progress Note    Patient: Pardeep Wilson  MRN: [id number]  Date of Service: 10/20/2020        Reason for Visit    Chief Complaint   Patient presents with     HE Cancer       Assessment and Plan  Cancer Staging  Malignant neoplasm of lower lobe of left lung (H)  Staging form: Lung, AJCC 8th Edition  - Clinical stage from 10/15/2018: Stage IIIA (cT4, cN0, cM0) - Signed by eMlody Segovia CNP on 12/21/2018  ALK-, EGFR-, BRAF-,ROS1-, RET-, NTRK-, MET-  PDL1 0% expressed    1. Lung cancer, originally Stage IIIa in 2018. Recurrent disease in 2019/2020 with stage 4: had wedge resection in Feb 2020 at the [facility]. Had multiple nodules in right upper lobe, ?pleural mets?. Has started palliative chemotherapy with Carboplatin, Taxol and Keytruda. He has had 4 cycles. CT overall shows stable disease in right upper lobe. 2 new tiny nodules in left upper lobe. Then started maintenance keytruda. He will return in 3 weeks. repeat imaging after 3-4 doses. Today is cycle 3.    2. Renal insufficiency: likely from medications, previous cisplatin. Avoid nephrotoxic medications.  Has been using ibuprofen lately. Encourage him to stop that. Stay hydrated.  Keep blood pressure well controlled.    3. 2 new small nodules: follow closely on subsequent imaging.     4. Low TSH: could be from keytruda. Mild. Will monitor with next treatments. Will likely go high and become hypothyroid and may need synthroid.     ECOG Performance   ECOG Performance Status: 1     Distress Assessment  Distress Assessment Score: 4:    Pain  Currently in Pain: No/denies      Problem List    1. Metastatic primary lung cancer, left (H)  CC OFFICE VISIT LONG    Infusion Appointment    DISCONTINUED: sodium chloride 0.9% 250 mL infusion    DISCONTINUED: pembrolizumab 200 mg in sodium chloride 0.9% 100 mL (KEYTRUDA)    DISCONTINUED: sodium chloride flush 20 mL (NS)    DISCONTINUED: heparin lockflush (PF) porcine 300-600 Units     DISCONTINUED: diphenhydrAMINE injection 50 mg (BENADRYL)    DISCONTINUED: famotidine 20 mg/2 mL injection 20 mg (PEPCID)    DISCONTINUED: hydrocortisone sod succ (PF) injection 100 mg    DISCONTINUED: acetaminophen tablet 1,000 mg (TYLENOL)    DISCONTINUED: sodium chloride 0.9% 500 mL   2. Encounter for antineoplastic chemotherapy  CC OFFICE VISIT LONG    Infusion Appointment    DISCONTINUED: sodium chloride 0.9% 250 mL infusion    DISCONTINUED: pembrolizumab 200 mg in sodium chloride 0.9% 100 mL (KEYTRUDA)    DISCONTINUED: sodium chloride flush 20 mL (NS)    DISCONTINUED: heparin lockflush (PF) porcine 300-600 Units    DISCONTINUED: diphenhydrAMINE injection 50 mg (BENADRYL)    DISCONTINUED: famotidine 20 mg/2 mL injection 20 mg (PEPCID)    DISCONTINUED: hydrocortisone sod succ (PF) injection 100 mg    DISCONTINUED: acetaminophen tablet 1,000 mg (TYLENOL)    DISCONTINUED: sodium chloride 0.9% 500 mL   3. Malignant neoplasm of lower lobe of left lung (H)     4. Stage 3b chronic kidney disease          ______________________________________________________________________________    History of Present Illness    Measurable Disease: CT, PET    Current Treatment: Maintenance Keytruda. Started 9/8/20  Carboplatin, Taxol, Keytruda, first cycle 6/15/20. Had 4 cycles.     Past Treatment:   Wedge resection of right upper lobe nodule, February 28, 2020     Cisplatin and Alimta adjuvant chemotherapy, for 4 cycles: Day 1 cycle 4, February 15, 2019  First cycle  started December 14, 2018     Patient underwent mediastinoscopy, bronchoscopy, thoracoscopic surgery and left lower lobectomy on November 1, 2018     Interim History:   Pt is here to continue on immunotherapy. He is doing fine. Mainly anxious.     Pain Status  Currently in Pain: No/denies    Review of Systems    Constitutional  Constitutional (WDL): All constitutional elements are within defined limits  Neurosensory  Neurosensory (WDL): Exceptions to WDL  Peripheral  Sensory Neuropathy: Asymptomatic, loss of deep tendon reflexes or paresthesia  Eye   Eye Disorder (WDL): All eye disorder elements are within defined limits  Ear  Ear Disorder (WDL): All ear disorder elements are within defined limits  Cardiovascular  Cardiovascular (WDL): All cardiovascular elements are within defined limits  Pulmonary  Dyspnea: Shortness of breath with moderate exertion(sob with exercise)  Gastrointestinal  Anorexia: Loss of appetite without alteration in eating habits  Genitourinary  Genitourinary (WDL): All genitourinary elements are within defined limits  Lymphatic  Lymph (WDL): All lymph disorder elements are within defined limits  Musculoskeletal and Connective Tissue  Arthralgia: None  Integumentary  Alopecia: Hair loss of >50% normal for that individual that is readily apparent to others, a wig or hair piece is necessary if the patient desires to completely camouflage the hair loss, associated with psychosocial impact  Patient Coping  Patient Coping: Open/discussion  Distress Assessment  Distress Assessment Score: 4  Accompanied by  Accompanied by: Alone  Oral Chemo Adherence         Past History  Past Medical History:   Diagnosis Date     Anemia      Coronary artery disease     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 Cooperative in conversation. Here alone due to visitor restrictions. Mask on  RESP: Regular respiratory rate. No expiratory wheezes   MUSCULOSKELETAL: no bilateral leg swelling  NEURO: non focal. Alert and oriented x3.   PSYCH: within normal limits. No depression or anxiety.  SKIN: exposed skin is dry intact.     Lab  Results    Recent Results (from the past 168 hour(s))   Magnesium   Result Value Ref Range    Magnesium 1.6 (L) 1.8 - 2.6 mg/dL   Comprehensive Metabolic Panel   Result Value Ref Range    Sodium 143 136 - 145 mmol/L    Potassium 4.5 3.5 - 5.0 mmol/L    Chloride 107 98 - 107 mmol/L    CO2 26 22 - 31 mmol/L    Anion Gap, Calculation 10 5 - 18 mmol/L    Glucose 98 70 - 125 mg/dL    BUN 24 (H) 8 - 22 mg/dL    Creatinine 1.47 (H) 0.70 - 1.30 mg/dL    GFR MDRD Af Amer 58 (L) >60 mL/min/1.73m2    GFR MDRD Non Af Amer 48 (L) >60 mL/min/1.73m2    Bilirubin, Total 0.5 0.0 - 1.0 mg/dL    Calcium 9.3 8.5 - 10.5 mg/dL    Protein, Total 7.3 6.0 - 8.0 g/dL    Albumin 3.7 3.5 - 5.0 g/dL    Alkaline Phosphatase 129 (H) 45 - 120 U/L    AST 26 0 - 40 U/L    ALT 18 0 - 45 U/L   Thyroid Cascade   Result Value Ref Range    TSH 0.27 (L) 0.30 - 5.00 uIU/mL   HM1 (CBC with Diff)   Result Value Ref Range    WBC 7.6 4.0 - 11.0 thou/uL    RBC 3.58 (L) 4.40 - 6.20 mill/uL    Hemoglobin 11.7 (L) 14.0 - 18.0 g/dL    Hematocrit 36.4 (L) 40.0 - 54.0 %     (H) 80 - 100 fL    MCH 32.7 27.0 - 34.0 pg    MCHC 32.1 32.0 - 36.0 g/dL    RDW 16.0 (H) 11.0 - 14.5 %    Platelets 370 140 - 440 thou/uL    MPV 10.0 8.5 - 12.5 fL    Neutrophils % 35 (L) 50 - 70 %    Lymphocytes % 43 (H) 20 - 40 %    Monocytes % 17 (H) 2 - 10 %    Eosinophils % 5 0 - 6 %    Basophils % 1 0 - 2 %    Immature Granulocyte % 0 <=0 %    Neutrophils Absolute 2.7 2.0 - 7.7 thou/uL    Lymphocytes Absolute 3.3 0.8 - 4.4 thou/uL    Monocytes Absolute 1.3 (H) 0.0 - 0.9 thou/uL    Eosinophils Absolute 0.3 0.0 - 0.4 thou/uL    Basophils Absolute 0.1 0.0 - 0.2 thou/uL    Immature Granulocyte Absolute 0.0 <=0.0 thou/uL       Imaging    No results found.      Signed by: Melody Segovia, CNP

## 2021-06-12 NOTE — PROGRESS NOTES
Pardeep came to chemo infusion following lab and NP appointments for his next dose of Pembrolizumab.  Peripheral IV inserted with good blood return.  He received treatment as ordered and tolerated it well while in clinic today.  IV was flushed with ns then d/c'd and site covered.  Pardeep left clinic ambulatory.

## 2021-06-13 NOTE — PROGRESS NOTES
Assessment/Plan:      Visit for Preoperative Exam.     Patient approved for surgery with general or local anesthesia. Postoperative pain to be managed by surgeon during post-operative [employer] timeframe, typically 30-60 days for major surgery, and less for others. Postoperative Care will be managed by Hospital Service. Labs will be done as indicated. Above recommendations were reviewed with the patient. Follow up as needed. Proceed with proposed surgery without additional clinical clarifications.     Subjective:     Scheduled Procedure: Diagnostic Laparoscopy with Lysis of adhesions  Surgery Date: 11-7-17  Surgery Location: [facility]  Surgeon: Dr. Diehl    Current Outpatient Prescriptions   Medication Sig Dispense Refill     aspirin 325 MG tablet Take 325 mg by mouth daily. [facility]        atorvastatin (LIPITOR) 40 MG tablet Take 1 tablet (40 mg total) by mouth at bedtime. 90 tablet 3     clopidogrel (PLAVIX) 75 mg tablet Take 1 tablet (75 mg total) by mouth daily. 90 tablet 3     fluocinonide (LIDEX) 0.05 % cream Apply topically 2 (two) times a day. Sparingly to affected areas       levothyroxine (SYNTHROID, LEVOTHROID) 125 MCG tablet TAKE 1 TABLET DAILY 90 tablet 3     lisinopril (PRINIVIL,ZESTRIL) 5 MG tablet Take 1 tablet (5 mg total) by mouth daily. 90 tablet 1     metoprolol succinate (TOPROL-XL) 25 MG TAKE ONE-HALF (1/2) TABLET DAILY 45 tablet 3     pantoprazole (PROTONIX) 40 MG tablet Take 1 tablet (40 mg total) by mouth daily. 90 tablet 3     sodium fluoride (SF) 1.1 % Gel dental gel 1 application bedtime. UTD TAT        No current facility-administered medications for this visit.        Allergies   Allergen Reactions     Penicillins        Immunization History   Administered Date(s) Administered     Influenza, inj, historic 09/26/2012     Influenza, seasonal,quad inj 36+ mos 09/14/2016     Influenza,seasonal quad, PF, 36+MOS 10/06/2017     Pneumo Polysac 23-V 10/06/2017       Patient Active  Problem List   Diagnosis     Psoriasis     Anemia     Hypothyroidism     Hyperlipidemia     Hypertension     Coronary Artery Disease     Esophageal Reflux     Generalized skin lesions     Abdominal pain, unspecified abdominal location       Past Medical History:   Diagnosis Date     Anemia      Coronary artery disease      Esophageal reflux      Hyperlipemia      Hypertension      Hypothyroidism        Social History     Social History     Marital status:      Spouse name: Eliane     Number of children: 2     Years of education: 18     Occupational History      Other     [employer]     Social History Main Topics     Smoking status: Never Smoker     Smokeless tobacco: Never Used     Alcohol use 0.6 oz/week     1 Standard drinks or equivalent per week     Drug use: No     Sexual activity: Yes     Partners: Female     Birth control/ protection: Surgical      Comment: Parter = Hysterectomy     Other Topics Concern     Not on file     Social History Narrative       Past Surgical History:   Procedure Laterality Date     CORONARY STENT PLACEMENT       PAROTIDECTOMY       SPLENECTOMY, TOTAL  1973     WISDOM TOOTH EXTRACTION         History of Present Illness  Recent Health  Fever: no  Chills: no  Fatigue: no  Chest Pain: no  Cough: no  Dyspnea: no  Urinary Frequency: no  Nausea: no  Vomiting: no  Diarrhea: no  Abdominal Pain: yes- Reaccuring  Easy Bruising: no  Lower Extremity Swelling: no  Poor Exercise Tolerance: no    Most recent Health Maintenance Visit:  3 week(s) ago    Pertinent History  Prior Anesthesia: yes  Previous Anesthesia Reaction:  yes  Diabetes: no  Cardiovascular Disease: yes. Stable. Cardiology cleared previously  Pulmonary Disease: no  Renal Disease: no  GI Disease: no, recurrent small bowel obstructions  Sleep Apnea: no  Thromboembolic Problems: no  Clotting Disorder: no  Bleeding Disorder: no  Transfusion Reaction: no  Impaired Immunity: no  Steroid use in the last 6 months:  "no  Frequent Aspirin use: yes    Family history of None    Social history of patient wears dentures or partial plates, there is no transfusion refusal and there are no concerns regarding care after surgery    After surgery, the patient plans to recover at home with family.    Review of Systems  Review of Systems   All other systems reviewed and are negative.            Objective:         Vitals:    10/20/17 1520   BP: 118/64   Pulse: (!) 56   Resp: 12   Temp: 97.6  F (36.4  C)   TempSrc: Oral   SpO2: 99%   Weight: 174 lb (78.9 kg)   Height: 5' 9\" (1.753 m)       Physical Exam:  Physical Exam   Constitutional: He is oriented to person, place, and time. He appears well-developed and well-nourished.   HENT:   Right Ear: External ear normal.   Left Ear: External ear normal.   Nose: Nose normal.   Mouth/Throat: Oropharynx is clear and moist.   Eyes: Conjunctivae and EOM are normal. Pupils are equal, round, and reactive to light. Right eye exhibits no discharge. Left eye exhibits no discharge.   Neck: Normal range of motion. No thyromegaly present.   Cardiovascular: Normal rate, regular rhythm and normal heart sounds.    No murmur heard.  Pulmonary/Chest: Effort normal and breath sounds normal.   Abdominal: Soft. Bowel sounds are normal. He exhibits no distension and no mass. There is no tenderness. There is no rebound and no guarding.   Musculoskeletal: Normal range of motion.   Lymphadenopathy:     He has no cervical adenopathy.   Neurological: He is alert and oriented to person, place, and time. He has normal strength and normal reflexes. No cranial nerve deficit or sensory deficit.   Reflex Scores:       Bicep reflexes are 2+ on the right side and 2+ on the left side.       Patellar reflexes are 2+ on the right side and 2+ on the left side.       Achilles reflexes are 2+ on the right side and 2+ on the left side.  Skin: Skin is warm and dry. No rash noted.   Psychiatric: He has a normal mood and affect.   Nursing note " and vitals reviewed.       Recent Results (from the past 168 hour(s))   Electrocardiogram Perform and Read   Result Value Ref Range    SYSTOLIC BLOOD PRESSURE  mmHg    DIASTOLIC BLOOD PRESSURE  mmHg    VENTRICULAR RATE 58 BPM    ATRIAL RATE 58 BPM    P-R INTERVAL 186 ms    QRS DURATION 78 ms    Q-T INTERVAL 440 ms    QTC CALCULATION (BEZET) 431 ms    P Axis 76 degrees    R AXIS 38 degrees    T AXIS 25 degrees    MUSE DIAGNOSIS       Sinus bradycardia  Otherwise normal ECG  When compared with ECG of 22-DEC-2013 18:40,  No significant change was found     Hemoglobin   Result Value Ref Range    Hemoglobin 12.3 (L) 14.0 - 18.0 g/dL

## 2021-06-13 NOTE — PROGRESS NOTES
Pt here for treatment after seeing MD. It took 4 attempts to get IV placed. No problem with treatment as directed. IV removed upon completion and pt d.c ambulatory to lobby alone. Pt was shown a diagram of a port-a-cath and he will think about it and discuss with his MD

## 2021-06-13 NOTE — TELEPHONE ENCOUNTER
CM received a voice-mail from Zay (OJ ANN) [phone] requesting a call back to confirm PT's D/C time for tomorrow  CM outreached to Zay AARON) [phone] and left a voice-mail confirming PT's D/C for tomorrow 11/20/18 @ 11:00am and requested a call back to review this further  Patient calls in today to discuss some symptoms that he is having while on Keytruda.  He states that he is having some diarrhea and thinks there may be a little bit of blood but he is not sure.  He states that sometimes the amount of diarrhea is not much but when he does have a bowel movement it is liquidy.  He states that he is cannot stop eating fatty foods and eat more of a bland diet such as the brat diet.  I discussed Imodium with him and explained how to take it.  I also told him to make sure that he keeps up on his fluids.  Patient does not have an appointment in our clinic until 12/23, so I did asked that he call back if this does not seem to work or he has definite blood in his stools he verbalized understanding.    Sylvia Su RN

## 2021-06-13 NOTE — PROGRESS NOTES
[facility] after lab draw and visit with Provider. IV started in right hand. Labs within treatment parameter and proceeding with treatment. Pt tolerated Keytruda well. IV removed and covered. Pt left dept ambulatory.

## 2021-06-13 NOTE — TELEPHONE ENCOUNTER
I called Pardeep to check in with him and to offer resources as needed. He is scheduled for a consult with Dr. Grant next week and was happy with how quickly this could be arranged.  He had hoped additional surgery to resect the two right upper lung nodules could be an option but he is glad radiation has been offered as he wishes to be aggressive with treatment.  He was surprised he needed to have a PET since he recently had a CT. I explained that there is a limit to the number of areas that can be treated with SBRT and getting a PET/CT will help us determine that no additional areas of disease are present. Pardeep had some questions about SBRT and I provided a brief explanation of the process from consult to treatment. I reassured him he will be getting detailed information about treatment as it pertains to him at the time of his consult.  Pardeep is doing well at home and he does not identify a need for additional resources at this time. I will continue to check in with him and I welcomed calls as well.

## 2021-06-13 NOTE — PROGRESS NOTES
[facility] Hematology and Oncology Progress Note    Patient: Pardeep Wilson  MRN: [id number]  Date of Service: 11/10/2020        Reason for Visit    Chief Complaint   Patient presents with     HE Cancer     Metastatic primary lung cancer       Assessment and Plan    Recurrent and metastatic mucinous lung adenocarcinoma, status post wedge resection, February 28, 2020  T4 N0 M0, stage III a mucinous left lung adenocarcinoma status post left lower lobe resection on November 1, 2018  Tumor 9 cm with 3.5 cm nodule, grade 2 out of 4 mucinous adenocarcinoma  Tumor is PDL 1-, no EGFR mutation.  No rearrangement of ALK, evaluation on the Alchemist trial  Tumor negative for ALK, ROS 1, RET, NTRK1, MET e14, EGFR MUTATIONS, April 2020  Remote history of stage I a right axillary Hodgkin's disease status post mantle field radiation and splenectomy about 45 years ago  Right parotid cancer with lymph node involvement status post surgery and adjuvant radiation for 6 weeks in 1991  Coronary artery disease  Elevated BUN and creatinine  New right lung pulmonary nodules   Left pleural effusion/enhancement, 4/2020    Good tolerance for Keytruda maintenance being administered every 3 weeks.  We will continue with a fourth maintenance dose today.  We will see him back in 3 weeks with repeat CT scans.  Reviewed that we could continue with maintenance therapy as long as his disease is stable.    He has a lot of anxiety about persistent disease and the need to continue on therapy indefinitely.  This is not affecting his appetite, sleep or other functioning at this time.  Did offer meeting with a psychologist but he declines this.    Explained that his case had been reviewed at tumor conference and he was not felt to be an initial candidate for surgery or radiation therapy.  If he remains with stable disease we could consider stopping Keytruda and observing.  We could also consider administering the Keytruda at 400 mg every 6 weeks.  If he has  remained stable for a year could again address role of either surgery or radiation therapy.    40 minutes spent majority counseling and coordination of care.    Plan: Continue with cycle 4 of Keytruda maintenance  Follow-up in 3 weeks with repeat CT scans        Measurable disease: CT of the chest      Current therapy: Keytruda maintenance every 3 weeks, fourth dose today  First dose September 8, 2020      Treatment history:      Carboplatin, Taxol and Keytruda, 4 cycles, last August 18, 2020  Treatment started [place] 15, 2020    Wedge resection of right upper lobe nodule, February 28, 2020    Cisplatin and Alimta adjuvant chemotherapy, for 4 cycles: Day 1 cycle 4, February 15, 2019  First cycle  started December 14, 2018    Patient underwent mediastinoscopy, bronchoscopy, thoracoscopic surgery and left lower lobectomy on November 1, 2018      Cancer Staging  Malignant neoplasm of lower lobe of left lung (H)  Staging form: Lung, AJCC 8th Edition  - Clinical stage from 10/15/2018: Stage IIIA (cT4, cN0, cM0) - Signed by Melody Segovia CNP on 12/21/2018      ECOG Performance   ECOG Performance Status: 1    Distress Assessment  Distress Assessment Score: No distress    Pain           Problem List    1. Metastatic primary lung cancer, left (H)  CC OFFICE VISIT LONG    CC OFFICE VISIT LONG    Infusion Appointment    CT Chest Without Contrast   2. Encounter for antineoplastic chemotherapy  CC OFFICE VISIT LONG    CC OFFICE VISIT LONG    Infusion Appointment        CC: Dov Morales,     ______________________________________________________________________________    History of Present Illness    Mr. Pardeep Wilson is seen for follow-up.  He is on maintenance Keytruda every 3 weeks.  Has mild fatigue but no other side effects.  ECOG status is 0.  Working full-time.  Does have some anxiety with his diagnosis and the need to continue on indefinite treatment.  This is not yet affecting sleep or other  functioning.      Pain Status  Currently in Pain: No/denies    Review of Systems    Constitutional  Constitutional (WDL): Exceptions to WDL  Fatigue: Fatigue relieved by rest  Neurosensory  Neurosensory (WDL): Exceptions to WDL  Peripheral Sensory Neuropathy: Asymptomatic, loss of deep tendon reflexes or paresthesia(Feet; numbness)  Cardiovascular  Cardiovascular (WDL): All cardiovascular elements are within defined limits  Pulmonary  Respiratory (WDL): Exceptions to WDL  Dyspnea: Shortness of breath with moderate exertion(Dry throat)  Gastrointestinal  Gastrointestinal (WDL): Exceptions to WDL  Anorexia: Loss of appetite without alteration in eating habits  Genitourinary  Genitourinary (WDL): All genitourinary elements are within defined limits  Integumentary  Integumentary (WDL): All integumentary elements are within defined limits  Patient Coping  Patient Coping: Accepting  Distress Assessment  Distress Assessment Score: No distress  Accompanied by  Accompanied by: Alone    Past History  Past Medical History:   Diagnosis Date     Anemia      Coronary artery disease     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     Esophageal reflux      Hodgkin's lymphoma (H)     s/p radiation to the mediastinum at age 17     Hyperlipemia      Hypertension      Hypothyroidism      Lung cancer (H)      Psoriasis          Past Surgical History:   Procedure Laterality Date     CORONARY ANGIOPLASTY      Stent Placement     EYE SURGERY Bilateral     Cataracts     MEDIASTINOSCOPY N/A 11/1/2018    Procedure: MEDIASTINOSCOPY;  Surgeon: Bry Saunders MD;  Location: [facility];  Service:      PAROTIDECTOMY       MI LAP,DIAGNOSTIC ABDOMEN N/A 11/7/2017    Procedure: DIAGNOSTIC LAPAROSCOPY,  LYSIS OF ADHESIONS, SMALL BOWEL RESECTION;  Surgeon: Brian Granados MD;  Location: [facility] [place];  Service: General     SPLENECTOMY, TOTAL  1973     WISDOM TOOTH EXTRACTION         Physical Exam    Recent Vitals 11/10/2020    Height -   Weight 193 lbs 8 oz   BSA (m2) 2.06 m2   /76   Pulse 84   Temp 98.1   Temp src 1   SpO2 100   Some recent data might be hidden         GENERAL: Alert and oriented. Seated comfortably. In no distress.     HEAD: Atraumatic and normocephalic.  Alopecia     EYES: SOPHIE, EOMI.  No pallor.  No icterus.     Oral cavity: no mucosal lesion or tonsillar enlargement.     NECK: supple. JVP normal.  No thyroid enlargement.     LYMPH NODES: No palpable, cervical, axillary or inguinal lymphadenopathy.     CHEST: clear to auscultation bilaterally.  Resonant to percussion throughout bilaterally.  Symmetrical breath movements bilaterally.     CVS: S1 and S2 are heard. Regular rate and rhythm.  No murmur or gallop or rub heard.     ABDOMEN: Soft. Not tender. Not distended.  No palpable hepatomegaly or splenomegaly.  No other mass palpable.  Bowel sounds heard.     EXTREMITIES: Warm.  No peripheral edema.     SKIN: no rash, or bruising or purpura.    CNS: Nonfocal          Lab Results    Recent Results (from the past 168 hour(s))   Comprehensive Metabolic Panel   Result Value Ref Range    Sodium 141 136 - 145 mmol/L    Potassium 4.6 3.5 - 5.0 mmol/L    Chloride 106 98 - 107 mmol/L    CO2 27 22 - 31 mmol/L    Anion Gap, Calculation 8 5 - 18 mmol/L    Glucose 99 70 - 125 mg/dL    BUN 26 (H) 8 - 22 mg/dL    Creatinine 1.28 0.70 - 1.30 mg/dL    GFR MDRD Af Amer >60 >60 mL/min/1.73m2    GFR MDRD Non Af Amer 56 (L) >60 mL/min/1.73m2    Bilirubin, Total 0.7 0.0 - 1.0 mg/dL    Calcium 8.8 8.5 - 10.5 mg/dL    Protein, Total 7.2 6.0 - 8.0 g/dL    Albumin 3.6 3.5 - 5.0 g/dL    Alkaline Phosphatase 137 (H) 45 - 120 U/L    AST 19 0 - 40 U/L    ALT 10 0 - 45 U/L   Thyroid Cascade   Result Value Ref Range    TSH 1.22 0.30 - 5.00 uIU/mL       Imaging    No results found.      Signed by: Kevin Soto MD

## 2021-06-13 NOTE — PROGRESS NOTES
Assessment:      Healthy male exam.      1. Routine general medical examination at a health care facility  AST (SGOT)    Basic Metabolic Panel    Thyroid Cascade    HM2(CBC w/o Differential)    PSA (Prostatic-Specific Antigen), Annual Screen    Lipid Cascade   2. Need for vaccination  Influenza, Seasonal Quad, Preservative Free 36+ Months    Pneumococcal polysaccharide vaccine 23-valent 3 yo or older, subq/IM   3. Hypertension  Basic Metabolic Panel   4. Atherosclerosis of native coronary artery of native heart without angina pectoris  AST (SGOT)    Lipid Cascade   5. Gastroesophageal reflux disease without esophagitis     6. Anemia  HM2(CBC w/o Differential)   7. Abdominal pain, unspecified abdominal location  Ambulatory referral to General Surgery   8. Congenital hypothyroidism without goiter  Thyroid Cascade   9. Prostate cancer screening  PSA (Prostatic-Specific Antigen), Annual Screen   10. Seborrheic keratoses, inflamed        Plan:       All questions answered.  Blood tests: As per orders.  Diagnosis explained in detail, including differential.  Dietary diary.  Discussed healthy lifestyle modifications.  Follow up: No Follow-up on file.   Hypertension  Controlled on combined therapy of metoprolol 12.5 mg twice a day and lisinopril 5 mg daily. Labs as per orders today.  Continue current treatment.  Follow-up in 6 months for recheck, sooner if warning signs symptoms as discussed.    Hypothyroidism  Secondary to radiation therapy.  Currently on levothyroxine 125 mcg daily.  Will check levels and adjust therapy as needed.    Anemia  Chronic in nature secondary to previous radiation therapy.  Has been stable.  Will get annual check on H&H to check if still stable.    Coronary Artery Disease  Previous cardiology notes were reviewed.  Will check lipid levels as per guidance.  Continue atorvastatin 40 mg daily.  Additionally it is recommended that he stay on Plavix lifelong as well as aspirin as per radiation  oncology secondary to stents and history of radiation.    Abdominal pain, unspecified abdominal location  [place] [facility] notes and studies were reviewed.  It does seem that he is getting blockage on and off every 3-4 months.  However it does clear on its own within 10 hours but this is causing a significant difficulty and quality of life as well as lost workdays.  Will send to GI surgeon for further evaluation and discussion of what else might be done to investigate as well as what else may be done to help prevent.    Seborrheic keratosis, inflamed  Discussed options of observation versus removal.  The removal will be based upon irritation.  Patient will consider whether or not is irritated enough that he would like removed or if he wants to stick with observation.  I did offer the patient to schedule at any time for shave biopsy.    History of splenectomy  Reviewed with patient that he should be getting Pneumovax every 5 years.  Patient agreed and it was ordered for update today.  Subjective:      Pardeep Wilson is a 62 y.o. male who presents for an annual exam. The patient reports that there is not domestic violence in his life.  Overall doing well.  Denies any chest pain, shortness breath, dyspnea exertion, palpitations, nausea or vomiting.  He still having a recurrent left upper quadrant pain that comes and goes.  He did go to see [facility] did recommend he could possibly see a surgeon, but they have not been able to identify the specific area that it is occurring.  So far he has had all static tests and no functional test.  No stents were reviewed today.  It is occurring about every 3-4 months and the pain will last in a typical fashion of about 10 hours.  During that timeframe he feels exceedingly bloated and a lot of pain.  He feels like there are no bowel sounds occurring until it finally releases and then he gets a feeling that the bowels are finally moving as well as sounds from his stomach  that everything is starting to move.  The pain is keeping him from leaving the house or doing anything during that timeframe.    Healthy Habits:   Regular Exercise: Yes  Sunscreen Use: Yes  Healthy Diet: Yes  Dental Visits Regularly: Yes  Seat Belt: Yes  Sexually active: Yes  Monthly Self Testicular Exams:  No  Hemoccults: No  Flex Sig: No  Colonoscopy: Yes  Lipid Profile: Yes  Glucose Screen: Yes  Prevention of Osteoporosis: No  Last Dexa: No  Guns at Home:  No  I have had an Advance Directives discussion with the patient.      Immunization History   Administered Date(s) Administered     Influenza, inj, historic 09/26/2012     Influenza, seasonal,quad inj 36+ mos 09/14/2016     Immunization status: up to date and documented, due today.    No exam data present     Current Outpatient Prescriptions   Medication Sig Dispense Refill     aspirin 325 MG tablet Take 325 mg by mouth daily. [facility]        atorvastatin (LIPITOR) 40 MG tablet Take 1 tablet (40 mg total) by mouth bedtime. 90 tablet 3     clopidogrel (PLAVIX) 75 mg tablet Take 1 tablet (75 mg total) by mouth daily. 90 tablet 3     fluocinonide (LIDEX) 0.05 % cream Apply topically 2 (two) times a day. Sparingly to affected areas       levothyroxine (SYNTHROID, LEVOTHROID) 125 MCG tablet TAKE 1 TABLET DAILY (SCHEDULE LABS) 90 tablet 0     lisinopril (PRINIVIL,ZESTRIL) 5 MG tablet Take 1 tablet (5 mg total) by mouth daily. 90 tablet 0     metoprolol succinate (TOPROL-XL) 25 MG Take 0.5 tablets (12.5 mg total) by mouth daily. 45 tablet 3     pantoprazole (PROTONIX) 40 MG tablet Take 1 tablet (40 mg total) by mouth daily. 90 tablet 0     sodium fluoride (SF) 1.1 % Gel dental gel 1 application bedtime. UTD TAT        No current facility-administered medications for this visit.      Past Medical History:   Diagnosis Date     Anemia      Coronary artery disease      Esophageal reflux      Hyperlipemia      Hypertension      Hypothyroidism      Past Surgical History:  "  Procedure Laterality Date     CORONARY STENT PLACEMENT       PAROTIDECTOMY       SPLENECTOMY, TOTAL  1973     WISDOM TOOTH EXTRACTION       Penicillins  Family History   Problem Relation Age of Onset     Dementia Mother      Cancer Father      Kidney disease Brother      No Medical Problems Maternal Grandmother      No Medical Problems Maternal Grandfather      No Medical Problems Paternal Grandmother      Heart disease Paternal Grandfather      Social History     Social History     Marital status:      Spouse name: Eliane     Number of children: 2     Years of education: 18     Occupational History      Other     [employer]     Social History Main Topics     Smoking status: Never Smoker     Smokeless tobacco: Never Used     Alcohol use 0.6 oz/week     1 Standard drinks or equivalent per week     Drug use: No     Sexual activity: Yes     Partners: Female     Birth control/ protection: Surgical      Comment: Parter = Hysterectomy     Other Topics Concern     Not on file     Social History Narrative       Review of Systems  Review of Systems   All other systems reviewed and are negative.            Objective:     Vitals:    10/06/17 0739   BP: 114/64   Pulse: 64   Resp: 12   Temp: 97.4  F (36.3  C)   TempSrc: Oral   Weight: 168 lb 9.6 oz (76.5 kg)   Height: 5' 9\" (1.753 m)     Body mass index is 24.9 kg/(m^2).    Physical  Physical Exam   Constitutional: He is oriented to person, place, and time. He appears well-developed and well-nourished.   HENT:   Right Ear: External ear normal.   Left Ear: External ear normal.   Nose: Nose normal.   Mouth/Throat: Oropharynx is clear and moist.   Eyes: Conjunctivae and EOM are normal. Pupils are equal, round, and reactive to light. Right eye exhibits no discharge. Left eye exhibits no discharge.   Neck: Normal range of motion. No thyromegaly present.   Cardiovascular: Normal rate, regular rhythm and normal heart sounds.    No murmur heard.  Pulmonary/Chest: " Effort normal and breath sounds normal.   Abdominal: Soft. Bowel sounds are normal. He exhibits no distension and no mass. There is no tenderness. There is no rebound and no guarding.   Musculoskeletal: Normal range of motion.   Lymphadenopathy:     He has no cervical adenopathy.   Neurological: He is alert and oriented to person, place, and time. He has normal strength and normal reflexes. No cranial nerve deficit or sensory deficit.   Reflex Scores:       Bicep reflexes are 2+ on the right side and 2+ on the left side.       Patellar reflexes are 2+ on the right side and 2+ on the left side.       Achilles reflexes are 2+ on the right side and 2+ on the left side.  Skin: Skin is warm and dry. No rash noted.   Multiple seborrheic keratosis of back and chest region.  There are 3 specifically they are irritated, on his back and one on his chest.   Psychiatric: He has a normal mood and affect.   Nursing note and vitals reviewed.

## 2021-06-13 NOTE — PROGRESS NOTES
[facility] Hematology and Oncology Progress Note    Patient: Pardeep Wilson  MRN: [id number]  Date of Service: 12/01/2020        Reason for Visit    Chief Complaint   Patient presents with     HE Cancer     Lung Cancer       Assessment and Plan    Recurrent and metastatic mucinous lung adenocarcinoma, status post wedge resection, February 28, 2020  T4 N0 M0, stage III a mucinous left lung adenocarcinoma status post left lower lobe resection on November 1, 2018  Tumor 9 cm with 3.5 cm nodule, grade 2 out of 4 mucinous adenocarcinoma  Tumor is PDL 1-, no EGFR mutation.  No rearrangement of ALK, evaluation on the Alchemist trial  Tumor negative for ALK, ROS 1, RET, NTRK1, MET e14, EGFR MUTATIONS, April 2020  Remote history of stage I a right axillary Hodgkin's disease status post mantle field radiation and splenectomy about 45 years ago  Right parotid cancer with lymph node involvement status post surgery and adjuvant radiation for 6 weeks in 1991  Coronary artery disease  Elevated BUN and creatinine  New right lung pulmonary nodules   Left pleural effusion/enhancement, 4/2020    CT scans are reviewed and show mixed response.  Resolution of left lung nodules.  Possible slight increase in right upper lung nodules.  Patient tolerating immunotherapy with Keytruda fairly well.  For now we will continue with the same treatment and plan restaging scans in 9 weeks, i.e. 3 more cycles including today's treatment.    His tumor overall appears to be growing very slowly.  He is still very interested in being aggressive and therefore will review case again at tumor conference to see if local therapy such as surgery or stereotactic radiation would be a consideration.    Plan: Continue with Keytruda maintenance dose #5 today  Follow-up in 3 weeks  Review case at tumor conference  Restaging after seventh dose    Measurable disease: CT of the chest      Current therapy: Keytruda maintenance every 3 weeks, fifth dose today  First dose  September 8, 2020      Treatment history:      Carboplatin, Taxol and Keytruda, 4 cycles, last August 18, 2020  Treatment started [place] 15, 2020    Wedge resection of right upper lobe nodule, February 28, 2020    Cisplatin and Alimta adjuvant chemotherapy, for 4 cycles: Day 1 cycle 4, February 15, 2019  First cycle  started December 14, 2018    Patient underwent mediastinoscopy, bronchoscopy, thoracoscopic surgery and left lower lobectomy on November 1, 2018      Cancer Staging  Malignant neoplasm of lower lobe of left lung (H)  Staging form: Lung, AJCC 8th Edition  - Clinical stage from 10/15/2018: Stage IIIA (cT4, cN0, cM0) - Signed by Melody Segovia, IVANNA on 12/21/2018      ECOG Performance   ECOG Performance Status: 1    Distress Assessment  Distress Assessment Score: 2    Pain           Problem List    1. Metastatic primary lung cancer, left (H)  CC OFFICE VISIT LONG    CC OFFICE VISIT LONG    Infusion Appointment   2. Encounter for antineoplastic chemotherapy  CC OFFICE VISIT LONG    CC OFFICE VISIT LONG    Infusion Appointment        CC: Dov Morales,     ______________________________________________________________________________    History of Present Illness    Mr. Pardeep Wilson is seen for follow-up.  He is on maintenance Keytruda every 3 weeks.  Continues with mild fatigue.  Also soft stools and gas and some cough productive of phlegm occasionally.  No rash.  ECOG status is 0.    He does remain anxious about his recurrent cancer and it would still like to consider either surgery or radiation therapy.    Pain Status  Currently in Pain: No/denies    Review of Systems    Constitutional     Neurosensory  Neurosensory (WDL): Exceptions to WDL  Peripheral Sensory Neuropathy: Asymptomatic, loss of deep tendon reflexes or paresthesia(feet)  Cardiovascular  Cardiovascular (WDL): All cardiovascular elements are within defined limits  Pulmonary  Respiratory (WDL): Within Defined  Limits  Gastrointestinal  Gastrointestinal (WDL): Exceptions to WDL  Anorexia: Loss of appetite without alteration in eating habits  Dry Mouth: Symptomatic (e.g., dry or thick saliva) without significant dietary alteration, unstimulated saliva flow >0.2 ml/min  Genitourinary  Genitourinary (WDL): All genitourinary elements are within defined limits  Integumentary  Integumentary (WDL): All integumentary elements are within defined limits  Patient Coping  Patient Coping: Accepting  Distress Assessment  Distress Assessment Score: 2  Accompanied by  Accompanied by: Alone    Past History  Past Medical History:   Diagnosis Date     Anemia      Coronary artery disease     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     Esophageal reflux      Hodgkin's lymphoma (H)     s/p radiation to the mediastinum at age 17     Hyperlipemia      Hypertension      Hypothyroidism      Lung cancer (H)      Psoriasis          Past Surgical History:   Procedure Laterality Date     CORONARY ANGIOPLASTY      Stent Placement     EYE SURGERY Bilateral     Cataracts     MEDIASTINOSCOPY N/A 11/1/2018    Procedure: MEDIASTINOSCOPY;  Surgeon: Bry Saunders MD;  Location: [facility];  Service:      PAROTIDECTOMY       RI LAP,DIAGNOSTIC ABDOMEN N/A 11/7/2017    Procedure: DIAGNOSTIC LAPAROSCOPY,  LYSIS OF ADHESIONS, SMALL BOWEL RESECTION;  Surgeon: Brian Granados MD;  Location: [facility] [place];  Service: General     SPLENECTOMY, TOTAL  1973     WISDOM TOOTH EXTRACTION         Physical Exam    Recent Vitals 12/1/2020   Weight 190 lbs 13 oz   BSA (m2) 2.04 m2   /66   Pulse 93   Temp 98.6   Temp src 1   SpO2 98   Some recent data might be hidden         GENERAL: Alert and oriented. Seated comfortably. In no distress.     HEAD: Atraumatic and normocephalic.  Alopecia     EYES: SOPHIE, EOMI.  No pallor.  No icterus.     Oral cavity: no mucosal lesion or tonsillar enlargement.     NECK: supple. JVP normal.  No thyroid  enlargement.     LYMPH NODES: No palpable, cervical, axillary or inguinal lymphadenopathy.     CHEST: clear to auscultation bilaterally.  Resonant to percussion throughout bilaterally.  Symmetrical breath movements bilaterally.     CVS: S1 and S2 are heard. Regular rate and rhythm.  No murmur or gallop or rub heard.     ABDOMEN: Soft. Not tender. Not distended.  No palpable hepatomegaly or splenomegaly.  No other mass palpable.  Bowel sounds heard.     EXTREMITIES: Warm.  No peripheral edema.     SKIN: no rash, or bruising or purpura.    CNS: Nonfocal          Lab Results    Recent Results (from the past 168 hour(s))   Comprehensive Metabolic Panel   Result Value Ref Range    Sodium 140 136 - 145 mmol/L    Potassium 4.2 3.5 - 5.0 mmol/L    Chloride 104 98 - 107 mmol/L    CO2 29 22 - 31 mmol/L    Anion Gap, Calculation 7 5 - 18 mmol/L    Glucose 103 70 - 125 mg/dL    BUN 23 (H) 8 - 22 mg/dL    Creatinine 1.25 0.70 - 1.30 mg/dL    GFR MDRD Af Amer >60 >60 mL/min/1.73m2    GFR MDRD Non Af Amer 58 (L) >60 mL/min/1.73m2    Bilirubin, Total 0.6 0.0 - 1.0 mg/dL    Calcium 9.5 8.5 - 10.5 mg/dL    Protein, Total 7.4 6.0 - 8.0 g/dL    Albumin 3.6 3.5 - 5.0 g/dL    Alkaline Phosphatase 138 (H) 45 - 120 U/L    AST 27 0 - 40 U/L    ALT 13 0 - 45 U/L   HM1 (CBC with Diff)   Result Value Ref Range    WBC 7.8 4.0 - 11.0 thou/uL    RBC 3.98 (L) 4.40 - 6.20 mill/uL    Hemoglobin 12.6 (L) 14.0 - 18.0 g/dL    Hematocrit 39.1 (L) 40.0 - 54.0 %    MCV 98 80 - 100 fL    MCH 31.7 27.0 - 34.0 pg    MCHC 32.2 32.0 - 36.0 g/dL    RDW 13.6 11.0 - 14.5 %    Platelets 366 140 - 440 thou/uL    MPV 10.2 8.5 - 12.5 fL    Neutrophils % 37 (L) 50 - 70 %    Lymphocytes % 44 (H) 20 - 40 %    Monocytes % 14 (H) 2 - 10 %    Eosinophils % 5 0 - 6 %    Basophils % 1 0 - 2 %    Immature Granulocyte % 0 <=0 %    Neutrophils Absolute 2.9 2.0 - 7.7 thou/uL    Lymphocytes Absolute 3.5 0.8 - 4.4 thou/uL    Monocytes Absolute 1.1 (H) 0.0 - 0.9 thou/uL     Eosinophils Absolute 0.4 0.0 - 0.4 thou/uL    Basophils Absolute 0.1 0.0 - 0.2 thou/uL    Immature Granulocyte Absolute 0.0 <=0.0 thou/uL       Imaging    Ct Chest Without Contrast    Result Date: 11/27/2020  EXAM: CT CHEST WO CONTRAST LOCATION: [facility] DATE/TIME: 11/27/2020 2:36 PM INDICATION: Follow-up lung cancer COMPARISON: 09/04/2020 TECHNIQUE: CT chest without IV contrast. Multiplanar reformats were obtained. Dose reduction techniques were used. CONTRAST: None. FINDINGS: LUNGS AND PLEURA: Prior new left lung nodules of resolved. Slight increased size of 10 x 13 mm connie-fissural right lung nodule versus 5 x 8 mm (series 4, image 92). Additional nodule along suture line in the right lung appears increased measuring 9 x 10 mm versus 7 x 8 mm (image 113). Redemonstrated left pleural thickening and/or fluid without significant change. MEDIASTINUM/AXILLAE: No adenopathy. Atherosclerosis. UPPER ABDOMEN: Surgical changes. MUSCULOSKELETAL: No focal bony lesion.     1.  Right lung connie-fissural and connie-sutural nodules appear slightly increased. 2.  Prior new left lung nodules have resolved. 3.  No other significant change.         Signed by: Kevin Soto MD

## 2021-06-13 NOTE — PROGRESS NOTES
GENERAL SURGICAL CONSULTATION    I was requested by Dov Morales DO to consult on this pt to evaluate them for recurrent SBO's    HPI:  This is a 62 y.o. male here today with symptoms of SBO which happens about every 3 to 4 months for the last 10 years.  The attacks seem to come on depending on the type of food that he eats. He has been treated with NGT decompression several times.  He has been seen by GI and they did an EGD and a Colonoscopy but did not find the problem.  He had an MRI recently that looked largely unremarkable.  Etiology of this patient's small bowel obstruction is thought to be secondary to his history of lymphoma for which she has had an exploratory laparotomy with splenectomy and chemo with radiation of his abdomen.     Allergies:Penicillins    Past Medical History:   Diagnosis Date     Anemia      Coronary artery disease      Esophageal reflux      Hyperlipemia      Hypertension      Hypothyroidism        Past Surgical History:   Procedure Laterality Date     CORONARY STENT PLACEMENT       PAROTIDECTOMY       SPLENECTOMY, TOTAL  1973     WISDOM TOOTH EXTRACTION         CURRENT MEDS:  Current Outpatient Prescriptions   Medication Sig Dispense Refill     aspirin 325 MG tablet Take 325 mg by mouth daily. [facility]        atorvastatin (LIPITOR) 40 MG tablet Take 1 tablet (40 mg total) by mouth at bedtime. 90 tablet 3     clopidogrel (PLAVIX) 75 mg tablet Take 1 tablet (75 mg total) by mouth daily. 90 tablet 3     fluocinonide (LIDEX) 0.05 % cream Apply topically 2 (two) times a day. Sparingly to affected areas       levothyroxine (SYNTHROID, LEVOTHROID) 125 MCG tablet TAKE 1 TABLET DAILY 90 tablet 3     lisinopril (PRINIVIL,ZESTRIL) 5 MG tablet Take 1 tablet (5 mg total) by mouth daily. 90 tablet 1     metoprolol succinate (TOPROL-XL) 25 MG TAKE ONE-HALF (1/2) TABLET DAILY 45 tablet 3     pantoprazole (PROTONIX) 40 MG tablet Take 1 tablet (40 mg total) by mouth daily. 90 tablet 3     sodium  "fluoride (SF) 1.1 % Gel dental gel 1 application bedtime. UTD TAT        No current facility-administered medications for this visit.        Family History   Problem Relation Age of Onset     Dementia Mother      Cancer Father      Kidney disease Brother      No Medical Problems Maternal Grandmother      No Medical Problems Maternal Grandfather      No Medical Problems Paternal Grandmother      Heart disease Paternal Grandfather      Family history is not pertinent to this patients Chief Complaint.     reports that he has never smoked. He has never used smokeless tobacco. He reports that he drinks about 0.6 oz of alcohol per week  He reports that he does not use illicit drugs.    Review of Systems -   10 point Review of systems is negative except for; as mentioned above in HPI and PMHx    /57 (Patient Site: Right Arm, Patient Position: Sitting, Cuff Size: Adult Small)  Pulse 70  Ht 5' 9\" (1.753 m)  Wt 165 lb (74.8 kg)  SpO2 99%  BMI 24.37 kg/m2  Body mass index is 24.37 kg/(m^2).    EXAM:  GENERAL: Thin gentleman that appears his stated age  HEENT: EOMI, Anicteric Sclera, Moist Mucous Membranes, some facial asymmetry  CARDIOVASCULAR: RRR w/out murmur   CHEST/LUNG: Clear to Auscultation  ABDOMEN:  Non tender to palpation, +BS, upper midline scar from prior surgery  MUSCULOSKELETAL:  No deformities with good range of motion in all extremities  NEURO: He is ambulatory with good strength in both legs.  HEME/LYMPH: No Cervical Adenopathy or tenderness.     IMAGES:  I evaluated the MRI, it was read to be essentially unremarkable.  No small bowel thickening is discussed on the MR read.  No reason for bowel obstruction symptoms noted on the MRI either.    Assessment/Plan:  Etiology of this patient's small bowel obstruction is thought to be secondary to his history of lymphoma for which she has had an exploratory laparotomy with splenectomy and chemo with radiation of his abdomen.  It is encouraging that the MRI " does not detect loops of small intestine that are particularly thickened.  This leaves open the possibility that the intermittent bowel obstruction symptoms are secondary to mechanical obstruction from intra-abdominal adhesions.  If this is the case the patient may benefit substantially by adhesio lysis.  I would recommend we evaluate this patient with a diagnostic laparoscopy with the planned lysis of adhesions were possible.  Hopefully will find some adhesive bands that can be taken down and allow bowel content to move through in an unobstructed fashion.    This gentleman has some cardiovascular disease also thought to be secondary to his chemo radiation from lymphoma decades ago.  The patient is on Plavix currently and will need to make sure he is off of his anticoagulation for any planned surgery.  This will need to be cleared with his cardiac team.    Exploratory laparoscopy with lysis of adhesions        Brian Granados MD  [facility]  [phone]

## 2021-06-14 NOTE — TELEPHONE ENCOUNTER
Pt called earlier today stating Dr. Grant was trying to get in touch with him Friday but they did not connect. I confirmed with her this morning that she was just going to let Melecio know that the biopsy was as we expected and we'd proceed with SBRT. He's already set up for CT simulation/planning this Thursday 1/21. I called him back to let him know this and he was happy with that plan.

## 2021-06-14 NOTE — PROCEDURES
[facility]    Procedure: Imaging Procedure Note    Date/Time: 1/12/2021 10:05 AM  Performed by: Emmanuel Akbar MD  Authorized by: Emmanuel Akbar MD       Universal Protocol    Site marked: Yes    Prior images obtained and reviewed: Yes    Required items: required blood products, implants, devices, and special equipment available    Patient identity confirmed: verbally with patient and provided demographic data    Reevaluation: Patient was reevaluated immediately before administering moderate or deep sedation or anesthesia    Confirmation checklist: patient's identity using two indicators, relevant allergies, procedure was appropriate and matched the consent or emergent situation and correct equipment/implants were available    Time out: Immediately prior to procedure a time out was called to verify the correct patient, procedure, equipment, support staff and site/side marked as required    Universal Protocol: Joint Commission Universal Protocol was followed    Preparation: Patient was prepped and draped in the usual sterile fashion    ESBL (mL): 1    Anesthesia    Local anesthesia used?: Yes    Local anesthetic: lidocaine 1% without epinephrine    Anesthetic total (mL): 5    Sedation    Patient sedation: Yes    Sedation: fentanyl and midazolam    Vital signs: Vital signs monitored during sedation    Post-procedure    Description of procedure: EXAM:    1. PERCUTANEOUS FIDUCIAL MARKER PLACEMENT ANTERIOR RIGHT UPPER LOBE NODULE    2. CT GUIDANCE    3. CONSCIOUS SEDATION    LOCATION: [facility]  DATE/TIME: 1/12/2021 10:05 AM    INDICATION: Enlarging right upper lobe nodules.  TECHNIQUE: Dose reduction techniques were used.    PROCEDURE: Informed consent obtained. Time out performed. The site was prepped and draped in sterile fashion. 5 mL of 1% lidocaine was infused into the local soft tissues. Using standard technique and under direct CT guidance, a 20 gauge  needle was placed in the localized lesion. 1 fiducial marker placed.    The patient tolerated the procedure. Mild postprocedural intraparenchymal blood products and coughing.    RADIOLOGIC SUPERVISION AND INTERPRETATION:   CT GUIDANCE: Images demonstrate the localizing needle to be in good position. Fiducial markers in good position.    SEDATION: Versed 1.5 mg. Fentanyl 75 mcg. The procedure was performed with administration intravenous conscious sedation with appropriate preoperative, intraoperative, and postoperative evaluation.    28 minutes of supervised face to face conscious sedation time was provided by a radiology nurse under my direct supervision.    IMPRESSION:    1. Successful CT-guided fiducial marker placement into anterior right upper lobe nodule.    2. Secondary to mild postprocedural blood products from biopsy of the first nodule and patient coughing, biopsy and fiducial marking of the second nodule was not able to be safely performed at this time.      Patient tolerance: Patient tolerated the procedure well with no immediate complications   Length of time physician present for 1:1 monitoring during sedation: 30

## 2021-06-14 NOTE — PROGRESS NOTES
Labs are stable.  However still not fully corrective.  Continue current treatment plan.  And recheck labs this Thursday or Friday.  Please help patient schedule a lab appointment.

## 2021-06-14 NOTE — PRE-PROCEDURE
Procedure Name: CT guided right lung nodule biopsy with fiducial placement (two nodules possible) with moderate sedation and chest tube if needed  Date/Time: 1/12/2021 8:50 AM  Written consent obtained?: Yes  Risks and benefits: Risks, benefits and alternatives were discussed  Consent given by: patient  Expected level of sedation: moderate  ASA Class: Class 2- mild systemic disease, no acute problems, no functional limitations  Patient states understanding of procedure being performed: Yes  Patient's understanding of procedure matches consent: Yes  Procedure consent matches procedure scheduled: Yes  Appropriately NPO: yes  Lungs: lungs clear with good breath sounds bilaterally  Heart: normal heart sounds and rate  History & Physical reviewed: History and physical reviewed and no updates needed  Statement of review: I have reviewed the lab findings, diagnostic data, medications, and the plan for sedation

## 2021-06-14 NOTE — PATIENT INSTRUCTIONS - HE

## 2021-06-14 NOTE — TELEPHONE ENCOUNTER
Called patient to see how he is doing and he reports he is feeling good. Advised patient that we would start the prednisone taper today to 50mg daily (2.5 tabs). Patient verbalized understanding to this. Melissa Hagen, [facility]

## 2021-06-14 NOTE — PROGRESS NOTES
Pt ambulatory to radiation clinic for initial radiation consult. RN spent 15 minutes with pt discussing RT, RT planning, and RT side effects. ACS RT, Johan RT, and pathway given with explanation. Pt had radiation in past, Dr. Coe. Further recommendations and orders per provider.

## 2021-06-14 NOTE — ANESTHESIA PREPROCEDURE EVALUATION
Anesthesia Evaluation      Patient summary reviewed     Airway   Mallampati: III  Neck ROM: limited   Pulmonary - normal exam    breath sounds clear to auscultation                         Cardiovascular   Rhythm: regular  Rate: normal,         Neuro/Psych - negative ROS     Endo/Other       GI/Hepatic/Renal       Other findings: Stent, neck irradiation, plavix in the past, hgb-12      Dental - normal exam                        Anesthesia Plan  Planned anesthetic: general endotracheal    ASA 3   Induction: intravenous   Anesthetic plan and risks discussed with: patient  Anesthesia plan special considerations: video-assisted,   Post-op plan: routine recovery

## 2021-06-14 NOTE — PROGRESS NOTES
Patient seen in clinic today for follow-up of lung cancer.    COVID testing offered; patient declines. He indicate he had it done yesterday.    Shira Abdullahi RN, Oncology Clinic   [facility]

## 2021-06-14 NOTE — PROGRESS NOTES
HPI: Pt is here for follow up of a small bowel count.   he is doing well.  Pain is well controlled.  No difficulties with the surgical wound/wounds.  he is eating well and denies fever and chills. He really is feeling pretty well but he does complain of lightheadedness when he is up and about.      /62 (Patient Site: Left Arm, Patient Position: Sitting, Cuff Size: Adult Regular)  Pulse 70  SpO2 100%    EXAM:  GENERAL:Appears well  ABDOMEN:  Soft, +BS  SURGICAL WOUNDS:  Incisions healing well, no enduration or drainage.        Assessment/Plan: .  Patient is complaining of lightheadedness and as I evaluate his medications I see he is on metoprolol and lisinopril.  Given his history I think metoprolol is cardioprotective for him but I suspect he is going hypotensive and that is what is causing his lightheadedness.  I have suggested to him that may be we could stop or cut back on his lisinopril.  I told her to bring this up with his primary care physician so they can work on this together as this will need ongoing fine tuning.  Doing well after surgery and should follow up as needed.    Brian Granados MD  [facility] Department of Surgery

## 2021-06-14 NOTE — PROGRESS NOTES
Assessment:     1. Anemia  HM2(CBC w/o Differential)    Ferritin    HM2(CBC w/o Differential)    Ferritin   2. Coronary artery disease involving native coronary artery of native heart without angina pectoris         Return for 1 week for lab visit to recheck H&H and ferritin level..    Plan:       Anemia  From acute blood loss during surgery.  H&H is holding stable at 10.  No signs of active bleeding currently.  Start multivitamin that includes iron and B12.  We discussed and patient is planning to start Flintstones with iron at 2 tabs daily.  Recheck H&H and ferritin levels in 1 week.  Running signs and symptoms for early return to clinic discussed.    Coronary artery disease involving native coronary artery of native heart without angina pectoris  During hospitalization cardiology did reviewed case.  They did recommend that he come off the Plavix as well as reduce aspirin down to 81 mg daily.  Medications were updated in chart to match this        Subjective:       62 y.o. male presents for evaluation follow-up from recent hospitalization.  Patient did go in for lysis of adhesions through laparoscopic, there were some mild complications during that which resulted in 4 inches of small intestine being removed.  He had some significant bleeding during that timeframe and resulted in 3 units of packed red blood cells for transfusion.  He was stable and eventually discharged.  He is doing much better.  His energy is slowly returning though not back to 100% yet.  He realizes that he was in the hospital for 5 days and received blood transfusion and is still slightly anemic and thus not expecting to be fully back to normal.  While he was there cardiology did review his case and he has some changes to medications which included stopping the Plavix and reducing his aspirin down to 81 mg daily.    The following portions of the patient's history were reviewed and updated as appropriate: allergies, current medications, past  "family history, past medical history, past social history, past surgical history and problem list.    Review of Systems  A 12 point comprehensive review of systems was negative except as noted.     History   Smoking Status     Never Smoker   Smokeless Tobacco     Never Used       Objective:      /60 (Patient Site: Left Arm, Patient Position: Sitting, Cuff Size: Adult Large)  Pulse 64  Temp 98.4  F (36.9  C) (Oral)   Resp 12  Ht 5' 9\" (1.753 m)  Wt 165 lb 11.2 oz (75.2 kg)  BMI 24.47 kg/m2  General appearance: alert, appears stated age and cooperative  Head: Normocephalic, without obvious abnormality, atraumatic  Lungs: clear to auscultation bilaterally  Heart: regular rate and rhythm, S1, S2 normal, no murmur, click, rub or gallop  Extremities: extremities normal, atraumatic, no cyanosis or edema  Pulses: 2+ and symmetric       This note has been dictated using voice recognition software. Any grammatical or context distortions are unintentional and inherent to the software  "

## 2021-06-14 NOTE — PROCEDURES
[facility]    Procedure: Imaging Procedure Note    Date/Time: 1/12/2021 10:03 AM  Performed by: Emmanuel Akbar MD  Authorized by: Emmanuel Akbar MD       Universal Protocol    Site marked: Yes    Prior images obtained and reviewed: Yes    Required items: required blood products, implants, devices, and special equipment available    Patient identity confirmed: verbally with patient and provided demographic data    Reevaluation: Patient was reevaluated immediately before administering moderate or deep sedation or anesthesia    Confirmation checklist: patient's identity using two indicators, relevant allergies, procedure was appropriate and matched the consent or emergent situation and correct equipment/implants were available    Time out: Immediately prior to procedure a time out was called to verify the correct patient, procedure, equipment, support staff and site/side marked as required    Universal Protocol: Joint Commission Universal Protocol was followed    Preparation: Patient was prepped and draped in the usual sterile fashion    ESBL (mL): 1    Anesthesia    Local anesthesia used?: Yes    Local anesthetic: lidocaine 1% without epinephrine    Anesthetic total (mL): 5    Sedation    Patient sedation: Yes    Sedation: fentanyl and midazolam    Vital signs: Vital signs monitored during sedation    Post-procedure    Description of procedure: EXAM:  1. PERCUTANEOUS BIOPSY ANTERIOR RIGHT UPPER LOBE NODULE.  2. CT GUIDANCE  3. CONSCIOUS SEDATION    LOCATION: [facility]  DATE/TIME: 1/12/2021 10:03 AM    INDICATION: Lung cancer, recurrent with 2 growing nodules, biopsy with fiducial placement prior to SBRT if possible.    TECHNIQUE: Dose reduction techniques were used.    PROCEDURE: Informed consent obtained. Site marked. Prior images reviewed. Required items made available. Patient identity confirmed verbally and with arm band. Patient reevaluated immediately  before administering sedation. Universal protocol was followed. Time out performed. The site was prepped and draped in sterile fashion. 5 mL of 1% lidocaine was infused into the local soft tissues. Using standard technique and under direct CT guidance, a 20-gauge biopsy device was used to obtain two core biopsies. Tissue was submitted to Pathology.    The patient tolerated the procedure. Mild postprocedural intraparenchymal blood products and coughing.    RADIOLOGIC SUPERVISION AND INTERPRETATION:   CT GUIDANCE: Images demonstrate the biopsy needle to be in good position.    SEDATION: Versed 1.5 mg. Fentanyl 75 mcg. The procedure was performed with administration intravenous conscious sedation with appropriate preoperative, intraoperative, and postoperative evaluation.    28 minutes of supervised face to face conscious sedation time was provided by a radiology nurse under my direct supervision.    IMPRESSION:    1. Successful CT-guided biopsy anterior right upper lobe lung nodule.    2. Mild postprocedural blood products after the first biopsy and patient had episodes of coughing. Secondary to this, the second more posterior nodule was not able to be safely biopsied at this time.      Patient tolerance: Patient tolerated the procedure well with no immediate complications   Length of time physician present for 1:1 monitoring during sedation: 30

## 2021-06-14 NOTE — TELEPHONE ENCOUNTER
I called Melecio with an update on the status of his fiducial placement procedure. I relayed that I spoke with Kesha, CT technician, at [facility] this am and learned the procedure was approved by an interventional radiologist. It will be scheduled at [facility] as procedures are no longer taking place at [facility]. Melecio can expect a call tomorrow to schedule his procedure. He was appreciative of the update and he has no further questions or needs at this time.

## 2021-06-14 NOTE — PROGRESS NOTES
[facility] Hematology and Oncology Progress Note    Patient: Pardeep Wilson  MRN: [id number]  Date of Service: 02/02/2021        Reason for Visit    Chief Complaint   Patient presents with     HE Cancer     Metastatic primary lung cancer       Assessment and Plan    Recurrent and metastatic mucinous lung adenocarcinoma, status post wedge resection, February 28, 2020  T4 N0 M0, stage III a mucinous left lung adenocarcinoma status post left lower lobe resection on November 1, 2018  Tumor 9 cm with 3.5 cm nodule, grade 2 out of 4 mucinous adenocarcinoma  Tumor is PDL 1-, no EGFR mutation.  No rearrangement of ALK, evaluation on the Alchemist trial  Tumor negative for ALK, ROS 1, RET, NTRK1, MET e14, EGFR MUTATIONS, April 2020  Remote history of stage I a right axillary Hodgkin's disease status post mantle field radiation and splenectomy about 45 years ago  Right parotid cancer with lymph node involvement status post surgery and adjuvant radiation for 6 weeks in 1991  Coronary artery disease  Elevated BUN and creatinine  New right lung pulmonary nodules   Left pleural effusion/enhancement, 4/2020  Thrombocytopenia  Diarrhea related to Keytruda    New finding of profound thrombocytopenia without any bleeding or bruising symptoms.  We will repeat lab with both purple and blue top tubes to ensure this is not a lab error.    If his platelet count is significantly low then we will need to consider platelet transfusion and IVIG for presumed ITP.    He had significant diarrhea related to Keytruda and this has finally resolved.  He will continue steroid taper, continue 20 mg p.o. daily for 4 days and then 10 mg daily for 4 days and then stop the medication.    He is scheduled to start stereotactic radiation therapy Thursday with 5 treatments planned.  We will hold off on any systemic therapy for now.  We will plan reevaluation with imaging in 2 to 3 months.    Plan: As above    Addendum: Platelet count confirmed to be 30,000.   Normal WBC count and hemoglobin.  Likely ITP.  He is at high risk for bleeding.  Will transfuse 1 unit of platelets today.  Will try to schedule for outpatient IVIG infusion beginning tomorrow and consider 1 g/kg daily for 2 days.  Explained that hopefully this is an acute episode and will resolve with treatment.    IVIG treatment is required emergently because of high risk of life-threatening bleeding.    Reviewed small risks of platelet transfusion including infusion reaction and transfusion.  He signed a consent for this.  Reviewed side effects of IVIG infusion including rare renal failure.    We will check platelet count 30 minutes after the platelet transfusion.  And daily for the next few days.  Reviewed bleeding precautions and the need to avoid aspirin or nonsteroidals.  Also to watch for bleeding and symptoms of new headache.    We will inform Dr. Grant at these events.      Measurable disease: CT of the chest      Current therapy: Observation      Treatment history:     Keytruda maintenance every 3 weeks,First dose September 8, 2020  Last dose December 23, 2020, 400 mg       Carboplatin, Taxol and Keytruda, 4 cycles, last August 18, 2020  Treatment started Collette 15, 2020    Wedge resection of right upper lobe nodule, February 28, 2020    Cisplatin and Alimta adjuvant chemotherapy, for 4 cycles: Day 1 cycle 4, February 15, 2019  First cycle  started December 14, 2018    Patient underwent mediastinoscopy, bronchoscopy, thoracoscopic surgery and left lower lobectomy on November 1, 2018      Cancer Staging  Malignant neoplasm of lower lobe of left lung (H)  Staging form: Lung, AJCC 8th Edition  - Clinical stage from 10/15/2018: Stage IIIA (cT4, cN0, cM0) - Signed by Melody Segovia CNP on 12/21/2018      ECOG Performance   ECOG Performance Status: 1    Distress Assessment  Distress Assessment Score: No distress    Pain           Problem List    1. Malignant neoplasm of lower lobe of left lung (H)   HM1(CBC and Differential)   2. Encounter for antineoplastic chemotherapy  CC OFFICE VISIT LONG   3. Metastatic primary lung cancer, left (H)  CC OFFICE VISIT LONG        CC: Dov Morales,     ______________________________________________________________________________    History of Present Illness    Mr. Pardeep Wilson is seen for follow-up.  Last dose of Keytruda was 6 weeks ago.  He developed significant diarrhea and has been on steroids with resolution over the last couple of weeks.  Continues on steroid taper.  Has met with radiation oncology and is scheduled to start stereotactic radiation this Thursday.  Did have a repeat biopsy and fiducial placement and biopsy confirmed adenocarcinoma.  No bleeding or bruising is reported.    Pain Status  Currently in Pain: No/denies    Review of Systems    Constitutional  Constitutional (WDL): All constitutional elements are within defined limits  Neurosensory  Neurosensory (WDL): Exceptions to WDL  Peripheral Sensory Neuropathy: Asymptomatic, loss of deep tendon reflexes or paresthesia(Mostly in feet)  Cardiovascular  Cardiovascular (WDL): Exceptions to WDL(Hands cramp up. Calves cramp up too.)  Pulmonary  Respiratory (WDL): Within Defined Limits  Gastrointestinal  Diarrhea: Increase of <4 stools per day over baseline, mild increase in ostomy output compared to baseline(Much improved)  Dysphagia: Symptomatic, able to eat regular diet(Chronic)  Dry Mouth: Symptomatic (e.g., dry or thick saliva) without significant dietary alteration, unstimulated saliva flow >0.2 ml/min  Genitourinary  Genitourinary (WDL): All genitourinary elements are within defined limits  Integumentary  Integumentary (WDL): All integumentary elements are within defined limits  Patient Coping  Patient Coping: Accepting  Distress Assessment  Distress Assessment Score: No distress  Accompanied by  Accompanied by: Alone    Past History  Past Medical History:   Diagnosis Date     Anemia       Coronary artery disease     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     Esophageal reflux      Hodgkin's lymphoma (H)     s/p radiation to the mediastinum at age 17     Hyperlipemia      Hypertension      Hypothyroidism     hx nodules     Lung cancer (H)      MI, old 12 years ago, 2008         Past Surgical History:   Procedure Laterality Date     CORONARY ANGIOPLASTY      Stent Placement     CT BIOPSY LUNG       CT BIOPSY LUNG  1/12/2021     EYE SURGERY Bilateral     Cataracts     left lower lobe lobectomy   11/01/2018     MEDIASTINOSCOPY N/A 11/1/2018    Procedure: MEDIASTINOSCOPY;  Surgeon: Bry Saunders MD;  Location: [facility];  Service:      PAROTIDECTOMY       [place] LAP,DIAGNOSTIC ABDOMEN N/A 11/7/2017    Procedure: DIAGNOSTIC LAPAROSCOPY,  LYSIS OF ADHESIONS, SMALL BOWEL RESECTION;  Surgeon: Brian Granados MD;  Location: [facility];  Service: General     SPLENECTOMY, TOTAL  1973     wedge resection Right 02/28/2020     WISDOM TOOTH EXTRACTION         Physical Exam    Recent Vitals 2/2/2021   Height -   Weight 186 lbs 11 oz   BSA (m2) 2.03 m2   /69   Pulse 84   Temp 98.3   Temp src 1   SpO2 99   Some recent data might be hidden         GENERAL: Alert and oriented. Seated comfortably. In no distress.     HEAD: Atraumatic and normocephalic.  Alopecia     EYES: SOPHIE, EOMI.  No pallor.  No icterus.     Oral cavity: no mucosal lesion or tonsillar enlargement.     NECK: supple. JVP normal.  No thyroid enlargement.     LYMPH NODES: No palpable, cervical, axillary or inguinal lymphadenopathy.     CHEST: clear to auscultation bilaterally.  Resonant to percussion throughout bilaterally.  Symmetrical breath movements bilaterally.     CVS: S1 and S2 are heard. Regular rate and rhythm.  No murmur or gallop or rub heard.     ABDOMEN: Soft. Not tender. Not distended.  No palpable hepatomegaly or splenomegaly.  No other mass palpable.  Bowel sounds heard.     EXTREMITIES: Warm.  No  peripheral edema.     SKIN: no rash, or bruising or purpura.    CNS: Nonfocal          Lab Results    Recent Results (from the past 168 hour(s))   COVID-19 Virus PCR MRF    Specimen: Respiratory   Result Value Ref Range    COVID-19 VIRUS SPECIMEN SOURCE Nasopharyngeal     2019-nCOV       Test received-See reflex to IDDL test SARS CoV2 (COVID-19) Virus RT-PCR   Comprehensive Metabolic Panel   Result Value Ref Range    Sodium 138 136 - 145 mmol/L    Potassium 4.5 3.5 - 5.0 mmol/L    Chloride 103 98 - 107 mmol/L    CO2 29 22 - 31 mmol/L    Anion Gap, Calculation 6 5 - 18 mmol/L    Glucose 107 70 - 125 mg/dL    BUN 40 (H) 8 - 22 mg/dL    Creatinine 1.30 0.70 - 1.30 mg/dL    GFR MDRD Af Amer >60 >60 mL/min/1.73m2    GFR MDRD Non Af Amer 55 (L) >60 mL/min/1.73m2    Bilirubin, Total 1.0 0.0 - 1.0 mg/dL    Calcium 8.6 8.5 - 10.5 mg/dL    Protein, Total 6.1 6.0 - 8.0 g/dL    Albumin 3.0 (L) 3.5 - 5.0 g/dL    Alkaline Phosphatase 90 45 - 120 U/L    AST 18 0 - 40 U/L    ALT 19 0 - 45 U/L   HM1 (CBC with Diff)   Result Value Ref Range    WBC 10.5 4.0 - 11.0 thou/uL    RBC 4.22 (L) 4.40 - 6.20 mill/uL    Hemoglobin 12.7 (L) 14.0 - 18.0 g/dL    Hematocrit 38.9 (L) 40.0 - 54.0 %    MCV 92 80 - 100 fL    MCH 30.1 27.0 - 34.0 pg    MCHC 32.6 32.0 - 36.0 g/dL    RDW 18.6 (H) 11.0 - 14.5 %    Platelets 3 (LL) 140 - 440 thou/uL    MPV      Neutrophils % 68 50 - 70 %    Lymphocytes % 19 (L) 20 - 40 %    Monocytes % 10 2 - 10 %    Eosinophils % 2 0 - 6 %    Basophils % 0 0 - 2 %    Immature Granulocyte % 1 (H) <=0 %    Neutrophils Absolute 7.2 2.0 - 7.7 thou/uL    Lymphocytes Absolute 2.0 0.8 - 4.4 thou/uL    Monocytes Absolute 1.0 (H) 0.0 - 0.9 thou/uL    Eosinophils Absolute 0.2 0.0 - 0.4 thou/uL    Basophils Absolute 0.0 0.0 - 0.2 thou/uL    Immature Granulocyte Absolute 0.1 (H) <=0.0 thou/uL       Imaging    Xr Chest 1 View Portable    Result Date: 1/12/2021  EXAM: XR CHEST 1 VIEW PORTABLE LOCATION: [facility]  HOSPITAL DATE/TIME: 1/12/2021 10:57 AM INDICATION: Post lung biopsy and fiducial placement. COMPARISON: Same-day biopsy CT.     Interval medial right upper lobe fiducial marker placement. No pneumothorax. No other significant changes.     Ct Lung Biopsy    Result Date: 1/12/2021  EXAM: 1. PERCUTANEOUS BIOPSY ANTERIOR RIGHT UPPER LOBE NODULE. 2. CT GUIDANCE 3. CONSCIOUS SEDATION LOCATION: [facility] DATE/TIME: 1/12/2021 10:03 AM INDICATION: Lung cancer, recurrent with 2 growing nodules, biopsy with fiducial placement prior to SBRT if possible. TECHNIQUE: Dose reduction techniques were used. PROCEDURE: Informed consent obtained. Site marked. Prior images reviewed. Required items made available. Patient identity confirmed verbally and with arm band. Patient reevaluated immediately before administering sedation. Universal protocol was followed. Time out performed. The site was prepped and draped in sterile fashion. 5 mL of 1% lidocaine was infused into the local soft tissues. Using standard technique and under direct CT guidance, a 20-gauge biopsy device was used to obtain two core biopsies. Tissue was submitted to Pathology. The patient tolerated the procedure. Mild postprocedural intraparenchymal blood products and coughing. RADIOLOGIC SUPERVISION AND INTERPRETATION: CT GUIDANCE: Images demonstrate the biopsy needle to be in good position. SEDATION: Versed 1.5 mg. Fentanyl 75 mcg. The procedure was performed with administration intravenous conscious sedation with appropriate preoperative, intraoperative, and postoperative evaluation. 28 minutes of supervised face to face conscious sedation time was provided by a radiology nurse under my direct supervision.     1.  Successful CT-guided biopsy anterior right upper lobe lung nodule. 2.  Mild postprocedural blood products after the first biopsy and patient had episodes of coughing. Secondary to this, the second more posterior nodule was not able to be  safely biopsied at this time. Reference CPT Codes: 47571, 28500, 49702, 94087     Ct Fiducial Placement Lung    Result Date: 1/12/2021  EXAM: 1. PERCUTANEOUS FIDUCIAL MARKER PLACEMENT ANTERIOR RIGHT UPPER LOBE NODULE 2. CT GUIDANCE 3. CONSCIOUS SEDATION LOCATION: [facility] DATE/TIME: 1/12/2021 10:05 AM INDICATION: Enlarging right upper lobe nodules. TECHNIQUE: Dose reduction techniques were used. PROCEDURE: Informed consent obtained. Time out performed. The site was prepped and draped in sterile fashion. 5 mL of 1% lidocaine was infused into the local soft tissues. Using standard technique and under direct CT guidance, a 20 gauge needle was placed in the localized lesion. 1 fiducial marker placed. The patient tolerated the procedure. Mild postprocedural intraparenchymal blood products and coughing. RADIOLOGIC SUPERVISION AND INTERPRETATION: CT GUIDANCE: Images demonstrate the localizing needle to be in good position. Fiducial markers in good position. SEDATION: Versed 1.5 mg. Fentanyl 75 mcg. The procedure was performed with administration intravenous conscious sedation with appropriate preoperative, intraoperative, and postoperative evaluation. 28 minutes of supervised face to face conscious sedation time was provided by a radiology nurse under my direct supervision.     1.  Successful CT-guided fiducial marker placement into anterior right upper lobe nodule. 2.  Secondary to mild postprocedural blood products from biopsy of the first nodule and patient coughing, biopsy and fiducial marking of the second nodule was not able to be safely performed at this time. Reference CPT Codes: 53628, 77123, 98897, 40171        Signed by: Kevin Soto MD

## 2021-06-14 NOTE — PROGRESS NOTES
[facility] Hematology and Oncology Progress Note    Patient: Pardeep Wilson  MRN: [id number]  Date of Service: 12/23/2020        Reason for Visit    Chief Complaint   Patient presents with     HE Cancer     Lung Cancer       Assessment and Plan  Cancer Staging  Malignant neoplasm of lower lobe of left lung (H)  Staging form: Lung, AJCC 8th Edition  - Clinical stage from 10/15/2018: Stage IIIA (cT4, cN0, cM0) - Signed by Melody Segovia CNP on 12/21/2018  ALK-, EGFR-, BRAF-,ROS1-, RET-, NTRK-, MET-  PDL1 0% expressed    1. Lung cancer, originally Stage IIIa in 2018. Recurrent disease in 2019/2020 with stage 4: had wedge resection in Feb 2020 at the [facility]. Had multiple nodules in right upper lobe, ?pleural mets?. Has started palliative chemotherapy with Carboplatin, Taxol and Keytruda. He has had 4 cycles. CT overall shows stable disease in right upper lobe. 2 new tiny nodules in left upper lobe. Then started maintenance keytruda. CT shows slightly enlarged tumors. PET shows that they are hypermetabolic. His case was discussed at tumor board and it was decided to pursue radiation since he only has these 2 areas and would like to be aggressive. He has great performance status. Continue keytruda for now. He will change to 6 week dosing.     2. Renal insufficiency: likely from medications, previous cisplatin. Avoid nephrotoxic medications.  Has been using ibuprofen lately. Encourage him to stop that. Stay hydrated.  Keep blood pressure well controlled.    3. 2 new small nodules: follow closely on subsequent imaging. See above.     4. Diarrhea: has been going on for a couple of weeks. Wife had just for a couple of days. No pain, cramping, blood in stool. No foul odor. No fevers. Only taking 1-2 imodium daily. Encourage him to take 1-2 with each loose stool, up to 8/day. If no improvement, we will try lomotil. Low likelihood of colitis. Says he is up to date with colonoscopy.     ECOG Performance   ECOG  Performance Status: 1     Distress Assessment  Distress Assessment Score: 2:    Pain  Currently in Pain: No/denies      Problem List    1. Encounter for antineoplastic chemotherapy  CC OFFICE VISIT LONG    CC OFFICE VISIT LONG    Infusion Appointment   2. Metastatic primary lung cancer, left (H)  CC OFFICE VISIT LONG    CC OFFICE VISIT LONG    Infusion Appointment        ______________________________________________________________________________    History of Present Illness    Measurable Disease: CT, PET    Current Treatment: Maintenance Keytruda. Started 9/8/20  Carboplatin, Taxol, Keytruda, first cycle 6/15/20. Had 4 cycles.     Past Treatment:   Wedge resection of right upper lobe nodule, February 28, 2020     Cisplatin and Alimta adjuvant chemotherapy, for 4 cycles: Day 1 cycle 4, February 15, 2019  First cycle  started December 14, 2018     Patient underwent mediastinoscopy, bronchoscopy, thoracoscopic surgery and left lower lobectomy on November 1, 2018     Interim History:   Pt is here to continue on immunotherapy. He is doing fine. Mainly anxious. Really wants to get radiation. He is having some diarrhea which is new from a couple of weeks ago. No recent antibiotics. Hasn't lost weight. Trying to do BRAT diet. No blood in stool. No abd pain/cramping. Worse in the am and then gets better. No fevers.     Pain Status  Currently in Pain: No/denies    Review of Systems    Constitutional  Constitutional (WDL): All constitutional elements are within defined limits  Neurosensory  Neurosensory (WDL): Exceptions to WDL  Peripheral Sensory Neuropathy: Asymptomatic, loss of deep tendon reflexes or paresthesia(feet no change)  Eye   Eye Disorder (WDL): All eye disorder elements are within defined limits(glasses)  Ear  Ear Disorder (WDL): All ear disorder elements are within defined limits  Cardiovascular  Cardiovascular (WDL): All cardiovascular elements are within defined limits  Pulmonary  Respiratory (WDL): Within  Defined Limits  Gastrointestinal  Gastrointestinal (WDL): Exceptions to WDL(gas)  Anorexia: Loss of appetite without alteration in eating habits(smaller amounts)  Diarrhea: Increase of <4 stools per day over baseline, mild increase in ostomy output compared to baseline(worse in am)  Dry Mouth: Symptomatic (e.g., dry or thick saliva) without significant dietary alteration, unstimulated saliva flow >0.2 ml/min(chronic)  Genitourinary  Genitourinary (WDL): All genitourinary elements are within defined limits  Lymphatic  Lymph (WDL): All lymph disorder elements are within defined limits  Musculoskeletal and Connective Tissue  Musculoskeletal and Connetive Tissue Disorders (WDL): Exceptions to WDL  Bone Pain: Mild pain(stiffness in neck)  Myalgia: Mild pain(back)  Integumentary  Integumentary (WDL): All integumentary elements are within defined limits  Patient Coping  Patient Coping: Accepting  Distress Assessment  Distress Assessment Score: 2  Accompanied by  Accompanied by: Alone  Oral Chemo Adherence         Past History  Past Medical History:   Diagnosis Date     Anemia      Coronary artery disease     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 Cooperative in conversation. Here alone due to visitor restrictions. Mask on  RESP: Regular respiratory rate. No expiratory wheezes   MUSCULOSKELETAL: no bilateral leg swelling  NEURO: non focal. Alert and oriented x3.   PSYCH: within normal limits. No depression or anxiety.  SKIN: exposed skin is dry intact.     Lab Results    Recent Results (from the past 168 hour(s))   POCT Glucose    Specimen: Capillary; Blood   Result  Value Ref Range    Glucose 87 70 - 139 mg/dL   Comprehensive Metabolic Panel   Result Value Ref Range    Sodium 142 136 - 145 mmol/L    Potassium 4.4 3.5 - 5.0 mmol/L    Chloride 105 98 - 107 mmol/L    CO2 31 22 - 31 mmol/L    Anion Gap, Calculation 6 5 - 18 mmol/L    Glucose 84 70 - 125 mg/dL    BUN 27 (H) 8 - 22 mg/dL    Creatinine 1.41 (H) 0.70 - 1.30 mg/dL    GFR MDRD Af Amer >60 >60 mL/min/1.73m2    GFR MDRD Non Af Amer 50 (L) >60 mL/min/1.73m2    Bilirubin, Total 0.8 0.0 - 1.0 mg/dL    Calcium 9.0 8.5 - 10.5 mg/dL    Protein, Total 7.3 6.0 - 8.0 g/dL    Albumin 3.4 (L) 3.5 - 5.0 g/dL    Alkaline Phosphatase 125 (H) 45 - 120 U/L    AST 21 0 - 40 U/L    ALT 12 0 - 45 U/L       Imaging    Ct Chest Without Contrast    Result Date: 11/27/2020  EXAM: CT CHEST WO CONTRAST LOCATION: [facility] DATE/TIME: 11/27/2020 2:36 PM INDICATION: Follow-up lung cancer COMPARISON: 09/04/2020 TECHNIQUE: CT chest without IV contrast. Multiplanar reformats were obtained. Dose reduction techniques were used. CONTRAST: None. FINDINGS: LUNGS AND PLEURA: Prior new left lung nodules of resolved. Slight increased size of 10 x 13 mm connie-fissural right lung nodule versus 5 x 8 mm (series 4, image 92). Additional nodule along suture line in the right lung appears increased measuring 9 x 10 mm versus 7 x 8 mm (image 113). Redemonstrated left pleural thickening and/or fluid without significant change. MEDIASTINUM/AXILLAE: No adenopathy. Atherosclerosis. UPPER ABDOMEN: Surgical changes. MUSCULOSKELETAL: No focal bony lesion.     1.  Right lung connie-fissural and connie-sutural nodules appear slightly increased. 2.  Prior new left lung nodules have resolved. 3.  No other significant change.     Nm Pet Ct Skull To Mid Thigh    Result Date: 12/22/2020  EXAM: NM PET CT SKULL TO MID THIGH LOCATION: [facility] DATE/TIME: 12/22/2020 9:47 AM INDICATION: Subsequent treatment planning and restaging for  malignant neoplasm of lower lobe, left bronchus or lung. Status post left lower lobectomy in 2018 with recent right upper lobe wedge resection revealing mucinous lung adenocarcinoma, currently receiving immunotherapy. 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 COMPARISON: FDG PET/CT dated 06/01/2020 and CT of the thorax dated 11/27/2020. TECHNIQUE: Serum glucose level 87 mg/dL. One hour post intravenous administration of 9.8 mCi F-18 FDG, PET imaging was performed from the skull base to the mid thighs utilizing attenuation correction with concurrent axial CT and PET/CT image fusion. Dose  reduction techniques were used. FINDINGS: Continued interval increase in size of the connie-fissural nodule along the lateral aspect the right upper lobe measuring 1.3 x 0.9 cm (max SUV 1.9, previously measured 0.9 x 0.7 cm with a max SUV of 1.6) and development of mildly FDG avid nodule  along the medial aspect of the right upper lobe wedge resection margin measuring 0.9 x 0.8 cm (max SUV 1.7, previously measured 0.6 x 0.5 cm on 06/01/2020) suggesting progression of disease. Redemonstrated mildly FDG avid small pleural effusion/pleural thickening (max SUV 3.7, previously 3.7). Mild senescent intracranial changes. Postoperative change of the bilateral lenses. Mild carotid artery bifurcation calcification. Severe coronary artery calcium/stenting. Biapical scarring. Right upper lobectomy. Left lower lobectomy. Right renal cyst. Splenectomy. Pelvic phleboliths. Multilevel degenerative changes of the spine.     1. Mild progression of disease with continued increase in size of the two right upper lobe nodule. 2. Persistent FDG avid small left pleural effusion/pleural thickening.        Signed by: Melody Segovia, CNP

## 2021-06-14 NOTE — ANESTHESIA CARE TRANSFER NOTE
Last vitals:   Vitals:    11/07/17 1641   BP: 163/74   Pulse: 76   Resp: 20   Temp: 36.4  C (97.6  F)   SpO2: 100%     Patient's level of consciousness is unresponsive  Spontaneous respirations: Spontaneously breathing with T-piece  Maintains airway independently: no: on T-piece  Dentition unchanged: yes  Oropharynx: oropharynx clear of all foreign objects and endotracheal tube in place    QCDR Measures:  ASA# 20 - Surgical Safety Checklist: WHO surgical safety checklist completed prior to induction  PQRS# 430 - Adult PONV Prevention: 4558F - Pt received => 2 anti-emetic agents (different classes) preop & intraop  ASA# 8 - Peds PONV Prevention: 4558F - Pt received => 2 anti-emetic agents (different classes) preop & intraop  PQRS# 424 - Rosemary-op Temp Management: 4559F - At least one body temp DOCUMENTED => 35.5C or 95.9F within required timeframe  PQRS# 426 - PACU Transfer Protocol: - Transfer of care checklist used  ASA# 14 - Acute Post-op Pain: ASA14B - Patient did NOT experience pain >= 7 out of 10

## 2021-06-14 NOTE — PROGRESS NOTES
Patient reporting significant diarrhea, Grade 3, probably related to Keytruda.  Will start Prednisone 60mg po daily, hold Keytruda, have him come in sooner for labs and hydration if needed and continue Lomotil.

## 2021-06-14 NOTE — PROGRESS NOTES
PT here ambulatory for one unit platelets transfusion for platelet count of 3,000. Iv started and reviewed transfusion with pt. Consent signed. One unit platelets transfused and pt tolerated without any problems. Platelet count drawn 30 minutes post infusion and platelet count now [id number]. Dr Zaman approved pt to go home but to return tomorrow for IVIG and labs on first floor infusion. Reviewed this with pt. Iv dcd' and pressure dressing to site. Follow up reviewed and pt dc'd steady gait

## 2021-06-14 NOTE — TELEPHONE ENCOUNTER
Pt calls in today to ask if prednisone taper can go quicker as he is having difficulty sleeping and is agitated. Per Dr Brittany velazco for pt to decrease dose by 10mg every 4 days but to call if diarrhea returns. Pt agreeable to plan.

## 2021-06-14 NOTE — PROGRESS NOTES
Pardeep Wilson, 65 y.o., male arrived ambulatory to clinic at 0815 for Cycle #6  Day #1 of his chemotherapy regimen. PIV inserted by Chante Yuan R.N. with great blood return.  Administered treatment per provider order. He tolerated infusion well, no s/s of infusion reaction. At completion of infusion, PIV flushed well with normal saline and discontinued. Site covered with gauze and coban wrap. Pardeep Wilson verbalized understanding of plan of care and return to clinic. Discharged to [facility] at 1045 alert and ambulatory.

## 2021-06-14 NOTE — PRE-PROCEDURE
Procedure Name: CT guided right lung nodule (two nodules for possible biospy) with moderate sedation and chest tube insertion if needed  Date/Time: 1/12/2021 8:50 AM  Written consent obtained?: Yes  Risks and benefits: Risks, benefits and alternatives were discussed  Consent given by: patient  Expected level of sedation: moderate  ASA Class: Class 2- mild systemic disease, no acute problems, no functional limitations  Patient states understanding of procedure being performed: Yes  Patient's understanding of procedure matches consent: Yes  Procedure consent matches procedure scheduled: Yes  Appropriately NPO: yes  Lungs: lungs clear with good breath sounds bilaterally  Heart: normal heart sounds and rate  History & Physical reviewed: History and physical reviewed and no updates needed  Statement of review: I have reviewed the lab findings, diagnostic data, medications, and the plan for sedation

## 2021-06-14 NOTE — TELEPHONE ENCOUNTER
Upcoming Appointment Question  When is the appointment: Tomorrow  What is your appointment for?: Pre Op  Who is your appointment scheduled with?: PCP only  What is your question/concern?: I am unsure if a pre op is needed for my upcoming biopsy and fiducial placement. I have scheduled an appointment just to be safe. Is there anyway someone could help me find out? I also need to have a covid test and have scheduled that before the pre op in case the pre op is not needed.  Okay to leave a detailed message?: Yes

## 2021-06-14 NOTE — PATIENT INSTRUCTIONS - HE
Pardeep you had your first dose of IVIG 70 grams-- please call with any questions or concerns. Otherwise, we will see you on Friday 2/5/2021 .    1st floor OP Infusion at [facility] -- [phone], option 2 for a nurse.    Patient Education     Immune Globulin Solution for injection  What is this medicine?  IMMUNE GLOBULIN (im MUNE GLOB foster hilario) helps to prevent or reduce the severity of certain infections in patients who are at risk. This medicine is collected from the pooled blood of many donors. It is used to treat immune system problems, thrombocytopenia, and Kawasaki syndrome.  This medicine may be used for other purposes; ask your health care provider or pharmacist if you have questions.  What should I tell my health care provider before I take this medicine?  They need to know if you have any of these conditions:    diabetes    extremely low or no immune antibodies in the blood    heart disease    history of blood clots    hyperprolinemia    infection in the blood, sepsis    kidney disease    taking medicine that may change kidney function - ask your health care provider about your medicine    an unusual or allergic reaction to human immune globulin, albumin, maltose, sucrose, polysorbate 80, other medicines, foods, dyes, or preservatives    pregnant or trying to get pregnant    breast-feeding  How should I use this medicine?  This medicine is for injection into a muscle or infusion into a vein or skin. It is usually given by a health care professional in a hospital or clinic setting.  In rare cases, some brands of this medicine might be given at home. You will be taught how to give this medicine. Use exactly as directed. Take your medicine at regular intervals. Do not take your medicine more often than directed.  Talk to your pediatrician regarding the use of this medicine in children. Special care may be needed.  Overdosage: If you think you have taken too much of this medicine contact a poison  control center or emergency room at once.  NOTE: This medicine is only for you. Do not share this medicine with others.  What if I miss a dose?  It is important not to miss your dose. Call your doctor or health care professional if you are unable to keep an appointment. If you give yourself the medicine and you miss a dose, take it as soon as you can. If it is almost time for your next dose, take only that dose. Do not take double or extra doses.  What may interact with this medicine?    aspirin and aspirin-like medicines    cisplatin    cyclosporine    medicines for infection like acyclovir, adefovir, amphotericin B, bacitracin, cidofovir, foscarnet, ganciclovir, gentamicin, pentamidine, vancomycin    NSAIDS, medicines for pain and inflammation, like ibuprofen or naproxen    pamidronate    vaccines    zoledronic acid  This list may not describe all possible interactions. Give your health care provider a list of all the medicines, herbs, non-prescription drugs, or dietary supplements you use. Also tell them if you smoke, drink alcohol, or use illegal drugs. Some items may interact with your medicine.  What should I watch for while using this medicine?  Your condition will be monitored carefully while you are receiving this medicine.  This medicine is made from pooled blood donations of many different people. It may be possible to pass an infection in this medicine. However, the donors are screened for infections and all products are tested for HIV and hepatitis. The medicine is treated to kill most or all bacteria and viruses. Talk to your doctor about the risks and benefits of this medicine.  Do not have vaccinations for at least 14 days before, or until at least 3 months after receiving this medicine.  What side effects may I notice from receiving this medicine?  Side effects that you should report to your doctor or health care professional as soon as possible:    allergic reactions like skin rash, itching or  hives, swelling of the face, lips, or tongue    breathing problems    chest pain or tightness    fever, chills    headache with nausea, vomiting    neck pain or difficulty moving neck    pain when moving eyes    pain, swelling, warmth in the leg    problems with balance, talking, walking    sudden weight gain    swelling of the ankles, feet, hands    trouble passing urine or change in the amount of urine  Side effects that usually do not require medical attention (report to your doctor or health care professional if they continue or are bothersome):    dizzy, drowsy    flushing    increased sweating    leg cramps    muscle aches and pains    pain at site where injected  This list may not describe all possible side effects. Call your doctor for medical advice about side effects. You may report side effects to FDA at 0-377-FDA-4017.  Where should I keep my medicine?  Keep out of the reach of children.  This drug is usually given in a hospital or clinic and will not be stored at home.  In rare cases, some brands of this medicine may be given at home. If you are using this medicine at home, you will be instructed on how to store this medicine. Throw away any unused medicine after the expiration date on the label.  NOTE: This sheet is a summary. It may not cover all possible information. If you have questions about this medicine, talk to your doctor, pharmacist, or health care provider.  NOTE:This sheet is a summary. It may not cover all possible information. If you have questions about this medicine, talk to your doctor, pharmacist, or health care provider. Copyright  2015 [employer]

## 2021-06-14 NOTE — ANESTHESIA POSTPROCEDURE EVALUATION
Patient: Pardeep Wilson  DIAGNOSTIC LAPAROSCOPY,  LYSIS OF ADHESIONS, SMALL BOWEL RESECTION  Anesthesia type: general    Patient location: PACU  Last vitals:   Vitals:    11/07/17 1830   BP: 153/69   Pulse: 80   Resp: 16   Temp: 36  C (96.8  F)   SpO2: 97%     Post vital signs: stable  Level of consciousness: awake and responds to simple questions  Post-anesthesia pain: pain controlled  Post-anesthesia nausea and vomiting: no  Pulmonary: unassisted, return to baseline  Cardiovascular: stable and blood pressure at baseline  Hydration: adequate  Anesthetic events: no    QCDR Measures:  ASA# 11 - Rosemary-op Cardiac Arrest: ASA11B - Patient did NOT experience unanticipated cardiac arrest  ASA# 12 - Rosemary-op Mortality Rate: ASA12B - Patient did NOT die  ASA# 13 - PACU Re-Intubation Rate: ASA13B - Patient did NOT require a new airway mgmt  ASA# 10 - Composite Anes Safety: ASA10A - No serious adverse event    Additional Notes:

## 2021-06-14 NOTE — PROGRESS NOTES
[facility]  [address]  [facility] [address]  Dept: [phone]  Dept Fax: [phone]  Primary Provider: Dov Arvizu DO  Pre-op Performing Provider: DOV ARVIZU    PREOPERATIVE EVALUATION:  Today's date: 1/8/2021    Pardeep Wilson is a 65 y.o. male who presents for a preoperative evaluation.    Surgical Information:  Surgery/Procedure: CT BIOPSY AND CT FIDUCIAL; ; ;   Surgery Location: [facility]  Surgeon: Dr. Chelsea Grant  Surgery Date: 1/12/21  Time of Surgery: 6:50am  Where patient plans to recover: At home with family  Fax number for surgical facility: Note does not need to be faxed, will be available electronically in Epic.    Type of Anesthesia Anticipated: to be determined    Subjective     HPI related to upcoming procedure: 64 y/o with 2 growing lung nodules right upper lobe on maintenance Keytruda, suspicious for oligoprogressive  NSCLC with extensive history of prior malignancy.  Interventional radiology procedure in preparation for radiation therapy    Preop Questions 1/8/2021   Have you ever had a heart attack or stroke? YES - 12 years ago, AMI   Have you ever had surgery on your heart or blood vessels, such as a stent placement, a coronary artery bypass, or surgery on an artery in your head, neck, heart, or legs? YES - EKG unchanged. 6 METs without symptoms. Cleared for surgery   Do you have chest pain with activity? No   Do you have a history of  heart failure? No   Do you currently have a cold, bronchitis or symptoms of other infection? No   Do you have a cough, shortness of breath, or wheezing? No   Do you or anyone in your family have previous history of blood clots? No   Do you or does anyone in your family have a serious bleeding problem such as prolonged bleeding following surgeries or cuts? No   Have you ever had problems with anemia or been told to take iron pills? No   Have you had any abnormal blood loss such as  black, tarry or bloody stools? No   Have you ever had a blood transfusion? YES - 5 years ago.    Have you ever had a transfusion reaction? No   Are you willing to have a blood transfusion if it is medically needed before, during, or after your surgery? Yes   Have you or any of your relatives ever had problems with anesthesia? No   Do you have sleep apnea, excessive snoring or daytime drowsiness? No   Do you have any artifical heart valves or other implanted medical devices like a pacemaker, defibrillator, or continuous glucose monitor? No   Do you have artificial joints? No   Are you allergic to latex? No     Health Care Directive:  Patient does not have a Health Care Directive or Living Will: Patient states has Advance Directive and will bring in a copy to clinic.    Preoperative Review of :    reviewed - Had oxycodone, 105 tablets in Feb 2020. None since.    See problem list for active medical problems.  Problems all longstanding and stable, except as noted/documented.  See ROS for pertinent symptoms related to these conditions.      Review of Systems   Constitutional: Negative for chills, fatigue and fever.   Eyes: Negative for visual disturbance.   Respiratory: Negative for chest tightness and shortness of breath.    Cardiovascular: Negative for chest pain, palpitations and leg swelling.   Gastrointestinal: Positive for diarrhea. Negative for constipation, nausea and vomiting.   Genitourinary: Negative for difficulty urinating.         Patient Active Problem List    Diagnosis Date Noted     CKD (chronic kidney disease) stage 3, GFR 30-59 ml/min 02/14/2020     Renal insufficiency 12/21/2018     Metastatic primary lung cancer, left (H) 11/01/2018     Encounter for antineoplastic chemotherapy 10/29/2018     Malignant neoplasm of lower lobe of left lung (H) 10/15/2018     Coronary artery disease involving native coronary artery of native heart without angina pectoris      Psoriasis      Anemia       Hypothyroidism      Hyperlipidemia      Hypertension      Esophageal Reflux      Past Medical History:   Diagnosis Date     Anemia      Coronary artery disease     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     Esophageal reflux      Hodgkin's lymphoma (H)     s/p radiation to the mediastinum at age 17     Hyperlipemia      Hypertension      Hypothyroidism      Lung cancer (H)      Psoriasis      Past Surgical History:   Procedure Laterality Date     CORONARY ANGIOPLASTY      Stent Placement     EYE SURGERY Bilateral     Cataracts     MEDIASTINOSCOPY N/A 11/1/2018    Procedure: MEDIASTINOSCOPY;  Surgeon: Bry Saunders MD;  Location: [facility];  Service:      PAROTIDECTOMY       LA LAP,DIAGNOSTIC ABDOMEN N/A 11/7/2017    Procedure: DIAGNOSTIC LAPAROSCOPY,  LYSIS OF ADHESIONS, SMALL BOWEL RESECTION;  Surgeon: Brian Granados MD;  [place] [facility] [place]: General     SPLENECTOMY, TOTAL  1973     WISDOM TOOTH EXTRACTION       Current Outpatient Medications   Medication Sig Dispense Refill     atorvastatin (LIPITOR) 40 MG tablet TAKE 1 TABLET BY MOUTH AT  BEDTIME 90 tablet 3     biotin 300 mcg Tab Take 300 mg by mouth daily.       levothyroxine (SYNTHROID, LEVOTHROID) 100 MCG tablet Take 10.125 mcg by mouth daily.       loperamide (IMODIUM) 2 mg capsule Take 2 mg by mouth 4 (four) times a day as needed for diarrhea.       metoprolol succinate (TOPROL-XL) 25 MG TAKE ONE-HALF TABLET BY  MOUTH DAILY 45 tablet 2     multivitamin (ONE A DAY) per tablet Take 2 tablets by mouth.       pantoprazole (PROTONIX) 40 MG tablet TAKE 1 TABLET BY MOUTH  DAILY 90 tablet 3     SF 5000 PLUS 1.1 % Crea Apply 1 application to teeth daily.       No current facility-administered medications for this visit.        Allergies   Allergen Reactions     Penicillins Rash       Social History     Tobacco Use     Smoking status: Never Smoker     Smokeless tobacco: Never Used   Substance Use Topics     Alcohol use: Not  "Currently     Frequency: Never     Binge frequency: Never        Social History     Substance and Sexual Activity   Drug Use No        Objective     Resp 12   Ht 5' 8.75\" (1.746 m)   Wt 186 lb 4.8 oz (84.5 kg)   BMI 27.71 kg/m    Physical Exam   Constitutional: He is oriented to person, place, and time. He appears well-developed and well-nourished.   HENT:   Head: Normocephalic and atraumatic.   Mouth/Throat: Oropharynx is clear and moist.   Eyes: Conjunctivae and EOM are normal.   Neck: Normal range of motion.   Cardiovascular: Normal rate and regular rhythm.   1/6 JAMMIE heard best at RUSB   Pulmonary/Chest: Effort normal and breath sounds normal. He has no wheezes.   Musculoskeletal:         General: No edema.   Neurological: He is alert and oriented to person, place, and time. No cranial nerve deficit. Gait normal.   Reflex Scores:       Patellar reflexes are 2+ on the right side and 2+ on the left side.  Psychiatric: He has a normal mood and affect. His behavior is normal.   Nursing note and vitals reviewed.        Recent Labs   Lab Test 12/23/20  0820 12/01/20  0901 10/20/20  1056 10/20/20  1056   HGB  --  12.6*  --  11.7*   PLT  --  366  --  370    140   < > 143   K 4.4 4.2   < > 4.5   CREATININE 1.41* 1.25   < > 1.47*    < > = values in this interval not displayed.        PRE-OP Diagnostics:   Recent Results (from the past 24 hour(s))   Electrocardiogram Perform and Read    Collection Time: 01/08/21 10:19 AM   Result Value Ref Range    SYSTOLIC BLOOD PRESSURE 110 mmHg    DIASTOLIC BLOOD PRESSURE 64 mmHg    VENTRICULAR RATE 73 BPM    ATRIAL RATE 73 BPM    P-R INTERVAL 182 ms    QRS DURATION 76 ms    Q-T INTERVAL 388 ms    QTC CALCULATION (BEZET) 427 ms    P Axis 74 degrees    R AXIS 16 degrees    T AXIS 38 degrees    MUSE DIAGNOSIS       Normal sinus rhythm  Minimal voltage criteria for LVH, may be normal variant  Borderline ECG  When compared with ECG of 14-FEB-2020 08:40,  No significant change was " found  Confirmed by SHAMAR VALENTINE MD LOC:[facility] ([id number]) on 1/8/2021 11:05:26 AM     Hemoglobin    Collection Time: 01/08/21 10:58 AM   Result Value Ref Range    Hemoglobin 12.1 (L) 14.0 - 18.0 g/dL     EKG required for known coronary heart disease and not completed in the last 90 days.    REVISED CARDIAC RISK INDEX (RCRI)   The patient has the following serious cardiovascular risks for perioperative complications:   - Coronary Artery Disease (MI, positive stress test, angina, Qs on EKG) = 1 point    RCRI INTERPRETATION: 1 point: Class II (low risk - 0.9% complication rate)         Assessment & Plan      The proposed surgical procedure is considered LOW risk.    Problem List Items Addressed This Visit     Psoriasis    Relevant Medications    fluocinonide (LIDEX) 0.05 % cream    Hypothyroidism    Hypertension    Coronary artery disease involving native coronary artery of native heart without angina pectoris    Malignant neoplasm of lower lobe of left lung (H)    Relevant Orders    Hemoglobin (Completed)    CKD (chronic kidney disease) stage 3, GFR 30-59 ml/min      Other Visit Diagnoses     Preoperative examination    -  Primary    Relevant Orders    Electrocardiogram Perform and Read (Completed)    Hemoglobin (Completed)             Medication Instructions:  Patient is to take all scheduled medications on the day of surgery EXCEPT for modifications listed below:    Imodium    RECOMMENDATION:  APPROVAL GIVEN to proceed with proposed procedure, without further diagnostic evaluation.    Signed Electronically by: Dov Morales DO    Copy of this evaluation report is provided to requesting physician.    [facility] Guidelines    Revised Cardiac Risk Index

## 2021-06-14 NOTE — TELEPHONE ENCOUNTER
----- Message from Melissa Hagen CMA sent at 1/13/2021 12:48 PM CST -----  Regarding: Prednisone Taper  Call patient to check in and if doing well he can taper Prednisone down 10mg

## 2021-06-14 NOTE — PATIENT INSTRUCTIONS - HE
60mg for one week, 50mg for one week, 40mg for one week, 30mg for one week, 20mg for one week, 10mg for one week, then off

## 2021-06-14 NOTE — PROGRESS NOTES
Number of sites to be treated: 2 nodules  Area(s) simulated: (check all appropriate sites)      Thorax: Lung  Normal tissues to be spared with custom blocks: (check all that apply)      Neuro: Spinal Cord      Head & Neck: Aerodigestive Mucosa, Larynx      Thorax: Lung Parenchyma, Heart, Esophagus  For planning:      CT Scan  Custom devices to be used:      stereotactic Elekta Body Fix frame  Patient position:      Supine  Field arrangment:       SBRT  Planned dose:       5000 cGy  5 fractions    Comments:  Diagnosis:  Non-small cell lung cancer, c/w adenocarcinoma with growth lipidic growth pattern right upper lobe (2 growing lesions, one biopsied) on maintenance Keytruda, suspicious for oligoprogressive NSCLC with extensive history of prior malignancy:     Stage [id number] NSCLC, mucinous adenocarcinoma, of Left Lower Lobe s/p Left lower lobectomy 11/1/2018 and Adjuvant Chemotherapy with Cisplatin and Alimta x4c, began 12/14/18.     Recurrent (Stage IV) NSCLC, mucinous adenocarcinoma, of the Right upper lobe s/p wedge resection 2/28/2020 with 4 separate lesions noted in the resection.  Followed by carboplatin, taxol and Keytruda x 4c (6/15/20-8/18/20), maintenance Keytruda began 9/8/20.     Remote history of Stage I Right axillary Hodgkin's Lymphoma s/p Mantel field RT and splenectomy [facility] yrs ago at [facility] (supected ~40 Gy).  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    Unfortunately given such remote prior RT, no outside RT records available for review.  Will take suspected RT doses into account.    The patient was simulated in the supine position with the arms up.  A  planning 4DCT scan of the thorax was performed without IV contrast.    The procedure was well tolerated.  The ITV wasdesignated as the CTV and 5 mm was added for PTV.    A stereotactic radiotherapy plan was designed including contour,  delineation of the radiotherapy volumes  and critical normal tissues,  determination of shielding, dose distribution, digitally reconstructed  radiographs of the treatment fields and dose-volume histograms for PTV and  normal tissues. A maximum spinal cord dose was determined.     will involve daily IGRT with cone-beam CT    Both lesions will be treated simultaneously

## 2021-06-14 NOTE — TELEPHONE ENCOUNTER
Back and forth email between myself, Lady Luevano (nurse navigator) and patients wife...    Hossein VelardeLady,  I had to call the on-call doc on Saturday, for Melecio s diarrhea.  Dr Ann prescribed Lomotil, which he started on Saturday afternoon.   It has helped with the bloating feeling, etc   Dr Ann mentioned adding steroids, which I think we need to do.   He is still having the diarrhea.   Melecio is at the dentist s office this am- so I have his phone.   But maybe you could talk to Dr Soto and we could get the steroid ordered.  The diarrhea has been going on since December.   Thank you so much for all of your help.  Eliane Del Rio -   I discussed your below email with Dr Soto.  After reviewing your husbands chart, he would like him to start taking prednisone 60mg daily (this has been sent over to [facility] on [address] in [place]), in the morning with breakfast along with the Lomotil.  He should be taking the Lomotil 4 times a day.  We will be holding further Keytruda doses until the diarrhea fully resolves.  I know that we do not have him on the schedule to be seen in our office until 2/2/21 however, with the diarrhea, Dr Soto would like him to come in this week (1/13/21) for labs, see a nurse practitioner and then possibly get IV fluids based off of blood work results.  I will have our office reach out to get this scheduled.      Please let us know if you need anything further or anything prior to an appointment this week.  Please make sure that he is staying hydrated as best he can.    Thanks,    Sylvia Su  Cancer Care Triage, RN, OCN

## 2021-06-14 NOTE — PROGRESS NOTES
[facility] Hematology and Oncology Progress Note    Patient: Pardeep Wilson  MRN: [id number]  Date of Service: 01/13/2021        Reason for Visit    Chief Complaint   Patient presents with     HE Cancer     Metastatic primary lung cancer, left        Assessment and Plan  Cancer Staging  Malignant neoplasm of lower lobe of left lung (H)  Staging form: Lung, AJCC 8th Edition  - Clinical stage from 10/15/2018: Stage IIIA (cT4, cN0, cM0) - Signed by Melody Segovia CNP on 12/21/2018  ALK-, EGFR-, BRAF-,ROS1-, RET-, NTRK-, MET-  PDL1 0% expressed    1. Lung cancer, originally Stage IIIa in 2018. Recurrent disease in 2019/2020 with stage 4: had wedge resection in Feb 2020 at the [facility]. Had multiple nodules in right upper lobe, ?pleural mets?. Has started palliative chemotherapy with Carboplatin, Taxol and Keytruda. He had 4 cycles. CT overall shows stable disease in right upper lobe. 2 new tiny nodules in left upper lobe. Then started maintenance keytruda. CT shows slightly enlarged tumors. PET shows that they are hypermetabolic. His case was discussed at tumor board and it was decided to pursue radiation since he only has these 2 areas and would like to be aggressive. He has great performance status. Received 6 week dose of Keytruda in December. Will keep appointment with Dr. Soto in February to decide about next steps.     2. Renal insufficiency: likely from medications, previous cisplatin. Avoid nephrotoxic medications. Worsening likely from diarrhea and mild dehydration. he was NPO yesterday for procedure. Has started drinking much more today and feels better. Does not want fluids today.      3. 2 new small nodules: had fiducial placed yesterday and will see rad onc next wek.     4. Diarrhea: has been going on for a couple of weeks. 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 He was started on prednisone yesterday. 60mg daily. Feels like yesterday and today are already a  little better, but still not totally gone. He has less bloating. He is also taking lomotil. We will continue his current prednisone. I did tell him if his symptoms improve, we will do 60mg for one week, 50mg for one week, 40mg for one week, 30mg for one week, 20mg for one week, 10mg for one week, then off. I will have my nurse call him on Tuesdays to see how he is doing and to decide if he can go down on the taper. He will see Dr. Soto in 3 weeks.     5. Mild Low magnesium: Asymptomatic. Educated about foods high in magnesium. Will continue to monitor.     ECOG Performance   ECOG Performance Status: 1     Distress Assessment  Distress Assessment Score: No distress:    Pain  Currently in Pain: No/denies      Problem List    1. Malignant neoplasm of lower lobe of left lung (H)     2. Metastatic primary lung cancer, left (H)     3. Stage 3b chronic kidney disease     4. Colitis, acute          ______________________________________________________________________________    History of Present Illness    Measurable Disease: CT, PET    Current Treatment: Maintenance Keytruda. Started 9/8/20. 6 week dosing started in December 2020.   Carboplatin, Taxol, Keytruda, first cycle 6/15/20. Had 4 cycles.     Past Treatment:   Wedge resection of right upper lobe nodule, February 28, 2020     Cisplatin and Alimta adjuvant chemotherapy, for 4 cycles: Day 1 cycle 4, February 15, 2019  First cycle  started December 14, 2018     Patient underwent mediastinoscopy, bronchoscopy, thoracoscopic surgery and left lower lobectomy on November 1, 2018     Interim History:   Pt is here today as add on. He had worse diarrhea over the weekend. He has been having in on/off over the last month or two, but it definitely was worse this weekend. A lot of watery stool, urgency. He called over the weekend and was started on prednisone yesterday. Feels like yesterday and today are better. Less diarrhea, less watery stool, less abd bloating.   Did  have a fiducial placed yesterday and as supposed to get 2 nodules done, but had some bleeding with the first one so the second one was aborted. No bleeding since then.   Pt very concerned that he will not be able to continue on keytruda.     Pain Status  Currently in Pain: No/denies    Review of Systems    Constitutional  Constitutional (WDL): Exceptions to WDL  Fatigue: Concerns(with activity)  Neurosensory  Neurosensory (WDL): Exceptions to WDL  Peripheral Motor Neuropathy: Concerns(stable)  Peripheral Sensory Neuropathy: Concerns(stable)  Eye   Eye Disorder (WDL): All eye disorder elements are within defined limits  Ear  Ear Disorder (WDL): All ear disorder elements are within defined limits  Cardiovascular  Cardiovascular (WDL): All cardiovascular elements are within defined limits  Pulmonary  Respiratory (WDL): Within Defined Limits  Gastrointestinal  Gastrointestinal (WDL): Exceptions to WDL  Anorexia: Concerns  Diarrhea: Concerns  Genitourinary  Genitourinary (WDL): All genitourinary elements are within defined limits  Lymphatic  Lymph (WDL): All lymph disorder elements are within defined limits  Musculoskeletal and Connective Tissue  Musculoskeletal and Connetive Tissue Disorders (WDL): Exceptions to WDL  Arthralgia: Concerns(right shoulder)  Bone Pain: Concerns  Muscle Weakness : Concerns  Integumentary  Integumentary (WDL): All integumentary elements are within defined limits  Patient Coping  Patient Coping: Accepting;Open/discussion  Accompanied by  Accompanied by: Alone  Oral Chemo Adherence           Past History  Past Medical History:   Diagnosis Date     Anemia      Coronary artery disease     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     Esophageal reflux      Hodgkin's lymphoma (H)     s/p radiation to the mediastinum at age 17     Hyperlipemia      Hypertension      Hypothyroidism     hx nodules     Lung cancer (H)      MI, old 12 years ago, 2008     PHYSICAL EXAM  /73   Pulse 89   Temp  "97.5  F (36.4  C) (Oral)   Ht 5' 9\" (1.753 m)   Wt 188 lb 14.4 oz (85.7 kg)   SpO2 99%   BMI 27.90 kg/m      GENERAL: no acute distress. Cooperative in conversation. Here alone due to visitor restrictions. Mask on  RESP: Regular respiratory rate. No expiratory wheezes   MUSCULOSKELETAL: no bilateral leg swelling  NEURO: non focal. Alert and oriented x3.   PSYCH: within normal limits. No depression or anxiety.  SKIN: exposed skin is dry intact.     Lab Results    Recent Results (from the past 168 hour(s))   COVID-19 Virus PCR MRF    Specimen: Respiratory   Result Value Ref Range    COVID-19 VIRUS SPECIMEN SOURCE Nasopharyngeal     2019-nCOV       Test received-See reflex to IDDL test SARS CoV2 (COVID-19) Virus RT-PCR   SARS-CoV-2 (COVID-19)-PCR    Specimen: Respiratory   Result Value Ref Range    SARS-CoV-2 Virus Specimen Source Nasopharyngeal     SARS-CoV-2 PCR Result NEGATIVE     SARS-COV-2 PCR COMMENT       Testing was performed using the Aptima SARS-CoV-2 Assay on the [employer]   Electrocardiogram Perform and Read   Result Value Ref Range    SYSTOLIC BLOOD PRESSURE 110 mmHg    DIASTOLIC BLOOD PRESSURE 64 mmHg    VENTRICULAR RATE 73 BPM    ATRIAL RATE 73 BPM    P-R INTERVAL 182 ms    QRS DURATION 76 ms    Q-T INTERVAL 388 ms    QTC CALCULATION (BEZET) 427 ms    P Axis 74 degrees    R AXIS 16 degrees    T AXIS 38 degrees    MUSE DIAGNOSIS       Normal sinus rhythm  Minimal voltage criteria for LVH, may be normal variant  Borderline ECG  When compared with ECG of 14-FEB-2020 08:40,  No significant change was found  Confirmed by SHAMAR VALENTINE MD LOC:JN ([id number]) on 1/8/2021 11:05:26 AM     Hemoglobin   Result Value Ref Range    Hemoglobin 12.1 (L) 14.0 - 18.0 g/dL   INR   Result Value Ref Range    INR 1.01 0.90 - 1.10   Platelet count   Result Value Ref Range    Platelets 92 (L) 140 - 440 thou/uL   Comprehensive Metabolic Panel   Result Value Ref Range    Sodium 141 136 - 145 mmol/L    Potassium 4.4 3.5 - 5.0 mmol/L "    Chloride 104 98 - 107 mmol/L    CO2 23 22 - 31 mmol/L    Anion Gap, Calculation 14 5 - 18 mmol/L    Glucose 177 (H) 70 - 125 mg/dL    BUN 34 (H) 8 - 22 mg/dL    Creatinine 1.59 (H) 0.70 - 1.30 mg/dL    GFR MDRD Af Amer 53 (L) >60 mL/min/1.73m2    GFR MDRD Non Af Amer 44 (L) >60 mL/min/1.73m2    Bilirubin, Total 0.6 0.0 - 1.0 mg/dL    Calcium 9.8 8.5 - 10.5 mg/dL    Protein, Total 7.8 6.0 - 8.0 g/dL    Albumin 3.4 (L) 3.5 - 5.0 g/dL    Alkaline Phosphatase 124 (H) 45 - 120 U/L    AST 21 0 - 40 U/L    ALT 18 0 - 45 U/L   Magnesium   Result Value Ref Range    Magnesium 1.6 (L) 1.8 - 2.6 mg/dL   HM1 (CBC with Diff)   Result Value Ref Range    WBC 10.8 4.0 - 11.0 thou/uL    RBC 4.27 (L) 4.40 - 6.20 mill/uL    Hemoglobin 13.0 (L) 14.0 - 18.0 g/dL    Hematocrit 39.8 (L) 40.0 - 54.0 %    MCV 93 80 - 100 fL    MCH 30.4 27.0 - 34.0 pg    MCHC 32.7 32.0 - 36.0 g/dL    RDW 14.4 11.0 - 14.5 %    Platelets 110 (L) 140 - 440 thou/uL    MPV 10.1 8.5 - 12.5 fL    Neutrophils % 81 (H) 50 - 70 %    Lymphocytes % 14 (L) 20 - 40 %    Monocytes % 3 2 - 10 %    Eosinophils % 0 0 - 6 %    Basophils % 0 0 - 2 %    Immature Granulocyte % 1 (H) <=0 %    Neutrophils Absolute 8.8 (H) 2.0 - 7.7 thou/uL    Lymphocytes Absolute 1.5 0.8 - 4.4 thou/uL    Monocytes Absolute 0.4 0.0 - 0.9 thou/uL    Eosinophils Absolute 0.0 0.0 - 0.4 thou/uL    Basophils Absolute 0.0 0.0 - 0.2 thou/uL    Immature Granulocyte Absolute 0.1 (H) <=0.0 thou/uL       Imaging    Xr Chest 1 View Portable    Result Date: 1/12/2021  EXAM: XR CHEST 1 VIEW PORTABLE LOCATION: [facility] DATE/TIME: 1/12/2021 10:57 AM INDICATION: Post lung biopsy and fiducial placement. COMPARISON: Same-day biopsy CT.     Interval medial right upper lobe fiducial marker placement. No pneumothorax. No other significant changes.     Ct Lung Biopsy    Result Date: 1/12/2021  EXAM: 1. PERCUTANEOUS BIOPSY ANTERIOR RIGHT UPPER LOBE NODULE. 2. CT GUIDANCE 3. CONSCIOUS SEDATION  LOCATION: [facility] DATE/TIME: 1/12/2021 10:03 AM INDICATION: Lung cancer, recurrent with 2 growing nodules, biopsy with fiducial placement prior to SBRT if possible. TECHNIQUE: Dose reduction techniques were used. PROCEDURE: Informed consent obtained. Site marked. Prior images reviewed. Required items made available. Patient identity confirmed verbally and with arm band. Patient reevaluated immediately before administering sedation. Universal protocol was followed. Time out performed. The site was prepped and draped in sterile fashion. 5 mL of 1% lidocaine was infused into the local soft tissues. Using standard technique and under direct CT guidance, a 20-gauge biopsy device was used to obtain two core biopsies. Tissue was submitted to Pathology. The patient tolerated the procedure. Mild postprocedural intraparenchymal blood products and coughing. RADIOLOGIC SUPERVISION AND INTERPRETATION: CT GUIDANCE: Images demonstrate the biopsy needle to be in good position. SEDATION: Versed 1.5 mg. Fentanyl 75 mcg. The procedure was performed with administration intravenous conscious sedation with appropriate preoperative, intraoperative, and postoperative evaluation. 28 minutes of supervised face to face conscious sedation time was provided by a radiology nurse under my direct supervision.     1.  Successful CT-guided biopsy anterior right upper lobe lung nodule. 2.  Mild postprocedural blood products after the first biopsy and patient had episodes of coughing. Secondary to this, the second more posterior nodule was not able to be safely biopsied at this time. Reference CPT Codes: 38866, 78113, 35302, 61014     Nm Pet Ct Skull To Mid Thigh    Result Date: 12/22/2020  EXAM: NM PET CT SKULL TO MID THIGH LOCATION: [facility] DATE/TIME: 12/22/2020 9:47 AM INDICATION: Subsequent treatment planning and restaging for malignant neoplasm of lower lobe, left bronchus or lung. 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 COMPARISON: FDG PET/CT dated 06/01/2020 and CT of the thorax dated 11/27/2020. TECHNIQUE: Serum glucose level 87 mg/dL. One hour post intravenous administration of 9.8 mCi F-18 FDG, PET imaging was performed from the skull base to the mid thighs utilizing attenuation correction with concurrent axial CT and PET/CT image fusion. Dose  reduction techniques were used. FINDINGS: Continued interval increase in size of the connie-fissural nodule along the lateral aspect the right upper lobe measuring 1.3 x 0.9 cm (max SUV 1.9, previously measured 0.9 x 0.7 cm with a max SUV of 1.6) and development of mildly FDG avid nodule  along the medial aspect of the right upper lobe wedge resection margin measuring 0.9 x 0.8 cm (max SUV 1.7, previously measured 0.6 x 0.5 cm on 06/01/2020) suggesting progression of disease. Redemonstrated mildly FDG avid small pleural effusion/pleural thickening (max SUV 3.7, previously 3.7). Mild senescent intracranial changes. Postoperative change of the bilateral lenses. Mild carotid artery bifurcation calcification. Severe coronary artery calcium/stenting. Biapical scarring. Right upper lobectomy. Left lower lobectomy. Right renal cyst. Splenectomy. Pelvic phleboliths. Multilevel degenerative changes of the spine.     1. Mild progression of disease with continued increase in size of the two right upper lobe nodule. 2. Persistent FDG avid small left pleural effusion/pleural thickening.    Ct Fiducial Placement Lung    Result Date: 1/12/2021  EXAM: 1. PERCUTANEOUS FIDUCIAL MARKER PLACEMENT ANTERIOR RIGHT UPPER LOBE NODULE 2. CT GUIDANCE 3. CONSCIOUS SEDATION LOCATION: [facility] DATE/TIME: 1/12/2021 10:05 AM INDICATION: Enlarging right upper lobe  nodules. TECHNIQUE: Dose reduction techniques were used. PROCEDURE: Informed consent obtained. Time out performed. The site was prepped and draped in sterile fashion. 5 mL of 1% lidocaine was infused into the local soft tissues. Using standard technique and under direct CT guidance, a 20 gauge needle was placed in the localized lesion. 1 fiducial marker placed. The patient tolerated the procedure. Mild postprocedural intraparenchymal blood products and coughing. RADIOLOGIC SUPERVISION AND INTERPRETATION: CT GUIDANCE: Images demonstrate the localizing needle to be in good position. Fiducial markers in good position. SEDATION: Versed 1.5 mg. Fentanyl 75 mcg. The procedure was performed with administration intravenous conscious sedation with appropriate preoperative, intraoperative, and postoperative evaluation. 28 minutes of supervised face to face conscious sedation time was provided by a radiology nurse under my direct supervision.     1.  Successful CT-guided fiducial marker placement into anterior right upper lobe nodule. 2.  Secondary to mild postprocedural blood products from biopsy of the first nodule and patient coughing, biopsy and fiducial marking of the second nodule was not able to be safely performed at this time. Reference CPT Codes: 35193, 79322, 24463, 33225        Signed by: Melody Segovia, CNP

## 2021-06-15 NOTE — PATIENT INSTRUCTIONS - HE
Pardeep you had your second dose of IVIG 70 grams-- please call with any questions or concerns. Otherwise, we will see you on Monday 2/8/2021 after your radiation for labs and possible transfusion .    1st floor OP Infusion at [facility] -- [phone], option 2 for a nurse.    Patient Education     Immune Globulin Solution for injection  What is this medicine?  IMMUNE GLOBULIN (im MUNE GLOB foster hilario) helps to prevent or reduce the severity of certain infections in patients who are at risk. This medicine is collected from the pooled blood of many donors. It is used to treat immune system problems, thrombocytopenia, and Kawasaki syndrome.  This medicine may be used for other purposes; ask your health care provider or pharmacist if you have questions.  What should I tell my health care provider before I take this medicine?  They need to know if you have any of these conditions:    diabetes    extremely low or no immune antibodies in the blood    heart disease    history of blood clots    hyperprolinemia    infection in the blood, sepsis    kidney disease    taking medicine that may change kidney function - ask your health care provider about your medicine    an unusual or allergic reaction to human immune globulin, albumin, maltose, sucrose, polysorbate 80, other medicines, foods, dyes, or preservatives    pregnant or trying to get pregnant    breast-feeding  How should I use this medicine?  This medicine is for injection into a muscle or infusion into a vein or skin. It is usually given by a health care professional in a hospital or clinic setting.  In rare cases, some brands of this medicine might be given at home. You will be taught how to give this medicine. Use exactly as directed. Take your medicine at regular intervals. Do not take your medicine more often than directed.  Talk to your pediatrician regarding the use of this medicine in children. Special care may be needed.  Overdosage: If you think you  have taken too much of this medicine contact a poison control center or emergency room at once.  NOTE: This medicine is only for you. Do not share this medicine with others.  What if I miss a dose?  It is important not to miss your dose. Call your doctor or health care professional if you are unable to keep an appointment. If you give yourself the medicine and you miss a dose, take it as soon as you can. If it is almost time for your next dose, take only that dose. Do not take double or extra doses.  What may interact with this medicine?    aspirin and aspirin-like medicines    cisplatin    cyclosporine    medicines for infection like acyclovir, adefovir, amphotericin B, bacitracin, cidofovir, foscarnet, ganciclovir, gentamicin, pentamidine, vancomycin    NSAIDS, medicines for pain and inflammation, like ibuprofen or naproxen    pamidronate    vaccines    zoledronic acid  This list may not describe all possible interactions. Give your health care provider a list of all the medicines, herbs, non-prescription drugs, or dietary supplements you use. Also tell them if you smoke, drink alcohol, or use illegal drugs. Some items may interact with your medicine.  What should I watch for while using this medicine?  Your condition will be monitored carefully while you are receiving this medicine.  This medicine is made from pooled blood donations of many different people. It may be possible to pass an infection in this medicine. However, the donors are screened for infections and all products are tested for HIV and hepatitis. The medicine is treated to kill most or all bacteria and viruses. Talk to your doctor about the risks and benefits of this medicine.  Do not have vaccinations for at least 14 days before, or until at least 3 months after receiving this medicine.  What side effects may I notice from receiving this medicine?  Side effects that you should report to your doctor or health care professional as soon as  possible:    allergic reactions like skin rash, itching or hives, swelling of the face, lips, or tongue    breathing problems    chest pain or tightness    fever, chills    headache with nausea, vomiting    neck pain or difficulty moving neck    pain when moving eyes    pain, swelling, warmth in the leg    problems with balance, talking, walking    sudden weight gain    swelling of the ankles, feet, hands    trouble passing urine or change in the amount of urine  Side effects that usually do not require medical attention (report to your doctor or health care professional if they continue or are bothersome):    dizzy, drowsy    flushing    increased sweating    leg cramps    muscle aches and pains    pain at site where injected  This list may not describe all possible side effects. Call your doctor for medical advice about side effects. You may report side effects to FDA at 8-538-FDA-0793.  Where should I keep my medicine?  Keep out of the reach of children.  This drug is usually given in a hospital or clinic and will not be stored at home.  In rare cases, some brands of this medicine may be given at home. If you are using this medicine at home, you will be instructed on how to store this medicine. Throw away any unused medicine after the expiration date on the label.  NOTE: This sheet is a summary. It may not cover all possible information. If you have questions about this medicine, talk to your doctor, pharmacist, or health care provider.  NOTE:This sheet is a summary. It may not cover all possible information. If you have questions about this medicine, talk to your doctor, pharmacist, or health care provider. Copyright  2015 Gold Standard

## 2021-06-15 NOTE — TELEPHONE ENCOUNTER
"Patient wife calls this morning to report that patient diarrhea continues, yesterday he had moderate amount of blood with the diarrhea twice, she is concerned about his PLT count, she also mentions he is lethargic, quiet, is not eating or drinking and is just \"not himself\".  Symptoms reviewed with Melody Segovia who advised he come to clinic for HM1 lab draw and fluids.  Arrangements made with infusion nurses for today, pt wife aware to come at 10:30,  Briana notified to add to schedule.    "

## 2021-06-15 NOTE — PROGRESS NOTES
Patient is here for labs, provider and treatment for Malignant neoplasm of lower lobe of left lung.

## 2021-06-15 NOTE — PROGRESS NOTES
Pardeep came to clinic this morning after calling in with complaints of continued diarrhea, blood in his stool and dizziness for lab and IV hydration.  VSS but he is orthrostatic. Picc team used for ultrasound insertion of IV and lab was drawn off this line.  IV hydration started. Pt was assessed.  He has been having bright red blood for the past 3 days with each BM.  He has had 5 BM's int he past 24 hours which have been a mix of diarrhea and normal stool.  He has been taking lomotil 2 pills 4 times a day for the diarrhea.  Lab results noted and his platelet count is 6.  Platelet transfusion ordered.  Pt was also instructed (per Melody Segovia CNP instructions) to start prednisone 40mg po from now until Monday when this will be re-evaluated.  His first dose of this was given while in clinic today. He received his platelet transfusion and tolerated it well without any sign of reaction.  IV was flushed with ns then removed and site covered.  Pardeep left clinic ambulatory.

## 2021-06-15 NOTE — PROGRESS NOTES
[facility] Hematology and Oncology Progress Note    Patient: Pardeep Wilson  MRN: [id number]  Date of Service: 03/01/2021        Reason for Visit    Chief Complaint   Patient presents with     HE Cancer     Metastatic primary lung cancer, left       Assessment and Plan    Recurrent and metastatic mucinous lung adenocarcinoma, status post wedge resection, February 28, 2020  T4 N0 M0, stage III a mucinous left lung adenocarcinoma status post left lower lobe resection on November 1, 2018  Tumor 9 cm with 3.5 cm nodule, grade 2 out of 4 mucinous adenocarcinoma  Tumor is PDL 1-, no EGFR mutation.  No rearrangement of ALK, evaluation on the Alchemist trial  Tumor negative for ALK, ROS 1, RET, NTRK1, MET e14, EGFR MUTATIONS, April 2020  Remote history of stage I a right axillary Hodgkin's disease status post mantle field radiation and splenectomy about 45 years ago  Right parotid cancer with lymph node involvement status post surgery and adjuvant radiation for 6 weeks in 1991  Coronary artery disease  Elevated BUN and creatinine  New right lung pulmonary nodules   Left pleural effusion/enhancement, 4/2020  Thrombocytopenia  Diarrhea related to Keytruda    Platelet count is up to 38,000.  We will try to minimize treatment as he may be responding to combination of discontinuation of Keytruda and treatment with Nplate and Rituxan.  We will give him Nplate only today.    He will return q. Monday for check of labs for the next 4 weeks.  If counts are greater than 50,000 we will hold all treatment.  Between 30 and 50,000 we will continue with Nplate only.  We will do additional Rituxan only if the count drops below 30,000.    Reviewed that with Rituxan he may not mount adequate antibody response and may need to delay taking the Covid vaccine.    We will use Compazine for mild nausea symptoms.  He will continue with Imodium and Lomotil for diarrhea which may be related to combination of Nplate and Rituxan.    He has follow-up  down the line regarding the lung cancer as well.    Plan: As above  Check labs weekly and adjust Nplate and Rituxan as above  Follow-up visit in 4 weeks      Measurable disease: CT of the chest      Current therapy: Observation      Treatment history:     Nplate started February 15, 2021    Rituxan x2 doses on February 17 and February 22, 2021      IVIG 1 g/kg's x2 doses on February 10 and February 12, 2021    Keytruda maintenance every 3 weeks,First dose September 8, 2020  Last dose December 23, 2020, 400 mg       Carboplatin, Taxol and Keytruda, 4 cycles, last August 18, 2020  Treatment started Collette 15, 2020    Wedge resection of right upper lobe nodule, February 28, 2020    Cisplatin and Alimta adjuvant chemotherapy, for 4 cycles: Day 1 cycle 4, February 15, 2019  First cycle  started December 14, 2018    Patient underwent mediastinoscopy, bronchoscopy, thoracoscopic surgery and left lower lobectomy on November 1, 2018      Cancer Staging  Malignant neoplasm of lower lobe of left lung (H)  Staging form: Lung, AJCC 8th Edition  - Clinical stage from 10/15/2018: Stage IIIA (cT4, cN0, cM0) - Signed by Melody Segovia, [facility] on 12/21/2018      ECOG Performance   ECOG Performance Status: 1    Distress Assessment  Distress Assessment Score: No distress    Pain           Problem List    1. Idiopathic thrombocytopenic purpura (H)  CC OFFICE VISIT LONG    Infusion Appointment    Comprehensive Metabolic Panel    Infusion Appointment    Infusion Appointment    Comprehensive Metabolic Panel   2. Malignant neoplasm of upper lobe of right lung (H)  prochlorperazine (COMPAZINE) 10 MG tablet        CC: Dov Morales,     ______________________________________________________________________________    History of Present Illness    Mr. Pardeep Wilson is seen for follow-up.  He has been on treatment with Nplate and Rituxan and also received 2 doses of IVIG for ITP over the last 3 weeks.  Relates some mild  "nausea.  Restart of diarrhea since Wednesday, 2 times a day controlled with Lomotil.  Slight dizziness which is not new.  No bleeding symptoms.  ECOG status is 0.  Did complete stereotactic radiation therapy to the lung.  Pain Status  Currently in Pain: No/denies    Review of Systems    Constitutional  Constitutional (WDL): Exceptions to WDL  Fatigue: Fatigue not relieved by rest - Limiting instrumental ADL  Neurosensory  Neurosensory (WDL): Exceptions to WDL  Peripheral Sensory Neuropathy: Asymptomatic, loss of deep tendon reflexes or paresthesia  Cardiovascular  Cardiovascular (WDL): All cardiovascular elements are within defined limits  Pulmonary  Respiratory (WDL): Within Defined Limits  Gastrointestinal  Gastrointestinal (WDL): Exceptions to WDL  Anorexia: Loss of appetite without alteration in eating habits(\"I eat but nothing is appetizing.\")  Diarrhea: Increase of <4 stools per day over baseline, mild increase in ostomy output compared to baseline  Nausea: Loss of appetite without alteration in eating habits  Dry Mouth: Symptomatic (e.g., dry or thick saliva) without significant dietary alteration, unstimulated saliva flow >0.2 ml/min  Genitourinary  Genitourinary (WDL): All genitourinary elements are within defined limits  Integumentary  Integumentary (WDL): All integumentary elements are within defined limits  Patient Coping  Patient Coping: Accepting  Distress Assessment  Distress Assessment Score: No distress  Accompanied by  Accompanied by: Alone    Past History  Past Medical History:   Diagnosis Date     Anemia      Coronary artery disease     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     Esophageal reflux      Hodgkin's lymphoma (H)     s/p radiation to the mediastinum at age 17     Hyperlipemia      Hypertension      Hypothyroidism     hx nodules     Lung cancer (H)      MI, old 12 years ago, 2008         Past Surgical History:   Procedure Laterality Date     CORONARY ANGIOPLASTY      Stent " Placement     CT BIOPSY LUNG       CT BIOPSY LUNG  1/12/2021     EYE SURGERY Bilateral     Cataracts     left lower lobe lobectomy   11/01/2018     MEDIASTINOSCOPY N/A 11/1/2018    Procedure: MEDIASTINOSCOPY;  Surgeon: Bry Saunders MD;  Location: [facility];  Service:      PAROTIDECTOMY       WI LAP,DIAGNOSTIC ABDOMEN N/A 11/7/2017    Procedure: DIAGNOSTIC LAPAROSCOPY,  LYSIS OF ADHESIONS, SMALL BOWEL RESECTION;  Surgeon: Brian Granados MD;  Location: [facility];  Service: General     SPLENECTOMY, TOTAL  1973     wedge resection Right 02/28/2020     WISDOM TOOTH EXTRACTION         Physical Exam    Recent Vitals 3/1/2021   Weight 189 lbs 5 oz   BSA (m2) 2.04 m2   /72   Pulse 84   Temp 98.8   Temp src 1   SpO2 100   Some recent data might be hidden         GENERAL: Alert and oriented. Seated comfortably. In no distress.     HEAD: Atraumatic and normocephalic.  Alopecia     EYES: SOPHIE, EOMI.  No pallor.  No icterus.     Oral cavity: no mucosal lesion or tonsillar enlargement.     NECK: supple. JVP normal.  No thyroid enlargement.     LYMPH NODES: No palpable, cervical, axillary or inguinal lymphadenopathy.     CHEST: clear to auscultation bilaterally.  Resonant to percussion throughout bilaterally.  Symmetrical breath movements bilaterally.     CVS: S1 and S2 are heard. Regular rate and rhythm.  No murmur or gallop or rub heard.     ABDOMEN: Soft. Not tender. Not distended.  No palpable hepatomegaly or splenomegaly.  No other mass palpable.  Bowel sounds heard.     EXTREMITIES: Warm.  No peripheral edema.     SKIN: no rash, or bruising or purpura.    CNS: Nonfocal          Lab Results    Recent Results (from the past 168 hour(s))   HM1 (CBC with Diff)   Result Value Ref Range    WBC 8.1 4.0 - 11.0 thou/uL    RBC 3.98 (L) 4.40 - 6.20 mill/uL    Hemoglobin 11.9 (L) 14.0 - 18.0 g/dL    Hematocrit 37.0 (L) 40.0 - 54.0 %    MCV 93 80 - 100 fL    MCH 29.9 27.0 - 34.0 pg    MCHC 32.2 32.0 -  36.0 g/dL    RDW 19.3 (H) 11.0 - 14.5 %    Platelets 20 (LL) 140 - 440 thou/uL    MPV     Manual Differential   Result Value Ref Range    Total Neutrophils % 31 (L) 50 - 70 %    Lymphocytes % 45 (H) 20 - 40 %    Monocytes % 23 (H) 2 - 10 %    Eosinophils %  0 0 - 6 %    Basophils % 1 0 - 2 %    Immature Granulocyte % - Manual 0 <=0 %    Total Neutrophils Absolute 2.5 2.0 - 7.7 thou/ul    Lymphocytes Absolute 3.6 0.8 - 4.4 thou/uL    Monocytes Absolute 1.9 (H) 0.0 - 0.9 thou/uL    Eosinophils Absolute 0.0 0.0 - 0.4 thou/uL    Basophils Absolute 0.1 0.0 - 0.2 thou/uL    Immature Granulocyte Absolute - Manual 0.0 <=0.0 thou/uL    Platelet Estimate Decreased (!) Normal    Target Cells 1+ (!) Negative    Tear Drop Cells 1+ (!) Negative    Schistocytes 1+ (!) Negative    Eastman-Jolly Bodies 1+ (!) Negative   HM1 (CBC with Diff)   Result Value Ref Range    WBC 9.2 4.0 - 11.0 thou/uL    RBC 3.92 (L) 4.40 - 6.20 mill/uL    Hemoglobin 11.6 (L) 14.0 - 18.0 g/dL    Hematocrit 36.3 (L) 40.0 - 54.0 %    MCV 93 80 - 100 fL    MCH 29.6 27.0 - 34.0 pg    MCHC 32.0 32.0 - 36.0 g/dL    RDW 19.5 (H) 11.0 - 14.5 %    Platelets 17 (LL) 140 - 440 thou/uL    MPV      Neutrophils % 36 (L) 50 - 70 %    Lymphocytes % 39 20 - 40 %    Monocytes % 19 (H) 2 - 10 %    Eosinophils % 2 0 - 6 %    Basophils % 1 0 - 2 %    Immature Granulocyte % 3 (H) <=0 %    Neutrophils Absolute 3.4 2.0 - 7.7 thou/uL    Lymphocytes Absolute 3.6 0.8 - 4.4 thou/uL    Monocytes Absolute 1.7 (H) 0.0 - 0.9 thou/uL    Eosinophils Absolute 0.1 0.0 - 0.4 thou/uL    Basophils Absolute 0.1 0.0 - 0.2 thou/uL    Immature Granulocyte Absolute 0.3 (H) <=0.0 thou/uL   HM1 (CBC with Diff)   Result Value Ref Range    WBC 11.4 (H) 4.0 - 11.0 thou/uL    RBC 4.07 (L) 4.40 - 6.20 mill/uL    Hemoglobin 12.0 (L) 14.0 - 18.0 g/dL    Hematocrit 37.4 (L) 40.0 - 54.0 %    MCV 92 80 - 100 fL    MCH 29.5 27.0 - 34.0 pg    MCHC 32.1 32.0 - 36.0 g/dL    RDW 18.7 (H) 11.0 - 14.5 %    Platelets 38  (LL) 140 - 440 thou/uL    MPV      Neutrophils % 40 (L) 50 - 70 %    Lymphocytes % 38 20 - 40 %    Monocytes % 18 (H) 2 - 10 %    Eosinophils % 2 0 - 6 %    Basophils % 1 0 - 2 %    Immature Granulocyte % 1 (H) <=0 %    Neutrophils Absolute 4.6 2.0 - 7.7 thou/uL    Lymphocytes Absolute 4.4 0.8 - 4.4 thou/uL    Monocytes Absolute 2.0 (H) 0.0 - 0.9 thou/uL    Eosinophils Absolute 0.2 0.0 - 0.4 thou/uL    Basophils Absolute 0.1 0.0 - 0.2 thou/uL    Immature Granulocyte Absolute 0.1 (H) <=0.0 thou/uL       Imaging    No results found.      Signed by: Kevin Soto MD

## 2021-06-15 NOTE — TELEPHONE ENCOUNTER
I called Melecio to check in with him and he was getting an infusion at the time of my call. He said his radiation course went very well but he is now dealing with a new issue of low plts. He's getting an infusion of Rituxan today.  I told Melecio I was sorry to hear about this unexpected diagnosis and I hope he will respond well to the Rituxan. He was appreciative of the check in. I will continue to follow and I welcomed calls as well.

## 2021-06-15 NOTE — PROGRESS NOTES
Pardeep came to chemo infusion following lab and NP visit for a platelet transfusion as well as an injection of Nplate.  IV placed with ultrasound use by [facility] team. He has been consented. Platelets infused as ordered and he tolerated them well without evidence of reaction. IV was flushed with ns then removed and site covered.  He received Nplate as ordered subcutaneous into his right upper arm.  He tolerated this well and site was covered with a bandaid.  Pardeep left clinic ambulatory.

## 2021-06-15 NOTE — PROGRESS NOTES
Patient received 2 doses of IVIG on February 3 and 5 with platelet count up to 50,000 by February 8.  Platelet count has dropped to 15,000 today.  No bleeding symptoms.  We will have patient return Monday for labs and with plan to start Nplate.  Orders have been placed for this.  If his platelet count is less than 10,000 or if he has any new bleeding or bruising he will need platelet transfusion as well.  He will need to have his labs checked on Thursday of next week again with plans for possible transfusion.  He will return weekly for Nplate injection.  Labs to be checked twice a week and he should keep follow-up appointment March 8 with me.

## 2021-06-15 NOTE — PROGRESS NOTES
Pt ambulates to infusion center for labs and injection.  Lab results noted et reviewed w/Dr. Soto.  Nplate injection given as ordered.  Pt left clinic stable to [facility].  Plan RTC 2/24/2021 for Rituxan infusion.

## 2021-06-15 NOTE — PROGRESS NOTES
PT here for nplate injections [place] [address]. Dr Soto held rituxan as blood counts are recovering and will have pt come weekly for labs and possible txt . Nplate reviewed with pt and administered with bandaid to site. Follow up reviewed and pt dc'd steady gait.

## 2021-06-15 NOTE — PROGRESS NOTES
Pt here for labs and possible treatment. Labs noted and reviewed with Dr Soto and no treatment or transfusion today. Pt aware and will return next week for labs and possible treatment. Pt does still note diarrhea and using lomotil and BRAT diet. Dr Soto aware of that as well and next week cmp will be drawn.

## 2021-06-15 NOTE — PROGRESS NOTES
SBRT/SRS Treatment Summary    Patient: Pardeep Wilson   MRN: [id number]  : 1955  Care Provider: Chelsea Grant    Date of Service: 2021      HISTORY: Pardeep Wilson was treated with stereotactic radiosurgery    Diagnosis:  Non-small cell lung cancer, c/w adenocarcinoma with growth lipidic growth pattern right upper lobe (2 growing lesions, one biopsied) on maintenance Keytruda, suspicious for oligoprogressive NSCLC with extensive history of prior malignancy:     Stage T4N0M0 NSCLC, mucinous adenocarcinoma, of Left Lower Lobe s/p Left lower lobectomy 2018 and Adjuvant Chemotherapy with Cisplatin and Alimta x4c, began 18.     Recurrent (Stage IV) NSCLC, mucinous adenocarcinoma, of the Right upper lobe s/p wedge resection 2020 with 4 separate lesions noted in the resection.  Followed by carboplatin, taxol and Keytruda x 4c (6/15/20-20), maintenance Keytruda began 20.     Remote history of Stage I Right axillary Hodgkin's Lymphoma s/p Mantel field RT and splenectomy 45 yrs ago at [facility] (supected ~40 Gy).  He also had Right parotid cancer with lymph node involvement s/p surgery and adjuvant RT (6 weeks/30 fractions) in .  Unfortunately given such remote prior RT, no outside RT records available for review.        SITE TREATED: Right lung x2 nodules  TOTAL DOSE: 5000 cGy  NUMBER OF FRACTION: 5 fractions  DATES COMPLETED: 2021-2021    Pardeep tolerated the treatment without unexpected sides effects.      PLAN: Discharge instructions were given and Pardeep knows to call if questions/issues arise. Pardeep will be seen in f/u in 3 month with a scan.  CT of the chest planned.      Signed by: Chelsea Grant MD

## 2021-06-15 NOTE — PROGRESS NOTES
[facility] Hematology and Oncology Progress Note    Patient: Pardeep Wilson  MRN: [id number]  Date of Service: 02/15/2021        Reason for Visit    1. Acute ITP  2. Metastatic lung cancer    Assessment and Plan    1. Acute ITP  Moderate thrombocytopenia since early January (90K) with progressive severe drop to 12K on 2/2. Remainder of CBC normal. Favored as ITP. He got 1 unit platelets on 2/2 and received IVIG x 2 days 2/3 and 2/5. Platelets following this on 2/8 were 50K.    Recheck platelets 2/12 were back down to 15K and today 3K. No bleeding symptoms.     Dr. Soto would like him to start Nplate today, then weekly (titrating dose according to response each week).  Pending response later in week, Dr. Soto may consider adding Rituxan to next week's treatment.    Will get platelet transfusion today. Will have him return Wed and Fri this week for labs +/- platelets transfusions if needed. Once we get more stable response on treatment, we can go to Mon and Thursday labs/transfusions in coming weeks.     Follow-up with Dr. Soto in 2 weeks ahead of 3rd dose of Nplate.    2. Recurrent and metastatic mucinous lung adenocarcinoma, status post wedge resection, February 28, 2020  T4 N0 M0, stage III a mucinous left lung adenocarcinoma status post left lower lobe resection on November 1, 2018  Tumor 9 cm with 3.5 cm nodule, grade 2 out of 4 mucinous adenocarcinoma  Tumor is PDL 1-, no EGFR mutation.  No rearrangement of ALK, evaluation on the Alchemist trial  Tumor negative for ALK, ROS 1, RET, NTRK1, MET e14, EGFR MUTATIONS, April 2020  Completed chemo, then maintenance Keytruda x 6 cycles through December, 2020. This has been on hold for immunotherapy colitis/diarrhea requiring steroids with taper - tapered off completely early last week. He has recently completed SBRT to the right lung nodules.    Holding further systemic treatment for now.     Planning new reimaging in 2-3 months.    3.  Remote history of  stage I a right axillary Hodgkin's disease status post mantle field radiation and splenectomy about 45 years ago    4.  Right parotid cancer with lymph node involvement status post surgery and adjuvant radiation for 6 weeks in 1991    5.  History renal insufficiency          Oncologic History    Measurable disease: CT of the chest    Current therapy: Observation    Treatment history:     SBRT to right upper lobe lung masses (5000 cGy/5), completed 2/10/2020    Keytruda maintenance every 3 weeks,First dose September 8, 2020  Last dose December 23, 2020, 400 mg. Complicated by immunotherapy colitis requiring steroid with 6 week taper     Carboplatin, Taxol and Keytruda, 4 cycles, last August 18, 2020  Treatment started Collette 15, 2020    Wedge resection of right upper lobe nodule, February 28, 2020    Cisplatin and Alimta adjuvant chemotherapy, for 4 cycles: Day 1 cycle 4, February 15, 2019  First cycle  started December 14, 2018    Patient underwent mediastinoscopy, bronchoscopy, thoracoscopic surgery and left lower lobectomy on November 1, 2018      ECOG Performance   ECOG Performance Status: 1    Distress Assessment  Distress Assessment Score: No distress    Pain  0        CC: Dov Morales,     ______________________________________________________________________________    History of Present Illness    Diarrhea related to Keytruda, completed steroid taper early last week. BMs have been regular.    Has had progressive thrombocytopenia over the past 6 weeks, with acute worsening a few weeks ago. Initially, briefly responded to IVIG x 2 doses; but, then platelets dropped to 15K again late last week. No bleeding, so did not get transfusion.    Returns today to initiate Nplate and transfusion, if needed.    He's had no bleeding/bruising.   Mild dull headache for a couple of weeks, no acute changes nor new neuro symptoms.    Pain Status  Currently in Pain: No/denies    Review of  Systems    Constitutional  Constitutional (WDL): Exceptions to WDL  Fatigue: Fatigue not relieved by rest - Limiting instrumental ADL  Neurosensory  Peripheral Sensory Neuropathy: Asymptomatic, loss of deep tendon reflexes or paresthesia  Cardiovascular     Pulmonary  Respiratory (WDL): Within Defined Limits  Gastrointestinal  Gastrointestinal (WDL): Exceptions to WDL  Nausea: Loss of appetite without alteration in eating habits  Genitourinary  Genitourinary (WDL): All genitourinary elements are within defined limits  Integumentary  Integumentary (WDL): All integumentary elements are within defined limits  Patient Coping  Patient Coping: Accepting  Distress Assessment  Distress Assessment Score: No distress  Accompanied by  Accompanied by: Alone    Past History  Past Medical History:   Diagnosis Date     Anemia      Coronary artery disease     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     Esophageal reflux      Hodgkin's lymphoma (H)     s/p radiation to the mediastinum at age 17     Hyperlipemia      Hypertension      Hypothyroidism     hx nodules     Lung cancer (H)      MI, old 12 years ago, 2008         Past Surgical History:   Procedure Laterality Date     CORONARY ANGIOPLASTY      Stent Placement     CT BIOPSY LUNG       CT BIOPSY LUNG  1/12/2021     EYE SURGERY Bilateral     Cataracts     left lower lobe lobectomy   11/01/2018     MEDIASTINOSCOPY N/A 11/1/2018    Procedure: MEDIASTINOSCOPY;  Surgeon: Bry Saunders MD;  Location: [facility];  Service:      PAROTIDECTOMY       ME LAP,DIAGNOSTIC ABDOMEN N/A 11/7/2017    Procedure: DIAGNOSTIC LAPAROSCOPY,  LYSIS OF ADHESIONS, SMALL BOWEL RESECTION;  Surgeon: Brian Granados MD;  Location: [facility];  Service: General     SPLENECTOMY, TOTAL  1973     wedge resection Right 02/28/2020     WISDOM TOOTH EXTRACTION         Physical Exam    Recent Vitals 2/15/2021   Weight 186 lbs 11 oz   BSA (m2) 2.03 m2   /63   Pulse 84   Temp 98.1    Temp src 1   SpO2 100   Some recent data might be hidden         GENERAL: Alert and oriented. Seated comfortably. In no distress. Alone.     HEAD: Atraumatic and normocephalic.     EYES: SOPHIE, EOMI.  No pallor.  No icterus.     Oral cavity: no mucosal lesion or tonsillar enlargement.        CHEST: clear to auscultation bilaterally.  Resonant to percussion throughout bilaterally.  Symmetrical breath movements bilaterally.     CVS: S1 and S2 are heard. Regular rate and rhythm.  No murmur or gallop or rub heard.     ABDOMEN: Soft. Not tender. Not distended.  No palpable hepatomegaly or splenomegaly.  No other mass palpable.  Bowel sounds heard.     EXTREMITIES: Warm.  No peripheral edema.     SKIN: no rash, or bruising or purpura.    CNS: Nonfocal      Lab Results    Recent Results (from the past 168 hour(s))   HM1 (CBC with Diff)   Result Value Ref Range    WBC 5.8 4.0 - 11.0 thou/uL    RBC 4.30 (L) 4.40 - 6.20 mill/uL    Hemoglobin 12.8 (L) 14.0 - 18.0 g/dL    Hematocrit 38.5 (L) 40.0 - 54.0 %    MCV 90 80 - 100 fL    MCH 29.8 27.0 - 34.0 pg    MCHC 33.2 32.0 - 36.0 g/dL    RDW 18.8 (H) 11.0 - 14.5 %    Platelets 15 (LL) 140 - 440 thou/uL    MPV      Neutrophils % 52 50 - 70 %    Lymphocytes % 37 20 - 40 %    Monocytes % 8 2 - 10 %    Eosinophils % 2 0 - 6 %    Basophils % 1 0 - 2 %    Immature Granulocyte % 1 (H) <=0 %    Neutrophils Absolute 3.1 2.0 - 7.7 thou/uL    Lymphocytes Absolute 2.1 0.8 - 4.4 thou/uL    Monocytes Absolute 0.5 0.0 - 0.9 thou/uL    Eosinophils Absolute 0.1 0.0 - 0.4 thou/uL    Basophils Absolute 0.1 0.0 - 0.2 thou/uL    Immature Granulocyte Absolute 0.0 <=0.0 thou/uL   HM1 (CBC with Diff)   Result Value Ref Range    WBC 6.0 4.0 - 11.0 thou/uL    RBC 4.43 4.40 - 6.20 mill/uL    Hemoglobin 13.2 (L) 14.0 - 18.0 g/dL    Hematocrit 41.4 40.0 - 54.0 %    MCV 94 80 - 100 fL    MCH 29.8 27.0 - 34.0 pg    MCHC 31.9 (L) 32.0 - 36.0 g/dL    RDW 19.0 (H) 11.0 - 14.5 %    Platelets 3 (LL) 140 - 440  thou/uL    MPV      Neutrophils % 45 (L) 50 - 70 %    Lymphocytes % 41 (H) 20 - 40 %    Monocytes % 11 (H) 2 - 10 %    Eosinophils % 2 0 - 6 %    Basophils % 1 0 - 2 %    Immature Granulocyte % 1 (H) <=0 %    Neutrophils Absolute 2.7 2.0 - 7.7 thou/uL    Lymphocytes Absolute 2.5 0.8 - 4.4 thou/uL    Monocytes Absolute 0.7 0.0 - 0.9 thou/uL    Eosinophils Absolute 0.1 0.0 - 0.4 thou/uL    Basophils Absolute 0.1 0.0 - 0.2 thou/uL    Immature Granulocyte Absolute 0.0 <=0.0 thou/uL   Platelet Product Information   Result Value Ref Range    Unit Type O Pos     Unit Number [id number]     Status Ready     Component Platelets     PRODUCT CODE [id number]     Blood Type 5100     CODING SYSTEM [id number]        Imaging    No results found.    Total time: 35 min, including review of EMR, diagnostics, documentation and ordering/coordination of care    Signed by: Betsy Dallas, IVANNA

## 2021-06-15 NOTE — PROGRESS NOTES
Patient seen in clinic today for follow-up of lung  cancer.    COVID testing offered; patient declined.     Shira Abdullahi RN, Oncology Clinic   [facility]

## 2021-06-15 NOTE — PROGRESS NOTES
Patient arrived ambulatory, alert and orientated at 0829. Patient has come over here after radiation for lab draws and poss transfusion. PICC was scheduled to come and start an IV at 0845 but they did not come until 0908. Pardeep did not want me to just use a butterfly needle and draw him. PICC arrived and placed a line with the lidocaine. We rae labs and sent them. His hgb was 11.7 and his platelets were up to 50. I contacted Dr. Soto's nurse Ana Cristina Abdullahi. She spoke to Dr. Soto  And he decided that we were not going to do any transfusion at this time. They will have him do labs on Friday and then weekly on mondays upstairs in cancer care. I went over the plan with Pardeep and he verbalized understanding that he will have labs on Friday after his radiation and then weekly on Mondays. Patient did not want to wait for his times for his appointments. He was going to look it up on my chart.  IV was removed covered with gauze and coban- instructed to remove when he gets home. Pt left ambulatory at 1040.

## 2021-06-15 NOTE — PROGRESS NOTES
Pt here for their final radiation treatment. DC instructions given verbally and in writing, pt verbalized their understanding. IC was asked to put the patient on a recall list. Dr. Grant would like a chest CT and f/u in about 3 months.

## 2021-06-15 NOTE — PROGRESS NOTES
Pardeep arrived ambulatory, A&O x4 and is seated in chair #9. VSS. He is here for Labs and his first dose of IVIG. I attempted an IV with no luck. He said he is a difficult stick and need to use US. He was stuck 6 times the other day he said. We contacted PICC and they placed and IV in the left forearm. Labs were drawn and sent to lab. I then started the IVIG at 1000. Patient C/O of having some pain that ran down his arm from his IV site. Where it was placed was irritating a nerve. The pain would come and go but when it was painful his pain would be at 6/10. We finally talked him into letting us place another IV because he had several hours left in his infusion. At 1130 PICC placed another IV in the left AC. Patient was more comfortable.   IVIG started at   0.5mg/kg/hr x 30 min  1 mg/kg/hr x 30 min  1.5 mg/kg/hr x 30 min  2 mg/kg/hr x 30 min  2.5 mg/kg/hr x 30  min  3 mg/kg/hr x30 min  3.5 mg/kg/hr x 30 min  3..92 mg/kg/hr for the rest of the infusion.  Vital signs continued to remain stable throughout. No signs of a reaction. Patient was monitored for 30 minutes after. I went over the AVS with the patient.Patient is scheduled to come back on Friday 2/5/21 at 0900. At 0815 he has his radiation treatment prior. Patient left ambulatory at 1600.

## 2021-06-15 NOTE — PATIENT INSTRUCTIONS - HE
Patient Education     Rituximab Solution for injection  What is this medicine?  RITUXIMAB (ri TUX i mab) is a monoclonal antibody. This medicine changes the way the body's immune system works. It is used commonly to treat non-Hodgkin's lymphoma and other conditions. In cancer cells, this drug targets a specific protein within cancer cells and stops the cancer cells from growing. It is also used to treat rhuematoid arthritis (RA). In RA, this medicine slow the inflammatory process and help reduce joint pain and swelling. This medicine is often used with other cancer or arthritis medications.  This medicine may be used for other purposes; ask your health care provider or pharmacist if you have questions.  What should I tell my health care provider before I take this medicine?  They need to know if you have any of these conditions:    blood disorders    heart disease    history of hepatitis B    infection (especially a virus infection such as chickenpox, cold sores, or herpes)    irregular heartbeat    kidney disease    lung or breathing disease, like asthma    lupus    an unusual or allergic reaction to rituximab, mouse proteins, other medicines, foods, dyes, or preservatives    pregnant or trying to get pregnant    breast-feeding  How should I use this medicine?  This medicine is for infusion into a vein. It is administered in a hospital or clinic by a specially trained health care professional.  A special MedGuide will be given to you by the pharmacist with each prescription and refill. Be sure to read this information carefully each time.  Talk to your pediatrician regarding the use of this medicine in children. This medicine is not approved for use in children.  Overdosage: If you think you have taken too much of this medicine contact a poison control center or emergency room at once.  NOTE: This medicine is only for you. Do not share this medicine with others.  What if I miss a dose?  It is important not to miss  a dose. Call your doctor or health care professional if you are unable to keep an appointment.  What may interact with this medicine?    cisplatin    medicines for blood pressure    some other medicines for arthritis    vaccines  This list may not describe all possible interactions. Give your health care provider a list of all the medicines, herbs, non-prescription drugs, or dietary supplements you use. Also tell them if you smoke, drink alcohol, or use illegal drugs. Some items may interact with your medicine.  What should I watch for while using this medicine?  Report any side effects that you notice during your treatment right away, such as changes in your breathing, fever, chills, dizziness or lightheadedness. These effects are more common with the first dose.  Visit your prescriber or health care professional for checks on your progress. You will need to have regular blood work. Report any other side effects. The side effects of this medicine can continue after you finish your treatment. Continue your course of treatment even though you feel ill unless your doctor tells you to stop.  Call your doctor or health care professional for advice if you get a fever, chills or sore throat, or other symptoms of a cold or flu. Do not treat yourself. This drug decreases your body's ability to fight infections. Try to avoid being around people who are sick.  This medicine may increase your risk to bruise or bleed. Call your doctor or health care professional if you notice any unusual bleeding.  Be careful brushing and flossing your teeth or using a toothpick because you may get an infection or bleed more easily. If you have any dental work done, tell your dentist you are receiving this medicine.  Avoid taking products that contain aspirin, acetaminophen, ibuprofen, naproxen, or ketoprofen unless instructed by your doctor. These medicines may hide a fever.  Do not become pregnant while taking this medicine. Women should  inform their doctor if they wish to become pregnant or think they might be pregnant. There is a potential for serious side effects to an unborn child. Talk to your health care professional or pharmacist for more information. Do not breast-feed an infant while taking this medicine.  What side effects may I notice from receiving this medicine?  Side effects that you should report to your doctor or health care professional as soon as possible:    allergic reactions like skin rash, itching or hives, swelling of the face, lips, or tongue    low blood counts - this medicine may decrease the number of white blood cells, red blood cells and platelets. You may be at increased risk for infections and bleeding.    signs of infection - fever or chills, cough, sore throat, pain or difficulty passing urine    signs of decreased platelets or bleeding - bruising, pinpoint red spots on the skin, black, tarry stools, blood in the urine    signs of decreased red blood cells - unusually weak or tired, fainting spells, lightheadedness    breathing problems    confused, not responsive    chest pain    fast, irregular heartbeat    feeling faint or lightheaded, falls    mouth sores    redness, blistering, peeling or loosening of the skin, including inside the mouth    stomach pain    swelling of the ankles, feet, or hands    trouble passing urine or change in the amount of urine  Side effects that usually do not require medical attention (report to your doctor or other health care professional if they continue or are bothersome):    anxiety    headache    loss of appetite    muscle aches    nausea    night sweats  This list may not describe all possible side effects. Call your doctor for medical advice about side effects. You may report side effects to FDA at 3-136-FDA-1589.  Where should I keep my medicine?  This drug is given in a hospital or clinic and will not be stored at home.  NOTE:This sheet is a summary. It may not cover all  possible information. If you have questions about this medicine, talk to your doctor, pharmacist, or health care provider. Copyright  2015 Gold Standard

## 2021-06-15 NOTE — PROGRESS NOTES
Pardeep arrived ambulatory, A&O x4 and is seated in chair #3. VSS. He is here for Labs and his 2nd dose of IVIG. We had PICC all lined up to come start an IV D/T being a difficult start. PICC started an IV in his right forearm with excellent blood return. Labs were drawn and sent to lab. I gave the pre-meds 30 minutes prior to hanigng the IVIG.   IVIG started at 0930  0.5mg/kg/hr x 20 min  1 mg/kg/hr x 20 min  1.5 mg/kg/hr x 20 min  2 mg/kg/hr x 20 min  2.5 mg/kg/hr x 20  min  3 mg/kg/hr x20 min  3.5 mg/kg/hr x 20 min  Then at 200cc/hr for the rest of the infusion.  Vital signs continued to remain stable throughout except when someone turned CNN on he became angry and was offended by it. His BP shot up to 173/81 . It came down to 152/81 after TV turned but he had just got back from the bathroom. Then ater sitting for awhile with no CNN his BP came back to WNL of 118/55. There were no  signs of a reaction. Patient was monitored for 30 minutes after. I went over the AVS with the patient.Patient is scheduled to come back on Monday 2/8/2021 after his radiation. Pt left ambulatory at 1455..

## 2021-06-15 NOTE — PROGRESS NOTES
SBRT/SRS OTV Note      Assessment / Impression       1. Malignant neoplasm of upper lobe of right lung (H)       Cancer Staging  Malignant neoplasm of lower lobe of left lung (H)  Staging form: Lung, AJCC 8th Edition  - Clinical stage from 10/15/2018: Stage IIIA (cT4, cN0, cM0) - Signed by Melody Segovia CNP on 2018       Tolerating radiation therapy well.  All questions and concerns addressed.  No acute side effects    Plan:     Continue radiation treatment as prescribed.  Radiation: Site: RUL  Stereotactic Radiosurgery: Yes  Stereotactic Radiosurgery date: 21  Today's Dose: 1000  Total Dose for Thoracic: 5000  Today's Fraction/Total Fraction Thoracic:       Subjective:      HPI: Pardeep Wilson is a 65 y.o. male with    1. Malignant neoplasm of upper lobe of right lung (H)       He tolerated his first treatment today.  No acute side effects.  Reviewed treatment of both upper lobe nodule simultaneously which she is happy about.  Reviewed side effects such as radiation pneumonitis to watch for following treatment.    The following portions of the patient's history were reviewed and updated as appropriate: allergies, current medications, past family history, past medical history, past social history, past surgical history and problem list.    Assessment                  Body Site: Thoracic Site: RUL  Stereotactic Radiosurgery: Yes  Stereotactic Radiosurgery date: 21  Today's Dose: 1000  Total Dose for Thoracic: 5000  Today's Fraction/Total Fraction Thoracic:   Voice Chances/Stridor/Larynx: 0: Normal  Pharynx and Esphogaus: 0: No change over baseline  Cough: 0: Absent  Hemoptysis: 0: None  Dyspnea: 0: Normal                                              Sexuality Alteration                 Emotional Alteration Copin: Effective  Comfort Alteration KPS: 90% Can perform normal activity, minor signs of disease  Fatigue  (ONS scale) : 4: Moderate Fatigue  Pain Location: headache  Pain Intensity. Rate degree of pain ranging from 0 (no pain) to 10 (severe pain) : 3  Pain Description: Ache - Muscular type ache  Pain Intervention: 1: Over the counter medications   Nutrition Alteration Anorexia: 0: None  Nausea: 0: None  Vomitin: None  Dyspepsia and/or Heartburn: 0: None  Pharynx and Esphogaus: 0: No change over baseline  Skin Alteration Skin Sensation: 0: No problem  Skin Reaction: 0: None  AUA Assessment                                  Accompanied by       Objective:     Exam:   Vitals:    21 0921   BP: 140/59   Pulse: 96   SpO2: 96%   Weight: 186 lb 11.2 oz (84.7 kg)       Wt Readings from Last 8 Encounters:   21 186 lb 11.2 oz (84.7 kg)   21 186 lb 11.2 oz (84.7 kg)   21 188 lb 14.4 oz (85.7 kg)   21 186 lb (84.4 kg)   21 186 lb 4.8 oz (84.5 kg)   20 189 lb 1.6 oz (85.8 kg)   20 188 lb (85.3 kg)   20 190 lb 12.8 oz (86.5 kg)       General: Alert and oriented, in no acute distress  Pardeep has no Erythema.  Respirations nonlabored.  Gait is normal and unaided    Treatment Summary to Date    Aria chart and setup information reviewed    Chelsea Grant MD

## 2021-06-15 NOTE — TELEPHONE ENCOUNTER
Pt calls this morning to report diarrhea starting. He has only has 1-2 a day for last 2 days but in past has had a problem with this. Pt instructed to follow BRAT diet, increase fluids and take his lomotil as directed. Pt sees NP Monday and will update then. Pt agreeable with plan

## 2021-06-15 NOTE — PROGRESS NOTES
[facility] Hematology and Oncology Progress Note    Patient: Pardeep Wilson  MRN: [id number]  Date of Service: 02/17/2021        Reason for Visit    Chief Complaint   Patient presents with     HE Cancer     Malignant neoplasm of lower lobe of left lung       Assessment and Plan  Cancer Staging  Malignant neoplasm of lower lobe of left lung (H)  Staging form: Lung, AJCC 8th Edition  - Clinical stage from 10/15/2018: Stage IIIA (cT4, cN0, cM0) - Signed by Melody Segovia CNP on 12/21/2018  ALK-, EGFR-, BRAF-,ROS1-, RET-, NTRK-, MET-  PDL1 0% expressed    1. Lung cancer, originally Stage IIIa in 2018. Recurrent disease in 2019/2020 with stage 4: had wedge resection in Feb 2020 at the [facility]. Had multiple nodules in right upper lobe, ?pleural mets?. Has started palliative chemotherapy with Carboplatin, Taxol and Keytruda. He had 4 cycles. CT overall shows stable disease in right upper lobe. 2 new tiny nodules in left upper lobe. Then started maintenance keytruda. CT shows slightly enlarged tumors. PET shows that they are hypermetabolic. His case was discussed at tumor board and it was decided to pursue radiation since he only has these 2 areas and would like to be aggressive. He has great performance status. Received 6 week dose of Keytruda in December. Now on observation. Will repeat imaging in May.     2. Renal insufficiency: likely from medications, previous cisplatin. Avoid nephrotoxic medications.     3. 2 new small nodules: had fiducial placed and did SBRT. Completed that on 2/12/21. Will get CT in May.     4. 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 Received IVIG for 2 doses on 2/3 and 2/5. Platelets went from 15 to 50, but then dropped again. Now has started NPlate on Monday. We are adding in 4 weekly doses of Rituxan today. Side effects explained to pt. He signed consent. Monitor platelets 2-3 times per week. Will recheck on Friday.      5. Epistaxis: mild, but has been going on since yesterday. We will give platelet transfusion today.     ECOG Performance   ECOG Performance Status: 1     Distress Assessment  Distress Assessment Score: Unable to rate(limitations):    Pain  Currently in Pain: No/denies      Problem List    1. Malignant neoplasm of lower lobe of left lung (H)     2. Metastatic primary lung cancer, left (H)     3. Idiopathic thrombocytopenic purpura (H)     4. Colitis     5. Colitis, acute     6. Renal insufficiency          ______________________________________________________________________________    History of Present Illness    Measurable Disease: CT, PET for lung cancer  2. CBC for ITP    Current Treatment:   1. observation for lung cancer.   2. Treatment for ITP found 2/3/21. IVIG on 2/3 and 2/5. NPlate started 2/15.     Past Treatment:   SBRT to 2 lung nodules from 2/4/21-2/12/21.     Maintenance Keytruda. Started 9/8/20. 6 week dosing started in December 2020 which was last dose. Stopped due to significant colitis.   Carboplatin, Taxol, Keytruda, first cycle 6/15/20. Had 4 cycles.     Wedge resection of right upper lobe nodule, February 28, 2020     Cisplatin and Alimta adjuvant chemotherapy, for 4 cycles: Day 1 cycle 4, February 15, 2019  First cycle  started December 14, 2018     Patient underwent mediastinoscopy, bronchoscopy, thoracoscopic surgery and left lower lobectomy on November 1, 2018     Interim History:   Pt is here today as add on. He continues to have low platelets and started having mild epistaxis yesterday. Continues to have mild bloody drainage. No large bleeding. Nose feels irritated. Has some fatigue. Frustrated with all of the complications. Radiation went well and happy he is done with that. Diarrhea much improved.     Pain Status  Currently in Pain: No/denies    Review of Systems    Constitutional  Constitutional (WDL): Exceptions to WDL  Fatigue: Concerns(not relieved with rest)  Hot flashes/Night  "Sweats: Concerns(occ flush)  Neurosensory  Neurosensory (WDL): Exceptions to WDL  Peripheral Motor Neuropathy: Concerns(feet)  Peripheral Sensory Neuropathy: Concerns(feet)  Eye   Eye Disorder (WDL): All eye disorder elements are within defined limits  Ear  Ear Disorder (WDL): All ear disorder elements are within defined limits  Cardiovascular  Cardiovascular (WDL): All cardiovascular elements are within defined limits  Pulmonary  Respiratory (WDL): Exceptions to WDL(Continuous nose bleed, dribbling per pt)  Dyspnea: Concerns  Gastrointestinal  Gastrointestinal (WDL): Exceptions to WDL  Anorexia: Concerns  Nausea: Concerns(intermittent)  Genitourinary  Genitourinary (WDL): All genitourinary elements are within defined limits  Lymphatic  Lymph (WDL): All lymph disorder elements are within defined limits  Musculoskeletal and Connective Tissue  Musculoskeletal and Connetive Tissue Disorders (WDL): Exceptions to WDL  Muscle Weakness : Concerns  Integumentary  Integumentary (WDL): All integumentary elements are within defined limits  Patient Coping  Patient Coping: Accepting;Open/discussion  Accompanied by  Accompanied by: Alone  Oral Chemo Adherence           Past History  Past Medical History:   Diagnosis Date     Anemia      Coronary artery disease     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     Esophageal reflux      Hodgkin's lymphoma (H)     s/p radiation to the mediastinum at age 17     Hyperlipemia      Hypertension      Hypothyroidism     hx nodules     Lung cancer (H)      MI, old 12 years ago, 2008     PHYSICAL EXAM  /57   Pulse 88   Temp 97.7  F (36.5  C) (Oral)   Ht 5' 9\" (1.753 m)   Wt 187 lb 4.8 oz (85 kg)   SpO2 100%   BMI 27.66 kg/m      GENERAL: no acute distress. Cooperative in conversation. Here alone due to visitor restrictions. Mask on  RESP: Regular respiratory rate. No expiratory wheezes   MUSCULOSKELETAL: no bilateral leg swelling  NEURO: non focal. Alert and oriented x3. "   PSYCH: within normal limits. No depression or anxiety.  SKIN: exposed skin is dry intact.     Lab Results    Recent Results (from the past 168 hour(s))   HM1 (CBC with Diff)   Result Value Ref Range    WBC 5.8 4.0 - 11.0 thou/uL    RBC 4.30 (L) 4.40 - 6.20 mill/uL    Hemoglobin 12.8 (L) 14.0 - 18.0 g/dL    Hematocrit 38.5 (L) 40.0 - 54.0 %    MCV 90 80 - 100 fL    MCH 29.8 27.0 - 34.0 pg    MCHC 33.2 32.0 - 36.0 g/dL    RDW 18.8 (H) 11.0 - 14.5 %    Platelets 15 (LL) 140 - 440 thou/uL    MPV      Neutrophils % 52 50 - 70 %    Lymphocytes % 37 20 - 40 %    Monocytes % 8 2 - 10 %    Eosinophils % 2 0 - 6 %    Basophils % 1 0 - 2 %    Immature Granulocyte % 1 (H) <=0 %    Neutrophils Absolute 3.1 2.0 - 7.7 thou/uL    Lymphocytes Absolute 2.1 0.8 - 4.4 thou/uL    Monocytes Absolute 0.5 0.0 - 0.9 thou/uL    Eosinophils Absolute 0.1 0.0 - 0.4 thou/uL    Basophils Absolute 0.1 0.0 - 0.2 thou/uL    Immature Granulocyte Absolute 0.0 <=0.0 thou/uL   HM1 (CBC with Diff)   Result Value Ref Range    WBC 6.0 4.0 - 11.0 thou/uL    RBC 4.43 4.40 - 6.20 mill/uL    Hemoglobin 13.2 (L) 14.0 - 18.0 g/dL    Hematocrit 41.4 40.0 - 54.0 %    MCV 94 80 - 100 fL    MCH 29.8 27.0 - 34.0 pg    MCHC 31.9 (L) 32.0 - 36.0 g/dL    RDW 19.0 (H) 11.0 - 14.5 %    Platelets 3 (LL) 140 - 440 thou/uL    MPV      Neutrophils % 45 (L) 50 - 70 %    Lymphocytes % 41 (H) 20 - 40 %    Monocytes % 11 (H) 2 - 10 %    Eosinophils % 2 0 - 6 %    Basophils % 1 0 - 2 %    Immature Granulocyte % 1 (H) <=0 %    Neutrophils Absolute 2.7 2.0 - 7.7 thou/uL    Lymphocytes Absolute 2.5 0.8 - 4.4 thou/uL    Monocytes Absolute 0.7 0.0 - 0.9 thou/uL    Eosinophils Absolute 0.1 0.0 - 0.4 thou/uL    Basophils Absolute 0.1 0.0 - 0.2 thou/uL    Immature Granulocyte Absolute 0.0 <=0.0 thou/uL   Type and Screen   Result Value Ref Range    ABORh O NEG     Antibody Screen Negative Negative   Comprehensive Metabolic Panel   Result Value Ref Range    Sodium 143 136 - 145 mmol/L     Potassium 4.5 3.5 - 5.0 mmol/L    Chloride 106 98 - 107 mmol/L    CO2 31 22 - 31 mmol/L    Anion Gap, Calculation 6 5 - 18 mmol/L    Glucose 101 70 - 125 mg/dL    BUN 38 (H) 8 - 22 mg/dL    Creatinine 1.33 (H) 0.70 - 1.30 mg/dL    GFR MDRD Af Amer >60 >60 mL/min/1.73m2    GFR MDRD Non Af Amer 54 (L) >60 mL/min/1.73m2    Bilirubin, Total 0.5 0.0 - 1.0 mg/dL    Calcium 9.4 8.5 - 10.5 mg/dL    Protein, Total 7.8 6.0 - 8.0 g/dL    Albumin 2.8 (L) 3.5 - 5.0 g/dL    Alkaline Phosphatase 99 45 - 120 U/L    AST 31 0 - 40 U/L    ALT 29 0 - 45 U/L   Platelet Product Information   Result Value Ref Range    Unit Type O Pos     Unit Number [id number]     Status Transfused     Component Platelets     PRODUCT CODE T0765F12     Issue Date and Time [id number]     Blood Type 5100     CODING SYSTEM [id number]    HM1 (CBC with Diff)   Result Value Ref Range    WBC 7.9 4.0 - 11.0 thou/uL    RBC 4.15 (L) 4.40 - 6.20 mill/uL    Hemoglobin 12.3 (L) 14.0 - 18.0 g/dL    Hematocrit 38.1 (L) 40.0 - 54.0 %    MCV 92 80 - 100 fL    MCH 29.6 27.0 - 34.0 pg    MCHC 32.3 32.0 - 36.0 g/dL    RDW 18.8 (H) 11.0 - 14.5 %    Platelets 4 (LL) 140 - 440 thou/uL    MPV      Neutrophils % 44 (L) 50 - 70 %    Lymphocytes % 40 20 - 40 %    Monocytes % 13 (H) 2 - 10 %    Eosinophils % 1 0 - 6 %    Basophils % 1 0 - 2 %    Immature Granulocyte % 1 (H) <=0 %    Neutrophils Absolute 3.4 2.0 - 7.7 thou/uL    Lymphocytes Absolute 3.2 0.8 - 4.4 thou/uL    Monocytes Absolute 1.0 (H) 0.0 - 0.9 thou/uL    Eosinophils Absolute 0.1 0.0 - 0.4 thou/uL    Basophils Absolute 0.1 0.0 - 0.2 thou/uL    Immature Granulocyte Absolute 0.1 (H) <=0.0 thou/uL   Platelet Product Information   Result Value Ref Range    Unit Type A Pos     Unit Number [id number]     Status Issued     Component Platelets     PRODUCT CODE O2589W32     Issue Date and Time [id number]     Blood Type 6200     CODING SYSTEM [id number]        Imaging    No results found.    Total time spent with patient  in face to face time, chart review and documentation was 30 minutes. Discussed with Dr. Soto     Signed by: Melody Segovia, IVANNA

## 2021-06-15 NOTE — PATIENT INSTRUCTIONS - HE
Pardeep your Hgb is 11.7 and your platelets are at 50. You will have a lab draw on Friday. Then you will have weekly Labs on Monday upstairs.

## 2021-06-15 NOTE — PROGRESS NOTES
PT here ambulatory for txt. Pt platelets today [id number] and Dr Soto ordered n plate and rituxan. Reviewed with pt txt plan. N plate administered and bandaid to site. Rituxan administered and pt tolerated without any problems. txt completed/iv dc'd and pressure dressing to site.  Reviewed with pt to return Monday for labs, exam with Melody CHAMPION and txt. Also per Dr Soto instruction pt to stay on prednisone 40 mg daily and pt dc'd steady gait

## 2021-06-15 NOTE — PROGRESS NOTES
Patient arrived ambulatory for his rituximab-abbs. Shira rae his labs and then he came back. PICC started his peripheral IV. I checked blood return and it had good blood return. I started the normal saline and gave the patient his pre-meds as ordered. The rituximab-abbs was hung as ordered. Patient tolerated the treatment. Patients IV was infiltrated at the end. IV was removed. Pharmacy called and just said to monitor area. Patient was going to elevate, heat and ice the area. He was also instructed to watch area and if he noticed any redness or changes in the area to give us a call. Pt verbalized understanding. Patent left ambulatory to the lobby.

## 2021-06-15 NOTE — PROGRESS NOTES
Pt receive 1 pack plt without incident, then his first rituxan which was administered as 1:1 increasing by 50 ml every 30 minutes to max rate of 400 ml/hr without incident. PICC team called to place IV with ultra sound. Pt was chilled but vitals stable and blankets resolved issue. Upon completion IV removed and pt d.c ambulatory to lobby to meet his wife. Pt aware of treatment plan.

## 2021-06-15 NOTE — TELEPHONE ENCOUNTER
Pt and his wife stopped up here after radiation to update pt is having headaches and dizziness. Labs were drawn today and reviewed with Dr Soto. plt 15k.  Pt will have labs drawn Monday and possible platelet transfusion and or nyplate. Per dr Soto ok for pt to take tylenol for headache.  Pt and his wife verbalize understanding of plan.

## 2021-06-15 NOTE — PROGRESS NOTES
Patient seen in clinic today for follow-up of lung cancer and Idiopathic thrombocytopenic purpura.    COVID testing offered; patient declined.     Shira Abdullahi RN, Oncology Clinic   [facility]

## 2021-06-15 NOTE — TELEPHONE ENCOUNTER
Patient's wife Eliane calls to let us know that her  had platelets yesterday but is having a slow dribbling nose bleed that is lasting for hours.  Per Dr. Soto, patient to come in tomorrow for labs and NP visit.  He wants to start him on Rituxan if possible tomorrow.  Patient aware and will be here tomorrow.

## 2021-06-16 NOTE — PROGRESS NOTES
Treatment plan summary form has been filled out, signed and faxed.     Fax: [phone]    Shira Abdullahi RN, Oncology Clinic   [facility]

## 2021-06-16 NOTE — PROGRESS NOTES
[facility] Hematology and Oncology Progress Note    Patient: Pardeep Wilson  MRN: [id number]  Date of Service: 03/22/2021        Reason for Visit    Chief Complaint   Patient presents with     HE Cancer     Malignant neoplasm of lower lobe of left lung      Benign Hematology     Idiopathic thrombocytopenic purpura       Assessment and Plan  Cancer Staging  Malignant neoplasm of lower lobe of left lung (H)  Staging form: Lung, AJCC 8th Edition  - Clinical stage from 10/15/2018: Stage IIIA (cT4, cN0, cM0) - Signed by Melody Segovia, CNP on 12/21/2018  ALK-, EGFR-, BRAF-,ROS1-, RET-, NTRK-, MET-  PDL1 0% expressed    1. Lung cancer, originally Stage IIIa in 2018. Recurrent disease in 2019/2020 with stage 4: had wedge resection in Feb 2020 at the [facility]. Had multiple nodules in right upper lobe, ?pleural mets?. Has started palliative chemotherapy with Carboplatin, Taxol and Keytruda. He had 4 cycles. CT overall shows stable disease in right upper lobe. 2 new tiny nodules in left upper lobe. Then started maintenance keytruda. CT shows slightly enlarged tumors. PET shows that they are hypermetabolic. His case was discussed at tumor board and it was decided to pursue radiation since he only has these 2 areas and would like to be aggressive. He has great performance status. Received 6 week dose of Keytruda in December. Now on observation. Will repeat imaging in May.     2. Renal insufficiency: likely from medications, previous cisplatin. Avoid nephrotoxic medications.     3. 2 new small nodules: had fiducial placed and did SBRT. Completed that on 2/12/21. Will get CT in May.     4. 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 Received IVIG for 2 doses on 2/3 and 2/5. Platelets went from 15 to 50, but then dropped again. Then started NPlate and rituxan. Initially improved, but then dropped again with bloody diarrhea on 3/12. We added prednisone back in and  gave more NPlate and rituxan. Received his third dose of rituxan on 3/15. Will get another dose today. Hold Nplate today due to rising platelet count. Start prednisone taper. Will start 30mg daily for 5 days, 20mg daily for 5 days, 10 mg daily for 5 days, then stop.     5. Diarrhea: better with prednisone again. Also on lomotil 2 tabs four times a day. Will decrease that to 2 tabs two times a day. If diarrhea worsens as prednisone is lowered, but have to go back up. No blood.     ECOG Performance   ECOG Performance Status: 1     Distress Assessment  Distress Assessment Score: No distress:    Pain  Currently in Pain: No/denies      Problem List    1. Idiopathic thrombocytopenic purpura (H)  CC OFFICE VISIT LONG    DISCONTINUED: riTUXimab-abbs 800 mg in sodium chloride 0.9% 170 mL (TRUXIMA)    DISCONTINUED: acetaminophen tablet 1,000 mg (TYLENOL)    DISCONTINUED: diphenhydrAMINE injection 50 mg (BENADRYL)        ______________________________________________________________________________    History of Present Illness    Measurable Disease: CT, PET for lung cancer  2. CBC for ITP    Current Treatment:   1. observation for lung cancer.   2. Treatment for ITP found 2/3/21. IVIG on 2/3 and 2/5. NPlate weekly started 2/15.   Rituxan given on 2/17, 2/24, 3/15.    Past Treatment:   SBRT to 2 lung nodules from 2/4/21-2/12/21.     Maintenance Keytruda. Started 9/8/20. 6 week dosing started in December 2020 which was last dose. Stopped due to significant colitis.   Carboplatin, Taxol, Keytruda, first cycle 6/15/20. Had 4 cycles.     Wedge resection of right upper lobe nodule, February 28, 2020     Cisplatin and Alimta adjuvant chemotherapy, for 4 cycles: Day 1 cycle 4, February 15, 2019  First cycle  started December 14, 2018     Patient underwent mediastinoscopy, bronchoscopy, thoracoscopic surgery and left lower lobectomy on November 1, 2018     Interim History:   Pt is here today for labs and treatment. He states he is  feeling pretty good. No bleeding complications.  He says his energy levels are better.  He said he still feels quite gassy but his diarrhea is better as well.  He said his bowels are not normal and they are not formed but he is not having loose watery stools either.  No fevers.  He says he does have bruises on his arms where he has been getting IVs and they are hurting sometimes.    Pain Status  Currently in Pain: No/denies    Review of Systems    Constitutional  Constitutional (WDL): All constitutional elements are within defined limits  Neurosensory  Neurosensory (WDL): Exceptions to WDL  Peripheral Motor Neuropathy: Concerns  Peripheral Sensory Neuropathy: Concerns  Eye   Eye Disorder (WDL): All eye disorder elements are within defined limits  Ear  Ear Disorder (WDL): All ear disorder elements are within defined limits  Cardiovascular  Cardiovascular (WDL): All cardiovascular elements are within defined limits  Pulmonary  Respiratory (WDL): Within Defined Limits  Gastrointestinal  Gastrointestinal (WDL): Exceptions to WDL  Constipation: Concerns  Genitourinary  Genitourinary (WDL): All genitourinary elements are within defined limits  Lymphatic  Lymph (WDL): All lymph disorder elements are within defined limits  Musculoskeletal and Connective Tissue  Musculoskeletal and Connetive Tissue Disorders (WDL): Exceptions to WDL  Arthralgia: Concerns  Muscle Weakness : Concerns  Myalgia: Concerns(hands)  Integumentary  Integumentary (WDL): Exceptions to WDL(tender to touch bruises from IV sites/ occ shooting pains from IV sites)  Patient Coping  Patient Coping: Accepting;Open/discussion  Accompanied by  Accompanied by: Alone  Oral Chemo Adherence           Past History  Past Medical History:   Diagnosis Date     Anemia      Coronary artery disease     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     Esophageal reflux      Hodgkin's lymphoma (H)     s/p radiation to the mediastinum at age 17     Hyperlipemia       "Hypertension      Hypothyroidism     hx nodules     Lung cancer (H)      MI, old 12 years ago, 2008     PHYSICAL EXAM  /72   Pulse 86   Temp 97.9  F (36.6  C) (Oral)   Ht 5' 9\" (1.753 m)   Wt 191 lb (86.6 kg)   SpO2 100%   BMI 28.21 kg/m      GENERAL: no acute distress. Cooperative in conversation. Here alone due to visitor restrictions. Mask on  RESP: Regular respiratory rate. No expiratory wheezes   MUSCULOSKELETAL: no bilateral leg swelling  NEURO: non focal. Alert and oriented x3.   PSYCH: within normal limits. No depression or anxiety.  SKIN: exposed skin is dry intact.  Bruises noted on both forearms    Lab Results    Recent Results (from the past 168 hour(s))   HM1 (CBC with Diff)   Result Value Ref Range    WBC 17.6 (H) 4.0 - 11.0 thou/uL    RBC 3.57 (L) 4.40 - 6.20 mill/uL    Hemoglobin 10.7 (L) 14.0 - 18.0 g/dL    Hematocrit 33.5 (L) 40.0 - 54.0 %    MCV 94 80 - 100 fL    MCH 30.0 27.0 - 34.0 pg    MCHC 31.9 (L) 32.0 - 36.0 g/dL    RDW 19.9 (H) 11.0 - 14.5 %    Platelets 239 140 - 440 thou/uL    MPV 11.2 8.5 - 12.5 fL       Imaging    No results found.    Total time spent with patient in face to face time, chart review and documentation was 30 minutes. Discussed with Dr. Soto     Signed by: Melody Segovia CNP  "

## 2021-06-16 NOTE — PROGRESS NOTES
Platelet count down to 7000.  Will start back on prednisone 60 mg p.o. daily.  Will give dose of Nplate 2 mcg/kg today.  Will transfuse 1 unit of platelets.  We will have him return Thursday for recheck of CBC.  We will plan labs, Nplate and possible transfusion in 1 week and also with visit in 2 weeks.

## 2021-06-16 NOTE — PROGRESS NOTES
Dr Soto would like him to get platelets and N-Plate injection today for a platelet count of 7.  He would also like him to restart on prednisone 60mg daily.  This has been sent to his pharmacy.  He also wants him back for a lab check on 4/15 with possible platelets.  We are also adding injection appts for N-Plate for 4/19 and 4/26 as well as possible platelets.  These appts are being worked on by our  and new schedule will be given to him before he leaves today.  This has all been communicated to him over in infusion.    Sylvia Su RN

## 2021-06-16 NOTE — TELEPHONE ENCOUNTER
Patient calls in today stating that he has been on prednisone 60 mg daily x10 days.  He is wondering if he can start tapering this down.  He also states that he is on 4 tablets of Lomotil per day and is wondering if he should decrease those as well.  Patient was in on 4/19 and his platelet count was in the 600s.  I discussed this with Dr Soto who states that the patient should go down to taking prednisone 40 mg daily until he is seen in the clinic by Dr Soto on 4/26.  As far as the Lomotil goes, he tells me that he had a very small bowel movement yesterday.  I did let him know that Lomotil is for diarrhea so if he is not having diarrhea he should start to taper down on these.  We obviously do not want him to go back to having diarrhea so he can slowly taper down on these and see what works for him.  Patient should not get Nplate this week.  There are no further appointments for this patient this week.  He is aware to come back on Monday, 4/26 for his lab, MD and possible platelet transfusion appointment.  He was appreciative.    Sylvia Su RN

## 2021-06-16 NOTE — PROGRESS NOTES
Pardeep came over after seeing provider and lab review. Provider wanted him to get a unit of platelet and N-Plate because platelet was 7 today. He restarted hin on prednisone 60 mg and recheck lab on 4/15/21. Pardeep requested and had okay to increase Lomotil to 4 tabs. He was transfuse with one unit of platelet no reaction noted, also N plate was  administered and bandaid placed on the site he   discharged at 13:05 ambulatory in stable condition after discharge instruction.

## 2021-06-16 NOTE — PROGRESS NOTES
Pardeep arrived A&Ox4 ambulatory and stable, had labs drawn; plt 81 today, no transfusion needed.  Pt reported continued blood, he thinks maybe from bleeding hemorrhoids- on going for several weeks now.  Pardeep states he has been trying to get some dental work done, will try to get in today or tomorrow to have dental work done, or will try again next week  0855 Pardeep dc'd A&Ox3 4 ambulatory and stable

## 2021-06-16 NOTE — PROGRESS NOTES
Pardeep arrived A&O, labs drawn; plt     Pardeep alvarez'd A&Ox4 ambulatory and stable, aware of next appt 4/19/21

## 2021-06-16 NOTE — PROGRESS NOTES
Pt arrived ambulatory to clinic for Cycle # 2 Day # 8 of his chemotherapy regimen.  IV was started using US machine using aseptic technique without difficulties with blood return.  Administered premedications and Rituxan per MD order.  Meldoy held Nplate.  Pt tolerated infusion well, no s/s of infusion reaction.  IV was flushed with NS then D/C'd using 2x2 and Coban.  Pt verbalized understanding of plan of care and return to clinic.

## 2021-06-16 NOTE — PROGRESS NOTES
Patient is here for labs, provider and treatment for Lung Cancer and Idiopathic thrombocytopenic purpura.

## 2021-06-16 NOTE — PROGRESS NOTES
Patient seen in clinic today for follow-up of lung cancer and idiopathic thrombocytopenic purpura.    Shira Abdullahi RN, Oncology Clinic   [facility]

## 2021-06-16 NOTE — PROGRESS NOTES
[facility] Hematology and Oncology Progress Note    Patient: Pardeep Wilson  MRN: [id number]  Date of Service: 03/29/2021        Reason for Visit    Chief Complaint   Patient presents with     HE Cancer     Idiopathic thrombocytopenic purpura       Assessment and Plan    Recurrent and metastatic mucinous lung adenocarcinoma, status post wedge resection, February 28, 2020  T4 N0 M0, stage III a mucinous left lung adenocarcinoma status post left lower lobe resection on November 1, 2018  Tumor 9 cm with 3.5 cm nodule, grade 2 out of 4 mucinous adenocarcinoma  Tumor is PDL 1-, no EGFR mutation.  No rearrangement of ALK, evaluation on the Alchemist trial  Tumor negative for ALK, ROS 1, RET, NTRK1, MET e14, EGFR MUTATIONS, April 2020  Remote history of stage I a right axillary Hodgkin's disease status post mantle field radiation and splenectomy about 45 years ago  Right parotid cancer with lymph node involvement status post surgery and adjuvant radiation for 6 weeks in 1991  Coronary artery disease  Elevated BUN and creatinine  New right lung pulmonary nodules   Left pleural effusion/enhancement, 4/2020  Thrombocytopenia, ITP, February 2021  Diarrhea related to Keytruda    Platelet count at 124,000.  Diarrhea is improved.  No treatment with Rituxan or Nplate is indicated at this time.  He will continue steroid taper, down to 10 mg p.o. daily beginning April 1 and stop after 1 more week.  He will also taper off Lomotil.    We will check CBC weekly and see him back in 4 weeks for a follow-up visit.  He will call with new bruising or bleeding symptoms or recurrence of diarrhea.    He is scheduled for repeat CT of the chest in May to follow-up on his lung cancer.    Measurable disease: CT of the chest      Current therapy: Prednisone, currently at 20 mg p.o. daily  Started 60 mg p.o. daily March 12, 2021      Treatment history:     Prednisone 60 mg p.o. daily started March 12 with a taper of 10 mg/week    Nplate started  February 15, 2021 through March 1, additional dose on March 15    Rituxan x4 doses on February 17 and February 22, 2021 and March 15 and March 22      IVIG 1 g/kg's x2 doses on February 10 and February 12, 2021    Keytruda maintenance every 3 weeks,First dose September 8, 2020  Last dose December 23, 2020, 400 mg       Carboplatin, Taxol and Keytruda, 4 cycles, last August 18, 2020  Treatment started Collette 15, 2020    Wedge resection of right upper lobe nodule, February 28, 2020    Cisplatin and Alimta adjuvant chemotherapy, for 4 cycles: Day 1 cycle 4, February 15, 2019  First cycle  started December 14, 2018    Patient underwent mediastinoscopy, bronchoscopy, thoracoscopic surgery and left lower lobectomy on November 1, 2018      Cancer Staging  Malignant neoplasm of lower lobe of left lung (H)  Staging form: Lung, AJCC 8th Edition  - Clinical stage from 10/15/2018: Stage IIIA (cT4, cN0, cM0) - Signed by Melody Segovia CNP on 12/21/2018      ECOG Performance   ECOG Performance Status: 1    Distress Assessment  Distress Assessment Score: No distress    Pain           Problem List    1. Idiopathic thrombocytopenic purpura (H)  HM1(CBC and Differential)    Comprehensive Metabolic Panel        CC: Dov Morales,     ______________________________________________________________________________    History of Present Illness    Mr. Pardeep Wilson is seen for follow-up.  He discontinued treatment March 8 but relapsed with platelet count down to 6000 on March 12.  Was started on prednisone 60 mg p.o. daily and then received 2 additional doses of Rituxan in 1 of Nplate.  Currently he is doing well with no bleeding or bruising.  Diarrhea has stopped.  Appetite is improving.  ECOG status is 0.    Pain Status  Currently in Pain: No/denies    Review of Systems    Constitutional  Constitutional (WDL): Exceptions to WDL  Fatigue: Fatigue relieved by rest  Neurosensory  Neurosensory (WDL): Exceptions to  WDL  Peripheral Sensory Neuropathy: Asymptomatic, loss of deep tendon reflexes or paresthesia  Cardiovascular  Cardiovascular (WDL): Exceptions to WDL  Edema: Yes  Edema Limbs: 5 - 10% inter-limb discrepancy in volume or circumference at point of greatest visible difference, swelling or obscuration of anatomic architecture on close inspection(Lower legs; Rt worse than Lt)  Pulmonary  Respiratory (WDL): Exceptions to WDL(Getting over a cold)  Cough: Mild symptoms, nonprescription intervention indicated  Dyspnea: Shortness of breath with moderate exertion  Gastrointestinal  Gastrointestinal (WDL): Exceptions to WDL  Anorexia: Loss of appetite without alteration in eating habits(Reports great improvement)  Diarrhea: Increase of <4 stools per day over baseline, mild increase in ostomy output compared to baseline(Reports improvement)  Dry Mouth: Symptomatic (e.g., dry or thick saliva) without significant dietary alteration, unstimulated saliva flow >0.2 ml/min  Genitourinary  Genitourinary (WDL): All genitourinary elements are within defined limits  Integumentary  Integumentary (WDL): All integumentary elements are within defined limits  Patient Coping  Patient Coping: Accepting  Distress Assessment  Distress Assessment Score: No distress  Accompanied by  Accompanied by: Alone    Past History  Past Medical History:   Diagnosis Date     Anemia      Coronary artery disease     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     Esophageal reflux      Hodgkin's lymphoma (H)     s/p radiation to the mediastinum at age 17     Hyperlipemia      Hypertension      Hypothyroidism     hx nodules     Lung cancer (H)      MI, old 12 years ago, 2008         Past Surgical History:   Procedure Laterality Date     CORONARY ANGIOPLASTY      Stent Placement     CT BIOPSY LUNG       CT BIOPSY LUNG  1/12/2021     EYE SURGERY Bilateral     Cataracts     left lower lobe lobectomy   11/01/2018     MEDIASTINOSCOPY N/A 11/1/2018    Procedure:  MEDIASTINOSCOPY;  Surgeon: Bry Saunders MD;  Location: [facility];  Service:      PAROTIDECTOMY       MO LAP,DIAGNOSTIC ABDOMEN N/A 11/7/2017    Procedure: DIAGNOSTIC LAPAROSCOPY,  LYSIS OF ADHESIONS, SMALL BOWEL RESECTION;  Surgeon: Brian Granados MD;  Location: [facility];  Service: General     SPLENECTOMY, TOTAL  1973     wedge resection Right 02/28/2020     WISDOM TOOTH EXTRACTION         Physical Exam    Recent Vitals 3/29/2021   Height -   Weight 188 lbs 2 oz   BSA (m2) 2.04 m2   /82   Pulse 80   Temp 98.3   Temp src 1   SpO2 99   Some recent data might be hidden         GENERAL: Alert and oriented. Seated comfortably. In no distress.     HEAD: Atraumatic and normocephalic.  Alopecia     EYES: SOPHIE, EOMI.  No pallor.  No icterus.     Oral cavity: no mucosal lesion or tonsillar enlargement.     NECK: supple. JVP normal.  No thyroid enlargement.     LYMPH NODES: No palpable, cervical, axillary or inguinal lymphadenopathy.     CHEST: clear to auscultation bilaterally.  Resonant to percussion throughout bilaterally.  Symmetrical breath movements bilaterally.     CVS: S1 and S2 are heard. Regular rate and rhythm.  No murmur or gallop or rub heard.     ABDOMEN: Soft. Not tender. Not distended.  No palpable hepatomegaly or splenomegaly.  No other mass palpable.  Bowel sounds heard.     EXTREMITIES: Warm.  No peripheral edema.     SKIN: no rash, or bruising or purpura.    CNS: Nonfocal          Lab Results    Recent Results (from the past 168 hour(s))   HM1 (CBC with Diff)   Result Value Ref Range    WBC 17.6 (H) 4.0 - 11.0 thou/uL    RBC 3.57 (L) 4.40 - 6.20 mill/uL    Hemoglobin 10.7 (L) 14.0 - 18.0 g/dL    Hematocrit 33.5 (L) 40.0 - 54.0 %    MCV 94 80 - 100 fL    MCH 30.0 27.0 - 34.0 pg    MCHC 31.9 (L) 32.0 - 36.0 g/dL    RDW 19.9 (H) 11.0 - 14.5 %    Platelets 239 140 - 440 thou/uL    MPV 11.2 8.5 - 12.5 fL   Manual Differential   Result Value Ref Range    Total Neutrophils % 88  (H) 50 - 70 %    Lymphocytes % 7 (L) 20 - 40 %    Monocytes % 3 2 - 10 %    Eosinophils %  1 0 - 6 %    Basophils % 0 0 - 2 %    Myelocytes % 1 <=1 %    Immature Granulocyte % - Manual 1 (H) <=0 %    Total Neutrophils Absolute 15.5 (H) 2.0 - 7.7 thou/ul    Lymphocytes Absolute 1.2 0.8 - 4.4 thou/uL    Monocytes Absolute 0.5 0.0 - 0.9 thou/uL    Eosinophils Absolute 0.2 0.0 - 0.4 thou/uL    Basophils Absolute 0.0 0.0 - 0.2 thou/uL    Myelocytes Absolute 0.2 (H) <=0.1 thou/uL    Immature Granulocyte Absolute - Manual 0.2 (H) <=0.0 thou/uL    Manual nRBC per 100 Cells 6 (H) 0-<1    Platelet Estimate Normal Normal    Ovalocytes 1+ (!) Negative    Target Cells 1+ (!) Negative    Schistocytes 1+ (!) Negative    Eastman-Jolly Bodies 1+ (!) Negative   Comprehensive Metabolic Panel   Result Value Ref Range    Sodium 141 136 - 145 mmol/L    Potassium 3.8 3.5 - 5.0 mmol/L    Chloride 106 98 - 107 mmol/L    CO2 28 22 - 31 mmol/L    Anion Gap, Calculation 7 5 - 18 mmol/L    Glucose 118 70 - 125 mg/dL    BUN 37 (H) 8 - 22 mg/dL    Creatinine 1.34 (H) 0.70 - 1.30 mg/dL    GFR MDRD Af Amer >60 >60 mL/min/1.73m2    GFR MDRD Non Af Amer 53 (L) >60 mL/min/1.73m2    Bilirubin, Total 1.1 (H) 0.0 - 1.0 mg/dL    Calcium 9.1 8.5 - 10.5 mg/dL    Protein, Total 7.0 6.0 - 8.0 g/dL    Albumin 3.5 3.5 - 5.0 g/dL    Alkaline Phosphatase 98 45 - 120 U/L    AST 21 0 - 40 U/L    ALT 19 0 - 45 U/L   HM1 (CBC with Diff)   Result Value Ref Range    WBC 13.6 (H) 4.0 - 11.0 thou/uL    RBC 3.79 (L) 4.40 - 6.20 mill/uL    Hemoglobin 11.4 (L) 14.0 - 18.0 g/dL    Hematocrit 36.8 (L) 40.0 - 54.0 %    MCV 97 80 - 100 fL    MCH 30.1 27.0 - 34.0 pg    MCHC 31.0 (L) 32.0 - 36.0 g/dL    RDW 20.1 (H) 11.0 - 14.5 %    Platelets 124 (L) 140 - 440 thou/uL    MPV 10.8 8.5 - 12.5 fL    Neutrophils % 77 (H) 50 - 70 %    Lymphocytes % 12 (L) 20 - 40 %    Monocytes % 8 2 - 10 %    Eosinophils % 2 0 - 6 %    Basophils % 0 0 - 2 %    Immature Granulocyte % 1 (H) <=0 %     Neutrophils Absolute 10.5 (H) 2.0 - 7.7 thou/uL    Lymphocytes Absolute 1.7 0.8 - 4.4 thou/uL    Monocytes Absolute 1.0 (H) 0.0 - 0.9 thou/uL    Eosinophils Absolute 0.2 0.0 - 0.4 thou/uL    Basophils Absolute 0.0 0.0 - 0.2 thou/uL    Immature Granulocyte Absolute 0.1 (H) <=0.0 thou/uL   Manual Differential   Result Value Ref Range    Platelet Estimate Normal Normal    Ovalocytes 1+ (!) Negative    Target Cells 1+ (!) Negative    Schistocytes 1+ (!) Negative    Eastman-Jolly Bodies 1+ (!) Negative       Imaging    No results found.      Signed by: Kevin Soto MD

## 2021-06-16 NOTE — TELEPHONE ENCOUNTER
Refills Approved x 3    Rx renewed per Medication Renewal Policy. Medication was last renewed on 03/24/2020-pantoprazole, atorvastatin. 07/19/2020-metoprolol.  Last office visit was 01/08/2021 with PCP.    Dee Maki, [facility] Triage/Med Refill 4/22/2021     Requested Prescriptions   Pending Prescriptions Disp Refills     metoprolol succinate (TOPROL-XL) 25 MG [Pharmacy Med Name: METOPROLOL SUCC ER 25MG TABLET] 45 tablet 2     Sig: TAKE ONE-HALF TABLET BY  MOUTH DAILY       Beta-Blockers Refill Protocol Passed - 4/22/2021  4:17 AM        Passed - PCP or prescribing provider visit in past 12 months or next 3 months     Last office visit with prescriber/PCP: 2/14/2020 Dov Morales DO OR same dept: Visit date not found OR same specialty: 2/14/2020 Dov Morales DO  Last physical: 1/8/2021 Last MTM visit: Visit date not found   Next visit within 3 mo: Visit date not found  Next physical within 3 mo: Visit date not found  Prescriber OR PCP: Dov Morales DO  Last diagnosis associated with med order: 1. Coronary artery disease involving native coronary artery of native heart without angina pectoris  - metoprolol succinate (TOPROL-XL) 25 MG [Pharmacy Med Name: METOPROLOL SUCC ER 25MG TABLET]; TAKE ONE-HALF TABLET BY  MOUTH DAILY  Dispense: 45 tablet; Refill: 2    2. Coronary atherosclerosis  - atorvastatin (LIPITOR) 40 MG tablet [Pharmacy Med Name: ATORVASTATIN  40MG  TAB]; TAKE 1 TABLET BY MOUTH AT  BEDTIME  Dispense: 90 tablet; Refill: 3    3. Esophageal reflux  - pantoprazole (PROTONIX) 40 MG tablet [Pharmacy Med Name: PANTOPRAZOLE SOD 40MG EC TABLET]; TAKE 1 TABLET BY MOUTH  DAILY  Dispense: 90 tablet; Refill: 3    4. Congenital hypothyroidism without goiter  - levothyroxine (SYNTHROID, LEVOTHROID) 125 MCG tablet [Pharmacy Med Name: LEVOTHYROXINE  125MCG  TAB]; TAKE 1 TABLET BY MOUTH  DAILY  Dispense: 90 tablet; Refill: 3    If protocol passes may refill for 12 months  if within 3 months of last provider visit (or a total of 15 months).             Passed - Blood pressure filed in past 12 months     BP Readings from Last 1 Encounters:   04/12/21 119/59                atorvastatin (LIPITOR) 40 MG tablet [Pharmacy Med Name: ATORVASTATIN  40MG  TAB] 90 tablet 3     Sig: TAKE 1 TABLET BY MOUTH AT  BEDTIME       Statins Refill Protocol (Hmg CoA Reductase Inhibitors) Passed - 4/22/2021  4:17 AM        Passed - PCP or prescribing provider visit in past 12 months      Last office visit with prescriber/PCP: 2/14/2020 Dov Morales DO OR same dept: Visit date not found OR same specialty: 2/14/2020 Dov Morales DO  Last physical: 1/8/2021 Last MTM visit: Visit date not found   Next visit within 3 mo: Visit date not found  Next physical within 3 mo: Visit date not found  Prescriber OR PCP: Dov Morales DO  Last diagnosis associated with med order: 1. Coronary artery disease involving native coronary artery of native heart without angina pectoris  - metoprolol succinate (TOPROL-XL) 25 MG [Pharmacy Med Name: METOPROLOL SUCC ER 25MG TABLET]; TAKE ONE-HALF TABLET BY  MOUTH DAILY  Dispense: 45 tablet; Refill: 2    2. Coronary atherosclerosis  - atorvastatin (LIPITOR) 40 MG tablet [Pharmacy Med Name: ATORVASTATIN  40MG  TAB]; TAKE 1 TABLET BY MOUTH AT  BEDTIME  Dispense: 90 tablet; Refill: 3    3. Esophageal reflux  - pantoprazole (PROTONIX) 40 MG tablet [Pharmacy Med Name: PANTOPRAZOLE SOD 40MG EC TABLET]; TAKE 1 TABLET BY MOUTH  DAILY  Dispense: 90 tablet; Refill: 3    4. Congenital hypothyroidism without goiter  - levothyroxine (SYNTHROID, LEVOTHROID) 125 MCG tablet [Pharmacy Med Name: LEVOTHYROXINE  125MCG  TAB]; TAKE 1 TABLET BY MOUTH  DAILY  Dispense: 90 tablet; Refill: 3    If protocol passes may refill for 12 months if within 3 months of last provider visit (or a total of 15 months).                pantoprazole (PROTONIX) 40 MG tablet [Pharmacy Med  Name: PANTOPRAZOLE SOD 40MG EC TABLET] 90 tablet 3     Sig: TAKE 1 TABLET BY MOUTH  DAILY       GI Medications Refill Protocol Passed - 4/22/2021  4:17 AM        Passed - PCP or prescribing provider visit in last 12 or next 3 months.     Last office visit with prescriber/PCP: 2/14/2020 Dov Morales DO OR same dept: Visit date not found OR same specialty: 2/14/2020 Dov Morales DO  Last physical: 1/8/2021 Last MTM visit: Visit date not found   Next visit within 3 mo: Visit date not found  Next physical within 3 mo: Visit date not found  Prescriber OR PCP: Dov Morales DO  Last diagnosis associated with med order: 1. Coronary artery disease involving native coronary artery of native heart without angina pectoris  - metoprolol succinate (TOPROL-XL) 25 MG [Pharmacy Med Name: METOPROLOL SUCC ER 25MG TABLET]; TAKE ONE-HALF TABLET BY  MOUTH DAILY  Dispense: 45 tablet; Refill: 2    2. Coronary atherosclerosis  - atorvastatin (LIPITOR) 40 MG tablet [Pharmacy Med Name: ATORVASTATIN  40MG  TAB]; TAKE 1 TABLET BY MOUTH AT  BEDTIME  Dispense: 90 tablet; Refill: 3    3. Esophageal reflux  - pantoprazole (PROTONIX) 40 MG tablet [Pharmacy Med Name: PANTOPRAZOLE SOD 40MG EC TABLET]; TAKE 1 TABLET BY MOUTH  DAILY  Dispense: 90 tablet; Refill: 3    4. Congenital hypothyroidism without goiter  - levothyroxine (SYNTHROID, LEVOTHROID) 125 MCG tablet [Pharmacy Med Name: LEVOTHYROXINE  125MCG  TAB]; TAKE 1 TABLET BY MOUTH  DAILY  Dispense: 90 tablet; Refill: 3    If protocol passes may refill for 12 months if within 3 months of last provider visit (or a total of 15 months).                levothyroxine (SYNTHROID, LEVOTHROID) 125 MCG tablet [Pharmacy Med Name: LEVOTHYROXINE  125MCG  TAB] 90 tablet 3     Sig: TAKE 1 TABLET BY MOUTH  DAILY       Thyroid Hormones Protocol Passed - 4/22/2021  4:17 AM        Passed - Provider visit in past 12 months or next 3 months     Last office visit with  prescriber/PCP: 2/14/2020 Dov Morales DO OR same dept: Visit date not found OR same specialty: 2/14/2020 Dov Morales DO  Last physical: 1/8/2021 Last MTM visit: Visit date not found   Next visit within 3 mo: Visit date not found  Next physical within 3 mo: Visit date not found  Prescriber OR PCP: Dov Morales DO  Last diagnosis associated with med order: 1. Coronary artery disease involving native coronary artery of native heart without angina pectoris  - metoprolol succinate (TOPROL-XL) 25 MG [Pharmacy Med Name: METOPROLOL SUCC ER 25MG TABLET]; TAKE ONE-HALF TABLET BY  MOUTH DAILY  Dispense: 45 tablet; Refill: 2    2. Coronary atherosclerosis  - atorvastatin (LIPITOR) 40 MG tablet [Pharmacy Med Name: ATORVASTATIN  40MG  TAB]; TAKE 1 TABLET BY MOUTH AT  BEDTIME  Dispense: 90 tablet; Refill: 3    3. Esophageal reflux  - pantoprazole (PROTONIX) 40 MG tablet [Pharmacy Med Name: PANTOPRAZOLE SOD 40MG EC TABLET]; TAKE 1 TABLET BY MOUTH  DAILY  Dispense: 90 tablet; Refill: 3    4. Congenital hypothyroidism without goiter  - levothyroxine (SYNTHROID, LEVOTHROID) 125 MCG tablet [Pharmacy Med Name: LEVOTHYROXINE  125MCG  TAB]; TAKE 1 TABLET BY MOUTH  DAILY  Dispense: 90 tablet; Refill: 3    If protocol passes may refill for 12 months if within 3 months of last provider visit (or a total of 15 months).             Passed - TSH on file in past 12 months for patient age 12 & older     TSH   Date Value Ref Range Status   02/02/2021 0.54 0.30 - 5.00 uIU/mL Final                                   18-Jan-2020

## 2021-06-17 NOTE — PROGRESS NOTES
Chief Complaint   Patient presents with     Illness     5/5/18, COUGH, FEVER, BODY ACHES, SOB         HPI    Patient is here for one day of cough with bilateral lower chest tenderness, associated with subjective fever, generalized body aches, and headache, treated with Tylenol, last dose 930 am today. No shortness of breath, nausea, vomiting,abdominal pain, sore throat.    ROS: Pertinent ROS noted in HPI.     Allergies   Allergen Reactions     Penicillins Rash       Patient Active Problem List   Diagnosis     Psoriasis     Anemia     Hypothyroidism     Hyperlipidemia     Hypertension     Coronary Artery Disease     Esophageal Reflux     Generalized skin lesions     Abdominal pain, unspecified abdominal location     Partial small bowel obstruction     Acute blood loss anemia     Coronary artery disease involving native coronary artery of native heart without angina pectoris     Heart murmur     Dyslipidemia       Family History   Problem Relation Age of Onset     Dementia Mother      Cancer Father      Kidney disease Brother      No Medical Problems Maternal Grandmother      No Medical Problems Maternal Grandfather      No Medical Problems Paternal Grandmother      Heart disease Paternal Grandfather        Social History     Social History     Marital status:      Spouse name: Eliane     Number of children: 2     Years of education: 18     Occupational History      Other     Narrable     Social History Main Topics     Smoking status: Never Smoker     Smokeless tobacco: Never Used     Alcohol use 0.6 oz/week     1 Standard drinks or equivalent per week     Drug use: No     Sexual activity: Yes     Partners: Female     Birth control/ protection: Surgical      Comment: Parter = Hysterectomy     Other Topics Concern     Not on file     Social History Narrative         Objective:    Vitals:    05/06/18 1052   BP: 136/62   Pulse: (!) 101   Resp: 16   Temp: 100.2  F (37.9  C)   SpO2: 94%        Gen:NAD  Throat: oropharynx clear, tonsils normal  Ears: TMs clear without effusion, ear canals normal with minimal cerumen  Nose: no discharge  Neck:NAD  CV: RRR, normal S1S2, no M, R, G  Pulm: CTAB, normal effort  Chest wall: normal inspection, normal palpation  Abd: normal bowel sounds, soft, no pain, no mass    Recent Results (from the past 24 hour(s))   Influenza A/B Rapid Test   Result Value Ref Range    Influenza  A, Rapid Antigen No Influenza A antigen detected No Influenza A antigen detected    Influenza B, Rapid Antigen No Influenza B antigen detected No Influenza B antigen detected   HM2(CBC w/o Differential)   Result Value Ref Range    WBC 18.7 (H) 4.0 - 11.0 thou/uL    RBC 4.13 (L) 4.40 - 6.20 mill/uL    Hemoglobin 12.8 (L) 14.0 - 18.0 g/dL    Hematocrit 36.4 (L) 40.0 - 54.0 %    MCV 88 80 - 100 fL    MCH 31.0 27.0 - 34.0 pg    MCHC 35.2 32.0 - 36.0 g/dL    RDW 18.2 (H) 11.0 - 14.5 %    Platelets 277 140 - 440 thou/uL    MPV 11.0 8.5 - 12.5 fL   D-dimer, Quantitative   Result Value Ref Range    D-Dimer, Quant 0.56 (H) <=0.50 FEU ug/mL         CXR - no in acute findings per my interpretation, discussed during visit.        Chest pain on breathing  -     D-dimer, Quantitative    Influenza-like symptoms  -     Influenza A/B Rapid Test    Cough  -     XR Chest 2 Views    Fever  -     HM2(CBC w/o Differential)      Patient presented with cough and pleuritic pain across lower chest wall. He had a low grade fever with a pulse of 101. Patient denied having shortness of breath, recent travel/immobilization, hx of DVT/PE. He was discharged before D-Dimer result came in. I called and explained results to patient over the phone. I advised him to go to  ER today for further investigation of his test results and he agreed to go. I spoke with  ER provider about patient coming to the ER.

## 2021-06-17 NOTE — PROGRESS NOTES
[facility] Hematology and Oncology Progress Note    Patient: Pardeep Wilson  MRN: [id number]  Date of Service: 04/26/2021        Reason for Visit    Chief Complaint   Patient presents with     HE Cancer     Idiopathic thrombocytopenic purpura     Lung Cancer       Assessment and Plan    Recurrent and metastatic mucinous lung adenocarcinoma, status post wedge resection, February 28, 2020  T4 N0 M0, stage III a mucinous left lung adenocarcinoma status post left lower lobe resection on November 1, 2018  Tumor 9 cm with 3.5 cm nodule, grade 2 out of 4 mucinous adenocarcinoma  Tumor is PDL 1-, no EGFR mutation.  No rearrangement of ALK, evaluation on the Alchemist trial  Tumor negative for ALK, ROS 1, RET, NTRK1, MET e14, EGFR MUTATIONS, April 2020  Remote history of stage I a right axillary Hodgkin's disease status post mantle field radiation and splenectomy about 45 years ago  Right parotid cancer with lymph node involvement status post surgery and adjuvant radiation for 6 weeks in 1991  Coronary artery disease  Elevated BUN and creatinine  New right lung pulmonary nodules   Left pleural effusion/enhancement, 4/2020  Thrombocytopenia, ITP, February 2021  Diarrhea related to Keytruda    Patient has now had multiple relapses of profound thrombocytopenia.  Reviewing the records suggest that these relapses have occurred when prednisone has been tapered off.    We will taper down to 20 mg daily of prednisone and keep him on this dose for a prolonged period, until it is clear that platelet counts have stabilized.  We will check CBC weekly.  Will check CMP next week.    Reviewed potential side effects of the prednisone.  He needs to watch his weight.    He does have CT scan and follow-up in regards to his lung cancer next month.    He is not really having diarrhea at this time so I asked him to taper off Lomotil.    We discussed again COVID-19 vaccination.  He could go ahead with vaccination.  There is a possibility he may  not mount appropriate immune response because of Rituxan therapy.  Alternate option is to wait until September which would be 6 months after his last Rituxan dose.    Plan: As above  Measurable disease: CT of the chest      Current therapy: Prednisone, decreased to 20 mg p.o. daily today  currently at 40 mg p.o. daily  Started 60 mg p.o. daily, through 12/20/2021      Treatment history:     Prednisone 60 mg p.o. daily started March 12 with a taper of 10 mg/week    Nplate started February 15, 2021 through March 1, additional dose on March 15    Rituxan x4 doses on February 17 and February 22, 2021 and March 15 and March 22      IVIG 1 g/kg's x2 doses on February 10 and February 12, 2021    Keytruda maintenance every 3 weeks,First dose September 8, 2020  Last dose December 23, 2020, 400 mg       Carboplatin, Taxol and Keytruda, 4 cycles, last August 18, 2020  Treatment started Collette 15, 2020    Wedge resection of right upper lobe nodule, February 28, 2020    Cisplatin and Alimta adjuvant chemotherapy, for 4 cycles: Day 1 cycle 4, February 15, 2019  First cycle  started December 14, 2018    Patient underwent mediastinoscopy, bronchoscopy, thoracoscopic surgery and left lower lobectomy on November 1, 2018      Cancer Staging  Malignant neoplasm of lower lobe of left lung (H)  Staging form: Lung, AJCC 8th Edition  - Clinical stage from 10/15/2018: Stage IIIA (cT4, cN0, cM0) - Signed by Melody Segovia, IVANNA on 12/21/2018      ECOG Performance   ECOG Performance Status: 1    Distress Assessment  Distress Assessment Score: No distress    Pain           Problem List    1. Idiopathic thrombocytopenic purpura (H)  CC OFFICE VISIT LONG    HM1(CBC and Differential)    Comprehensive Metabolic Panel        CC: Dov Morales, DO    ______________________________________________________________________________    History of Present Illness    Mr. Pardeep Wilson is seen for follow-up.  He had another relapse of  ITP around April 12 and was restarted on prednisone at 60 mg p.o. daily, received 1 unit of platelet transfusion and 1 dose of Nplate.  Platelet count over 600,000 last week and prednisone was decreased to 40 mg p.o. daily last Wednesday, April 21.  Reports to be feeling well.  When he has low platelet count there is note of increased fatigue.  He is not really having diarrhea but describes a lot of gas.      Pain Status  Currently in Pain: No/denies    Review of Systems    Constitutional  Constitutional (WDL): Exceptions to WDL  Fatigue: Fatigue not relieved by rest - Limiting instrumental ADL  Neurosensory  Neurosensory (WDL): Exceptions to WDL  Peripheral Sensory Neuropathy: Asymptomatic, loss of deep tendon reflexes or paresthesia  Cardiovascular  Cardiovascular (WDL): Exceptions to WDL  Edema: Yes  Edema Limbs: 5 - 10% inter-limb discrepancy in volume or circumference at point of greatest visible difference, swelling or obscuration of anatomic architecture on close inspection  Pulmonary  Respiratory (WDL): Within Defined Limits  Gastrointestinal  Gastrointestinal (WDL): Exceptions to WDL  Constipation: Occasional or intermittent symptoms, occasional use of stool softeners, laxatives, dietary modification, or enema  Genitourinary  Genitourinary (WDL): All genitourinary elements are within defined limits  Integumentary  Integumentary (WDL): All integumentary elements are within defined limits  Patient Coping  Patient Coping: Accepting  Distress Assessment  Distress Assessment Score: No distress  Accompanied by  Accompanied by: Family Member(Wife)    Past History  Past Medical History:   Diagnosis Date     Anemia      Coronary artery disease     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     Esophageal reflux      Hodgkin's lymphoma (H)     s/p radiation to the mediastinum at age 17     Hyperlipemia      Hypertension      Hypothyroidism     hx nodules     Lung cancer (H)      MI, old 12 years ago, 2008          Past Surgical History:   Procedure Laterality Date     CORONARY ANGIOPLASTY      Stent Placement     CT BIOPSY LUNG       CT BIOPSY LUNG  1/12/2021     EYE SURGERY Bilateral     Cataracts     left lower lobe lobectomy   11/01/2018     MEDIASTINOSCOPY N/A 11/1/2018    Procedure: MEDIASTINOSCOPY;  Surgeon: Bry Saunders MD;  Location: [facility];  Service:      PAROTIDECTOMY       MO LAP,DIAGNOSTIC ABDOMEN N/A 11/7/2017    Procedure: DIAGNOSTIC LAPAROSCOPY,  LYSIS OF ADHESIONS, SMALL BOWEL RESECTION;  Surgeon: Brian Granados MD;  Location: [facility];  Service: General     SPLENECTOMY, TOTAL  1973     wedge resection Right 02/28/2020     WISDOM TOOTH EXTRACTION         Physical Exam    Recent Vitals 4/26/2021   Height -   Weight 194 lbs 2 oz   BSA (m2) 2.08 m2   /70   Pulse 80   Temp 98.3   Temp src 1   SpO2 96   Some recent data might be hidden         GENERAL: Alert and oriented. Seated comfortably. In no distress.     HEAD: Atraumatic and normocephalic.  Alopecia     EYES: SOPHIE, EOMI.  No pallor.  No icterus.     Oral cavity: no mucosal lesion or tonsillar enlargement.     NECK: supple. JVP normal.  No thyroid enlargement.     LYMPH NODES: No palpable, cervical, axillary or inguinal lymphadenopathy.     CHEST: clear to auscultation bilaterally.  Resonant to percussion throughout bilaterally.  Symmetrical breath movements bilaterally.     CVS: S1 and S2 are heard. Regular rate and rhythm.  No murmur or gallop or rub heard.     ABDOMEN: Soft. Not tender. Not distended.  No palpable hepatomegaly or splenomegaly.  No other mass palpable.  Bowel sounds heard.     EXTREMITIES: Warm.  No peripheral edema.     SKIN: no rash, or bruising or purpura.    CNS: Nonfocal          Lab Results    Recent Results (from the past 168 hour(s))   HM1 (CBC with Diff)   Result Value Ref Range    WBC 14.6 (H) 4.0 - 11.0 thou/uL    RBC 3.45 (L) 4.40 - 6.20 mill/uL    Hemoglobin 10.9 (L) 14.0 - 18.0 g/dL     Hematocrit 33.5 (L) 40.0 - 54.0 %    MCV 97 80 - 100 fL    MCH 31.6 27.0 - 34.0 pg    MCHC 32.5 32.0 - 36.0 g/dL    RDW 18.5 (H) 11.0 - 14.5 %    Platelets 795 (H) 140 - 440 thou/uL    MPV 10.8 8.5 - 12.5 fL    Neutrophils % 77 (H) 50 - 70 %    Lymphocytes % 12 (L) 20 - 40 %    Monocytes % 10 2 - 10 %    Eosinophils % 0 0 - 6 %    Basophils % 0 0 - 2 %    Immature Granulocyte % 1 (H) <=0 %    Neutrophils Absolute 11.3 (H) 2.0 - 7.7 thou/uL    Lymphocytes Absolute 1.7 0.8 - 4.4 thou/uL    Monocytes Absolute 1.4 (H) 0.0 - 0.9 thou/uL    Eosinophils Absolute 0.0 0.0 - 0.4 thou/uL    Basophils Absolute 0.0 0.0 - 0.2 thou/uL    Immature Granulocyte Absolute 0.2 (H) <=0.0 thou/uL       Imaging    No results found.      Signed by: Kevin Soto MD

## 2021-06-17 NOTE — PROGRESS NOTES
[facility] Hematology and Oncology Progress Note    Patient: Pardeep Wilson  MRN: [id number]  Date of Service: 05/24/2021        Reason for Visit    Chief Complaint   Patient presents with     HE Cancer     Idiopathic thrombocytopenic purpura     Lung Cancer       Assessment and Plan    Recurrent and metastatic mucinous lung adenocarcinoma, status post wedge resection, February 28, 2020  T4 N0 M0, stage III a mucinous left lung adenocarcinoma status post left lower lobe resection on November 1, 2018  Tumor 9 cm with 3.5 cm nodule, grade 2 out of 4 mucinous adenocarcinoma  Tumor is PDL 1-, no EGFR mutation.  No rearrangement of ALK, evaluation on the Alchemist trial  Tumor negative for ALK, ROS 1, RET, NTRK1, MET e14, EGFR MUTATIONS, April 2020  Remote history of stage I a right axillary Hodgkin's disease status post mantle field radiation and splenectomy about 45 years ago  Right parotid cancer with lymph node involvement status post surgery and adjuvant radiation for 6 weeks in 1991  Coronary artery disease  Elevated BUN and creatinine  New right lung pulmonary nodules   Left pleural effusion/enhancement, 4/2020  Thrombocytopenia, ITP, February 2021  Diarrhea related to Keytruda    Patient has tolerated slow steroid taper fairly well so far.  Platelet count at 200,000.  No diarrhea symptoms.  Will decrease prednisone from 10 mg to 5 mg p.o. daily.  We will check labs in 1 week and then in 3 weeks and see him back in 4 weeks for follow-up.    He should call if he develops any new diarrhea symptoms or new bleeding or bruising.    He will have CT of the chest to follow-up on his lung cancer post radiation later this week.    Reviewed timing of Covid vaccination and we decided to do this in September, 6 months after Rituxan therapy.    Plan: As above    Measurable disease: CT of the chest      Current therapy: Prednisone, 5 mg daily beginning May 24, 2021  Decrease to 10 mg p.o. daily, May 17, 2021   decreased to 20 mg  p.o. daily April 26   40 mg p.o. daily, beginning April 21  Recent course 60 mg daily started April 12, 2021    Started 60 mg p.o. daily, through 12/20/2021      Treatment history:     Nplate single dose around April 12, 2021    Prednisone 60 mg p.o. daily started March 12 with a taper of 10 mg/week    Nplate started February 15, 2021 through March 1, additional dose on March 15    Rituxan x4 doses on February 17 and February 22, 2021 and March 15 and March 22      IVIG 1 g/kg's x2 doses on February 10 and February 12, 2021    Keytruda maintenance every 3 weeks,First dose September 8, 2020  Last dose December 23, 2020, 400 mg       Carboplatin, Taxol and Keytruda, 4 cycles, last August 18, 2020  Treatment started Collette 15, 2020    Wedge resection of right upper lobe nodule, February 28, 2020    Cisplatin and Alimta adjuvant chemotherapy, for 4 cycles: Day 1 cycle 4, February 15, 2019  First cycle  started December 14, 2018    Patient underwent mediastinoscopy, bronchoscopy, thoracoscopic surgery and left lower lobectomy on November 1, 2018      Cancer Staging  Malignant neoplasm of lower lobe of left lung (H)  Staging form: Lung, AJCC 8th Edition  - Clinical stage from 10/15/2018: Stage IIIA (cT4, cN0, cM0) - Signed by Melody Segovia CNP on 12/21/2018      ECOG Performance        Distress Assessment  Distress Assessment Score: No distress    Pain           Problem List    1. Idiopathic thrombocytopenic purpura (H)  HM1(CBC and Differential)        CC: Dov Morales,     ______________________________________________________________________________    History of Present Illness    Mr. Pardeep Wilson is seen for follow-up.  He is continued slow taper of prednisone over the last month.  No diarrhea symptoms.  He is having a bowel movement every 1 to 2 days.  Still does have some gassy distention.  Weight is up.  Some dyspnea on exertion but is increasing his exercise duration.  Has CT scan later  this week.  No other new symptoms or problems.    Pain Status  Currently in Pain: No/denies    Review of Systems    As per the HPI.       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     Esophageal reflux      Hodgkin's lymphoma (H)     s/p radiation to the mediastinum at age 17     Hyperlipemia      Hypertension      Hypothyroidism     hx nodules     Lung cancer (H)      MI, old 12 years ago, 2008         Past Surgical History:   Procedure Laterality Date     CORONARY ANGIOPLASTY      Stent Placement     CT BIOPSY LUNG       CT BIOPSY LUNG  1/12/2021     EYE SURGERY Bilateral     Cataracts     left lower lobe lobectomy   11/01/2018     MEDIASTINOSCOPY N/A 11/1/2018    Procedure: MEDIASTINOSCOPY;  Surgeon: Bry Saunders MD;  Location: [facility];  Service:      PAROTIDECTOMY       NH LAP,DIAGNOSTIC ABDOMEN N/A 11/7/2017    Procedure: DIAGNOSTIC LAPAROSCOPY,  LYSIS OF ADHESIONS, SMALL BOWEL RESECTION;  Surgeon: Brian Granados MD;  Location: [facility];  Service: General     SPLENECTOMY, TOTAL  1973     wedge resection Right 02/28/2020     WISDOM TOOTH EXTRACTION         Physical Exam    Recent Vitals 5/24/2021   Height -   Weight 191 lbs 5 oz   BSA (m2) 2.06 m2   /73   Pulse 80   Temp 98.6   Temp src 1   SpO2 97   Some recent data might be hidden         GENERAL: Alert and oriented. Seated comfortably. In no distress.     HEAD: Atraumatic and normocephalic.  Alopecia     EYES: SOPHIE, EOMI.  No pallor.  No icterus.     Oral cavity: no mucosal lesion or tonsillar enlargement.     NECK: supple. JVP normal.  No thyroid enlargement.     LYMPH NODES: No palpable, cervical, axillary or inguinal lymphadenopathy.     CHEST: clear to auscultation  bilaterally.  Resonant to percussion throughout bilaterally.  Symmetrical breath movements bilaterally.     CVS: S1 and S2 are heard. Regular rate and rhythm.  No murmur or gallop or rub heard.     ABDOMEN: Soft. Not tender. Not distended.  No palpable hepatomegaly or splenomegaly.  No other mass palpable.  Bowel sounds heard.     EXTREMITIES: Warm.  No peripheral edema.     SKIN: no rash, or bruising or purpura.    CNS: Nonfocal          Lab Results    Recent Results (from the past 168 hour(s))   HM1 (CBC with Diff)   Result Value Ref Range    WBC 13.0 (H) 4.0 - 11.0 thou/uL    RBC 3.93 (L) 4.40 - 6.20 mill/uL    Hemoglobin 12.5 (L) 14.0 - 18.0 g/dL    Hematocrit 38.2 (L) 40.0 - 54.0 %    MCV 97 80 - 100 fL    MCH 31.8 27.0 - 34.0 pg    MCHC 32.7 32.0 - 36.0 g/dL    RDW 17.4 (H) 11.0 - 14.5 %    Platelets 245 140 - 440 thou/uL    MPV 11.6 8.5 - 12.5 fL    Neutrophils % 69 50 - 70 %    Lymphocytes % 22 20 - 40 %    Monocytes % 8 2 - 10 %    Eosinophils % 1 0 - 6 %    Basophils % 0 0 - 2 %    Immature Granulocyte % 1 (H) <=0 %    Neutrophils Absolute 8.9 (H) 2.0 - 7.7 thou/uL    Lymphocytes Absolute 2.9 0.8 - 4.4 thou/uL    Monocytes Absolute 1.0 (H) 0.0 - 0.9 thou/uL    Eosinophils Absolute 0.1 0.0 - 0.4 thou/uL    Basophils Absolute 0.0 0.0 - 0.2 thou/uL    Immature Granulocyte Absolute 0.1 (H) <=0.0 thou/uL   HM1 (CBC with Diff)   Result Value Ref Range    WBC 10.7 4.0 - 11.0 thou/uL    RBC 3.97 (L) 4.40 - 6.20 mill/uL    Hemoglobin 12.3 (L) 14.0 - 18.0 g/dL    Hematocrit 38.3 (L) 40.0 - 54.0 %    MCV 97 80 - 100 fL    MCH 31.0 27.0 - 34.0 pg    MCHC 32.1 32.0 - 36.0 g/dL    RDW 17.1 (H) 11.0 - 14.5 %    Platelets 200 140 - 440 thou/uL    MPV 11.6 8.5 - 12.5 fL    Neutrophils % 70 50 - 70 %    Lymphocytes % 21 20 - 40 %    Monocytes % 8 2 - 10 %    Eosinophils % 1 0 - 6 %    Basophils % 0 0 - 2 %    Immature Granulocyte % 1 (H) <=0 %    Neutrophils Absolute 7.4 2.0 - 7.7 thou/uL    Lymphocytes Absolute 2.2 0.8 -  4.4 thou/uL    Monocytes Absolute 0.9 0.0 - 0.9 thou/uL    Eosinophils Absolute 0.1 0.0 - 0.4 thou/uL    Basophils Absolute 0.0 0.0 - 0.2 thou/uL    Immature Granulocyte Absolute 0.1 (H) <=0.0 thou/uL       Imaging    No results found.      Signed by: Kevin Soto MD

## 2021-06-17 NOTE — PROGRESS NOTES
"Oncology Rooming Note    05/24/21 9:25 AM    Pardeep Wilson is a 65 y.o. male who presents for:    Chief Complaint   Patient presents with     HE Cancer     Idiopathic thrombocytopenic purpura     Lung Cancer       Initial Vitals: /73   Pulse 80   Temp 98.6  F (37  C) (Oral)   Wt 191 lb 4.8 oz (86.8 kg)   SpO2 97%   BMI 27.85 kg/m       Estimated body mass index is 27.85 kg/m  as calculated from the following:    Height as of 4/12/21: 5' 9.5\" (1.765 m).    Weight as of this encounter: 191 lb 4.8 oz (86.8 kg).     Body surface area is 2.06 meters squared.      Allergies reviewed: Yes  Medications reviewed: Yes    Refills needed: No     Clinical concerns: no concerns      Shira Abdullahi RN, Oncology Clinic   [facility]    "

## 2021-06-17 NOTE — PROGRESS NOTES
"Pardeep Wilson is a 65 y.o. male who is being evaluated via a billable video visit.      How would you like to obtain your AVS? MyChart.  If dropped from the video visit, the video invitation should be resent by: Text to cell phone: [phone]  Will anyone else be joining your video visit? No      Video Start Time: 10:15 AM    Assessment & Plan   Problem List Items Addressed This Visit     Acute non-recurrent maxillary sinusitis     Given his immunosuppressive and with recent chemotherapy, and prednisone therapy, worried for possible bacterial infection.  With his allergies to penicillin will move to azithromycin as per orders.  Side effects precautions reviewed.         Relevant Medications    azithromycin (ZITHROMAX Z-BRICE) 250 MG tablet               BMI:   Estimated body mass index is 28.25 kg/m  as calculated from the following:    Height as of 4/12/21: 5' 9.5\" (1.765 m).    Weight as of 4/26/21: 194 lb 1.6 oz (88 kg).         Return in about 4 months (around 9/2/2021) for Annual physical.    Dov Morales [facility]   Pardeep Wilson is 65 y.o. and presents today for the following health issues   Sore throat and facial pressure.  Been occurring for the past week and a half.  Not improving.  He has not had fevers or chills, then again patient is also on high to moderate dose steroids for other reasons.  Additionally suppressed immune system.  He is keeping follow-ups.  Treatment plan with hematology and oncology were reviewed.  He is compliant with all treatments.       Review of Systems   All other systems reviewed and are negative.          Objective       Vitals:  No vitals were obtained today due to virtual visit.    Physical Exam   Constitutional: He is oriented to person, place, and time. He appears well-developed and well-nourished.   HENT:   Head: Normocephalic and atraumatic.   Eyes: Conjunctivae and EOM are normal.   Pulmonary/Chest: Effort " normal.   Neurological: He is alert and oriented to person, place, and time.   Psychiatric: He has a normal mood and affect. His behavior is normal.   Nursing note reviewed.                Video-Visit Details    Type of service:  Video Visit    Video End Time (time video stopped): 10:25 AM  Originating Location (pt. Location): Car, parking lot    Distant Location (provider location):  [facility]     Platform used for Video Visit: Geo[employer]

## 2021-06-17 NOTE — TELEPHONE ENCOUNTER
Pardeep calls today with concerns that he has not been scheduled for his CT scan and rad onc appointment.  This message was sent to radiation to schedule.  Patient now has been scheduled.  Patient also has concerns of a sore throat for 1.5 weeks.  Patient had no fever or white patchy throat.  Per Dr. Soto, patient can follow up with his primary MD regarding the sore throat if it continues or wait until he sees him on 5/24.  Patient verbalized understanding and had no further questions or concerns.

## 2021-06-17 NOTE — PROGRESS NOTES
Patient seen in clinic today for follow-up of ITP and lung cancer.    Shira Abdullahi RN, Oncology Clinic   [facility]

## 2021-06-17 NOTE — PROGRESS NOTES
Patient was in today for a lab recheck of his platelet count for his ITP under the care of Dr Soto.  Patient is currently taking 10 mg daily prednisone.  This was decreased on 5/17/2021.  Secondary reviewed his results from today.  Platelet is 245.  Patient should continue taking prednisone 10 mg daily and keep his appointments for Monday, 5/24.  Patient waited in the lobby and was given this information.  He verbalized understanding.    Sylvia Su RN

## 2021-06-17 NOTE — PROGRESS NOTES
Patient was in today for a lab recheck of his platelet count for his ITP under the care of Dr Soto.  Patient is currently taking prednisone 20 mg daily.  Platelet count today is 280 .  Dr Soto is happy with this and patient should decrease his prednisone 10 mg daily.  Patient should come in 05/20  for a lab only appointment.  Patient was given a copy of his results as he waited in the lobby.  He verbalized understanding.

## 2021-06-17 NOTE — PROGRESS NOTES
Patient was in today for a lab recheck of his platelet count for his ITP under the care of Dr Soto.  Patient is currently taking prednisone 20 mg daily.  Dr Soto wanted patient to come back a second time this week to make sure his platelet count does not continue to drop.  Platelet count today is 194.  Dr Soto is happy with this and patient should continue taking prednisone 20 mg daily.  Patient should keep his already scheduled appointment on 5/17 for a lab only appointment.  If stable at that time, Dr Soto may decide to decrease down on the prednisone.  Patient is also scheduled on 5/24 for lab, MD.  Patient was given a copy of his results as he waited in the lobby.  He verbalized understanding.    Sylvia Su RN

## 2021-06-17 NOTE — PROGRESS NOTES
Patient in today for a lab recheck of his platelet count for his ITP under the care Dr Soto.  Patient is currently taking prednisone 20 mg daily and is tapering down on his Lomotil.  Patient is having weekly lab checks and will see Dr Soto at the end of May.  Lab results came back and have been reviewed by Dr Soto.  Patient's platelet count today is 143.  Patient should continue taking prednisone 20 mg Dr Soto would like him to have his labs rechecked on Thursday, 5/13.  Patient verbalized understanding and is going to the  to get that lab appointment scheduled and left the clinic ambulatory at 9:05 AM.    Sylvia Su RN

## 2021-06-18 NOTE — PROGRESS NOTES
ASSESSMENT:  1. Community acquired pneumonia of left lower lobe of lung     2. ARF (acute renal failure)           PLAN:    Community acquired pneumonia of left lower lobe of lung  Tolerating Levaquin well.  Has no fevers or chills since discharge.  Cough cleared after 3 days.  Continue plan of care with the Levaquin for a full 14 days and repeat CT scan in 6 weeks.  I did advise patient to hold his atorvastatin while he is on Levaquin to prevent possible liver inflammation.  Restart atorvastatin once finished with Levaquin.    ARF (acute renal failure)  Resolved once treatment started for the community acquired pneumonia and sepsis.  Had normal GFR and creatinine upon discharge.    Of note, patient recently had a preop exam for planned cataract surgeries.  His cataract surgeries now been delayed for a month.  Patient has fully resolved from his pneumonia and sepsis.  As such he is cleared again for the cataract surgery.  No further testing needed at this time.  If they do need an update, I will write a letter to inform them that he is still cleared.      There are no discontinued medications.    Return in about 6 months (around 11/14/2018) for Annual physical.    CHIEF COMPLAINT:  Chief Complaint   Patient presents with     Follow-up     Inpt, 5/6/18 to 5/9/18, [place], Sepsis Due to CAP     Follow-up     CAD; HTN     Paperwork     FMLA       HISTORY OF PRESENT ILLNESS:  Pardeep is a 62 y.o. male follow-up from recent hospitalization for community-acquired pneumonia with sepsis and acute renal failure.  He was in the hospital from 5 6 until 5 9.  None of his chronic medications were changed, he was started on Zosyn and vancomycin, and then switched over to Levaquin.  Due to the past history of splenectomy, recent surgeries, was determined for him to be on a 14 day course of Levaquin.  Also with consolidation as long the pulmonologist has requested a follow-up CT in 6 weeks for further evaluation.  Overall patient  "is doing much better.  He has had no fevers or chills since discharge.  When he discharged he still had a mild cough, but that has actually improved since discharge.  He is denying any tendon pain throughout the body.  He is slowly getting back to exercise and he is using his incentive spirometry at least twice an hour while he is awake.  He has returned to work.    REVIEW OF SYSTEMS:     Pertinent items are noted in HPI.  All other systems are negative  PFSH:  Reviewed, no changes      TOBACCO USE:  History   Smoking Status     Never Smoker   Smokeless Tobacco     Never Used       VITALS:  Vitals:    05/14/18 1515   BP: 106/54   Patient Site: Left Arm   Patient Position: Sitting   Cuff Size: Adult Large   Pulse: 64   Resp: 12   Temp: 98.4  F (36.9  C)   TempSrc: Oral   Weight: 176 lb 4.8 oz (80 kg)   Height: 5' 9.5\" (1.765 m)     Wt Readings from Last 3 Encounters:   05/14/18 176 lb 4.8 oz (80 kg)   05/09/18 180 lb 1 oz (81.7 kg)   05/06/18 176 lb (79.8 kg)       PHYSICAL EXAM:   /54 (Patient Site: Left Arm, Patient Position: Sitting, Cuff Size: Adult Large)  Pulse 64  Temp 98.4  F (36.9  C) (Oral)   Resp 12  Ht 5' 9.5\" (1.765 m)  Wt 176 lb 4.8 oz (80 kg)  BMI 25.66 kg/m2  General appearance: alert, appears stated age and cooperative  Head: Normocephalic, without obvious abnormality, atraumatic  Lungs: clear to auscultation bilaterally  Heart: regular rate and rhythm, S1, S2 normal, no murmur, click, rub or gallop  Extremities: extremities normal, atraumatic, no cyanosis or edema  Pulses: 2+ and symmetric  Lymph nodes: Cervical, supraclavicular, and axillary nodes normal.  Neurologic: Mental status: Alert, oriented, thought content appropriate    DATA REVIEWED:  Additional History from Old Records Summarized (2): Hospital records from [id number] to include the consult notes from pulmonology were reviewed  Decision to Obtain Records (1): All records were available in electronic health record  Radiology Tests " Summarized or Ordered (1): CT scan on 5/6 did show a masslike consolidation left lower lobe additionally a 1.8 cm nodule in the left lower lobe.  Plan for short-term follow-up CT scan in 6 weeks  Labs Reviewed or Ordered (1): Patient's kidney function was evaluated while he was in the hospital and returned to normal by discharge.  His anemia stayed stable during the hospitalization and his white count was improving and back down to 14.3 by discharge.  Medicine Test Summarized or Ordered (1): None  Independent Review of EKG or X-RAY(2 each): None    The visit lasted a total of 30 minutes face to face with the patient. Over 50% of the time was spent counseling and educating the patient about continue incentive spirometry, holding his Lipitor, warning signs and symptoms for return to clinic or ER, also in the future if he is ever having symptoms such as a cough or shortness of breath or knee ankle pain that is lasting longer than 10-14 days, that usually does indicate it is no longer a viral infection and should be evaluated in office..    MEDICATIONS:  Current Outpatient Prescriptions   Medication Sig Dispense Refill     aspirin 81 mg chewable tablet Chew 1 tablet (81 mg total) daily.  0     atorvastatin (LIPITOR) 40 MG tablet Take 1 tablet (40 mg total) by mouth at bedtime. (Patient taking differently: Take 40 mg by mouth every morning. ) 90 tablet 3     fluocinonide (LIDEX) 0.05 % cream Apply 1 application topically 2 (two) times a day as needed. Sparingly to affected areas 30 g 0     fluoride, sodium, (SF) 1.1 % Gel dental gel UTD TAT 1 application bedtime. UTD TAT (Patient taking differently: Take 1 application by mouth at bedtime. UTD TAT 1 application bedtime. UTD TAT ) 56 g 1     levoFLOXacin (LEVAQUIN) 750 MG tablet Take 1 tablet (750 mg total) by mouth daily for 10 days. 10 tablet 0     levothyroxine (SYNTHROID, LEVOTHROID) 125 MCG tablet Take 125 mcg by mouth daily.       lisinopril (PRINIVIL,ZESTRIL) 5 MG  tablet Take 1 tablet (5 mg total) by mouth daily. 90 tablet 0     metoprolol succinate (TOPROL-XL) 25 MG Take 12.5 mg by mouth daily.       multivitamin therapeutic tablet Take 2 tablets by mouth daily.       pantoprazole (PROTONIX) 40 MG tablet Take 1 tablet (40 mg total) by mouth daily. 90 tablet 3     No current facility-administered medications for this visit.        This note has been dictated using voice recognition software. Any grammatical or context distortions are unintentional and inherent to the software

## 2021-06-18 NOTE — PATIENT INSTRUCTIONS - HE
Patient Instructions by Dov Morales DO at 9/2/2020  3:00 PM     Author: Dov Morales DO Service: -- Author Type: Physician    Filed: 9/2/2020  3:47 PM Encounter Date: 9/2/2020 Status: Signed    : Dov Morales DO (Physician)         Patient Education     Your Health Risk Assessment indicates you feel you are not in good physical health.    A healthy lifestyle helps keep the body fit and the mind alert. It helps protect you from disease, helps you fight disease, and helps prevent chronic disease (disease that doesn't go away) from getting worse. This is important as you get older and begin to notice twinges in muscles and joints and a decline in the strength and stamina you once took for granted. A healthy lifestyle includes good healthcare, good nutrition, weight control, recreation, and regular exercise. Avoid harmful substances and do what you can to keep safe. Another part of a healthy lifestyle is stay mentally active and socially involved.    Good healthcare     Have a wellness visit every year.     If you have new symptoms, let us know right away. Don't wait until the next checkup.     Take medicines exactly as prescribed and keep your medicines in a safe place. Tell us if your medicine causes problems.   Healthy diet and weight control     Eat 3 or 4 small, nutritious, low-fat, high-fiber meals a day. Include a variety of fruits, vegetables, and whole-grain foods.     Make sure you get enough calcium in your diet. Calcium, vitamin D, and exercise help prevent osteoporosis (bone thinning).     If you live alone, try eating with others when you can. That way you get a good meal and have company while you eat it.     Try to keep a healthy weight. If you eat more calories than your body uses for energy, it will be stored as fat and you will gain weight.     Recreation   Recreation is not limited to sports and team events. It includes any activity that provides  relaxation, interest, enjoyment, and exercise. Recreation provides an outlet for physical, mental, and social energy. It can give a sense of worth and achievement. It can help you stay healthy.       Patient Education     Exercise for a Healthier Heart  You may wonder how you can improve the health of your heart. If youre thinking about exercise, youre on the right track. You dont need to become an athlete, but you do need a certain amount of brisk exercise to help strengthen your heart. If you have been diagnosed with a heart condition, your doctor may recommend exercise to help stabilize your condition. To help make exercise a habit, choose safe, fun activities.       Be sure to check with your health care provider before starting an exercise program.    Why exercise?  Exercising regularly offers many healthy rewards. It can help you do all of the following:    Improve your blood cholesterol levels to help prevent further heart trouble    Lower your blood pressure to help prevent a stroke or heart attack    Control diabetes, or reduce your risk of getting this disease    Improve your heart and lung function    Reach and maintain a healthy weight    Make your muscles stronger and more limber so you can stay active    Prevent falls and fractures by slowing the loss of bone mass (osteoporosis)    Manage stress better  Exercise tips  Ease into your routine. Set small goals. Then build on them.  Exercise on most days. Aim for a total of 150 or more minutes of moderate to  vigorous intensity activity each week. Consider 40 minutes, 3 to 4 times a week. For best results, activity should last for 40 minutes on average. It is OK to work up to the 40 minute period over time. Examples of moderate-intensity activity is walking one mile in 15 minutes or 30 to 45 minutes of yard work.  Step up your daily activity level. Along with your exercise program, try being more active throughout the day. Walk instead of drive. Do more  household tasks or yard work.  Choose one or more activities you enjoy. Walking is one of the easiest things you can do. You can also try swimming, riding a bike, or taking an exercise class.  Stop exercising and call your doctor if you:    Have chest pain or feel dizzy or lightheaded    Feel burning, tightness, pressure, or heaviness in your chest, neck, shoulders, back, or arms    Have unusual shortness of breath    Have increased joint or muscle pain    Have palpitations or an irregular heartbeat      9393-1782 [employer]. [address] [place] [id number]. All rights reserved. This information is not intended as a substitute for professional medical care. Always follow your healthcare professional's instructions.         Patient Education   Understanding Advance Care Planning  Advance care planning is the process of deciding ones own future medical care. It helps ensure that if you cant speak for yourself, your wishes can still be carried out. The plan is a series of legal documents that note a persons wishes. The documents vary by state. Advance care planning may be done when a person has a serious illness that is expected to get worse. It may be done before major surgery. And it can help you and your family be prepared in case of a major illness or injury. Advance care planning helps with making decisions at these times.       A health care proxy is a person who acts as the voice of a patient when the patient cant speak for himself or herself. The name of this role varies by state. It may be called a Durable Medical Power of  or Durable Power of  for Healthcare. It may be called an agent, surrogate, or advocate. Or it may be called a representative or decision maker. It is an official duty that is identified by a legal document. The document also varies by state.    Why Is Advance Care Planning Important?  If a person communicates their healthcare wishes:    They will be  given medical care that matches their values and goals.    Their family members will not be forced to make decisions in a crisis with no guidance.  Creating a Plan  Making an advance care plan is often done in 3 steps:    Thinking about ones wishes. To create an advance care plan, you should think about what kind of medical treatment you would want if you lose the ability to communicate. Are there any situations in which you would refuse or stop treatment? Are there therapies you would want or not want? And whom do you want to make decisions for you? There are many places to learn more about how to plan for your care. Ask your doctor or  for resources.    Picking a health care proxy. This means choosing a trusted person to speak for you only when you cant speak for yourself. When you cannot make medical decisions, your proxy makes sure the instructions in your advance care plan are followed. A proxy does not make decisions based on his or her own opinions. They must put aside those opinions and values if needed, and carry out your wishes.    Filling out the legal documents. There are several kinds of legal documents for advance care planning. Each one tells health care providers your wishes. The documents may vary by state. They must be signed and may need to be witnessed or notarized. You can cancel or change them whenever you wish. Depending on your state, the documents may include a Healthcare Proxy form, Living Will, Durable Medical Power of , Advance Directive, or others.  The Familys Role  The best help a family can give is to support their loved ones wishes. Open and honest communication is vital. Family should express any concerns they have about the patients choices while the patient can still make decisions.    [phone] [employer]. [address] [place] [address], [place] [address]. All rights reserved. This information is not intended as a substitute for professional medical  care. Always follow your healthcare professional's instructions.         Also, [facility] offers a free, downloadable health care directive that allows you to share your treatment choices and personal preferences if you cannot communicate your wishes. It also allows you to appoint another person (called a health care agent) to make health care decisions if you are unable to do so. You can download an advance directive by going here: [email or web address]     Patient Education   Personalized Prevention Plan  You are due for the preventive services outlined below.  Your care team is available to assist you in scheduling these services.  If you have already completed any of these items, please share that information with your care team to update in your medical record.  Health Maintenance   Topic Date Due   ? HIV SCREENING  07/11/1970   ? MEDICARE ANNUAL WELLNESS VISIT  07/11/2020   ? TD 18+ HE  10/02/2020 (Originally 7/11/1973)   ? PNEUMOCOCCAL IMMUNIZATION 65+ HIGH/HIGHEST RISK (1 of 2 - PCV13) 10/02/2020 (Originally 7/11/2020)   ? INFLUENZA VACCINE RULE BASED (1) 10/02/2020 (Originally 8/1/2020)   ? TDAP ADULT ONE TIME DOSE  10/02/2020 (Originally 7/11/1973)   ? ZOSTER VACCINES (1 of 2) 10/02/2020 (Originally 7/11/2005)   ? CORONARY ARTERY DISEASE FOLLOW-UP  03/02/2021   ? HYPERTENSION FOLLOW-UP  03/02/2021   ? FALL RISK ASSESSMENT  07/07/2021   ? LIPID  10/06/2022   ? ADVANCE CARE PLANNING  10/06/2022   ? COLORECTAL CANCER SCREENING  02/07/2024   ? HEPATITIS C SCREENING  Completed   ? HEPATITIS B VACCINES  Aged Out

## 2021-06-19 NOTE — PROGRESS NOTES
Pulmonary Clinic Follow Up    Cc: follow up CAP    HPI: 62yoM with hodkin's lymphoma s/[ radiation at age 17 here for follow up after hospitalization for community acquired pneumonia.    He had a dense left lower lobe infiltrate and significant pleuritic pain as a result. He was febrile into 102. He completed a 14 day course of Levaquin--we had no bacteria identified.    He feels better than when in the hospital. Is now exercising 4-5 times per week. No shortness of breath but ongoing cough although it is better. Had a cold 2 weeks ago so cough got worse again. Pain is completely gone and no further fevers.    ROS: 12-point ROS performed and notable for cough. All else negative.    Current Outpatient Prescriptions   Medication Sig     aspirin 81 mg chewable tablet Chew 1 tablet (81 mg total) daily.     atorvastatin (LIPITOR) 40 MG tablet Take 1 tablet (40 mg total) by mouth at bedtime. (Patient taking differently: Take 40 mg by mouth every morning. )     fluocinonide (LIDEX) 0.05 % cream Apply 1 application topically 2 (two) times a day as needed. Sparingly to affected areas     fluoride, sodium, (SF) 1.1 % Gel dental gel UTD TAT 1 application bedtime. UTD TAT (Patient taking differently: Take 1 application by mouth at bedtime. UTD TAT 1 application bedtime. UTD TAT )     levothyroxine (SYNTHROID, LEVOTHROID) 125 MCG tablet Take 125 mcg by mouth daily.     lisinopril (PRINIVIL,ZESTRIL) 5 MG tablet Take 1 tablet (5 mg total) by mouth daily.     metoprolol succinate (TOPROL-XL) 25 MG Take 12.5 mg by mouth daily.     multivitamin therapeutic tablet Take 2 tablets by mouth daily.     pantoprazole (PROTONIX) 40 MG tablet Take 1 tablet (40 mg total) by mouth daily.     Vitals:    06/28/18 1324   BP: 138/68   Pulse: 80   Resp: 20   SpO2: 98%   RA    CT Chest: personally reviewed with patient and wife. Significant improvement in the left lower lobe infiltrate. Nodule at the base anterior and adjacent to the pericardium has  not changed.    ASSESSMENT/PLAN:  1) CAP--resolved  2) Lung nodule  -Lung nodule likely scar/residual infection given its location, but unable to rule out cancer. Explained to patient this is a difficult spot to biopsy as it is on the diaphragm next to the heart. PET scan also less reliable given its location at the diaphragm as well. I think repeating CT scan in 3 months is reasonable. Patient prefers to get scan and results via phone which is fine. Will schedule for 9/2018. He will return to clinic if the nodule has grown.    Lay Powers MD  Electronically signed on 6/28/2018 2:03 PM

## 2021-06-20 NOTE — H&P (VIEW-ONLY)
Preoperative Exam    Scheduled Procedure: CT Guided Biopsy  Surgery Date:  10/2/18  Surgery Location: [facility], fax [phone]  Surgeon:  Dr. Lay Powers  Assessment/Plan:     Problem List Items Addressed This Visit     Coronary artery disease involving native coronary artery of native heart without angina pectoris    Lung nodule - Primary     Plan for biopsy with conscious sedation.  Additionally given the finding, patient is cleared for any type of anesthesia that may be required for this or further procedures.  Will get BMP for confirmation of kidney function.  EKG is on file in normal sinus rhythm.  Patient is approved for this surgery and any follow-up in surgery that is needed within the next 30 days.         Relevant Orders    Basic Metabolic Panel (Completed)    Hemoglobin (Completed)    INR (Completed)      Other Visit Diagnoses     Preop examination        Need for vaccination        Relevant Orders    Influenza, Recombinant, Inj, Quadrivalent, PF, 18+YRS (Completed)            Surgical Procedure Risk: Intermediate (reported cardiac risk generally 1-5%)  Have you had prior anesthesia?: Yes  Have you or any family members had a previous anesthesia reaction:  No  Do you or any family members have a history of a clotting or bleeding disorder?: No  Cardiac Risk Assessment: increased risk for major cardiac complications based on  History of coronary artery disease with 1 stent in 2008.  EKG has been unchanged since that timeframe.    Patient approved for surgery with general or local anesthesia.      Functional Status: Independent  Patient plans to recover at home with family.     Subjective:      Pardeep Wilson is a 63 y.o. male who presents for a preoperative consultation.  In May this past year, patient developed a pneumonia requiring hospitalization.  During that time a CT of the chest was completed and noted to have a nodule.  Due to his previous history of Hodgkin's lymphoma, as well as  parotid cancer and treatments, and very distant history of smoking, follow-up CT was done 6 weeks later.  The nodule had changed slightly in size and again repeated 3 months later showed increase in size.  No lymphadenopathy.  However with the change in size it is worrisome given his history for possible neoplasm.  As such it was decided for a biopsy of the area.  Of note patient is pretty certain that it is most likely cancer, given his history of having 2 cancers previously.  He is not depressed about it, he just keeps thinking ahead of where he is currently at.  We discussed that the current plan is to get some tissue and get a definitive of what the nodule is.  And then once we have that a plan will be set after.  Patient understands that he should focus on that currently.  We also did discuss that he does have a well on file, however he does not have a living well.  He was given the information for honoring choices to take home and he will discuss with his wife and bring back for filing in his chart.    All other systems reviewed and are negative, other than those listed in the HPI.    Pertinent History  Do you have difficulty breathing or chest pain after walking up a flight of stairs: No  History of obstructive sleep apnea: No  Steroid use in the last 6 months: Yes: In May.  Frequent Aspirin/NSAID use: Yes: Patient will hold for 7 days  Prior Blood Transfusion: Yes: Distant past. No complications.  Prior Blood Transfusion Reaction: No  If for some reason prior to, during or after the procedure, if it is medically indicated, would you be willing to have a blood transfusion?:  There is no transfusion refusal.    Current Outpatient Prescriptions   Medication Sig Dispense Refill     aspirin 81 mg chewable tablet Chew 1 tablet (81 mg total) daily.  0     atorvastatin (LIPITOR) 40 MG tablet Take 1 tablet (40 mg total) by mouth at bedtime. (Patient taking differently: Take 40 mg by mouth every morning. ) 90 tablet 3      fluocinonide (LIDEX) 0.05 % cream Apply 1 application topically 2 (two) times a day as needed. Sparingly to affected areas 30 g 0     fluoride, sodium, (SF) 1.1 % Gel dental gel UTD TAT 1 application bedtime. UTD TAT (Patient taking differently: Take 1 application by mouth at bedtime. UTD TAT 1 application bedtime. UTD TAT ) 56 g 1     levothyroxine (SYNTHROID, LEVOTHROID) 125 MCG tablet Take 125 mcg by mouth daily.       lisinopril (PRINIVIL,ZESTRIL) 5 MG tablet TAKE 1 TABLET DAILY 90 tablet 2     metoprolol succinate (TOPROL-XL) 25 MG Take 12.5 mg by mouth daily.       moxifloxacin (VIGAMOX) 0.5 % ophthalmic solution Administer 1 drop to both eyes 4 (four) times a day.       multivitamin therapeutic tablet Take 2 tablets by mouth daily.       pantoprazole (PROTONIX) 40 MG tablet Take 1 tablet (40 mg total) by mouth daily. 90 tablet 3     prednisoLONE acetate (PRED-FORTE) 1 % ophthalmic suspension Administer 1 drop to both eyes 4 (four) times a day.       No current facility-administered medications for this visit.         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     Esophageal reflux      Hodgkin's lymphoma (H)     s/p radiation to the mediastinum at age 17     Hyperlipemia      Hypertension      Hypothyroidism      Psoriasis        Past Surgical History:   Procedure Laterality Date     CORONARY ANGIOPLASTY      Stent Placement     EYE SURGERY Bilateral     Cataracts     PAROTIDECTOMY       VA LAP,DIAGNOSTIC ABDOMEN N/A 11/7/2017    Procedure: DIAGNOSTIC LAPAROSCOPY,  LYSIS OF ADHESIONS, SMALL BOWEL  "RESECTION;  Surgeon: Brian Granados MD;  Location: [facility] OR;  Service: General     SPLENECTOMY, TOTAL  1973     WISDOM TOOTH EXTRACTION         Social History     Social History     Marital status:      Spouse name: Eliane     Number of children: 2     Years of education: 18     Occupational History      Other     [employer]     Social History Main Topics     Smoking status: Never Smoker     Smokeless tobacco: Never Used     Alcohol use 0.6 oz/week     1 Standard drinks or equivalent per week     Drug use: No     Sexual activity: Yes     Partners: Female     Birth control/ protection: Surgical      Comment: Partner = Hysterectomy     Other Topics Concern     Not on file     Social History Narrative         Objective:     Vitals:    09/28/18 1433   BP: 108/66   Pulse: 64   Resp: 12   Temp: 97.9  F (36.6  C)   TempSrc: Oral   Weight: 175 lb 14.4 oz (79.8 kg)   Height: 5' 9\" (1.753 m)         Physical Exam:  Physical Exam   Constitutional: He is oriented to person, place, and time. He appears well-developed and well-nourished.   HENT:   Head: Normocephalic and atraumatic.   Nose: Nose normal.   Mouth/Throat: Oropharynx is clear and moist.   Eyes: Conjunctivae and EOM are normal.   Cardiovascular: Normal rate, regular rhythm, normal heart sounds and intact distal pulses.    Pulmonary/Chest: Effort normal and breath sounds normal.   Abdominal: Soft. Bowel sounds are normal.   Neurological: He is alert and oriented to person, place, and time. He has normal strength and normal reflexes. No cranial nerve deficit or sensory deficit.   Reflex Scores:       Bicep reflexes are 2+ on the right side and 2+ on the left side.       Patellar reflexes are 2+ on the right side and 2+ on the left side.       Achilles reflexes are 2+ on the right side and 2+ on the left side.  Skin: Skin is warm and dry.   Psychiatric: He has a normal mood and affect.   Nursing note and vitals reviewed.    Patient Care " Team:  Dov Morales DO as PCP - General  Ricardo Iglesias MD as Physician (Ophthalmology)  Armin Suarez MD as Physician (Cardiology)  Lay Powers MD as Physician (Pulmonary Disease)      Patient Instructions     Hold all supplements, aspirin and NSAIDs for 7 days prior to surgery.  Follow your surgeon's direction on when to stop eating and drinking prior to surgery.  Your surgeon will be managing your pain after your surgery.    Remove all jewelry and metal piercings before your surgery.   Hold all medications the day of procedure except metoprolol and levothyroxine which should be taken with a small sip of water.        Labs:  Recent Results (from the past 120 hour(s))   Basic Metabolic Panel    Collection Time: 09/28/18  3:23 PM   Result Value Ref Range    Sodium 140 136 - 145 mmol/L    Potassium 4.6 3.5 - 5.0 mmol/L    Chloride 107 98 - 107 mmol/L    CO2 25 22 - 31 mmol/L    Anion Gap, Calculation 8 5 - 18 mmol/L    Glucose 99 70 - 125 mg/dL    Calcium 10.0 8.5 - 10.5 mg/dL    BUN 22 8 - 22 mg/dL    Creatinine 1.19 0.70 - 1.30 mg/dL    GFR MDRD Af Amer >60 >60 mL/min/1.73m2    GFR MDRD Non Af Amer >60 >60 mL/min/1.73m2   Hemoglobin    Collection Time: 09/28/18  3:23 PM   Result Value Ref Range    Hemoglobin 13.4 (L) 14.0 - 18.0 g/dL   INR    Collection Time: 09/28/18  3:23 PM   Result Value Ref Range    INR 1.03 0.90 - 1.10       Immunization History   Administered Date(s) Administered     INFLUENZA,RECOMBINANT,INJ,PF QUADRIVALENT 18+YRS 09/28/2018     Influenza, inj, historic,unspecified 09/26/2012     Influenza, seasonal,quad inj 36+ mos 09/14/2016     Influenza,seasonal quad, PF, 36+MOS 10/06/2017     Pneumo Polysac 23-V 10/06/2017           Electronically signed by Dov Morales DO 10/01/18 2:39 PM

## 2021-06-20 NOTE — PROCEDURES
POST PROCEDURE NOTE    Post procedure Diagnosis: Left lower lobe lung nodule    Technical Procedure: CT guided lung biopsy.    Findings: Left lower lobe nodule    Physician: Sam Wilkerson    EBL:  Minimal    Specimens Removed:  3 20 gauge core biopsies    Complications:  None.

## 2021-06-20 NOTE — PROGRESS NOTES
Preoperative Exam    Scheduled Procedure: Cataract Surgery  Surgery Date:  9/12/18 and 9/26/18  Surgery Location: [facility], [place], [place] (FAX: [phone])    Surgeon:  Dr. Iglesias    Assessment/Plan:     Problem List Items Addressed This Visit     Hypothyroidism     Recheck thyroid level and adjust supplementation as needed.         Relevant Orders    Thyroid Cascade (Completed)    HM2(CBC w/o Differential) (Completed)    Hypertension     Controlled on current dosing of metoprolol and lisinopril.  Will have patient hold lisinopril the day of surgery.         Relevant Orders    Basic Metabolic Panel (Completed)      Other Visit Diagnoses     Preop examination    -  Primary    Cataract                Surgical Procedure Risk: Low (reported cardiac risk generally < 1%)  Have you had prior anesthesia?: Yes  Have you or any family members had a previous anesthesia reaction:  No  Do you or any family members have a history of a clotting or bleeding disorder?: No  Cardiac Risk Assessment: no increased risk for major cardiac complications    Patient approved for surgery with general or local anesthesia.    Functional Status: Independent  Patient plans to recover at home with family.     Subjective:      Pardeep Wilson is a 63 y.o. male who presents for a preoperative consultation.  Plan for cataract surgery in both eyes approximately 2 weeks apart.  Patient has been noticing decreasing vision and difficulty in low light situations over the past 3 years and now worsening to the point that difficulty driving.  He does use glasses for distance as well as reading and most recently has even noticed her difficulty reading secondary to the cataracts.    All other systems reviewed and are negative, other than those listed in the HPI.    Pertinent History  Do you have difficulty breathing or chest pain after walking up a flight of stairs: No  History of obstructive sleep apnea: No  Steroid use in the last 6 months:  No  Frequent Aspirin/NSAID use: Yes: Daily aspirin  Prior Blood Transfusion: Yes: Over 10 years ago  Prior Blood Transfusion Reaction: No  If for some reason prior to, during or after the procedure, if it is medically indicated, would you be willing to have a blood transfusion?:  There is no transfusion refusal.    Current Outpatient Prescriptions   Medication Sig Dispense Refill     aspirin 81 mg chewable tablet Chew 1 tablet (81 mg total) daily.  0     atorvastatin (LIPITOR) 40 MG tablet Take 1 tablet (40 mg total) by mouth at bedtime. (Patient taking differently: Take 40 mg by mouth every morning. ) 90 tablet 3     fluoride, sodium, (SF) 1.1 % Gel dental gel UTD TAT 1 application bedtime. UTD TAT (Patient taking differently: Take 1 application by mouth at bedtime. UTD TAT 1 application bedtime. UTD TAT ) 56 g 1     levothyroxine (SYNTHROID, LEVOTHROID) 125 MCG tablet Take 125 mcg by mouth daily.       lisinopril (PRINIVIL,ZESTRIL) 5 MG tablet TAKE 1 TABLET DAILY 90 tablet 2     metoprolol succinate (TOPROL-XL) 25 MG Take 12.5 mg by mouth daily.       multivitamin therapeutic tablet Take 2 tablets by mouth daily.       pantoprazole (PROTONIX) 40 MG tablet Take 1 tablet (40 mg total) by mouth daily. 90 tablet 3     fluocinonide (LIDEX) 0.05 % cream Apply 1 application topically 2 (two) times a day as needed. Sparingly to affected areas 30 g 0     No current facility-administered medications for this visit.         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     Esophageal reflux      Hodgkin's lymphoma (H)     s/p  "radiation to the mediastinum at age 17     Hyperlipemia      Hypertension      Hypothyroidism      Psoriasis        Past Surgical History:   Procedure Laterality Date     CORONARY ANGIOPLASTY      Stent Placement     PAROTIDECTOMY       [place] LAP,DIAGNOSTIC ABDOMEN N/A 11/7/2017    Procedure: DIAGNOSTIC LAPAROSCOPY,  LYSIS OF ADHESIONS, SMALL BOWEL RESECTION;  Surgeon: Brian Granados MD;  [place] [facility] [place]  Service: General     SPLENECTOMY, TOTAL  1973     WISDOM TOOTH EXTRACTION         Social History     Social History     Marital status:      Spouse name: Eliane     Number of children: 2     Years of education: 18     Occupational History      Other     [employer]     Social History Main Topics     Smoking status: Never Smoker     Smokeless tobacco: Never Used     Alcohol use 0.6 oz/week     1 Standard drinks or equivalent per week     Drug use: No     Sexual activity: Yes     Partners: Female     Birth control/ protection: Surgical      Comment: Partner = Hysterectomy     Other Topics Concern     Not on file     Social History Narrative       Patient Care Team:  Dov Morales DO as PCP - General  Ricardo Iglesias MD as Physician (Ophthalmology)  Armin Suarez MD as Physician (Cardiology)  Lay Powers MD as Physician (Pulmonary Disease)          Objective:     Vitals:    08/31/18 0747   BP: 100/58   Pulse: 60   Resp: 12   Temp: 97.8  F (36.6  C)   TempSrc: Oral   Weight: 175 lb 9.6 oz (79.7 kg)   Height: 5' 9\" (1.753 m)         Physical Exam:  Physical Exam   Constitutional: He is oriented to person, place, and time. He appears well-developed and well-nourished.   HENT:   Head: Normocephalic and atraumatic.   Nose: Nose normal.   Mouth/Throat: Oropharynx is clear and moist.   Eyes: EOM are normal.   Cardiovascular: Normal rate, regular rhythm, normal heart sounds and intact distal pulses.    Pulmonary/Chest: Effort normal and breath sounds normal.   Abdominal: " Soft. Bowel sounds are normal.   Neurological: He is alert and oriented to person, place, and time. He has normal strength and normal reflexes. No cranial nerve deficit or sensory deficit.   Reflex Scores:       Bicep reflexes are 2+ on the right side and 2+ on the left side.       Patellar reflexes are 2+ on the right side and 2+ on the left side.       Achilles reflexes are 2+ on the right side and 2+ on the left side.  Skin: Skin is warm and dry.   Psychiatric: He has a normal mood and affect.   Nursing note and vitals reviewed.        Patient Instructions     Follow your surgeon's direction on when to stop eating and drinking prior to surgery.  Your surgeon will be managing your pain after your surgery.    Remove all jewelry and metal piercings before your surgery.   Remove nail polish from fingers before surgery.  Hold lisinopril the day surgery    Labs:  Recent Results (from the past 48 hour(s))   Thyroid [place]    Collection Time: 08/31/18  8:29 AM   Result Value Ref Range    TSH 2.96 0.30 - 5.00 uIU/mL   Basic Metabolic Panel    Collection Time: 08/31/18  8:29 AM   Result Value Ref Range    Sodium 143 136 - 145 mmol/L    Potassium 4.4 3.5 - 5.0 mmol/L    Chloride 109 (H) 98 - 107 mmol/L    CO2 25 22 - 31 mmol/L    Anion Gap, Calculation 9 5 - 18 mmol/L    Glucose 80 70 - 125 mg/dL    Calcium 9.7 8.5 - 10.5 mg/dL    BUN 24 (H) 8 - 22 mg/dL    Creatinine 1.01 0.70 - 1.30 mg/dL    GFR MDRD Af Amer >60 >60 mL/min/1.73m2    GFR MDRD Non Af Amer >60 >60 mL/min/1.73m2   HM2(CBC w/o Differential)    Collection Time: 08/31/18  8:29 AM   Result Value Ref Range    WBC 8.1 4.0 - 11.0 thou/uL    RBC 4.09 (L) 4.40 - 6.20 mill/uL    Hemoglobin 13.0 (L) 14.0 - 18.0 g/dL    Hematocrit 38.6 (L) 40.0 - 54.0 %    MCV 94 80 - 100 fL    MCH 31.8 27.0 - 34.0 pg    MCHC 33.7 32.0 - 36.0 g/dL    RDW 11.7 11.0 - 14.5 %    Platelets 304 140 - 440 thou/uL    MPV 8.1 7.0 - 10.0 fL        Immunization History   Administered Date(s)  Administered     Influenza, inj, historic,unspecified 09/26/2012     Influenza, seasonal,quad inj 36+ mos 09/14/2016     Influenza,seasonal quad, PF, 36+MOS 10/06/2017     Pneumo Polysac 23-V 10/06/2017           Electronically signed by Dov Morales DO 08/31/18 7:56 AM

## 2021-06-20 NOTE — PROGRESS NOTES
Preoperative Exam    Scheduled Procedure: CT Guided Biopsy  Surgery Date:  10/2/18  Surgery Location: [facility], fax [phone]  Surgeon:  Dr. Lay Powers  Assessment/Plan:     Problem List Items Addressed This Visit     Coronary artery disease involving native coronary artery of native heart without angina pectoris    Lung nodule - Primary     Plan for biopsy with conscious sedation.  Additionally given the finding, patient is cleared for any type of anesthesia that may be required for this or further procedures.  Will get BMP for confirmation of kidney function.  EKG is on file in normal sinus rhythm.  Patient is approved for this surgery and any follow-up in surgery that is needed within the next 30 days.         Relevant Orders    Basic Metabolic Panel (Completed)    Hemoglobin (Completed)    INR (Completed)      Other Visit Diagnoses     Preop examination        Need for vaccination        Relevant Orders    Influenza, Recombinant, Inj, Quadrivalent, PF, 18+YRS (Completed)            Surgical Procedure Risk: Intermediate (reported cardiac risk generally 1-5%)  Have you had prior anesthesia?: Yes  Have you or any family members had a previous anesthesia reaction:  No  Do you or any family members have a history of a clotting or bleeding disorder?: No  Cardiac Risk Assessment: increased risk for major cardiac complications based on  History of coronary artery disease with 1 stent in 2008.  EKG has been unchanged since that timeframe.    Patient approved for surgery with general or local anesthesia.      Functional Status: Independent  Patient plans to recover at home with family.     Subjective:      Pardeep Wilson is a 63 y.o. male who presents for a preoperative consultation.  In May this past year, patient developed a pneumonia requiring hospitalization.  During that time a CT of the chest was completed and noted to have a nodule.  Due to his previous history of Hodgkin's lymphoma, as well as  parotid cancer and treatments, and very distant history of smoking, follow-up CT was done 6 weeks later.  The nodule had changed slightly in size and again repeated 3 months later showed increase in size.  No lymphadenopathy.  However with the change in size it is worrisome given his history for possible neoplasm.  As such it was decided for a biopsy of the area.  Of note patient is pretty certain that it is most likely cancer, given his history of having 2 cancers previously.  He is not depressed about it, he just keeps thinking ahead of where he is currently at.  We discussed that the current plan is to get some tissue and get a definitive of what the nodule is.  And then once we have that a plan will be set after.  Patient understands that he should focus on that currently.  We also did discuss that he does have a well on file, however he does not have a living well.  He was given the information for honoring choices to take home and he will discuss with his wife and bring back for filing in his chart.    All other systems reviewed and are negative, other than those listed in the HPI.    Pertinent History  Do you have difficulty breathing or chest pain after walking up a flight of stairs: No  History of obstructive sleep apnea: No  Steroid use in the last 6 months: Yes: In May.  Frequent Aspirin/NSAID use: Yes: Patient will hold for 7 days  Prior Blood Transfusion: Yes: Distant past. No complications.  Prior Blood Transfusion Reaction: No  If for some reason prior to, during or after the procedure, if it is medically indicated, would you be willing to have a blood transfusion?:  There is no transfusion refusal.    Current Outpatient Prescriptions   Medication Sig Dispense Refill     aspirin 81 mg chewable tablet Chew 1 tablet (81 mg total) daily.  0     atorvastatin (LIPITOR) 40 MG tablet Take 1 tablet (40 mg total) by mouth at bedtime. (Patient taking differently: Take 40 mg by mouth every morning. ) 90 tablet 3      fluocinonide (LIDEX) 0.05 % cream Apply 1 application topically 2 (two) times a day as needed. Sparingly to affected areas 30 g 0     fluoride, sodium, (SF) 1.1 % Gel dental gel UTD TAT 1 application bedtime. UTD TAT (Patient taking differently: Take 1 application by mouth at bedtime. UTD TAT 1 application bedtime. UTD TAT ) 56 g 1     levothyroxine (SYNTHROID, LEVOTHROID) 125 MCG tablet Take 125 mcg by mouth daily.       lisinopril (PRINIVIL,ZESTRIL) 5 MG tablet TAKE 1 TABLET DAILY 90 tablet 2     metoprolol succinate (TOPROL-XL) 25 MG Take 12.5 mg by mouth daily.       moxifloxacin (VIGAMOX) 0.5 % ophthalmic solution Administer 1 drop to both eyes 4 (four) times a day.       multivitamin therapeutic tablet Take 2 tablets by mouth daily.       pantoprazole (PROTONIX) 40 MG tablet Take 1 tablet (40 mg total) by mouth daily. 90 tablet 3     prednisoLONE acetate (PRED-FORTE) 1 % ophthalmic suspension Administer 1 drop to both eyes 4 (four) times a day.       No current facility-administered medications for this visit.         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     Esophageal reflux      Hodgkin's lymphoma (H)     s/p radiation to the mediastinum at age 17     Hyperlipemia      Hypertension      Hypothyroidism      Psoriasis        Past Surgical History:   Procedure Laterality Date     CORONARY ANGIOPLASTY      Stent Placement     EYE SURGERY Bilateral     Cataracts     PAROTIDECTOMY       [place] LAP,DIAGNOSTIC ABDOMEN N/A 11/7/2017    Procedure: DIAGNOSTIC LAPAROSCOPY,  LYSIS OF ADHESIONS, SMALL BOWEL  "RESECTION;  Surgeon: Brian Granados MD;  Location: [facility] OR;  Service: General     SPLENECTOMY, TOTAL  1973     WISDOM TOOTH EXTRACTION         Social History     Social History     Marital status:      Spouse name: Eliane     Number of children: 2     Years of education: 18     Occupational History      Other     [employer]     Social History Main Topics     Smoking status: Never Smoker     Smokeless tobacco: Never Used     Alcohol use 0.6 oz/week     1 Standard drinks or equivalent per week     Drug use: No     Sexual activity: Yes     Partners: Female     Birth control/ protection: Surgical      Comment: Partner = Hysterectomy     Other Topics Concern     Not on file     Social History Narrative         Objective:     Vitals:    09/28/18 1433   BP: 108/66   Pulse: 64   Resp: 12   Temp: 97.9  F (36.6  C)   TempSrc: Oral   Weight: 175 lb 14.4 oz (79.8 kg)   Height: 5' 9\" (1.753 m)         Physical Exam:  Physical Exam   Constitutional: He is oriented to person, place, and time. He appears well-developed and well-nourished.   HENT:   Head: Normocephalic and atraumatic.   Nose: Nose normal.   Mouth/Throat: Oropharynx is clear and moist.   Eyes: Conjunctivae and EOM are normal.   Cardiovascular: Normal rate, regular rhythm, normal heart sounds and intact distal pulses.    Pulmonary/Chest: Effort normal and breath sounds normal.   Abdominal: Soft. Bowel sounds are normal.   Neurological: He is alert and oriented to person, place, and time. He has normal strength and normal reflexes. No cranial nerve deficit or sensory deficit.   Reflex Scores:       Bicep reflexes are 2+ on the right side and 2+ on the left side.       Patellar reflexes are 2+ on the right side and 2+ on the left side.       Achilles reflexes are 2+ on the right side and 2+ on the left side.  Skin: Skin is warm and dry.   Psychiatric: He has a normal mood and affect.   Nursing note and vitals reviewed.    Patient Care " Team:  Dov Morales DO as PCP - General  Ricardo Iglesias MD as Physician (Ophthalmology)  Armin Suarez MD as Physician (Cardiology)  Lay Powers MD as Physician (Pulmonary Disease)      Patient Instructions     Hold all supplements, aspirin and NSAIDs for 7 days prior to surgery.  Follow your surgeon's direction on when to stop eating and drinking prior to surgery.  Your surgeon will be managing your pain after your surgery.    Remove all jewelry and metal piercings before your surgery.   Hold all medications the day of procedure except metoprolol and levothyroxine which should be taken with a small sip of water.        Labs:  Recent Results (from the past 120 hour(s))   Basic Metabolic Panel    Collection Time: 09/28/18  3:23 PM   Result Value Ref Range    Sodium 140 136 - 145 mmol/L    Potassium 4.6 3.5 - 5.0 mmol/L    Chloride 107 98 - 107 mmol/L    CO2 25 22 - 31 mmol/L    Anion Gap, Calculation 8 5 - 18 mmol/L    Glucose 99 70 - 125 mg/dL    Calcium 10.0 8.5 - 10.5 mg/dL    BUN 22 8 - 22 mg/dL    Creatinine 1.19 0.70 - 1.30 mg/dL    GFR MDRD Af Amer >60 >60 mL/min/1.73m2    GFR MDRD Non Af Amer >60 >60 mL/min/1.73m2   Hemoglobin    Collection Time: 09/28/18  3:23 PM   Result Value Ref Range    Hemoglobin 13.4 (L) 14.0 - 18.0 g/dL   INR    Collection Time: 09/28/18  3:23 PM   Result Value Ref Range    INR 1.03 0.90 - 1.10       Immunization History   Administered Date(s) Administered     INFLUENZA,RECOMBINANT,INJ,PF QUADRIVALENT 18+YRS 09/28/2018     Influenza, inj, historic,unspecified 09/26/2012     Influenza, seasonal,quad inj 36+ mos 09/14/2016     Influenza,seasonal quad, PF, 36+MOS 10/06/2017     Pneumo Polysac 23-V 10/06/2017           Electronically signed by Dov Morales DO 10/01/18 2:39 PM

## 2021-06-21 NOTE — ANESTHESIA PROCEDURE NOTES
Epidural Block    Patient location during procedure: pre-op  Time Called: 11/1/2018 7:25 AM  Reason for Block:post-op pain management  Staffing:  Performing  Anesthesiologist: JOSE BEATTY  Preanesthetic Checklist  Completed: patient identified, risks, benefits, and alternatives discussed, timeout performed, consent obtained, airway assessed, oxygen available, suction available, emergency drugs available and hand hygiene performed  Procedure  Patient position: sitting  Prep: ChloraPrep  Patient monitoring: continuous pulse oximetry, heart rate and blood pressure  Approach: midline  Epidural location: T6/7.  Injection technique: SAMAN saline  Number of Attempts:1  Needle  Needle type: Dolores   Needle gauge: 18 G     Catheter in Space: 4  Assessment  Sensory level: T4  No complications

## 2021-06-21 NOTE — ANESTHESIA CARE TRANSFER NOTE
Last vitals:   Vitals:    11/01/18 1239   BP: 177/73   Pulse: 80   Resp:    Temp: 36.1  C (97  F)   SpO2: 100%     Patient's level of consciousness is drowsy  Spontaneous respirations: yes  Maintains airway independently: yes  Dentition unchanged: yes  Oropharynx: oropharynx clear of all foreign objects    QCDR Measures:  ASA# 20 - Surgical Safety Checklist: WHO surgical safety checklist completed prior to induction  PQRS# 430 - Adult PONV Prevention: 4558F - Pt received => 2 anti-emetic agents (different classes) preop & intraop  ASA# 8 - Peds PONV Prevention: NA - Not pediatric patient, not GA or 2 or more risk factors NOT present  PQRS# 424 - Rosemary-op Temp Management: 4559F - At least one body temp DOCUMENTED => 35.5C or 95.9F within required timeframe  PQRS# 426 - PACU Transfer Protocol: - Transfer of care checklist used  ASA# 14 - Acute Post-op Pain: ASA14A - Patient experienced pain >= 7 out of 10

## 2021-06-21 NOTE — ANESTHESIA PROCEDURE NOTES
Peripheral Block    Start time: 11/1/2018 2:15 PM  End time: 11/1/2018 2:40 PM  post-op analgesia per surgeon order as noted in medical record  Staffing:  Performing  Anesthesiologist: JOSE BEATTY  Preanesthetic Checklist  Completed: patient identified, site marked, risks, benefits, and alternatives discussed, timeout performed, consent obtained, airway assessed, oxygen available, suction available, emergency drugs available and hand hygiene performed  Peripheral Block  Nerve block type: PARAVERTEBRAL BLOCK.  Prep: ChloraPrep  Patient position: sitting  Patient monitoring: cardiac monitor, continuous pulse oximetry, heart rate and blood pressure  Laterality: left  Injection technique: ultrasound guided    Ultrasound used to visualize needle placement in proximity to nerve being blocked: yes   Permanent ultrasound image captured for medical record  Sterile gel and probe cover used for ultrasound.    Needle  Needle type: Tuohy   Needle gauge: 19 G  Needle length: 4 in  peripheral nerve catheter placed  Catheter type: open end  Catheter size: 20 G  Catheter at skin depth: 3 cm  Assessment  Injection assessment: no difficulty with injection, negative aspiration for heme, no paresthesia on injection and incremental injection  Additional Notes  Patient arrived in PACU with HTN and poor pain control ; secondary to inadequate thoracic epidural.     This paravertebral catheter was placed with consideration of fact that patient had received lovenox making a second attempt at epidural unacceptable.    This procedure was performed without any visible heme throughout procedure (as was the initial epidural catheter).

## 2021-06-21 NOTE — PROGRESS NOTES
I met with Melecio and his spouse, Eliane, prior to Melecio's consult with Dr. Soto today.  Melecio has a hx of Hodgkin's as a teenager and parotid cancer at age 35.  He said he had surgery and radiation tx for his past cancer diagnoses but he's never had systemic tx before.  Both he and Eliane are feeling more anxious about what to expect for tx this time.  Melecio was eager to establish care with Dr. Soto and has not yet met with a surgeon.  He had a PET/CT last Thursday and is awaiting results.  We reviewed the tentative plan as previously discussed with Dr. Powers for pulmonary function tests and referral to surgery.  I explained Dr. Soto will be reviewing the PET/CT results with them today.  We will assist with scheduling future appts.  I reviewed my role today and invited calls for questions or needs.

## 2021-06-21 NOTE — PROGRESS NOTES
[facility] paperwork filled out and signed. All forms were faxed to [phone].

## 2021-06-21 NOTE — ANESTHESIA POSTPROCEDURE EVALUATION
Patient: Pardeep Wilson     MEDIASTINOSCOPY, LEFT VIDEO ASSISTED THORACOSCOPY, LOBECTOMY, BRONCHOSCOPY      PARAVERTEBRAL CATHETER NOTE    Patient reports minimal pain.     The paravertebral catheter had been infusing at 12 cc/hour.     The patient is alert and conversational.    His chest tube is clamped.    Given patient's progress and that this is day 4 of the catheter, it is appropriate to remove.    Catheter removed with tip intact at 10: 35AM. Insertion site was clear.    Revert to surgical pain orders - dicussed with patient.     Thank you for allowing us to participate in Mr. Hawkins's care.

## 2021-06-21 NOTE — ANESTHESIA POST-OP FOLLOW-UP NOTE
Patient: Pardeep Wilson  MEDIASTINOSCOPY, LEFT VIDEO ASSISTED THORACOSCOPY, LOBECTOMY, BRONCHOSCOPY  Anesthesia type: general    S:  Patient is doing well this morning after his left VATS and mediastinoscopy yesterday. He is sitting up in a chair reading comfortably.  Rates pain 3/10 at rest and high with movement or deep inspiration but states that the paravertebral catheter is working well. Plan is to discontinue it Sunday before any anticoagulants are given or sooner if he progresses well this weekend.    O:   Vitals:    11/02/18 0403   BP: 117/58   Pulse: 77   Resp: 16   Temp: 37  C (98.6  F)   SpO2: 99%     Back:  Dressings are c/d/i, catheter appears to be in appropriate position. Motor and sensory intact in bilateral LEs.      A/P: Patient doing well POD #1 with adequate analgesia, no signs or symptoms of LAST.  Pain well controlled.  Plan to continue left PV cathter at 12mL/hour unless less than progresses to discharge quickly.  He is happy with this plan. We will continue to follow daily and are available as needed for adjustments should they be necessary.  Please contact us should anticoagulation become necessary as this would require discontinuation.

## 2021-06-21 NOTE — PROGRESS NOTES
TCM DISCHARGE FOLLOW UP CALL    Discharge Date:  11/4/2018  Reason for hospital stay (discharge diagnosis)::  Lung cancer s/p (L) lobectomy  Are you feeling better, the same or worse since your discharge?:  Patient is feeling better (went back to work today. very little pain)  Do you feel like you have a plan in the event of a health emergency?: Yes    As part of your discharge plan, were  home care services ordered for you?: No    Did you receive any new medications, or was there a change to your medications?: Yes    Are you taking those medications, or do you have any established regiment?:  Hasn't taken oxycodone. Tylenol is adequate for pain relief  Do you have any follow up visits scheduled with your PCP or Specialist?:  Yes, with PCP (Pt requested appt be cancelled)  (RN) Is PCP appt scheduled soon enough (within 14 days of discharge date)?: Yes

## 2021-06-21 NOTE — ANESTHESIA POSTPROCEDURE EVALUATION
Patient: Pardeep Wilson  MEDIASTINOSCOPY, LEFT VIDEO ASSISTED THORACOSCOPY, LOBECTOMY, BRONCHOSCOPY  Anesthesia type: general    Patient location: PACU  Last vitals:     Post vital signs: stable  Level of consciousness: awake and responds to simple questions  Post-anesthesia pain: pain controlled; pain much better controlled after placement of ultrasound guided paravertebral catheter  Post-anesthesia nausea and vomiting: no  Pulmonary: unassisted, return to baseline  Cardiovascular: stable and blood pressure at baseline  Hydration: adequate  Anesthetic events: no    QCDR Measures:  ASA# 11 - Rosemary-op Cardiac Arrest: ASA11B - Patient did NOT experience unanticipated cardiac arrest  ASA# 12 - Rosemary-op Mortality Rate: ASA12B - Patient did NOT die  ASA# 13 - PACU Re-Intubation Rate: ASA13B - Patient did NOT require a new airway mgmt  ASA# 10 - Composite Anes Safety: ASA10A - No serious adverse event    Additional Notes:

## 2021-06-21 NOTE — ANESTHESIA PROCEDURE NOTES
Arterial Line  Reason for Procedure: hemodynamic monitoring  Patient location during procedure: OR pre-induction and OR post-induction  Start time: 11/1/2018 8:20 AM  End time: 11/1/2018 8:25 AM  Staffing:  Performing  Anesthesiologist: JOSE BEATTY  Sterile Precautions:  sterile barriers used during insertion: cap, mask, sterile gloves, large sheet, and hand hygiene used.  Arterial Line:   Immediately prior to procedure a time out was called to verify the correct patient, procedure, equipment, support staff and site/side marked as required  Laterality: right  Location: radial  Prepped with: ChloroPrep    Needle gauge: 20 G  Number of Attempts: 1      1% lidocaine local anesthesia used for skin prep.   See MAR for additional medications given.  Ultrasound evaluation of access site: yes      Concurrent real time visualization of needle entry

## 2021-06-21 NOTE — CONSULTS
[facility] Hematology and Oncology Consult Note    Patient: Pardeep Wilson  MRN: [id number]  Date of Service: 10/15/2018        Reason for Visit    I was consulted by Dr. 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  CT scans and PET scans are reviewed and show left lower lobe mass.  Discussed with patient that he does not appear to have lymph node involvement or any evidence of metastatic disease.  Standard of care would be to consider surgical resection followed by adjuvant chemotherapy with cisplatin and Alimta for 4 cycles.  May need to consider lymph node evaluation prior to the surgery.  May need to consider neoadjuvant chemotherapy because of the location of the tumor.    Will get pulmonary function tests and refer to surgery for evaluation.  Discussed appropriate clinical trials and he is interested in this and will register him.  Explained that this trial requires an analysis of his tumor and consideration of possible targeted therapies in addition to standard chemotherapy.    Discussed standard adjuvant chemotherapy with Alimta and cisplatin and discussed potential side effects.  Typically would start adjuvant chemotherapy 6-12 weeks after his surgery.  Discussed that at this time radiation therapy may not be required but we will need final pathology to determine exact treatments.    Plan: Pulmonary function test  Refer to Dr. Saunders for surgical evaluation  [place] for clinical trial  Follow-up with me 4-6 weeks after surgery        ECOG Performance      Distress Assessment  Distress Assessment Score: No  distress        Problem List    1. Adenocarcinoma, lung, left (H)  Ambulatory referral to Cardiac Surgery   2. Malignant neoplasm of lower lobe of left lung (H)             CC: Dov Morales, DO      ______________________________________________________________________________      Staging History    Malignant neoplasm of lower lobe of left lung (H)    Staging form: Lung, AJCC 8th Edition    - Clinical stage from 10/15/2018: Stage IIB (cT3, cN0, cM0) - Signed by Kevin Soto MD on 10/15/2018    History    Mr. Pardeep Wilson is a very pleasant 63-year-old with past history of Hodgkin's disease, right parotid cancer, coronary artery disease, hypothyroidism and GERD who was diagnosed with left lung cancer.  He was treated for pneumonia in the hospital back in May.  He had follow-up CT in September that showed increase in size of mass in the left lower lobe.  He underwent biopsy which shows adenocarcinoma, bronchoalveolar type.  He is quite anxious about the diagnosis.  Denies headaches dizziness or focal weakness.  No fever or mild sores.  No shortness of breath or cough.  No new bone or abdominal pain.  Appetite and weight are stable.  No change with bowels or urine.  No bleeding or rash.  ECOG status is 0.    He has stage Ia right axillary Hodgkin's disease diagnosed about 45 years ago.  Treated with mantle field radiation and splenectomy.  He has history of right parotid cancer status post surgery in 6 weeks of radiation in 1991.  He has history of coronary artery disease status post MI and stents.  He has history of hypothyroidism on replacement.  He had recent cataract surgery.    He is  and works as an .  He has 2 kids.  Lives in his own home.  Wife works here in cancer [place].    Father had history of lung cancer possibly due to chemical exposure.  No other family history of cancer.  Patient does not smoke or drink.    Past History  Past Medical History:   Diagnosis  Date     Anemia      Coronary artery disease     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     Esophageal reflux      Hodgkin's lymphoma (H)     s/p radiation to the mediastinum at age 17     Hyperlipemia      Hypertension      Hypothyroidism      Psoriasis      Past Surgical History:   Procedure Laterality Date     CORONARY ANGIOPLASTY      Stent Placement     EYE SURGERY Bilateral     Cataracts     PAROTIDECTOMY       LA LAP,DIAGNOSTIC ABDOMEN N/A 11/7/2017    Procedure: DIAGNOSTIC LAPAROSCOPY,  LYSIS OF ADHESIONS, SMALL BOWEL RESECTION;  Surgeon: Brian Granados MD;  Location: [facility];  Service: General     SPLENECTOMY, TOTAL  1973     WISDOM TOOTH EXTRACTION       Family History   Problem Relation Age of Onset     Dementia Mother      Cancer Father 67     Thinks of the diaphragm, history of working with noxious chemicals     Kidney disease Brother      No Medical Problems Maternal Grandmother      No Medical Problems Maternal Grandfather      No Medical Problems Paternal Grandmother      Heart disease Paternal Grandfather      Social History     Social History     Marital status:      Spouse name: Eliane     Number of children: 2     Years of education: 18     Occupational History      Other     [employer]     Social History Main Topics     Smoking status: Never Smoker     Smokeless tobacco: Never Used     Alcohol use No     Drug use: No     Sexual activity: No      Comment: Partner = Hysterectomy     Other Topics Concern     None     Social History Narrative     Allergies    Allergies   Allergen Reactions     Penicillins Rash       Review of Systems    General  General (WDL): All general elements are within defined limits  ENT  ENT (WDL): Exceptions to WDL  Glasses or Contacts: Yes - Chronic (Greater than 3 months)  Respiratory  Respiratory (WDL): Exceptions to WDL  Cough: Yes - Recent (Less than 3 months)  Cardiovascular  Cardiovascular (WDL): All cardiovascular elements are  "within defined limits  Endocrine  Endocrine (WDL): All endocrine elements are within defined limits  Gastrointestinal  Gastrointestinal (WDL): All gastrointestinal elements are within defined limits  Musculoskeletal  Musculoskeletal (WDL): All musculoskeletal elements are within defined limits  Neurological  Neurological (WDL): All neurological elements are within defined limits  Dominant Hand: Right  Psychological/Emotional  Psychological/Emotional (WDL): All psychological/emotional elements are within defined limits  Hematological/Lymphatic  Hematological/Lymphatic (WDL): All hematological/lymphatic elements are within defined limits  Dermatological  Dermatologic (WDL): All dermatological elements are within defined limits  Genitourinary/Reproductive  Genitourinary/Reproductive (WDL): All genitourinary/reproductive elements are within defined limits  Reproductive (Females only)     Pain  Currently in Pain: No/denies    Physical Exam    Recent Vitals 10/15/2018   Height 5' 9\"   Weight 175 lbs 11 oz   BSA (m2) 1.97 m2   /88   Pulse 64   Temp 97.7   Temp src 1   SpO2 99   Some recent data might be hidden       GENERAL: Alert and oriented. Seated comfortably. In no distress.    HEAD: Atraumatic and normocephalic.  Has a full head of hair.    EYES: SOPHIE, EOMI.  No pallor.  No icterus.    Oral cavity: no mucosal lesion or tonsillar enlargement.    NECK: supple. JVP normal.  No thyroid enlargement.    LYMPH NODES: No palpable, cervical, axillary or inguinal lymphadenopathy.    CHEST: clear to auscultation bilaterally.  Resonant to percussion throughout bilaterally.  Symmetrical breath movements bilaterally.    CVS: S1 and S2 are heard. Regular rate and rhythm.  No murmur or gallop or rub heard.    ABDOMEN: Soft. Not tender. Not distended.  No palpable hepatomegaly or splenomegaly.  No other mass palpable.  Bowel sounds heard.    EXTREMITIES: Warm.  No peripheral edema.    SKIN: no rash, or bruising or " purpura.    CNS:: Nonfocal      Lab Results    Recent Results (from the past 168 hour(s))   POCT Glucose   Result Value Ref Range    Glucose, POC 93 mg/dL       Imaging Results    Ct Chest Without Contrast    Result Date: 9/24/2018  CT CHEST WO CONTRAST 9/24/2018 3:16 PM INDICATION: Lung nodule, <1cm TECHNIQUE: Routine chest. Dose reduction techniques were used. IV CONTRAST: None COMPARISON: CT chest exam 06/25/2018 FINDINGS: LUNGS AND PLEURA: Left lower lobe consolidative process along the posterior mediastinum has significantly progressed, now measuring 6.7 x 4.8 cm image 99, previously measuring 3.0 x 2.6 cm. Additional area of less well-defined consolidation anteriorly within the left lower lobe along the heart border and diaphragm on image 98 is also larger. Fibrosis both lung apices, unchanged. Mild diffuse emphysema. No effusions. MEDIASTINUM: No hilar adenopathy. Atherosclerotic disease thoracic aorta. LIMITED UPPER ABDOMEN: Right renal cyst. Multiple clips upper abdomen. Splenectomy. Atherosclerotic disease MUSCULOSKELETAL: Negative.     CONCLUSION: 1.  Consolidative process within the left lower lobe along the posterior mediastinum has significantly progressed and may represent a neoplasm. Consider CT guided biopsy for further evaluation 2.  Less extensive airspace opacity within the left lower lobe could also be infectious versus neoplastic.    Xr Chest 1 View    Result Date: 10/2/2018  XR CHEST 1 VIEW 10/2/2018 11:06 AM INDICATION: S/p left lung biopsy. COMPARISON: Same-day biopsy CT. FINDINGS: No pneumothorax. Redemonstrated medial left lower lobe mass. No pleural effusion.     Ct Lung Biopsy    Result Date: 10/2/2018  1. PERCUTANEOUS BIOPSY LEFT LOWER LOBE LUNG MASS 2. CT GUIDANCE 3. CONSCIOUS SEDATION 10/2/2018 9:08 AM INDICATION: Enlarging area in left lower lobe. TECHNIQUE: Dose reduction techniques were used. PROCEDURE: Informed consent obtained. Time out performed. The site was prepped and draped  in sterile fashion. 10 mL of 1% lidocaine was infused into the local soft tissues. Using standard technique and under direct CT guidance, a 20 gauge biopsy device was used to obtain three core biopsies. Tissue was submitted to Pathology and was adequate by preliminary review by a pathologist. The patient tolerated the procedure well. No immediate complications. RADIOLOGIC SUPERVISION AND INTERPRETATION: CT GUIDANCE: Images demonstrate the biopsy needle to be in good position. SEDATION: Versed 1.5 mg. Fentanyl 75 mcg. The procedure was performed with administration intravenous conscious sedation with appropriate preoperative, intraoperative, and postoperative evaluation. 15 minutes of supervised face to face conscious sedation time was provided by a radiology nurse under my direct supervision.     CONCLUSION: 1.  Successful CT-guided biopsy left lower lobe lung mass. Reference CPT Code: 65361, 16541, 66201    Nm Pet Ct Skull To Mid Thigh    Result Date: 10/12/2018  [facility] PET FDG/CT 10/11/2018 4:12 PM INDICATION: Lung cancer, left lower lobe adenocarcinoma, staging. Initial treatment strategy. History of parotid cancer. Remote history of Hodgkin lymphoma, post radiation. TECHNIQUE: Serum glucose level 93 mg/dL. One hour post intravenous administration of 8.6 mCi F-18 FDG, PET imaging was performed from the skull base to the mid thighs utilizing attenuation correction with concurrent axial CT and PET/CT image fusion. Dose  reduction techniques were used. COMPARISON: CT chest 09/24/2018. FINDINGS: A 4.8 x 6.7 cm mass in the posteromedial left lower lobe abutting the pleura, not substantially changed since 09/24/2018, demonstrates moderate heterogeneous uptake (SUVmax 4.9), consistent with malignancy. Mass extends superiorly to the central left lower lobe infrahilar region. No pleural effusion. No separate discrete left perihilar or mediastinal adenopathy. No evidence of distant metastasis. No suspicious  focal uptake in the liver or skeleton. Right parotidectomy. Posttreatment changes right neck. No evidence of local recurrence. Moderate scarring in the lung apices, likely chronic postradiation change. Separate small area of consolidation in the anterior left lower lobe demonstrates no appreciable uptake and may be infectious/inflammatory. Moderate atherosclerotic calcifications including the coronary arteries. Right renal cyst. Splenectomy. Upper abdominal surgical clips. Postoperative changes prior partial bowel resection. Moderate prostate gland enlargement. Mild scattered degenerative changes in the spine.     CONCLUSION: Left lower lobe mass with heterogeneous moderate uptake consistent with lung cancer. No evidence of zahira or distant metastasis.        Signed by: Kevin Soto MD

## 2021-06-21 NOTE — PROGRESS NOTES
"Thank you for asking the [facility] team to see Pardeep Wilson in [facility].      Assessment/Plan:     Patient with history of coronary disease but good exercise tolerance and no angina who is scheduled to undergo a high risk surgery this Thursday. Given his activity level and lack of symptoms I would not have ordered a stress test. One has been performed already, however, and we are awaiting those results.     Will call with results.     ADDENDUM: stress test read as non-diagnostic. Given his good exercise tolerance and lack of symptoms will not pursue further evaluation prior to his surgery this week.      Current History:   Pardeep Wilson is a 63 y.o. with CAD s/p PCI of LAC in 2008 (thought 2/2 radiation), hyperlipidemia and hypertension. He is s/p radiation for hodgkins lymphoma in 1973 and parotid cancer and now has non-small cell lung cancer and is needing to undergo planned \"mediastinoscopy, VATS LEFT lower lobectomy, with possible thoracotomy\" per Dr. Saunders's noted. He comes for pre-operative evaluation today.    Pardeep exercises doing at least 30 minutes cardio followed by weights several days per week without dyspnea, chest pain, dizziness or palpitations. His PCP did set him up for an exercise stress test without imaging for this AM; the result of that study is pending. He notes going almost 12 minutes on the study and denies any difficulties during or after the study.      Past Medical History:     Past Medical History:   Diagnosis Date     Anemia      Coronary artery disease     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     Esophageal reflux      Hodgkin's lymphoma (H)     s/p radiation to the mediastinum at age 17     Hyperlipemia      Hypertension      Hypothyroidism      Psoriasis        Past Surgical History:     Past Surgical History:   Procedure Laterality Date     CORONARY ANGIOPLASTY      Stent Placement     EYE SURGERY Bilateral     Cataracts     PAROTIDECTOMY       AK " LAP,DIAGNOSTIC ABDOMEN N/A 11/7/2017    Procedure: DIAGNOSTIC LAPAROSCOPY,  LYSIS OF ADHESIONS, SMALL BOWEL RESECTION;  Surgeon: Brian Granados MD;  Location: [facility] [place], [place];  Service: General     SPLENECTOMY, TOTAL  1973     WISDOM TOOTH EXTRACTION         Family History:     Family History   Problem Relation Age of Onset     Dementia Mother      Cancer Father 67     Thinks of the diaphragm, history of working with noxious chemicals     Kidney disease Brother      No Medical Problems Maternal Grandmother      No Medical Problems Maternal Grandfather      No Medical Problems Paternal Grandmother      Heart disease Paternal Grandfather        Social History:    reports that he has never smoked. He has never used smokeless tobacco. He reports that he does not drink alcohol or use illicit drugs.    Meds:     Current Outpatient Prescriptions   Medication Sig     aspirin 81 mg chewable tablet Chew 1 tablet (81 mg total) daily.     atorvastatin (LIPITOR) 40 MG tablet Take 1 tablet (40 mg total) by mouth at bedtime. (Patient taking differently: Take 40 mg by mouth every morning. )     fluocinonide (LIDEX) 0.05 % cream Apply 1 application topically 2 (two) times a day as needed. Sparingly to affected areas     fluoride, sodium, (SF) 1.1 % Gel dental gel UTD TAT 1 application bedtime. UTD TAT (Patient taking differently: Take 1 application by mouth at bedtime. UTD TAT 1 application bedtime. UTD TAT )     levothyroxine (SYNTHROID, LEVOTHROID) 125 MCG tablet TAKE 1 TABLET DAILY     lisinopril (PRINIVIL,ZESTRIL) 5 MG tablet TAKE 1 TABLET DAILY     metoprolol succinate (TOPROL-XL) 25 MG Take 12.5 mg by mouth daily.     multivitamin therapeutic tablet Take 2 tablets by mouth daily.     pantoprazole (PROTONIX) 40 MG tablet Take 1 tablet (40 mg total) by mouth daily.       Allergies:   Penicillins    Review of Systems:   Review of Systems:   General: WNL  Eyes: WNL  Ears/Nose/Throat: WNL  Lungs: Cough  Heart: WNL  Stomach:  "WNL  Bladder: WNL  Muscle/Joints: WNL  Skin: WNL  Nervous System: WNL  Mental Health: WNL     Blood: WNL       Objective:      Physical Exam  @LASTENCWT:3@  5' 9\" (1.753 m)  @BMI:3@  /60 (Patient Site: Right Arm, Patient Position: Sitting, Cuff Size: Adult Regular)  Pulse 80  Resp 14  Ht 5' 9\" (1.753 m)  Wt 175 lb (79.4 kg)  BMI 25.84 kg/m2    General Appearance:   Alert, cooperative and in no acute distress.   HEENT:  No scleral icterus; the mucous membranes were pink and moist.   Neck: JVP flat. No thyromegaly. No HJR   Chest: The spine was straight. The chest was symmetric.   Lungs:   Respirations unlabored; the lungs are clear to auscultation.   Cardiovascular:   S1 and S2 normal and without murmur. No clicks or rubs. No carotid bruits noted. Right DP, PT, and radial pulses 2+. Left DP, PT, and radial pulses 2+.   Abdomen:  No organomegaly, masses, bruits, or tenderness. Bowels sounds are present   Extremities: No cyanosis, clubbing, or edema.   Skin: No xanthelasma.   Neurologic: Mood and affect are appropriate.         Lab Review   Lab Results   Component Value Date     09/28/2018     08/31/2018     05/09/2018    K 4.6 09/28/2018    K 4.4 08/31/2018    K 4.4 05/09/2018     09/28/2018     (H) 08/31/2018     (H) 05/09/2018    CO2 25 09/28/2018    CO2 25 08/31/2018    CO2 22 05/09/2018    BUN 22 09/28/2018    BUN 24 (H) 08/31/2018    BUN 15 05/09/2018    CREATININE 1.19 09/28/2018    CREATININE 1.01 08/31/2018    CREATININE 1.06 05/09/2018    CALCIUM 10.0 09/28/2018    CALCIUM 9.7 08/31/2018    CALCIUM 9.3 05/09/2018     Lab Results   Component Value Date    WBC 8.1 08/31/2018    WBC 14.2 (H) 05/09/2018    WBC 19.7 (H) 05/08/2018    WBC 5.0 03/06/2015    HGB 13.3 (L) 10/02/2018    HGB 13.4 (L) 09/28/2018    HGB 13.0 (L) 08/31/2018    HCT 38.6 (L) 08/31/2018    HCT 34.3 (L) 05/09/2018    HCT 35.3 (L) 05/08/2018    MCV 94 08/31/2018    MCV 87 05/09/2018    MCV 89 " 05/08/2018     10/02/2018     08/31/2018     05/09/2018     Lab Results   Component Value Date    CHOL 106 10/06/2017    CHOL 124 09/30/2016    CHOL 112 03/06/2015    TRIG 64 10/06/2017    TRIG 91 09/30/2016    TRIG 61 03/06/2015    HDL 45 10/06/2017    HDL 40 09/30/2016    HDL 45 03/06/2015     No results found for: BNP      Betsy Handley M.D.

## 2021-06-21 NOTE — ANESTHESIA PREPROCEDURE EVALUATION
Anesthesia Evaluation      Patient summary reviewed     Airway   Mallampati: II   Pulmonary - normal exam                          Cardiovascular   (+) hypertension, CAD, ,     Rhythm: regular  Rate: normal,         Neuro/Psych      Endo/Other    (+) hypothyroidism,      GI/Hepatic/Renal    (+) GERD,        Other findings:       Anemia      Coronary artery disease      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    Esophageal reflux      Hodgkin's lymphoma (H)      s/p radiation to the mediastinum at age 17    Hyperlipemia      Hypertension      Hypothyroidism      Psoriasis           Dental - normal exam                        Anesthesia Plan  Planned anesthetic: general endotracheal  GETA DLETT  EPIDURAL  RADHA  DEC 10 ZOF 4    ASA 3     Anesthetic plan and risks discussed with: patient  Anesthesia plan special considerations: antiemetics,   Post-op plan: routine recovery

## 2021-06-21 NOTE — ANESTHESIA POSTPROCEDURE EVALUATION
Patient: Pardeep Wilson  MEDIASTINOSCOPY, LEFT VIDEO ASSISTED THORACOSCOPY, LOBECTOMY, BRONCHOSCOPY  Anesthesia type: general    Patient location: Telemetry/Step Down Unit  Last vitals:   Vitals:    11/03/18 0447   BP: 134/63   Pulse: 65   Resp: 20   Temp: 36.6  C (97.9  F)   SpO2: 97%     Patient's pain 0/10 at rest, 4/10 with deep breathing and coughing.  Chest tubes still in place.  Catheter site clean/dry/intact.  Plan to infusion at current rate.

## 2021-06-21 NOTE — PROGRESS NOTES
Melecio sent an email today requesting a return to work letter.  His employer is requesting the letter asap.  He states he is currently at work, feeling fine and plans to be at work from here on out.  I called LO Silva, re the above request and she will have Dr. Soto sign a return to work letter this afternoon.  I sent an email back to Melecio with an update.

## 2021-06-22 NOTE — TELEPHONE ENCOUNTER
RN cannot approve Refill Request    RN can NOT refill this medication med is not covered by policy/route to provider and medication not on med list.     Last office visit: 5/14/2018 Dov Morales DO Last Physical: 9/28/2018 Last MTM visit: Visit date not found Last visit same specialty: 5/14/2018 Dov Morales DO.  Next visit within 3 mo: Visit date not found  Next physical within 3 mo: Visit date not found      Steffanie Jacques, [facility] Triage/Med Refill 1/2/2019    Requested Prescriptions   Pending Prescriptions Disp Refills     fluocinonide (LIDEX) 0.05 % cream [Pharmacy Med Name: FLUOCINONIDE 0.05% CREAM 30GM] 30 g 0     Sig: APPLY EXTERNALLY TO THE AFFECTED AREA TWICE DAILY SPARINGLY AS NEEDED    There is no refill protocol information for this order

## 2021-06-22 NOTE — PROGRESS NOTES
[facility] Hematology and Oncology Progress Note    Patient: Pardeep Wilson  MRN: [id number]  Date of Service: 12/21/2018        Reason for Visit    Chief Complaint   Patient presents with     HE Cancer     Lung Cancer       Assessment and Plan  Cancer Staging  Malignant neoplasm of lower lobe of left lung (H)  Staging form: Lung, AJCC 8th Edition  - Clinical stage from 10/15/2018: Stage IIIA (cT4, cN0, cM0) - Signed by Melody Segovia CNP on 12/21/2018    1.  Lung cancer, left side, stage III, T4 N0 M0, mucinous: Patient had a left lower lobectomy on 11/1/2018.  Tumor was 9 cm with 3.5 cm nodule, grade 2.  Patient has started on adjuvant chemotherapy with cisplatin and Alimta.  He will return in about 10 days for his next cycle.  If his kidney function does not improve we will not be able to continue cisplatin.    2.  Remote history of Hodgkin's disease, stage I in the right axilla: Patient status post mantle field radiation and splenectomy 45 years ago.    3.  History of right parotid cancer with lymph node involvement: Patient had surgery and adjuvant radiation for that in 1991.      4.  No insufficiency: Appears to be urinating normally.  I feel that he is quite dehydrated based on what he is telling me that he is eating and drinking.  Give patient 1.5 L of normal saline in clinic today.  He is then going out of town so he will not be able to come back to the clinic until Wednesday which is December 26.  We will repeat labs at that appointment and if it continues to be abnormal we will give more fluids.  I was very clear with the patient and his wife that he needs to be getting in 64 ounces of non-caffeinated fluids every single day.  This is contributing to his feelings of feeling very tired and groggy and no energy.    5.  Eye drainage: Eyes look very dry to me.  I encouraged him to use rewetting drops and use warm compresses.  It does not appear that there is conjunctivitis.    ECOG  Performance   ECOG Performance Status: 1     Distress Assessment  Distress Assessment Score: 1    Pain  Currently in Pain: No/denies      Problem List    1. Malignant neoplasm of lower lobe of left lung (H)  CC OFFICE VISIT LONG    HM1(CBC and Differential)    Comprehensive Metabolic Panel    HM1 (CBC with Diff)   2. Encounter for antineoplastic chemotherapy  CC OFFICE VISIT LONG    HM1(CBC and Differential)    Comprehensive Metabolic Panel    HM1 (CBC with Diff)   3. Renal insufficiency  Basic Metabolic Panel    DISCONTINUED: sodium chloride 0.9% 1,000 mL infusion      ______________________________________________________________________________    History of Present Illness    Measurable disease: None postoperatively     Current therapy: Cisplatin and Alimta adjuvant chemotherapy  First cycle to start December 14, 2018     Treatment history: Patient underwent mediastinoscopy, bronchoscopy, thoracoscopic surgery and left lower lobectomy on November 1, 2018    Interim history:   She is here today for midcycle check.  He received chemo about 10 days ago.  He states that he has been struggling a little bit since getting the chemotherapy.  He says he feels really tired all of the time.  He especially is pretty rough in the morning where he feels really groggy and his thinking is not real clear and then by about noon that starts getting better and he starts feeling better until about 8 or 9 when he is quite tired and has to go to bed again.  Dates that he has been drinking a whole lot of fluids he has been eating small frequent meals.  His wife states that she does not feel that he is getting a fluids or food.  He does not believe that he is lost weight.  He has very low-grade nausea but no vomiting.  He is taking that nausea medicine and it seems to help.    Pain Status  Currently in Pain: No/denies    Review of Systems    Constitutional  Constitutional (WDL): Exceptions to WDL  Fatigue: Fatigue relieved by  rest  Neurosensory  Neurosensory (WDL): Exceptions to WDL  Ataxia: Asymptomatic, clinical or diagnostic observations only, intervention not indicated  Peripheral Sensory Neuropathy: Asymptomatic, loss of deep tendon reflexes or paresthesia(restless legs)  Confusion: Mild disorientation(brain fog)  Eye   Eye Disorder (WDL): All eye disorder elements are within defined limits  Ear  Ear Disorder (WDL): All ear disorder elements are within defined limits  Cardiovascular  Cardiovascular (WDL): All cardiovascular elements are within defined limits  Pulmonary  Respiratory (WDL): Exceptions to WDL  Cough: Mild symptoms, nonprescription intervention indicated  Dyspnea: Shortness of breath with moderate exertion  Gastrointestinal  Gastrointestinal (WDL): All gastrointestinal elements are within defined limits  Anorexia: Loss of appetite without alteration in eating habits(fills up quickly)  Constipation: Occasional or intermittent symptoms, occasional use of stool softeners, laxatives, dietary modification, or enema  Nausea: Loss of appetite without alteration in eating habits  Genitourinary  Genitourinary (WDL): All genitourinary elements are within defined limits  Lymphatic  Lymph (WDL): All lymph disorder elements are within defined limits  Musculoskeletal and Connective Tissue  Musculoskeletal and Connetive Tissue Disorders (WDL): All Musculoskeletal and Connetive Tissue Disorder elements are within defined limits  Arthralgia: Mild pain(neck)  Integumentary  Integumentary (WDL): All integumentary elements are within defined limits  Patient Coping  Patient Coping: Accepting  Distress Assessment  Distress Assessment Score: 1  Accompanied by  Accompanied by: Family Member(wife)  Oral Chemo Adherence       Past History  Past Medical History:   Diagnosis Date     Anemia      Coronary artery disease     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 Cooperative in conversation. Here with wife  HEENT: pupils are equal, round and reactive. Oromucosa is clean and intact. No ulcerations or mucositis noted. No bleeding noted.  His sclera are slightly red and look irritated.  RESP: lungs are clear bilaterally per auscultation. Regular respiratory rate. No wheezes or rhonchi.  CV: Regular, rate and rhythm. No murmurs.  ABD: soft, nontender.    MUSCULOSKELETAL: No lower extremity swelling.   NEURO: non focal. Alert and oriented x3.   PSYCH: within normal limits. No depression or anxiety.  SKIN: warm dry intact   LYMPH: no cervical, supraclavicular or axillary lymphadenopathy      Lab Results    Recent Results (from the past 168 hour(s))   Comprehensive Metabolic Panel   Result Value Ref Range    Sodium 140 136 - 145 mmol/L    Potassium 4.7 3.5 - 5.0 mmol/L    Chloride 103 98 - 107 mmol/L    CO2 29 22 - 31 mmol/L    Anion Gap, Calculation 8 5 - 18 mmol/L    Glucose 96 70 - 125 mg/dL    BUN 64 (H) 8 - 22 mg/dL    Creatinine 2.15 (H) 0.70 - 1.30 mg/dL    GFR MDRD Af Amer 38 (L) >60 mL/min/1.73m2    GFR MDRD Non Af Amer 31 (L) >60 mL/min/1.73m2    Bilirubin, Total 0.8 0.0 - 1.0 mg/dL    Calcium 9.2 8.5 - 10.5 mg/dL    Protein, Total 6.0 6.0 - 8.0 g/dL    Albumin 3.1 (L) 3.5 - 5.0 g/dL    Alkaline Phosphatase 88 45 - 120 U/L    AST 20 0 - 40 U/L    ALT 15 0 - 45 U/L   HM1 (CBC with Diff)   Result Value Ref Range    WBC 8.8 4.0 - 11.0 thou/uL    RBC 4.15 (L) 4.40 - 6.20 mill/uL    Hemoglobin 13.4 (L) 14.0 - 18.0 g/dL    Hematocrit 38.3 (L) 40.0 - 54.0 %    MCV 92 80 - 100 fL    MCH 32.3 27.0 - 34.0 pg    MCHC 35.0 32.0 - 36.0 g/dL    RDW 14.9 (H) 11.0 - 14.5 %    Platelets 372 140 - 440 thou/uL    MPV 10.4 8.5 - 12.5 fL     Neutrophils % 53 50 - 70 %    Lymphocytes % 31 20 - 40 %    Monocytes % 12 (H) 2 - 10 %    Eosinophils % 5 0 - 6 %    Basophils % 0 0 - 2 %    Neutrophils Absolute 4.6 2.0 - 7.7 thou/uL    Lymphocytes Absolute 2.7 0.8 - 4.4 thou/uL    Monocytes Absolute 1.0 (H) 0.0 - 0.9 thou/uL    Eosinophils Absolute 0.4 0.0 - 0.4 thou/uL    Basophils Absolute 0.0 0.0 - 0.2 thou/uL       Imaging    Xr Chest 2 Views    Result Date: 12/3/2018  XR CHEST 2 VIEWS 12/3/2018 2:57 PM INDICATION: S/p vats. COMPARISON: 11/04/2018. FINDINGS: Prior left pneumothorax no longer seen. Small left pleural effusion is present. Mild left basilar atelectasis. Right lung clear. Normal heart size.         Signed by: Melody Segovia, CNP

## 2021-06-22 NOTE — PROGRESS NOTES
Pt here for vitamin b 12 inj . Reviewed inj then given in right upper abdomen with bandaid to site. Follow up reviewed and pt tolerated inj without any problems. PT dc'd steady gait with wife.

## 2021-06-22 NOTE — PROGRESS NOTES
Pt arrived amb for for his lab check with poss fluids, Pt did not want fluids unless MD thought he needed it,Has been feeling better, vss Labs drawn from lt hand, MD reviewed the labs Instructed the pt to drink extra fluids, mariam water PT left [facility] at 1345  Kathy Cunningham

## 2021-06-22 NOTE — PROGRESS NOTES
Pt here ambulatory with wife for first txt. Orientated pt to room and all amenities. Reviewed txt and duration with pt. IV placed with brisk blood return./smooth ns flush. Txt administered as ordered and  Pt tolerated txt without any side effects. Txt completed and iv dc'd with pressure dressing to site. Follow up reviewed and pt dc'd steady gait with wife.

## 2021-06-22 NOTE — PROGRESS NOTES
Pt arrived ambulatory at 11:00 from 2nd floor Cancer Care, seated in chair 10 - accompanied by family. Placed IV and infused 1.5 liters of NS as instructed by CECELIA Segovia CNP (writer had a phone conversation with CNP). Infused over 1 hour 49 minutes - at completion the IV was dc'd and site wrapped with gauze and coban. Ate 100% of lunch. VSS. Left unit ambulatory in stable condition at 13:30, and plans to return on January 4th, 2019. A copy of the appointment schedule was given to the patient, along with instructions to call with any questions or concerns.    Aletha Ramirez RN

## 2021-06-22 NOTE — PROGRESS NOTES
[facility] Hematology and Oncology Progress Note    Patient: Pardeep Wilson  MRN: [id number]  Date of Service: 12/03/2018        Reason for Visit    Chief Complaint   Patient presents with     HE Cancer       Assessment and Plan    T4 N0 M0, stage III a mucinous left lung adenocarcinoma status post left lower lobe resection on November 1, 2018  Tumor 9 cm with 3.5 cm nodule, grade 2 out of 4 mucinous adenocarcinoma  Remote history of stage I a right axillary Hodgkin's disease status post mantle field radiation and splenectomy about 45 years ago  Right parotid cancer with lymph node involvement status post surgery and adjuvant radiation for 6 weeks in 1991  Coronary artery disease    Pathology is reviewed in detail with the patient.  He has recovered worried well after his surgery.  Recommend adjuvant chemotherapy to reduce risk of recurrence and improve chances of cure.    Discussed consideration for 4 cycles of chemotherapy with cisplatin and Alimta.  Discussed how the treatment is typically administered 1 day every 3 weeks for 4 cycles.  Reviewed potential side effects including but not limited to alopecia, nausea and vomiting, fatigue, myelosuppression with risk for infection and possible need for transfusions.  Discussed ototoxicity, nephrotoxicity, neuropathy and electrolyte imbalance with cisplatin.  Patient understands and is willing to proceed and did sign a consent.    He will get vitamin B12 injection today.  He will start folic acid 1 mg daily.  Reviewed use of dexamethasone 4 mg p.o. twice daily for 5 days starting the day before each cycle of chemotherapy to help prevent rash and for delayed nausea.  Discussed Compazine 10 mg p.o. every 6 hours as needed for nausea as breakthrough.  Discussed fever precautions and the need to call for temperature greater than 100.5  F.    Discussed consideration for clinical trial and he will be enrolled on this when he returns for chemotherapy.  Discussed typical  follow-up after chemotherapy with CT scan planned sometime in May 2019.  I do not think he will require adjuvant radiation therapy.    45 minutes spent majority in counseling and coordination of care.    Plan: B12 injection today  Schedule chemotherapy class  Start folic acid 1 mg p.o. daily today  Start dexamethasone 4 mg p.o. twice daily for 5 days starting December 13 the day before first chemotherapy  Follow-up December 14 for lab work and for cycle of chemotherapy.  Follow-up 1 week later with labs for toxicity check    Measurable disease: None postoperatively    Current therapy: Cisplatin and Alimta adjuvant chemotherapy  First cycle to start December 14, 2018    Treatment history: Patient underwent mediastinoscopy, bronchoscopy, thoracoscopic surgery and left lower lobectomy on November 1, 2018      Cancer Staging  Malignant neoplasm of lower lobe of left lung (H)  Staging form: Lung, AJCC 8th Edition  - Clinical stage from 10/15/2018: Stage IIB (cT3, cN0, cM0) - Signed by Kevin Soto MD on 10/15/2018      ECOG Performance   ECOG Performance Status: 1    Distress Assessment  Distress Assessment Score: 5    Pain           Problem List    1. Encounter for antineoplastic chemotherapy  cyanocobalamin injection 1,000 mcg    Education (Chemo Class)    CC OFFICE VISIT LONG    Infusion Appointment    CC OFFICE VISIT LONG    prochlorperazine (COMPAZINE) 10 MG tablet    dexamethasone (DECADRON) 4 MG tablet    folic acid (FOLVITE) 1 MG tablet   2. Malignant neoplasm of lower lobe of left lung (H)  cyanocobalamin injection 1,000 mcg    Education (Chemo Class)    CC OFFICE VISIT LONG    Infusion Appointment    CC OFFICE VISIT LONG    prochlorperazine (COMPAZINE) 10 MG tablet    dexamethasone (DECADRON) 4 MG tablet    folic acid (FOLVITE) 1 MG tablet        CC: Dov Morales, DO    ______________________________________________________________________________    History of Present  Illness    Mr. Pardeep Wilson returns for follow-up.  He underwent surgery on November 1.  He has made a remarkable recovery.  He is back to work full-time within a week of discharge.  No headaches or dizziness.  Some slight dyspnea on exertion.  Appetite and weight are stable.  ECOG status is 0.  No other issues.    Pain Status  Currently in Pain: No/denies    Review of Systems    Constitutional  Constitutional (WDL): Exceptions to WDL  Fatigue: Fatigue relieved by rest  Neurosensory  Neurosensory (WDL): All neurosensory elements are within defined limits  Cardiovascular  Cardiovascular (WDL): All cardiovascular elements are within defined limits  Pulmonary  Respiratory (WDL): Exceptions to WDL  Cough: Mild symptoms, nonprescription intervention indicated  Gastrointestinal  Gastrointestinal (WDL): All gastrointestinal elements are within defined limits  Genitourinary  Genitourinary (WDL): All genitourinary elements are within defined limits  Integumentary  Integumentary (WDL): All integumentary elements are within defined limits  Patient Coping  Patient Coping: Accepting  Distress Assessment  Distress Assessment Score: 5  Accompanied by  Accompanied by: Family Member    Past History  Past Medical History:   Diagnosis Date     Anemia      Coronary artery disease     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     Esophageal reflux      Hodgkin's lymphoma (H)     s/p radiation to the mediastinum at age 17     Hyperlipemia      Hypertension      Hypothyroidism      Psoriasis          Past Surgical History:   Procedure Laterality Date     CORONARY ANGIOPLASTY      Stent Placement     EYE SURGERY Bilateral     Cataracts     MEDIASTINOSCOPY N/A 11/1/2018    Procedure: MEDIASTINOSCOPY;  Surgeon: Bry Saunders MD;  [facility]: [facility];  Service:      PAROTIDECTOMY       NE LAP,DIAGNOSTIC ABDOMEN N/A 11/7/2017    Procedure: DIAGNOSTIC LAPAROSCOPY,  LYSIS OF ADHESIONS, SMALL BOWEL RESECTION;  Surgeon: Brian  MARISOL Granados MD;  Location: [facility] OR;  Service: General     SPLENECTOMY, TOTAL  1973     WISDOM TOOTH EXTRACTION         Physical Exam    Recent Vitals 12/3/2018   Height -   Weight 178 lbs 9 oz   BSA (m2) 1.99 m2   /74   Pulse 77   Temp 97.5   Temp src 1   SpO2 -   Some recent data might be hidden       GENERAL: Alert and oriented. Seated comfortably. In no distress.    HEAD: Atraumatic and normocephalic.  Has a full head of hair.    EYES: SOPHIE, EOMI.  No pallor.  No icterus.    Oral cavity: no mucosal lesion or tonsillar enlargement.    NECK: supple. JVP normal.  No thyroid enlargement.    LYMPH NODES: No palpable, cervical, axillary or inguinal lymphadenopathy.    CHEST: clear to auscultation bilaterally.  Resonant to percussion throughout bilaterally.  Symmetrical breath movements bilaterally.    CVS: S1 and S2 are heard. Regular rate and rhythm.  No murmur or gallop or rub heard.    ABDOMEN: Soft. Not tender. Not distended.  No palpable hepatomegaly or splenomegaly.  No other mass palpable.  Bowel sounds heard.    EXTREMITIES: Warm.  No peripheral edema.    SKIN: no rash, or bruising or purpura.        Lab Results    No results found for this or any previous visit (from the past 168 hour(s)).    Imaging    Xr Chest 2 Views    Result Date: 11/4/2018  XR CHEST 2 VIEWS 11/4/2018 10:15 AM INDICATION: S/p vats COMPARISON: Chest x-ray 11/03/2018, PET/CT 10/11/2018 FINDINGS: Stable position of the left-sided chest tube. Mildly improved lower left chest wall subcutaneous emphysema. Small left-sided pneumothorax with 4 mm pleural separation along the lateral left lung base, similar to the prior exam. Stable retrocardiac opacities. The right lung is clear. Normal heart size and no vascular congestion.        Signed by: Kevin Soto MD

## 2021-06-22 NOTE — PROGRESS NOTES
Pardeep came to chemo infusion this morning after lab and MD visit for cycle 2 using Alimta and Cisplatin.  Peripheral IV inserted with good blood return.  He was educated on his plan of care and each medication given was reviewed prior to administration.  He received treatment as ordered and tolerated it well while in clinic.  He was dose reduced by Dr Welsh due to kidney function.  Potassium was also held today as his level is high end of normal.  Pardeep was encouraged to have good hydration especially the next 3 days and to urinate frequently.  He will also return next week twice for labs and IV fluids. He tolerated his treatment today well.  IV was flushed then d/c'd and site covered.  Pardeep d/c from clinic ambulatory accompanied by his spouse.  He is aware of his future appointments.

## 2021-06-22 NOTE — PROGRESS NOTES
Pardeep came to chemo infusion following lab draw today for IV hydration.  Hydration ordered due to high creatinine and Bun prior to tx last week.  Lab results noted and reviewed with Dr Soto.  Peripheral IV inserted with excellent blood return.  IV hydration infused over approximately 70 minutes.  He tolerated the infusion well.  IV was d/c'd and site covered.  Pardeep d/c from clinic ambulatory accompanied by his wife.  He will return Friday for further labs and hydration.

## 2021-06-22 NOTE — PROGRESS NOTES
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  Also noted with elevated BUN and creatinine suggesting some dehydration.  Mild nausea as well.    We will proceed with cycle 2 of chemotherapy with 25% dose reduction of both drugs today.  Will have patient return next week Monday and Thursday for IV fluids.  We will have him return the 14th for lab work, toxicity check and additional fluids.  If there is further decline in renal function may need to switch to carboplatin.    Prescribed ondansetron ODT for nausea and place of Compazine.  He will use dexamethasone as previously.  He will continue folic acid daily.  Asked him to cut back on his protein shakes as this may be affecting BUN and creatinine.    Plan: Proceed with cycle 2 of cisplatin and Alimta with 25% dose reduction  Return twice next week for fluids and follow-up January 14  Ondansetron for nausea      Measurable disease: None postoperatively    Current therapy: Cisplatin and Alimta adjuvant chemotherapy, day 1 cycle 2 today  First cycle  started December 14, 2018    Treatment history: Patient underwent mediastinoscopy, bronchoscopy, thoracoscopic surgery and left lower lobectomy on November 1,  "2018      Cancer Staging  Malignant neoplasm of lower lobe of left lung (H)  Staging form: Lung, AJCC 8th Edition  - Clinical stage from 10/15/2018: Stage IIIA (cT4, cN0, cM0) - Signed by Melody Segovia CNP on 12/21/2018      ECOG Performance   ECOG Performance Status: 1    Distress Assessment  Distress Assessment Score: No distress    Pain           Problem List    1. Malignant neoplasm of lower lobe of left lung (H)  CC OFFICE VISIT LONG    ondansetron (ZOFRAN-ODT) 8 MG disintegrating tablet    HM1(CBC and Differential)    Comprehensive Metabolic Panel   2. Encounter for antineoplastic chemotherapy  CC OFFICE VISIT LONG        CC: Dov Morales, DO    ______________________________________________________________________________    History of Present Illness    Mr. Pardeep Wilson returns for follow-up.  He completed first cycle of chemotherapy 3 weeks ago.  Had significant fatigue the first week after.  Some dehydration as well with note of elevated BUN and creatinine.  No fever but some mild source.  No change with breathing.  Mild constipation but no urinary symptoms.  No bleeding or rash.  ECOG status back to 0.  Weight is up.  Did not work the first week after treatment.      Pain Status  Currently in Pain: No/denies    Review of Systems    Constitutional  Constitutional (WDL): Exceptions to WDL  Fatigue: Fatigue relieved by rest(Fatigue hits hard pos Tx.)  Chills: Mild sensation of cold, shivering, chattering of teeth  Neurosensory  Neurosensory (WDL): Exceptions to WDL  Peripheral Sensory Neuropathy: Asymptomatic, loss of deep tendon reflexes or paresthesia(\"Restless legs.\")  Cardiovascular  Cardiovascular (WDL): All cardiovascular elements are within defined limits  Pulmonary  Respiratory (WDL): Exceptions to WDL  Cough: Mild symptoms, nonprescription intervention indicated(Dry.)  Dyspnea: Shortness of breath with moderate exertion  Gastrointestinal  Gastrointestinal (WDL): Exceptions " "to WDL  Anorexia: Loss of appetite without alteration in eating habits(\"I don't get hungry.\" Eats small amounts. )  Nausea: Loss of appetite without alteration in eating habits(Post Tx. 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     Esophageal reflux      Hodgkin's lymphoma (H)     s/p radiation to the mediastinum at age 17     Hyperlipemia      Hypertension      Hypothyroidism      Psoriasis          Past Surgical History:   Procedure Laterality Date     CORONARY ANGIOPLASTY      Stent Placement     EYE SURGERY Bilateral     Cataracts     MEDIASTINOSCOPY N/A 11/1/2018    Procedure: MEDIASTINOSCOPY;  Surgeon: Bry Saunders MD;  Location: [facility];  Service:      PAROTIDECTOMY       UT LAP,DIAGNOSTIC ABDOMEN N/A 11/7/2017    Procedure: DIAGNOSTIC LAPAROSCOPY,  LYSIS OF ADHESIONS, SMALL BOWEL RESECTION;  Surgeon: Brian Granados MD;  Location: [facility];  Service: General     SPLENECTOMY, TOTAL  1973     WISDOM TOOTH EXTRACTION         Physical Exam    Recent Vitals 1/4/2019   Height -   Weight 181 lbs 2 oz   BSA (m2) 2 m2   /59   Pulse 78   Temp 97.7   Temp src 1   SpO2 100   Some recent data might be hidden       GENERAL: Alert and oriented. Seated comfortably. In no distress.    HEAD: Atraumatic and normocephalic.  Has a full head of hair.    EYES: SOPHIE, EOMI.  No pallor.  No icterus.    Oral cavity: no mucosal lesion or tonsillar enlargement.    NECK: supple. JVP normal.  No thyroid enlargement.    LYMPH NODES: No palpable, " cervical, axillary or inguinal lymphadenopathy.    CHEST: clear to auscultation bilaterally.  Resonant to percussion throughout bilaterally.  Symmetrical breath movements bilaterally.    CVS: S1 and S2 are heard. Regular rate and rhythm.  No murmur or gallop or rub heard.    ABDOMEN: Soft. Not tender. Not distended.  No palpable hepatomegaly or splenomegaly.  No other mass palpable.  Bowel sounds heard.    EXTREMITIES: Warm.  No peripheral edema.    SKIN: no rash, or bruising or purpura.        Lab Results    Recent Results (from the past 168 hour(s))   Magnesium   Result Value Ref Range    Magnesium 1.8 1.8 - 2.6 mg/dL   Comprehensive Metabolic Panel   Result Value Ref Range    Sodium 142 136 - 145 mmol/L    Potassium 5.0 3.5 - 5.0 mmol/L    Chloride 109 (H) 98 - 107 mmol/L    CO2 24 22 - 31 mmol/L    Anion Gap, Calculation 9 5 - 18 mmol/L    Glucose 114 70 - 125 mg/dL    BUN 50 (H) 8 - 22 mg/dL    Creatinine 1.47 (H) 0.70 - 1.30 mg/dL    GFR MDRD Af Amer 59 (L) >60 mL/min/1.73m2    GFR MDRD Non Af Amer 48 (L) >60 mL/min/1.73m2    Bilirubin, Total 0.4 0.0 - 1.0 mg/dL    Calcium 9.8 8.5 - 10.5 mg/dL    Protein, Total 6.7 6.0 - 8.0 g/dL    Albumin 3.4 (L) 3.5 - 5.0 g/dL    Alkaline Phosphatase 92 45 - 120 U/L    AST 18 0 - 40 U/L    ALT 15 0 - 45 U/L   HM1 (CBC with Diff)   Result Value Ref Range    WBC 11.5 (H) 4.0 - 11.0 thou/uL    RBC 3.61 (L) 4.40 - 6.20 mill/uL    Hemoglobin 11.6 (L) 14.0 - 18.0 g/dL    Hematocrit 34.3 (L) 40.0 - 54.0 %    MCV 95 80 - 100 fL    MCH 32.1 27.0 - 34.0 pg    MCHC 33.8 32.0 - 36.0 g/dL    RDW 15.6 (H) 11.0 - 14.5 %    Platelets 510 (H) 140 - 440 thou/uL    MPV 10.6 8.5 - 12.5 fL    Neutrophils % 67 50 - 70 %    Lymphocytes % 20 20 - 40 %    Monocytes % 13 (H) 2 - 10 %    Eosinophils % 0 0 - 6 %    Basophils % 0 0 - 2 %    Neutrophils Absolute 7.6 2.0 - 7.7 thou/uL    Lymphocytes Absolute 2.4 0.8 - 4.4 thou/uL    Monocytes Absolute 1.5 (H) 0.0 - 0.9 thou/uL    Eosinophils Absolute 0.0  0.0 - 0.4 thou/uL    Basophils Absolute 0.0 0.0 - 0.2 thou/uL       Imaging    No results found.      Signed by: Kevin Soto MD

## 2021-06-22 NOTE — PROGRESS NOTES
THORACIC SURGERY ESTABLISHED PATIENT OFFICE VISIT    Dear Dr. Morales,    I saw Mr. Wilson in follow-up today. The clinical summary follows:     PREOP DIAGNOSIS   NSCLC of the LEFT lower lobe    PROCEDURE   1) Mediastinoscopy  2) LEFT uniportal VATS lower lobectomy    DATE OF PROCEDURE  11/1/2018    HISTOPATHOLOGY   Adenocarcinoma, predominantly acinar, 9 cm, separate 3.5 cm tumor at base of lower lobe (intrapulmonary metastasis), pT4N0    COMPLICATIONS  None    INTERVAL STUDIES  CXR today: expected postop changes    ETOH 1 drink/week  TOB never  BMI 26    SUBJECTIVE   Mr. Wilson has made an impressive recovery. He's scheduled for adjuvant chemotherapy.    From a personal perspective, he is an . His wife, Eliane, is a cancer registrar in [facility], and is a respiratory therapist by training.        IMPRESSION   1. Malignant neoplasm of lower lobe of left lung (H)       63 year-old male 5 weeks S/P LEFT lower lobectomy for a stage IIIA (pT4N0) adenocarcinoma     PLAN  I reviewed the plan as follows:    1) Follow-up prn with me      They had all their questions answered and were in agreement with the plan.  I appreciate the opportunity to participate in the care of your patient and will keep you updated.  Sincerely,

## 2021-06-23 NOTE — PROGRESS NOTES
[facility] Hematology and Oncology Progress Note    Patient: Pardeep Wilson  MRN: [id number]  Date of Service: 01/14/2019        Reason for Visit    Chief Complaint   Patient presents with     HE Cancer     Lung Cancer       Assessment and Plan  Cancer Staging  Malignant neoplasm of lower lobe of left lung (H)  Staging form: Lung, AJCC 8th Edition  - Clinical stage from 10/15/2018: Stage IIIA (cT4, cN0, cM0) - Signed by Melody Segovia CNP on 12/21/2018    1.  Lung cancer, left side, stage III, T4 N0 M0, mucinous: Patient had a left lower lobectomy on 11/1/2018.  Tumor was 9 cm with 3.5 cm nodule, grade 2.  Patient has started on adjuvant chemotherapy with cisplatin and Alimta.  He will return in about 10 days for his next cycle, will be cycle 3.  The overall plan is to give 4 cycles.    2.  Remote history of Hodgkin's disease, stage I in the right axilla: Patient status post mantle field radiation and splenectomy 45 years ago.    3.  History of right parotid cancer with lymph node involvement: Patient had surgery and adjuvant radiation for that in 1991.      4.  Low magnesium: Is mild.  He has not started the supplement at home yet.  I did give him information on foods high in magnesium.  If his magnesium continues to stay low we will probably send over a prescription for the magnesium oxide.    5.  Renal insufficiency: This is slightly worse today than last week.  We will give him 1 L of fluids today.  He is going to try to push fluids and will get set up to come back on Friday for more fluids.    ECOG Performance   ECOG Performance Status: 1     Distress Assessment  Distress Assessment Score: 1    Pain  Currently in Pain: No/denies      Problem List    1. Renal insufficiency  Basic Metabolic Panel  (add-ons/treatment plan)   2. Malignant neoplasm of lower lobe of left lung (H)     3. Encounter for antineoplastic chemotherapy         ______________________________________________________________________________    History of Present Illness    Measurable disease: None postoperatively     Current therapy: Cisplatin and Alimta adjuvant chemotherapy  First cycle started December 14, 2018. Had second dose at 25% reduction on 1/4/19     Treatment history: Patient underwent mediastinoscopy, bronchoscopy, thoracoscopic surgery and left lower lobectomy on November 1, 2018    Interim history:   Patient is here today for midcycle check.  He did get fluids last week twice.  He states that the chemotherapy did go a little bit better with the dose reduction but he still was pretty lethargic for about a week after the chemo.  His wife seems to think it is worse than the patient does.  He says he is going to go to work this week.  He is due for his chemo again next week.  He says last week he was a little bit lightheaded and dizzy and his blood pressure was running low probably from dehydration.  Did feel better after getting fluids.  No nausea or vomiting.    Pain Status  Currently in Pain: No/denies    Review of Systems    Constitutional  Constitutional (WDL): Exceptions to WDL  Fatigue: Fatigue not relieved by rest - Limiting instrumental ADL  Chills: Mild sensation of cold, shivering, chattering of teeth(gets cold easily)  Weight Loss: to <10% from baseline, intervention not indicated(5#)  Neurosensory  Neurosensory (WDL): Exceptions to WDL(restless legs occassionally)  Eye   Eye Disorder (WDL): All eye disorder elements are within defined limits  Ear  Ear Disorder (WDL): All ear disorder elements are within defined limits  Cardiovascular  Cardiovascular (WDL): All cardiovascular elements are within defined limits  Pulmonary  Respiratory (WDL): Exceptions to WDL  Cough: Mild symptoms, nonprescription intervention indicated(occ dry)  Dyspnea: Shortness of breath with moderate exertion  Gastrointestinal  Gastrointestinal (WDL): Exceptions to WDL  Anorexia:  Oral intake altered without significant weight loss or malnutrition, oral nutritional supplements indicated  Constipation: Occasional or intermittent symptoms, occasional use of stool softeners, laxatives, dietary modification, or enema  Nausea: Oral intake decreased without significant weight loss, dehydration or malnutrition  Dysgeusia: Altered taste but no change in diet  Dry Mouth: Symptomatic (e.g., dry or thick saliva) without significant dietary alteration, unstimulated saliva flow >0.2 ml/min  Genitourinary  Genitourinary (WDL): All genitourinary elements are within defined limits  Lymphatic  Lymph (WDL): All lymph disorder elements are within defined limits  Musculoskeletal and Connective Tissue  Musculoskeletal and Connetive Tissue Disorders (WDL): All Musculoskeletal and Connetive Tissue Disorder elements are within defined limits  Integumentary  Integumentary (WDL): All integumentary elements are within defined limits  Patient Coping  Patient Coping: Accepting  Distress Assessment  Distress Assessment Score: 1  Accompanied by  Accompanied by: Family Member(wife)  Oral Chemo Adherence       Past History  Past Medical History:   Diagnosis Date     Anemia      Coronary artery disease     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 Cooperative in conversation. Here with wife  HEENT: pupils are equal, round and reactive. Oromucosa is clean and intact. No ulcerations or mucositis noted. No bleeding noted.    RESP: lungs are clear bilaterally per auscultation. Regular respiratory rate. No wheezes or rhonchi.  CV: Regular, rate and rhythm. No murmurs.  ABD: soft, nontender.     MUSCULOSKELETAL: No lower extremity swelling.   NEURO: non focal. Alert and oriented x3.   PSYCH: within normal limits. No depression or anxiety.  SKIN: warm dry intact   LYMPH: no cervical, supraclavicular or axillary lymphadenopathy      Lab Results    Recent Results (from the past 168 hour(s))   Comprehensive Metabolic Panel   Result Value Ref Range    Sodium 138 136 - 145 mmol/L    Potassium 4.1 3.5 - 5.0 mmol/L    Chloride 104 98 - 107 mmol/L    CO2 28 22 - 31 mmol/L    Anion Gap, Calculation 6 5 - 18 mmol/L    Glucose 99 70 - 125 mg/dL    BUN 49 (H) 8 - 22 mg/dL    Creatinine 1.52 (H) 0.70 - 1.30 mg/dL    GFR MDRD Af Amer 56 (L) >60 mL/min/1.73m2    GFR MDRD Non Af Amer 47 (L) >60 mL/min/1.73m2    Bilirubin, Total 1.0 0.0 - 1.0 mg/dL    Calcium 8.8 8.5 - 10.5 mg/dL    Protein, Total 5.8 (L) 6.0 - 8.0 g/dL    Albumin 3.0 (L) 3.5 - 5.0 g/dL    Alkaline Phosphatase 79 45 - 120 U/L    AST 20 0 - 40 U/L    ALT 18 0 - 45 U/L   HM1 (CBC with Diff)   Result Value Ref Range    WBC 8.9 4.0 - 11.0 thou/uL    RBC 3.97 (L) 4.40 - 6.20 mill/uL    Hemoglobin 12.8 (L) 14.0 - 18.0 g/dL    Hematocrit 36.7 (L) 40.0 - 54.0 %    MCV 92 80 - 100 fL    MCH 32.2 27.0 - 34.0 pg    MCHC 34.9 32.0 - 36.0 g/dL    RDW 15.1 (H) 11.0 - 14.5 %    Platelets 528 (H) 140 - 440 thou/uL    MPV 10.6 8.5 - 12.5 fL    Neutrophils % 63 50 - 70 %    Lymphocytes % 32 20 - 40 %    Monocytes % 4 2 - 10 %    Eosinophils % 0 0 - 6 %    Basophils % 0 0 - 2 %    Neutrophils Absolute 5.6 2.0 - 7.7 thou/uL    Lymphocytes Absolute 2.9 0.8 - 4.4 thou/uL    Monocytes Absolute 0.4 0.0 - 0.9 thou/uL    Eosinophils Absolute 0.0 0.0 - 0.4 thou/uL    Basophils Absolute 0.0 0.0 - 0.2 thou/uL   Comprehensive Metabolic Panel   Result Value Ref Range    Sodium 140 136 - 145 mmol/L    Potassium 4.7 3.5 - 5.0 mmol/L    Chloride 108 (H) 98 - 107 mmol/L    CO2 24 22 - 31 mmol/L    Anion Gap, Calculation 8 5 - 18 mmol/L    Glucose 117 70 - 125 mg/dL    BUN 51 (H) 8 - 22  mg/dL    Creatinine 1.37 (H) 0.70 - 1.30 mg/dL    GFR MDRD Af Amer >60 >60 mL/min/1.73m2    GFR MDRD Non Af Amer 52 (L) >60 mL/min/1.73m2    Bilirubin, Total 0.5 0.0 - 1.0 mg/dL    Calcium 8.7 8.5 - 10.5 mg/dL    Protein, Total 5.5 (L) 6.0 - 8.0 g/dL    Albumin 2.9 (L) 3.5 - 5.0 g/dL    Alkaline Phosphatase 83 45 - 120 U/L    AST 16 0 - 40 U/L    ALT 17 0 - 45 U/L   Magnesium   Result Value Ref Range    Magnesium 1.5 (L) 1.8 - 2.6 mg/dL   Magnesium   Result Value Ref Range    Magnesium 1.6 (L) 1.8 - 2.6 mg/dL   Basic Metabolic Panel  (add-ons/treatment plan)   Result Value Ref Range    Sodium 142 136 - 145 mmol/L    Potassium 5.2 (H) 3.5 - 5.0 mmol/L    Chloride 108 (H) 98 - 107 mmol/L    CO2 25 22 - 31 mmol/L    Anion Gap, Calculation 9 5 - 18 mmol/L    Glucose 91 70 - 125 mg/dL    Calcium 9.4 8.5 - 10.5 mg/dL    BUN 39 (H) 8 - 22 mg/dL    Creatinine 1.52 (H) 0.70 - 1.30 mg/dL    GFR MDRD Af Amer 56 (L) >60 mL/min/1.73m2    GFR MDRD Non Af Amer 47 (L) >60 mL/min/1.73m2       Imaging    No results found.      Signed by: Melody Segovia, CNP

## 2021-06-23 NOTE — PROGRESS NOTES
[facility] Hematology and Oncology Progress Note    Patient: Pardeep Wilson  MRN: [id number]  Date of Service: 01/24/2019        Reason for Visit    Chief Complaint   Patient presents with     HE Cancer     Malignant neoplasm of lower lobe of left lung       Assessment and Plan  Cancer Staging  Malignant neoplasm of lower lobe of left lung (H)  Staging form: Lung, AJCC 8th Edition  - Clinical stage from 10/15/2018: Stage IIIA (cT4, cN0, cM0) - Signed by Melody Segovia CNP on 12/21/2018    1.  Lung cancer, left side, stage III, T4 N0 M0, mucinous: Patient had a left lower lobectomy on 11/1/2018.  Tumor was 9 cm with 3.5 cm nodule, grade 2.  Patient has started on adjuvant chemotherapy with cisplatin and Alimta.  He will get cycle 3 tomorrow. Continue dose reduction that was started with cycle 2.  The overall plan is to give 4 cycles.  Both he and his wife had some questions about a potential study and he was not sure that he qualified for it.  I will look into it with our study nurse I think it probably is the alchemist study.     2.  Remote history of Hodgkin's disease, stage I in the right axilla: Patient status post mantle field radiation and splenectomy 45 years ago.     3.  History of right parotid cancer with lymph node involvement: Patient had surgery and adjuvant radiation for that in 1991.       4.  Low magnesium: Is mild.  He has not started the supplement at home yet.  I did give him information on foods high in magnesium.  If his magnesium continues to stay low we will probably send over a prescription for the magnesium oxide.     5.  Renal insufficiency: This is slightly better today. He will push fluids. We will give 1 liter of NS twice next week and once the following week. Monitor labs as well.     ECOG Performance   ECOG Performance Status: 1     Distress Assessment   mild     Pain  Currently in Pain: No/denies        Problem List    1. Malignant neoplasm of lower lobe of left lung (H)           ______________________________________________________________________________    History of Present Illness    Measurable disease: None postoperatively     Current therapy: Cisplatin and Alimta adjuvant chemotherapy  First cycle started December 14, 2018. Had second dose at 25% reduction on 1/4/19. Cycle 3 will be given tomorrow     Treatment history: Patient underwent mediastinoscopy, bronchoscopy, thoracoscopic surgery and left lower lobectomy on November 1, 2018     Interim history:   Is here today to continue on chemotherapy.  He states that he is actually feeling okay.  He says the last 2 weeks he has been working.  He continues to have fatigue and poor stamina especially going up steps.  Is noticing some balance issues and dizziness.  He says he was having some of this before he even started chemo may be related to his heart meds but it does seem to be worse.  He states he always has a little bit of low-grade nausea with appetite issues but usually eating will help the nausea.  He does take 1 Compazine every morning as well but is not sure that it really does a whole lot.  Has no urinary issues      Pain Status  Currently in Pain: No/denies    Review of Systems    Oncology Nurse Assessment/CMA Intake: Constitutional  Constitutional (WDL): Exceptions to WDL  Fatigue: Concerns(tired but relieved with rest)  Fever: No Concerns  Chills: No Concerns(cool all the time)  Weight Gain: No Concerns  Weight Loss: No Concerns  Hot flashes/Night Sweats: No Concerns  Neurosensory  Neurosensory (WDL): Exceptions to WDL  Peripheral Motor Neuropathy: No Concerns  Ataxia: Concerns(sitting to standing feels unbalanced)  Peripheral Sensory Neuropathy: No Concerns  Confusion: No Concerns  Dizziness: Concerns(with position change)  Syncope: No Concerns  Eye   Eye Disorder (WDL): All eye disorder elements are within defined limits  Blurred Vision: No Concerns  Dry Eye: No Concerns  Eye Pain: No Concerns  Watering Eyes: No  Concerns  Ear  Ear Disorder (WDL): All ear disorder elements are within defined limits  Ear Pain: No Concerns  Tinnitus: No Concerns  Cardiovascular  Cardiovascular (WDL): Exceptions to WDL  Palpitations: Concerns(with exertion)  Edema: No Concerns  SVT, DVT/PE: No Concerns  Chest Pain - Cardiac: No Concerns  Pulmonary  Respiratory (WDL): Exceptions to WDL  Cough: No Concerns(occ dry)  Dyspnea: Concerns(with exertion )  Hypoxia: No Concerns  Gastrointestinal  Gastrointestinal (WDL): All gastrointestinal elements are within defined limits  Anorexia: No Concerns  Nausea: No Concerns  Vomiting: No Concerns  Dehydration: No Concerns  Dysgeusia: No Concerns(dull )  Dysphagia: No Concerns  Mucositis Oral: No Concerns  Esophagitis: No Concerns  Constipation: No Concerns  Diarrhea: No Concerns  Pharyngitis: No Concerns  Dry Mouth: No Concerns  Genitourinary  Genitourinary (WDL): All genitourinary elements are within defined limits  Urinary Frequency: No Concerns  Urinary Retention: No Concerns  Urinary Tract Pain: No Concerns  Lymphatic  Lymph (WDL): All lymph disorder elements are within defined limits  Lymphedema: No Concerns  Lymph Node Discomfort: No Concerns  Musculoskeletal and Connective Tissue  Musculoskeletal and Connetive Tissue Disorders (WDL): All Musculoskeletal and Connetive Tissue Disorder elements are within defined limits  Arthralgia: No Concerns  Bone Pain: No Concerns  Muscle Weakness : No Concerns  Myalgia: No Concerns  Integumentary  Integumentary (WDL): All integumentary elements are within defined limits(dry)  Alopecia: No Concerns  Rash Maculo-Papular: No Concerns  Pruritus: No Concerns  Urticaria: No Concerns  Palmar-Plantar Erythrodysesthesia Syndrome: No Concerns  Flushing: No Concerns  Patient Coping  Patient Coping: Accepting  Accompanied by  Accompanied by: Family Member  Oral Chemo Adherence         Past History  Past Medical History:   Diagnosis Date     Anemia      Coronary artery disease      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 Cooperative in conversation. Here with wife  HEENT: pupils are equal, round and reactive. Oromucosa is clean and intact. No ulcerations or mucositis noted. No bleeding noted.  RESP: lungs are slightly diminished bilaterally per auscultation. Regular respiratory rate. No wheezes or rhonchi.  CV: Regular, rate and rhythm. No murmurs.  ABD: soft, nontender. Positive bowel sounds. No organomegaly.   MUSCULOSKELETAL: No lower extremity swelling.   NEURO: non focal. Alert and oriented x3.   PSYCH: within normal limits. No depression or anxiety.  SKIN: warm dry intact   LYMPH: no cervical, supraclavicular  lymphadenopathy      Lab Results    Recent Results (from the past 168 hour(s))   Magnesium   Result Value Ref Range    Magnesium 1.7 (L) 1.8 - 2.6 mg/dL   Comprehensive Metabolic Panel   Result Value Ref Range    Sodium 141 136 - 145 mmol/L    Potassium 4.7 3.5 - 5.0 mmol/L    Chloride 107 98 - 107 mmol/L    CO2 30 22 - 31 mmol/L    Anion Gap, Calculation 4 (L) 5 - 18 mmol/L    Glucose 78 70 - 125 mg/dL    BUN 36 (H) 8 - 22 mg/dL    Creatinine 1.34 (H) 0.70 - 1.30 mg/dL    GFR MDRD Af Amer >60 >60 mL/min/1.73m2    GFR MDRD Non Af Amer 54 (L) >60 mL/min/1.73m2    Bilirubin, Total 0.5 0.0 - 1.0 mg/dL    Calcium 9.4 8.5 - 10.5 mg/dL    Protein, Total 6.3 6.0 - 8.0 g/dL    Albumin 3.3 (L) 3.5 - 5.0 g/dL    Alkaline Phosphatase 98 45 - 120 U/L    AST 18 0 - 40 U/L    ALT 10 0 - 45 U/L   HM1 (CBC with Diff)   Result Value Ref Range    WBC 5.4 4.0 - 11.0 thou/uL    RBC 3.76 (L) 4.40 - 6.20 mill/uL    Hemoglobin 12.0 (L) 14.0 - 18.0 g/dL     Hematocrit 34.8 (L) 40.0 - 54.0 %    MCV 93 80 - 100 fL    MCH 31.9 27.0 - 34.0 pg    MCHC 34.5 32.0 - 36.0 g/dL    RDW 15.6 (H) 11.0 - 14.5 %    Platelets 443 (H) 140 - 440 thou/uL    MPV 9.8 8.5 - 12.5 fL    Neutrophils % 28 (L) 50 - 70 %    Lymphocytes % 46 (H) 20 - 40 %    Monocytes % 18 (H) 2 - 10 %    Eosinophils % 8 (H) 0 - 6 %    Basophils % 0 0 - 2 %    Neutrophils Absolute 1.5 (L) 2.0 - 7.7 thou/uL    Lymphocytes Absolute 2.5 0.8 - 4.4 thou/uL    Monocytes Absolute 1.0 (H) 0.0 - 0.9 thou/uL    Eosinophils Absolute 0.4 0.0 - 0.4 thou/uL    Basophils Absolute 0.0 0.0 - 0.2 thou/uL       Imaging    No results found.      Signed by: Melody Segovia, CNP

## 2021-06-23 NOTE — PROGRESS NOTES
Pt here to see NP and fluids.  1 L NS ran over 1 hour without incident.  Pt given a written handout on magnesium rich foods.  Upon completion IV removed and pt d/c ambulatory to lobby alone.  Pt aware of future appointments.

## 2021-06-23 NOTE — PROGRESS NOTES
Pt arrived ambulatory to clinic for labs and IVFs.  Reviewed labs with Dr. Soto, pt will get 2 gm of Mag replacement today, and 1 liter of NS.  Pt should avoid supplements and drinks that contain potassium, and we will recheck potassium and magnesium on Thursday.  Cathy started IV using aseptic technique without difficulties with excellent blood return.  Administered IVFs and Mag bump per MD order.  Pt tolerated infusion well, no s/s of fluid overload. IV was flushed with NS then D/C'd using 2x2 and Coban.  Pt verbalized understanding of plan of care and return to clinic.

## 2021-06-23 NOTE — PROGRESS NOTES
Pt arrived ambulatory to clinic for Cycle # 3 Day # 1 of his chemotherapy regimen.  IV was started using aseptic technique without difficulties with excellent blood return.  Administered premedications, pre hydration, chemotherapy, and post-hydration per MD order.  IV was flushed with NS then D/C'd using 2x2 and Coban.  Pt verbalized understanding of plan of care and return to clinic.     Pt's PO potassium was accidentally skipped, MD was notified, Dr. Soto was not concerned.  Pt's potassium was 4.7, so we will recheck labs next Thursday and replace if needed.   Attempted to reach pt, but phone was shut off.  Pt is due back Tuesday and Thursday for fluids.

## 2021-06-23 NOTE — PROGRESS NOTES
Pardeep came to chemo infusion this morning for labs and IV hydration.  He has poor oral intake due to anorexia and mild nausea.  He does sip on fluids all day.  VSS.  Pt assessed.  Peripheral IV inserted with good blood return.  IV hydration infused over 70 minutes and was well tolerated while in clinic today. He tolerated hydration well.  IV was d/c'd.  Pardeep d/c from clinic ambulatory and unaccompanied.  He is aware of his future appointment Thursday for lab and further hydration.

## 2021-06-23 NOTE — PATIENT INSTRUCTIONS - HE
Hold lisinopril.    Hold metoprolol.    Labs will be rechecked on Monday (including the magnesium level).     Return on Monday, and will be seen by Melody GONCALVES.    [facility] --  [phone] -- Dr. Soto

## 2021-06-23 NOTE — PROGRESS NOTES
"Pt arrived ambulatory and was seated in chair 6 - accompanied by his spouse, Eliane - who states,' his blood pressure was in the 80's at home' - \"My legs are weak, feel nauseated, and have that brain fog.\" Refer to the vital signs tab for those results. Placed IV and labs were drawn. Infused 1 liter of NS and administered zofran 8 mg IVP with a running IV. Discussed plan of care with Dr. Soto and Shira BLANCHARD - informed of today's lab results, and the Mg is 1.5. Patient was instructed to hold both the lisinopril and metoprolol - \"I will start that tomorrow.\" Blood pressure improved after the IV fluids. Post infusion AVS and lab results were given and explained to the patient. Left unit ambulatory in stable condition at 14:55 - and plans to return on Monday to see the CNP. Instructed to call with any questions or concerns prior to Monday's appointment.    Aletha Ramirez RN  "

## 2021-06-23 NOTE — PROGRESS NOTES
Pardeep came to chemo infusion this morning for IV hydration.  He has poor oral intake due to anorexia and mild nausea.  He does sip on fluids all day.  BUN and creatinine were also elevated prior to treatment. VSS.  Pt assessed.  Peripheral IV inserted with good blood return.  IV hydration infused over 70 minutes and was well tolerated while in clinic today. He tolerated hydration well.  IV was d/c'd.  Pardeep d/c from clinic ambulatory and unaccompanied.  He is aware of his future appointment Thursday for lab and further hydration.

## 2021-06-23 NOTE — TELEPHONE ENCOUNTER
Patient called regarding his increased lightheadedness x 1 week and low blood pressure.  Patient is on blood pressure medication.  Patient is getting IV fluids regularly in our infusion center.  Pt advised to notify his PMD of the changes in his blood pressure for medication management.  Pt was unhappy with this advice and expressed that Dr Soto should be making that call.  Discussed with Dr Soto who agreed that patient should discuss with his primary doctor, however he also suggested that due to patient symptoms he could come today for fluids rather than wait until tomorrow.  Patient verbalized understanding and agreed to have fluids today, pt transferred to Laura in scheduling to change that appointment.

## 2021-06-23 NOTE — PROGRESS NOTES
Pardeep came to chemo infusion this morning for lab and further hydration.  VSS.  Pt reports he is drinking well but has little appetite. See assessment.  Lab results noted and reviewed with Dr Soto who ordered 2gm of magnesium sulfate for a mag level today of 1.5 as well as his hydration.  Peripheral IV inserted with good blood return.  Hydration and magnesium infused and tolerated well.  IV d/c'd and site covered.  Pardeep d/c from clinic ambulatory and unaccompanied.  He is aware of his future appointment.

## 2021-06-23 NOTE — TELEPHONE ENCOUNTER
Nurse Navigator Note:  I had a VM from Pham, RN Case Manager with 's insurance company, left on 1/9/19. I was out of the office until today so called her back and confirmed his diagnosis, stage, and treatment care plan with her at this time. She had no other questions.     Pham's phone number is [phone]

## 2021-06-24 NOTE — PROGRESS NOTES
"Pt ambulates to infusion center for labs and hydration.  Labs drawn w/results noted.  Pt reports continuing \"dizziness\" in the mornings and would like hydration today despite lab results and blood pressure. Peripheral IV started w/good blood return.  Hydration started and infused for 45 minutes when peripheral IV infiltrated.  Pt did not want IV restarted. Peripheral IV dc'd and pt left clinic stable to lobby.  "

## 2021-06-24 NOTE — PROGRESS NOTES
[facility] Hematology and Oncology Progress Note    Patient: Pardeep Wilson  MRN: [id number]  Date of Service: 02/15/2019        Reason for Visit    Chief Complaint   Patient presents with     HE Cancer     Malignant neoplasm of lower lobe of left lung       Assessment and Plan    T4 N0 M0, stage III a mucinous left lung adenocarcinoma status post left lower lobe resection on November 1, 2018  Tumor 9 cm with 3.5 cm nodule, grade 2 out of 4 mucinous adenocarcinoma  Remote history of stage I a right axillary Hodgkin's disease status post mantle field radiation and splenectomy about 45 years ago  Right parotid cancer with lymph node involvement status post surgery and adjuvant radiation for 6 weeks in 1991  Coronary artery disease  Elevated BUN and creatinine    Patient with moderate fatigue related to chemotherapy.  Otherwise has tolerated chemotherapy okay.  Renal function is stable.  Will proceed with fourth and final cycle with 25% dose reduction today.  Will have patient return again in 3 weeks with lab work.  In addition he will return twice a week for the next 2 weeks for IV fluids and lab work.  Discussed plans for repeat imaging in mid April and also discussed typical follow-up after adjuvant chemotherapy.  Asked patient to continue folic acid daily until he returns.  He will get vitamin B12 injection today.    Plan: Proceed with cycle #4 of cisplatin and Alimta  Follow-up in 3 weeks    Measurable disease: None postoperatively    Current therapy: Cisplatin and Alimta adjuvant chemotherapy, day 1 cycle 4, February 15, 2019  First cycle  started December 14, 2018    Treatment history: Patient underwent mediastinoscopy, bronchoscopy, thoracoscopic surgery and left lower lobectomy on November 1, 2018      Cancer Staging  Malignant neoplasm of lower lobe of left lung (H)  Staging form: Lung, AJCC 8th Edition  - Clinical stage from 10/15/2018: Stage IIIA (cT4, cN0, cM0) - Signed by Melody Segovia, IVANNA on  12/21/2018      ECOG Performance   ECOG Performance Status: 1    Distress Assessment  Distress Assessment Score: No distress    Pain           Problem List    1. Malignant neoplasm of lower lobe of left lung (H)  CC OFFICE VISIT LONG    Infusion Appointment    Comprehensive Metabolic Panel    HM1(CBC and Differential)    Magnesium    DISCONTINUED: sodium chloride 0.9%    DISCONTINUED: palonosetron injection 0.25 mg (ALOXI)    DISCONTINUED: fosaprepitant 150 mg in sodium chloride 0.9% 150 mL (EMEND)    DISCONTINUED: cyanocobalamin injection 1,000 mcg    DISCONTINUED: PEMEtrexed 750 mg in sodium chloride 0.9% 100 mL chemo (ALIMTA)    DISCONTINUED: CISplatin 110 mg in sodium chloride 0.9% 500 mL chemo (PLATINOL)    DISCONTINUED: sodium chloride 0.9% 1,000 mL with potassium chloride 10 mEq infusion    DISCONTINUED: magnesium sulfate in water 2 gram/50 mL (4 %) IVPB 2 g    DISCONTINUED: potassium chloride CR tablet 20 mEq (K-DUR,KLOR-CON)    DISCONTINUED: sodium chloride flush 20 mL (NS)    DISCONTINUED: heparin lockflush (PF) porcine 300-600 Units    DISCONTINUED: diphenhydrAMINE injection 50 mg (BENADRYL)    DISCONTINUED: famotidine 20 mg/2 mL injection 20 mg (PEPCID)    DISCONTINUED: hydrocortisone sod succ (PF) injection 100 mg    DISCONTINUED: acetaminophen tablet 1,000 mg (TYLENOL)    DISCONTINUED: sodium chloride 0.9% 500 mL   2. Encounter for antineoplastic chemotherapy  CC OFFICE VISIT LONG    Infusion Appointment    DISCONTINUED: sodium chloride 0.9%    DISCONTINUED: palonosetron injection 0.25 mg (ALOXI)    DISCONTINUED: fosaprepitant 150 mg in sodium chloride 0.9% 150 mL (EMEND)    DISCONTINUED: cyanocobalamin injection 1,000 mcg    DISCONTINUED: PEMEtrexed 750 mg in sodium chloride 0.9% 100 mL chemo (ALIMTA)    DISCONTINUED: CISplatin 110 mg in sodium chloride 0.9% 500 mL chemo (PLATINOL)    DISCONTINUED: sodium chloride 0.9% 1,000 mL with potassium chloride 10 mEq infusion    DISCONTINUED: magnesium sulfate  "in water 2 gram/50 mL (4 %) IVPB 2 g    DISCONTINUED: potassium chloride CR tablet 20 mEq (K-DUR,KLOR-CON)    DISCONTINUED: sodium chloride flush 20 mL (NS)    DISCONTINUED: heparin lockflush (PF) porcine 300-600 Units    DISCONTINUED: diphenhydrAMINE injection 50 mg (BENADRYL)    DISCONTINUED: famotidine 20 mg/2 mL injection 20 mg (PEPCID)    DISCONTINUED: hydrocortisone sod succ (PF) injection 100 mg    DISCONTINUED: acetaminophen tablet 1,000 mg (TYLENOL)    DISCONTINUED: sodium chloride 0.9% 500 mL        CC: Dov Morales, DO    ______________________________________________________________________________    History of Present Illness    Mr. Pardeep Wilson returns for follow-up.  He completed cycle 3 3 weeks ago.  Is having significant fatigue but ECOG status is 1.  Does have dyspnea on exertion but no PND orthopnea or leg swelling.  No fever or mild sores.  No pain issues.  No change with bowels or urine.  No bleeding or rash.  Is working 2 out of the 3 weeks.    Pain Status  Currently in Pain: No/denies    Review of Systems    Constitutional  Constitutional (WDL): Exceptions to WDL  Fatigue: Fatigue not relieved by rest - Limiting instrumental ADL  Neurosensory  Neurosensory (WDL): Exceptions to WDL  Confusion: Mild disorientation(\"I'm forgetful a lot.\")  Cardiovascular  Cardiovascular (WDL): All cardiovascular elements are within defined limits  Pulmonary  Respiratory (WDL): Exceptions to WDL  Cough: Mild symptoms, nonprescription intervention indicated(Dry.)  Dyspnea: Shortness of breath with moderate exertion  Gastrointestinal  Gastrointestinal (WDL): Exceptions to WDL  Anorexia: Oral intake altered without significant weight loss or malnutrition, oral nutritional supplements indicated  Nausea: Loss of appetite without alteration in eating habits  Genitourinary  Genitourinary (WDL): All genitourinary elements are within defined limits  Integumentary  Integumentary (WDL): All integumentary " elements are within defined limits  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     Esophageal reflux      Hodgkin's lymphoma (H)     s/p radiation to the mediastinum at age 17     Hyperlipemia      Hypertension      Hypothyroidism      Psoriasis          Past Surgical History:   Procedure Laterality Date     CORONARY ANGIOPLASTY      Stent Placement     EYE SURGERY Bilateral     Cataracts     MEDIASTINOSCOPY N/A 11/1/2018    Procedure: MEDIASTINOSCOPY;  Surgeon: Bry Saunders MD;  Location: [facility];  Service:      PAROTIDECTOMY       ND LAP,DIAGNOSTIC ABDOMEN N/A 11/7/2017    Procedure: DIAGNOSTIC LAPAROSCOPY,  LYSIS OF ADHESIONS, SMALL BOWEL RESECTION;  Surgeon: Brian Granados MD;  Location: [facility] [place];  Service: General     SPLENECTOMY, TOTAL  1973     WISDOM TOOTH EXTRACTION         Physical Exam    Recent Vitals 2/15/2019   Weight 174 lbs 3 oz   BSA (m2) 1.96 m2   /71   Pulse 92   Temp 97.9   Temp src 1   SpO2 100   Some recent data might be hidden       GENERAL: Alert and oriented. Seated comfortably. In no distress.    HEAD: Atraumatic and normocephalic.  Has a full head of hair.    EYES: SOPHIE, EOMI.  No pallor.  No icterus.    Oral cavity: no mucosal lesion or tonsillar enlargement.    NECK: supple. JVP normal.  No thyroid enlargement.    LYMPH NODES: No palpable, cervical, axillary or inguinal lymphadenopathy.    CHEST: clear to auscultation bilaterally.  Resonant to percussion throughout bilaterally.  Symmetrical breath movements bilaterally.    CVS: S1 and S2 are heard. Regular rate and rhythm.  No murmur or gallop or rub heard.    ABDOMEN: Soft. Not tender. Not distended.  No palpable hepatomegaly or splenomegaly.  No  other mass palpable.  Bowel sounds heard.    EXTREMITIES: Warm.  No peripheral edema.    SKIN: no rash, or bruising or purpura.        Lab Results    Recent Results (from the past 168 hour(s))   Magnesium   Result Value Ref Range    Magnesium 1.6 (L) 1.8 - 2.6 mg/dL   Comprehensive Metabolic Panel   Result Value Ref Range    Sodium 143 136 - 145 mmol/L    Potassium 4.4 3.5 - 5.0 mmol/L    Chloride 108 (H) 98 - 107 mmol/L    CO2 29 22 - 31 mmol/L    Anion Gap, Calculation 6 5 - 18 mmol/L    Glucose 72 70 - 125 mg/dL    BUN 32 (H) 8 - 22 mg/dL    Creatinine 1.29 0.70 - 1.30 mg/dL    GFR MDRD Af Amer >60 >60 mL/min/1.73m2    GFR MDRD Non Af Amer 56 (L) >60 mL/min/1.73m2    Bilirubin, Total 0.5 0.0 - 1.0 mg/dL    Calcium 9.9 8.5 - 10.5 mg/dL    Protein, Total 6.7 6.0 - 8.0 g/dL    Albumin 3.4 (L) 3.5 - 5.0 g/dL    Alkaline Phosphatase 103 45 - 120 U/L    AST 16 0 - 40 U/L    ALT 10 0 - 45 U/L   HM1 (CBC with Diff)   Result Value Ref Range    WBC 9.2 4.0 - 11.0 thou/uL    RBC 3.29 (L) 4.40 - 6.20 mill/uL    Hemoglobin 10.6 (L) 14.0 - 18.0 g/dL    Hematocrit 30.6 (L) 40.0 - 54.0 %    MCV 93 80 - 100 fL    MCH 32.2 27.0 - 34.0 pg    MCHC 34.6 32.0 - 36.0 g/dL    RDW 16.8 (H) 11.0 - 14.5 %    Platelets 528 (H) 140 - 440 thou/uL    MPV 9.8 8.5 - 12.5 fL       Imaging    No results found.      Signed by: Kevin Soto MD

## 2021-06-24 NOTE — PROGRESS NOTES
Pt came into infusion clinic for IV fluids as ordered. Labs Reviewed. Medications explained to pt who verbalized understanding. IV patent throughout infusion. Pt tolerated infusion with no complications. Pt left infusion clinic via ambulatory and will RTC as sched.

## 2021-06-24 NOTE — PROGRESS NOTES
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  He has had fairly significant side effects and continues to have fatigue.  He is also had some dizziness.  Blood pressure is fine and heart rate is up.  I think he can restart his low-dose metoprolol.  Should hold on lisinopril.  Should monitor his blood pressures and if they start to get elevated then we can restart lisinopril.  Will check magnesium and start supplementation if needed.  Explained that he should slowly start to have improvement in his fatigue and other side effects but this may take a couple of months to recover completely.  We will plan to see him back in about 2 months with repeat CT scans and lab work.  Discussed typical follow-up for his lung cancer.  We will plan visits every 3 months and CT scans every 6 months for the first couple of years.    Plan: Follow-up in 2 months with repeat labs and CT of the chest  Restart metoprolol and monitor blood pressures  Encourage oral hydration      Measurable disease: None postoperatively    Current therapy: Observation      Treatment history:     Cisplatin and Alimta adjuvant chemotherapy, for 4 cycles: Day 1 cycle  4, February 15, 2019  First cycle  started December 14, 2018    Patient underwent mediastinoscopy, bronchoscopy, thoracoscopic surgery and left lower lobectomy on November 1, 2018      Cancer Staging  Malignant neoplasm of lower lobe of left lung (H)  Staging form: Lung, AJCC 8th Edition  - Clinical stage from 10/15/2018: Stage IIIA (cT4, cN0, cM0) - Signed by Melody Segovia CNP on 12/21/2018      ECOG Performance   ECOG Performance Status: 1    Distress Assessment  Distress Assessment Score: No distress    Pain           Problem List    1. Malignant neoplasm of lower lobe of left lung (H)  CT Chest Without Contrast    HM1(CBC and Differential)    Comprehensive Metabolic Panel    Magnesium    Magnesium        CC: Dov Morales,     ______________________________________________________________________________    History of Present Illness    Mr. Pardeep Wilson returns for follow-up.  He completed fourth and final cycle of adjuvant chemotherapy 3 weeks ago.  He has had a tough time with a lot of fatigue.  Reports some dyspnea on exertion.  Also some dizziness when he stands up suddenly.  No headaches.  Currently having symptoms of a cold.  No significant shortness of breath or cough.  Denies pain issues.  ECOG status is 0.    Pain Status  Currently in Pain: No/denies    Review of Systems    Constitutional  Constitutional (WDL): Exceptions to WDL  Fatigue: Fatigue not relieved by rest - Limiting instrumental ADL  Neurosensory  Neurosensory (WDL): Exceptions to WDL  Cardiovascular  Cardiovascular (WDL): Exceptions to WDL  Palpitations: Definition: A disorder characterized by inflammation of the muscle tissue of the heart.  Pulmonary  Respiratory (WDL): Exceptions to WDL(Currently has a chest cold. )  Cough: Mild symptoms, nonprescription intervention indicated  Dyspnea: Shortness of breath with moderate exertion  Gastrointestinal  Gastrointestinal (WDL): Exceptions to WDL  Anorexia: Oral intake  "altered without significant weight loss or malnutrition, oral nutritional supplements indicated(\"I eat because it's time to eat.\")  Nausea: Loss of appetite without alteration in eating habits(Slight. 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     Esophageal reflux      Hodgkin's lymphoma (H)     s/p radiation to the mediastinum at age 17     Hyperlipemia      Hypertension      Hypothyroidism      Psoriasis          Past Surgical History:   Procedure Laterality Date     CORONARY ANGIOPLASTY      Stent Placement     EYE SURGERY Bilateral     Cataracts     MEDIASTINOSCOPY N/A 11/1/2018    Procedure: MEDIASTINOSCOPY;  Surgeon: Bry Saunders MD;  Location: [facility];  Service:      PAROTIDECTOMY       KY LAP,DIAGNOSTIC ABDOMEN N/A 11/7/2017    Procedure: DIAGNOSTIC LAPAROSCOPY,  LYSIS OF ADHESIONS, SMALL BOWEL RESECTION;  Surgeon: Brian Granados MD;  Location: [facility];  Service: General     SPLENECTOMY, TOTAL  1973     WISDOM TOOTH EXTRACTION         Physical Exam    Recent Vitals 3/7/2019   Weight 174 lbs 11 oz   BSA (m2) 1.96 m2   /55   Pulse 96   Temp 98.1   Temp src 1   SpO2 100   Some recent data might be hidden       GENERAL: Alert and oriented. Seated comfortably. In no distress.    HEAD: Atraumatic and normocephalic.  Has a full head of hair.    EYES: SOPHIE, EOMI.  No pallor.  No icterus.    Oral cavity: no mucosal lesion or tonsillar enlargement.    NECK: supple. JVP normal.  No thyroid enlargement.    LYMPH NODES: No palpable, cervical, axillary or " inguinal lymphadenopathy.    CHEST: clear to auscultation bilaterally.  Resonant to percussion throughout bilaterally.  Symmetrical breath movements bilaterally.    CVS: S1 and S2 are heard. Regular rate and rhythm.  No murmur or gallop or rub heard.    ABDOMEN: Soft. Not tender. Not distended.  No palpable hepatomegaly or splenomegaly.  No other mass palpable.  Bowel sounds heard.    EXTREMITIES: Warm.  No peripheral edema.    SKIN: no rash, or bruising or purpura.        Lab Results    Recent Results (from the past 168 hour(s))   Comprehensive Metabolic Panel   Result Value Ref Range    Sodium 140 136 - 145 mmol/L    Potassium 4.6 3.5 - 5.0 mmol/L    Chloride 107 98 - 107 mmol/L    CO2 27 22 - 31 mmol/L    Anion Gap, Calculation 6 5 - 18 mmol/L    Glucose 106 70 - 125 mg/dL    BUN 29 (H) 8 - 22 mg/dL    Creatinine 1.41 (H) 0.70 - 1.30 mg/dL    GFR MDRD Af Amer >60 >60 mL/min/1.73m2    GFR MDRD Non Af Amer 51 (L) >60 mL/min/1.73m2    Bilirubin, Total 0.5 0.0 - 1.0 mg/dL    Calcium 9.6 8.5 - 10.5 mg/dL    Protein, Total 6.7 6.0 - 8.0 g/dL    Albumin 3.1 (L) 3.5 - 5.0 g/dL    Alkaline Phosphatase 91 45 - 120 U/L    AST 21 0 - 40 U/L    ALT 10 0 - 45 U/L   HM1 (CBC with Diff)   Result Value Ref Range    WBC 7.4 4.0 - 11.0 thou/uL    RBC 3.01 (L) 4.40 - 6.20 mill/uL    Hemoglobin 9.7 (L) 14.0 - 18.0 g/dL    Hematocrit 28.8 (L) 40.0 - 54.0 %    MCV 96 80 - 100 fL    MCH 32.2 27.0 - 34.0 pg    MCHC 33.7 32.0 - 36.0 g/dL    RDW 18.0 (H) 11.0 - 14.5 %    Platelets 470 (H) 140 - 440 thou/uL    MPV 9.3 8.5 - 12.5 fL    Neutrophils % 48 (L) 50 - 70 %    Lymphocytes % 29 20 - 40 %    Monocytes % 19 (H) 2 - 10 %    Eosinophils % 4 0 - 6 %    Basophils % 1 0 - 2 %    Neutrophils Absolute 3.5 2.0 - 7.7 thou/uL    Lymphocytes Absolute 2.1 0.8 - 4.4 thou/uL    Monocytes Absolute 1.4 (H) 0.0 - 0.9 thou/uL    Eosinophils Absolute 0.3 0.0 - 0.4 thou/uL    Basophils Absolute 0.0 0.0 - 0.2 thou/uL       Imaging    No results  found.      Signed by: Kevin Soto MD

## 2021-06-24 NOTE — PROGRESS NOTES
Pardeep came to chemo infusion this morning after lab and MD visit for cycle 4 using Alimta and Cisplatin. He was also due for and received his cyanocobalamin injection.  Peripheral IV inserted with good blood return.  He was educated on his plan of care and each medication given was reviewed prior to administration.  He received treatment as ordered and tolerated it well while in clinic.  Pardeep was encouraged to have good hydration. He tolerated his treatment today well.  IV was flushed then d/c'd and site covered.  Pardeep d/c from clinic ambulatory accompanied by his spouse.  He is aware of his future appointments.

## 2021-06-24 NOTE — PROGRESS NOTES
Pt arrived ambulatory to clinic for labs and IVFs.  Reviewed labs with Dr. Soto, pt will get 4 gm of Mag replacement today, IVP Zofran, and 1 liter of NS.  Pt states that this last cycle has been really rough on him.  Pt is taking antiemetics at home, but they are giving him minimal relief.  Madison started IV on second attempt using aseptic technique without difficulties with excellent blood return.  Administered IVFs, IVP Zofran, and Mag bump per MD order.  Pt tolerated infusion well, no s/s of fluid overload. IV was flushed with NS then D/C'd using 2x2 and Coban. Instructed pt to push oral intake at home and to call clinic if nausea worsens.  Pt verbalized understanding of plan of care and return to clinic.

## 2021-06-24 NOTE — TELEPHONE ENCOUNTER
Pardeep calls in looking for a magnesium prescription that was supposed to be sent to his pharmacy last week.  It did not get sent.  He picked up an over the counter magnesium supplement that has 250mg per pill.  He wondered if he could take this instead and how many he should take per day.  Per Dr Soto, this would be fine and he should take 2 pills per day.  He verbalized understanding.    Sylvia Su RN

## 2021-06-25 NOTE — TELEPHONE ENCOUNTER
Patient was in today for a recheck of his blood work in regards to his ITP.  Patient is currently on prednisone 5 mg prescribed by Dr Soto.  This was recently decreased from 10 mg daily down to 5 mg daily.  Patient's platelet count did drop from 200 last week to 116.  Dr Soto would like patient to have his labs rechecked on Thursday, 6/3.  Patient was given this information in the lobby as he waited for results.  He was also given a copy of his results and is waiting in line to schedule his lab appointment for later this week.    Sylvia Su RN

## 2021-06-25 NOTE — PROGRESS NOTES
Pt ambulatory to radiation clinic with wife for follow up. CT done on 5/27. Further recommendations and orders per provider.

## 2021-06-25 NOTE — PROGRESS NOTES
[facility] Radiation Oncology Follow Up Note    Patient: Pardeep Wilson  MRN: [id number]  Date of Service: 06/03/2021    Assessment / Impression:  Malignant neoplasm of upper lobe of right lung (H)  (primary encounter diagnosis)  Plan: CT Chest Without Contrast     Malignant neoplasm of lower lobe of left lung (H)    Staging form: Lung, AJCC 8th Edition    - Clinical stage from 10/15/2018: Stage IIIA (cT4, cN0, cM0) - Signed by Melody Segovia CNP on 12/21/2018  ECOG Performance Status: 1  Distress Assessment Score: 2  Distress Assessment Intervention: Provider Notified  Body site: Thorax    Plan:  Return to clinic in 3 months with repeat restaging chest CT  Total time of this visit, including time spent face-to-face with patient and or via video/audio, and also in preparing for today's visit for MDM and documentation. Medical decision-making included consideration and possible diagnoses, management options, complex record review, review of diagnostic tests and information, consideration and discussion of significant complications based on comorbidities, discussion with providers involved in the care of the patient.    30 Minutes spent.     This note has been generated using voice recognition software. There may be some errors that were not identified at the time of documented.    Subjective:  HPI: Pardeep Wilson is a 65 y.o. male with Patient presents with:  HE Cancer  Radiation    He is doing well status post SBRT to the right upper lobe.  He reports his breathing continues to feel tight (has for the last 47 years).  Shortness of breath is stable.  He denies cough.  No fevers.  His energy is good.   He is followed by heme-onc for thrombocytopenia, ITP, February 2021. He was able to access his CT chest report online but they are interested in reviewing the images in clinic:   5/27/2021 CT CHEST WO CONTRAST  FINDINGS:   LUNGS AND PLEURA: The 10 mm right upper lobe lesion marked with fiducial is no  longer visible consistent with response to interval therapy (image 6 of series 4). The second right upper lobe lesion which was not marked with fiducial hasn't decreased from   12 x 9 mm to 10 x 7 mm (image 83). Focus of consolidation and volume loss posterolateral right upper lobe and thin rind of peripheral consolidation in volume loss in the anterior medial and perifissural right upper lobe have developed and most likely   represent post radiation changes. Faint nonspecific nodular pleural thickening right major fissure has developed.  Right upper lobe wedge resection. Left lower lobectomy with associated chronic pleural thickening unchanged. Upper paramediastinal radiation fibrosis both lungs.  IMPRESSION:   1.  The 10 mm right upper lobe lesion marked with fiducial is no longer visible consistent with response to interval therapy .  2.  The second right upper lobe lesion which was not marked with fiducial is decreased from 12 x 9 mm to 10 x 7 mm.  3.  Faint nonspecific nodular pleural thickening right major fissure has developed. Consider follow-up CT in 3-6 months.      Current Outpatient Medications:  atorvastatin (LIPITOR) 40 MG tablet, TAKE 1 TABLET BY MOUTH AT  BEDTIME, Disp: 90 tablet, Rfl: 2  biotin 300 mcg Tab, Take 300 mg by mouth daily., Disp: , Rfl:   diphenoxylate-atropine (LOMOTIL) 2.5-0.025 mg per tablet, Take 2 tablets by mouth 4 (four) times a day as needed for diarrhea., Disp: 56 tablet, Rfl: 3  fluocinonide (LIDEX) 0.05 % cream, APPLY EXTERNALLY TO THE AFFECTED AREA TWICE DAILY SPARINGLY AS NEEDED, Disp: 30 g, Rfl: 0  levothyroxine (SYNTHROID, LEVOTHROID) 100 MCG tablet, Take 10.125 mcg by mouth daily., Disp: , Rfl:   loperamide (IMODIUM) 2 mg capsule, Take 2 mg by mouth 4 (four) times a day as needed for diarrhea., Disp: , Rfl:   metoprolol succinate (TOPROL-XL) 25 MG, TAKE ONE-HALF TABLET BY  MOUTH DAILY, Disp: 45 tablet, Rfl: 2  multivitamin (ONE A DAY) per tablet, Take 2 tablets by mouth.,  Disp: , Rfl:    pantoprazole (PROTONIX) 40 MG tablet, TAKE 1 TABLET BY MOUTH  DAILY, Disp: 90 tablet, Rfl: 2  predniSONE (DELTASONE) 10 mg tablet, Take 60 mg by mouth daily. (Patient taking differently: Take 20 mg by mouth daily .), Disp: 180 tablet, Rfl: 0  SF 5000 PLUS 1.1 % Crea, Apply 1 application to teeth daily., Disp: , Rfl:     No current facility-administered medications for this visit.     The following portions of the patient's history were reviewed and updated as appropriate: allergies, current medications, past family history, past medical history, past social history, past surgical history and problem list.    Review of Systems  Constitutional (WDL): Exceptions to WDL  Fatigue: Fatigue relieved by rest  Eye Disorder (WDL): All eye disorder elements are within defined limits  Ear Disorder (WDL): All ear disorder elements are within defined limits  Respiratory       Respiratory (WDL): Within Defined Limits  Cardiovascular (WDL): Exceptions to WDL  Edema: Yes  Edema Limbs: 5 - 10% inter-limb discrepancy in volume or circumference at point of greatest visible difference, swelling or obscuration of anatomic architecture on close inspection(d/t steroid use, on/off)  Gastrointestinal (WDL): Exceptions to WDL  Musculoskeletal and Connetive Tissue Disorders (WDL): All Musculoskeletal and Connetive Tissue Disorder elements are within defined limits  Integumentary (WDL): All integumentary elements are within defined limits  Neurosensory (WDL): Exceptions to WDL  Peripheral Sensory Neuropathy: Asymptomatic, loss of deep tendon reflexes or paresthesia  Patient Coping: Open/discussion  Lymph (WDL): All lymph disorder elements are within defined limits  Genitourinary (WDL): All genitourinary elements are within defined limits  Pain              Currently in Pain: No/denies  Accompanied by: Family Member      Objective:  Physical Exam    ---------------------------------                  06/03/21                             1510            ---------------------------------   BP:           (!) 195/90          Pulse:            88              SpO2:             98%             Weight: 190 lb 3.2 oz (86.3 kg)  ---------------------------------     Gen: Alert, in NAD pleasant interactive, well nourished  Eyes: PERRL, EOMI, sclera anicteric  Pulm: CTAB, No wheezing, stridor or respiratory distress  CV: Regular rate and rhythm  Neurologic: A/Ox3, CN II-XII intact, face symmetric, speech fluent, no focal motor deficits. Gait normal and unaided  Psychiatric: Appropriate mood and affect    Recent Labs: Recent Results (from the past 168 hour(s))  -[facility] (CBC with Diff)       Result                                            Value                         Ref Range                       WBC                                               14.0 (H)                      4.0 - 11.0 thou/uL              RBC                                               4.15 (L)                      4.40 - 6.20 mill/uL             Hemoglobin                                        12.9 (L)                      14.0 - 18.0 g/dL                Hematocrit                                        40.0                          40.0 - 54.0 %                   MCV                                               96                            80 - 100 fL                     MCH                                               31.1                          27.0 - 34.0 pg                  MCHC                                              32.3                          32.0 - 36.0 g/dL                RDW                                               16.9 (H)                      11.0 - 14.5 %                   Platelets                                         116 (L)                       140 - 440 thou/uL               MPV                                               10.0                          8.5 - 12.5 fL                   Neutrophils %                                      69                            50 - 70 %                       Lymphocytes %                                     19 (L)                        20 - 40 %                       Monocytes %                                       11 (H)                        2 - 10 %                        Eosinophils %                                     1                             0 - 6 %                         Basophils %                                       1                             0 - 2 %                         Immature Granulocyte %                            1 (H)                         <=0 %                           Neutrophils Absolute                              9.6 (H)                       2.0 - 7.7 thou/uL               Lymphocytes Absolute                              2.6                           0.8 - 4.4 thou/uL               Monocytes Absolute                                1.5 (H)                       0.0 - 0.9 thou/uL               Eosinophils Absolute                              0.2                           0.0 - 0.4 thou/uL               Basophils Absolute                                0.1                           0.0 - 0.2 thou/uL               Immature Granulocyte Absolute                     0.1 (H)                       <=0.0 thou/uL              -[facility] (CBC with Diff)       Result                                            Value                         Ref Range                       WBC                                               12.6 (H)                      4.0 - 11.0 thou/uL              RBC                                               4.09 (L)                      4.40 - 6.20 mill/uL             Hemoglobin                                        12.9 (L)                      14.0 - 18.0 g/dL                Hematocrit                                        39.8 (L)                      40.0 - 54.0 %                   MCV                                               97                             80 - 100 fL                     MCH                                               31.5                          27.0 - 34.0 pg                  MCHC                                              32.4                          32.0 - 36.0 g/dL                RDW                                               17.1 (H)                      11.0 - 14.5 %                   Platelets                                         119 (L)                       140 - 440 thou/uL               MPV                                               11.8                          8.5 - 12.5 fL                   Neutrophils %                                     64                            50 - 70 %                       Lymphocytes %                                     23                            20 - 40 %                       Monocytes %                                       11 (H)                        2 - 10 %                        Eosinophils %                                     2                             0 - 6 %                         Basophils %                                       1                             0 - 2 %                         Immature Granulocyte %                            1 (H)                         <=0 %                           Neutrophils Absolute                              8.1 (H)                       2.0 - 7.7 thou/uL               Lymphocytes Absolute                              2.9                           0.8 - 4.4 thou/uL               Monocytes Absolute                                1.4 (H)                       0.0 - 0.9 thou/uL               Eosinophils Absolute                              0.2                           0.0 - 0.4 thou/uL               Basophils Absolute                                0.1                           0.0 - 0.2 thou/uL               Immature Granulocyte Absolute                     0.1 (H)                       <=0.0 thou/uL                 Personally reviewed imaging.  Images also reviewed with patient in clinic    Imaging: Imaging results 6 weeks:Ct Chest Without Contrast    Result Date: 5/27/2021  EXAM: CT CHEST WO CONTRAST LOCATION: [facility] DATE/TIME: 5/27/2021 7:12 AM INDICATION: NSCLC status post right upper lobe stereotactic radiosurgery completed 2/2021 (of note, only 1 of 2 right upper lobe nodules could be biopsied and marked with fiducials at that time), right upper lobe wedge resection 2020 and left lower lobectomy 2018 as well as chemotherapy and immunotherapy. History of remote Hodgkin's lymphoma with mantle radiation and right parotid cancer status post right parotidectomy and radiation. COMPARISON: CT guidance for right upper lobe biopsy and fiducial placement 01/12/2021, 12/22/2020 PET/CT and 11/27/2020 CT chest TECHNIQUE: CT chest without IV contrast. Multiplanar reformats were obtained. Dose reduction techniques were used. CONTRAST: None. FINDINGS: LUNGS AND PLEURA: The 10 mm right upper lobe lesion marked with fiducial is no longer visible consistent with response to interval therapy (image 6 of series 4). The second right upper lobe lesion which was not marked with fiducial hasn't decreased from 12 x 9 mm to 10 x 7 mm (image 83). Focus of consolidation and volume loss posterolateral right upper lobe and thin rind of peripheral consolidation in volume loss in the anterior medial and perifissural right upper lobe have developed and most likely represent post radiation changes. Faint nonspecific nodular pleural thickening right major fissure has developed. Right upper lobe wedge resection. Left lower lobectomy with associated chronic pleural thickening unchanged. Upper paramediastinal radiation fibrosis both lungs. MEDIASTINUM/AXILLAE: No lymphadenopathy. No thoracic aortic aneurysm. CORONARY ARTERY CALCIFICATION: Moderate. UPPER ABDOMEN: No significant finding. MUSCULOSKELETAL: No bone lesion. Bones  are demineralized.     1.  The 10 mm right upper lobe lesion marked with fiducial is no longer visible consistent with response to interval therapy . 2.  The second right upper lobe lesion which was not marked with fiducial is decreased from 12 x 9 mm to 10 x 7 mm. 3.  Faint nonspecific nodular pleural thickening right major fissure has developed. Consider follow-up CT in 3-6 months.       Signed by: Chelsea Grant MD

## 2021-06-25 NOTE — TELEPHONE ENCOUNTER
I called Melecio to check in with him and he is doing well. He had a f/u appt with Dr. Grant yesterday and received good news with his CT scan. One nodule is gone and the second nodule shrunk. He and Eliane were reassured by his results. He continues to have frequent lab draws for ITP and fortunately his Plt count has stabilized. He has been tapering down his prednisone and that's been going fine.  Melecio was appreciative of the check in and he has no further questions or needs at this time.

## 2021-06-25 NOTE — PROGRESS NOTES
Patient was in today for a lab recheck in regards to his ITP that is monitored by Dr Soto.  Patient is currently taking prednisone 5 mg daily.  Platelet count today is 119.  Dr Soto states that the patient can decrease his prednisone to 5 mg every other day but he would like him to come in on Monday, 6/7 for a lab recheck.  Patient waited in the lobby for results so this information was given to him along with a copy of his results.  He verbalized understanding.    Sylvia Su RN

## 2021-06-26 NOTE — PROGRESS NOTES
Progress Notes by Bry Saunders MD at 10/25/2018  2:30 PM     Author: Bry Saunders MD Service: -- Author Type: Physician    Filed: 10/25/2018  4:15 PM Encounter Date: 10/25/2018 Status: Signed    : Bry Saunders MD (Physician)       THORACIC SURGERY OFFICE NEW PATIENT VISIT      Dear Dr. Morales,    I saw Mr. Wilson at Dr. Marx request in consultation for the evaluation and treatment of a NSCLC of the LEFT lower lobe (cT3N0).     HPI  Mr. Pardeep Wilson is a 63 y.o. year-old male with a biopsy-proven adenocarcinoma of the left lower lobe. He is asymptomatic. The tumor appears to be in the field of his prior mantle radiation.    Tests   CT-PET (10/11/2018): 6.7 cm, SUV 4.9, mediastinum negative      CT (9/242018):      PFT (10/17/2018): normal    Pathology (10/2/2018): lepidic predominant adenocarcinoma, G2    Echo (2017): normal    PMH  Past Medical History:   Diagnosis Date   ? Anemia    ? Coronary artery disease     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   ? Esophageal reflux    ? Hodgkin's lymphoma (H)     s/p radiation to the mediastinum at age 17   ? Hyperlipemia    ? Hypertension    ? Hypothyroidism    ? Psoriasis         Past Surgical History:   Procedure Laterality Date   ? CORONARY ANGIOPLASTY      Stent Placement   ? EYE SURGERY Bilateral     Cataracts   ? PAROTIDECTOMY     ? TN LAP,DIAGNOSTIC ABDOMEN N/A 11/7/2017    Procedure: DIAGNOSTIC LAPAROSCOPY,  LYSIS OF ADHESIONS, SMALL BOWEL RESECTION;  Surgeon: Brian Granados MD;  Location: [facility] [place];  Service: General   ? SPLENECTOMY, TOTAL  1973   ? WISDOM TOOTH EXTRACTION           ETOH 1 drink/week  TOB never    Physical examination  BMI 26  Sequelae of neck dissection    From a personal perspective, he is an . His wife, Eliane, is a cancer registrar in [employer], and is a respiratory therapist by training.      IMPRESSION   1. Malignant neoplasm of lower lobe of left lung (H)        63 y.o.  year-old male with NSCLC of the LEFT lower lobe (cT3N0).    PLAN  I spent a total of 30 minutes with Mr. Wilson and Eliane, more than 50% of which were spent in counseling, coordination of care, and face-to-face time. I reviewed the plan as follows:    1) Procedure planned: mediastinoscopy, VATS LEFT lower lobectomy, with possible thoracotomy.  I reviewed the indications, risks, and benefits of the procedure with Mr. Pardeep Wilson. We discussed the intraoperative risks of bleeding and injury to vital organs, potential postoperative complications including, but not limited to, major respiratory events, arrhythmia, bleeding, infection, reoperation, and death. I explained the anticipated hospital course (+/- 4-6 days) and postoperative recovery including pain control, chest drain management, and variable degrees of dyspnea (or need for supplemental oxygen) and fatigue that tend to get better with time.    2) Brain MRI    3) Stress test:  No longer on Plavix. He can resume baby ASA prior to surgery if recommended.    4) Enoxaparin 40 mg SQ in preop holding    5) H&P from 9/28, I will update it the day of surgery.        They had all their questions answered and were in agreement with the plan.  I appreciate the opportunity to participate in the care of your patient and will keep you updated.    Sincerely,

## 2021-06-26 NOTE — PROGRESS NOTES
Patient was in the clinic today for a lab only check regarding his ITP that is monitored by Dr Soto.  Patient is currently taking prednisone 5 mg every other day platelet count today has dropped to 94 from 160 back on 6/7/2021.  Dr Soto states that the patient is to stay on the same dose and recheck on Friday, 6/18.  He also wants him to keep his appointment for Monday, 6/21 for lab, MD.  Patient waited in the lobby for results of this information was given to him along with a copy of his results.  He verbalized understanding and was appreciative.    Sylvia uS RN

## 2021-06-26 NOTE — PROGRESS NOTES
Patient in today for a lab check regarding his ITP that is monitored by Dr. Soto. Patient is currently taking prednisone 5 mg every other day. Platelet count today is 160.  Dr. Soto states that the patient is to stay on same dose and recheck labs in a week. Patient waited in the lobby for results so this information was given to him along with a copy of his results. He verbalized understanding and appreciation of our conversation.     Shira Abdullahi RN, Oncology Clinic   [facility]

## 2021-06-26 NOTE — TELEPHONE ENCOUNTER
todays labs reviewed with Dr Soto and no change. Pt to continue 5mg prednisone every other day. Pt has labs and will see MD Monday as scheduled. Pt understanding of plan.

## 2021-06-30 NOTE — PROGRESS NOTES
Progress Notes by Chelsea Grant MD at 12/23/2020 12:00 PM     Author: Chelsea Grant MD Service: -- Author Type: Physician    Filed: 1/21/2021  2:39 PM Encounter Date: 12/23/2020 Status: Addendum    : Chelsea Grant MD (Physician)    Related Notes: Original Note by Chelsea Grant MD (Physician) filed at 1/7/2021  3:33 PM         Radiation Oncology Consult Note    Patient: Pardeep Wilson  MRN: [id number]  Date of Service: 12/23/2020    Assessment / Impression   64 y/o with 2 growing lung nodules right upper lobe on maintenance Keytruda, suspicious for oligoprogressive  NSCLC with extensive history of prior malignancy:    Stage T4N0M0 NSCLC, mucinous adenocarcinoma, of Left Lower Lobe s/p Left lower lobectomy 11/1/2018 and Adjuvant Chemotherapy with Cisplatin and Alimta x4c, began 12/14/18.    Recurrent (Stage IV) NSCLC, mucinous adenocarcinoma, of the Right upper lobe s/p wedge resection 2/28/2020 with 4 separate lesions noted in the resection.  Followed by carboplatin, taxol and Keytruda x 4c (6/15/20-8/18/20), maintenance Keytruda began 9/8/20.    Remote history of Stage I Right axillary Hodgkin's Lymphoma s/p Mantel field RT and splenectomy 45 yrs ago at [facility] (supected ~40 Gy).  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    1. Metastatic primary lung cancer, left (H)     2. Malignant neoplasm of lower lobe of left lung (H)       Cancer Staging  Malignant neoplasm of lower lobe of left lung (H)  Staging form: Lung, AJCC 8th Edition  - Clinical stage from 10/15/2018: Stage IIIA (cT4, cN0, cM0) - Signed by Melody Segovia CNP on 12/21/2018    ECOG Peformance Status  ECOG Performance Status: 1  Distress Assessment Score: 2    Plan:   Discussed radiation therapy for local control of 2 progressive nodules, given these are the only 2 sites of disease and are progressing on  systemic therapy, with patient.   Reviewed that his subsequent malignancies may have been RT related after his treatment for HL.   The indications for, process of, alternatives, potential side effects and complications of RT to the lung with SBRT were discussed.  Reviewed that re-irradiation is associated with increased toxicity and risk.  He would like to be aggressive in his treatment.    Reviewed attempt at biopsy and fiducial placement of SBRT treatment and he is agreeable to proceeding.  Dicussed if not felt possible or too risky would discuss proceeding without.    He asked multiple questions which were answered to his satisfaction and he verbalized understanding.    He wishes to proceed with SBRT and consent is signed today.  They will return to clinic following fiducial/biopsy for CT simulation for RT planning.  (Fiducial placement scheduled, CT sim ~ 1 wk after)     Dicussed 2 lesions would likely be treated sequentially but will do simultaneously if dosimetrically possible.    Thank you for allowing me to participate in the care of this patient. Feel free to contact me with all questions or concerns.   Face to face time  60 minutes with > 75% spent on consultation, education and coordination of care.    Intent of Therapy: Palliative/Local Control    Side effects that may occur during or within weeks after Radiation Therapy      Fatigue and general weakness     Darkening, irritation, itchiness, redness, dryness and peeling of the skin of the chest    Loss of chest and armpit hair    Painful swallowing limiting solid and liquid intake and causing dehydration    Nausea, vomiting and decrease in appetite    Side effects that may occur months or years after Radiation Therapy       Development of another tumor or cancer    Lung inflammation or fibrosis causing cough, fever, and shortness of breath     Fracture of ribs    Permanent narrowing or obstruction of the esophagus    Fluid compressing the lung (pleural  effusion)    Coronary artery blockage causing angina pain or a heart attack    Thickening, telangiectasias (development of spider like blood vessels in the skin) and ulceration of the skin of the chest    Poor healing after a trauma or surgery in the irradiated areas    Nerve damage resulting in loss of strength or sensation  *Risks are increased with prior Radiation    The risks, benefits and alternatives to radiation therapy were outlined with the patient. All questions were answered and a consent was signed.                  Subjective:      HPI: Pardeep Wilson is a 65 y.o. male with   Chief Complaint   Patient presents with   ? HE Cancer   ? Radiation   a remote history of Stage I Right axillary Hodgkin's Lymphoma s/p Mantel field RT and splenectomy 45 yrs ago at [facility] (Dr. Snow, ~40 Gy)    He since developed additional malignancies, which may be secondary, RT induced.   Right parotid cancer with lymph node involvement s/p surgery and adjuvant RT (6 weeks) in 1991 ([place]).    As well as Stage T4N0M0 NSCLC, mucinous adenocarcinoma, of Left Lower Lobe s/p Left lower lobectomy 11/1/2018 and Adjuvant Chemotherapy with Cisplatin and Alimta x4c, began 12/14/18.    Recurrent (Stage IV) NSCLC, mucinous adenocarcinoma, of the Right upper lobe s/p wedge resection 2/28/2020 with 4 separate lesions noted in the resection.   He was followed and noted to have concern for slight progression on PET/CT 6/1/20.  He began carboplatin, taxol and Keytruda x 4c (6/15/20-8/18/20), followed by maintenance Keytruda (began 9/8/20).    CT chest 11/27/20 revealed  LUNGS AND PLEURA: Prior new left lung nodules of resolved. Slight increased size of 10 x 13 mm connie-fissural right lung nodule versus 5 x 8 mm (series 4, image 92). Additional nodule along suture line in the right lung appears increased measuring 9 x 10   mm versus 7 x 8 mm (image 113). Redemonstrated left pleural thickening and/or fluid without significant change.  No other  significant change.    His case was reviewed at tumor conference.  2 nodules in the right upper lobe noted.  No other disease.  Felt to be high risk for any further surgery given previous surgery and his previous radiation.  PET/CT obtained and referred for consideration of SBRT.    PET/CT 12/22/20 demonstrated Continued interval increase in size of the connie-fissural nodule along the lateral aspect the right upper lobe measuring 1.3 x 0.9 cm (max SUV 1.9, previously measured 0.9 x 0.7 cm with a max SUV of 1.6) and development of mildly FDG avid nodule along the medial aspect of the right upper lobe wedge resection margin measuring 0.9 x 0.8 cm (max SUV 1.7, previously measured 0.6 x 0.5 cm on 06/01/2020) suggesting progression of disease.   Redemonstrated mildly FDG avid small pleural effusion/pleural thickening (max SUV 3.7, previously 3.7).     Patient feels well.  He has been through RT twice previously.  He recalls some of the details but given the remote history no RT planning records available for review.    He is disappointed no further surgery is being offered at this time.  He has good lung function and is very active with no shortness of breath.  He is a non-smoker.     Prior Radiation: Yes:   Stage I Right axillary Hodgkin's Lymphoma s/p Mantel field RT and splenectomy 45 yrs ago at [facility] (Dr. Snow, ~40 Gy)    Right parotid cancer with lymph node involvement s/p surgery and adjuvant RT (6 weeks ~60 Gy in 30 fractions) in 1991 ([place]).    Chemotherapy: Yes, as above    Current Outpatient Medications   Medication Sig Dispense Refill   ? atorvastatin (LIPITOR) 40 MG tablet TAKE 1 TABLET BY MOUTH AT  BEDTIME 90 tablet 3   ? biotin 300 mcg Tab Take 300 mg by mouth daily.     ? levothyroxine (SYNTHROID, LEVOTHROID) 100 MCG tablet Take 10.125 mcg by mouth daily.     ? loperamide (IMODIUM) 2 mg capsule Take 2 mg by mouth 4 (four) times a day as needed for diarrhea.     ? metoprolol succinate (TOPROL-XL) 25 MG  TAKE ONE-HALF TABLET BY  MOUTH DAILY 45 tablet 2   ? multivitamin (ONE A DAY) per tablet Take 2 tablets by mouth.     ? pantoprazole (PROTONIX) 40 MG tablet TAKE 1 TABLET BY MOUTH  DAILY 90 tablet 3     No current facility-administered medications for this visit.      Past Medical History:   Diagnosis Date   ? Anemia    ? Coronary artery disease     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   ? Esophageal reflux    ? Hodgkin's lymphoma (H)     s/p radiation to the mediastinum at age 17   ? Hyperlipemia    ? Hypertension    ? Hypothyroidism    ? Lung cancer (H)    ? Psoriasis      Past Surgical History:   Procedure Laterality Date   ? CORONARY ANGIOPLASTY      Stent Placement   ? EYE SURGERY Bilateral     Cataracts   ? MEDIASTINOSCOPY N/A 11/1/2018    Procedure: MEDIASTINOSCOPY;  Surgeon: Bry Saunders MD;  Location: [facility];  Service:    ? PAROTIDECTOMY     ? NV LAP,DIAGNOSTIC ABDOMEN N/A 11/7/2017    Procedure: DIAGNOSTIC LAPAROSCOPY,  LYSIS OF ADHESIONS, SMALL BOWEL RESECTION;  Surgeon: Brian Granados MD;  Location: [facility];  Service: General   ? SPLENECTOMY, TOTAL  1973   ? WISDOM TOOTH EXTRACTION       Penicillins  Family History   Problem Relation Age of Onset   ? Dementia Mother    ? Cancer Father 67        Thinks of the diaphragm, history of working with noxious chemicals   ? Kidney disease Brother    ? No Medical Problems Maternal Grandmother    ? No Medical Problems Maternal Grandfather    ? No Medical Problems Paternal Grandmother    ? Heart disease Paternal Grandfather    ? No Medical Problems Son    ? No Medical Problems Daughter      Social History     Socioeconomic History   ? Marital status:      Spouse name: Eliane   ? Number of children: 2   ? Years of education: 18   ? Highest education level: Master's degree (e.g., MA, MS, Iain, MEd, MSW, ABAD)   Occupational History   ? Occupation:      Employer: OTHER     Comment: [employer]   Social Needs   ?  Financial resource strain: Not on file   ? Food insecurity     Worry: Not on file     Inability: Not on file   ? Transportation needs     Medical: Not on file     Non-medical: Not on file   Tobacco Use   ? Smoking status: Never Smoker   ? Smokeless tobacco: Never Used   Substance and Sexual Activity   ? Alcohol use: Not Currently     Frequency: Never     Binge frequency: Never   ? Drug use: No   ? Sexual activity: Never   Lifestyle   ? Physical activity     Days per week: Not on file     Minutes per session: Not on file   ? Stress: Not on file   Relationships   ? Social connections     Talks on phone: Not on file     Gets together: Not on file     Attends [employer] service: Not on file     Active member of club or organization: Not on file     Attends meetings of clubs or organizations: Not on file     Relationship status: Not on file   ? Intimate partner violence     Fear of current or ex partner: Not on file     Emotionally abused: Not on file     Physically abused: Not on file     Forced sexual activity: Not on file   Other Topics Concern   ? Not on file   Social History Narrative   ? Not on file        Review of Systems:        General  Constitutional (WDL): All constitutional elements are within defined limits  EENT  Eye Disorder (WDL): All eye disorder elements are within defined limits  Ear Disorder (WDL): All ear disorder elements are within defined limits  Respiratory       Respiratory (WDL): Within Defined Limits  Cardiovascular  Cardiovascular (WDL): All cardiovascular elements are within defined limits  Endocrine     Gastrointestinal  Gastrointestinal (WDL): Exceptions to WDL  Anorexia: Loss of appetite without alteration in eating habits  Musculoskeletal  Musculoskeletal and Connetive Tissue Disorders (WDL): Exceptions to WDL  Bone Pain: Mild pain  Myalgia: Mild pain  Integumentary               Integumentary (WDL): All integumentary elements are within defined limits  Neurological  Neurosensory (WDL):  Exceptions to WDL  Peripheral Sensory Neuropathy: Asymptomatic, loss of deep tendon reflexes or paresthesia(feet)  Psychological/Emotional   Patient Coping: Accepting  Hematological/Lymphatic  Lymph (WDL): All lymph disorder elements are within defined limits  Dermatologic     Genitourinary/Reproductive  Genitourinary (WDL): All genitourinary elements are within defined limits  Reproductive     Pain              Currently in Pain: No/denies   AUA Assessment                 Accompanied by  Accompanied by: Alone      Objective:     Physical Exam    There were no vitals filed for this visit.    Gen: Alert, in NAD pleasant interactive, well nourished  Eyes:  EOMI, sclera anicteric  HENT     Head: NC/AT  Pulm: No wheezing, stridor or respiratory distress  CV: Well-perfused, no cyanosis, no pedal edema  Musculoskeletal: Normal muscle bulk and tone, thin  Skin: Normal tone and turgor, warm, dry, intact  Neurologic: A/Ox3,  face symmetric, speech fluent, no focal motor deficits, gait normal and unaided  Psychiatric: Appropriate mood and affect        Recent Labs:   Recent Results (from the past 168 hour(s))   POCT Glucose    Specimen: Capillary; Blood   Result Value Ref Range    Glucose 87 70 - 139 mg/dL   Comprehensive Metabolic Panel   Result Value Ref Range    Sodium 142 136 - 145 mmol/L    Potassium 4.4 3.5 - 5.0 mmol/L    Chloride 105 98 - 107 mmol/L    CO2 31 22 - 31 mmol/L    Anion Gap, Calculation 6 5 - 18 mmol/L    Glucose 84 70 - 125 mg/dL    BUN 27 (H) 8 - 22 mg/dL    Creatinine 1.41 (H) 0.70 - 1.30 mg/dL    GFR MDRD Af Amer >60 >60 mL/min/1.73m2    GFR MDRD Non Af Amer 50 (L) >60 mL/min/1.73m2    Bilirubin, Total 0.8 0.0 - 1.0 mg/dL    Calcium 9.0 8.5 - 10.5 mg/dL    Protein, Total 7.3 6.0 - 8.0 g/dL    Albumin 3.4 (L) 3.5 - 5.0 g/dL    Alkaline Phosphatase 125 (H) 45 - 120 U/L    AST 21 0 - 40 U/L    ALT 12 0 - 45 U/L   Thyroid Cascade   Result Value Ref Range    TSH 0.73 0.30 - 5.00 uIU/mL      Procedures:  General PFT Lab (Please always keep checked) 2/25/2020  Kerri  Component Name Value Ref Range   FVC-Pred 4.33 L   FVC-Pre 4.14 L   FVC-%Pred-Pre 95 %   FEV1-Pre 3.08 L   FEV1-%Pred-Pre 92 %   FEV1FVC-Pred 77 %   FEV1FVC-Pre 74 %   FEFMax-Pred 8.68 L/sec   FEFMax-Pre 7.59 L/sec   FEFMax-%Pred-Pre 87 %   FEF2575-Pred 2.70 L/sec   FEF2575-Pre 2.31 L/sec   VMV2598-%Pred-Pre 85 %   ExpTime-Pre 5.91 sec   FIFMax-Pre 6.60 L/sec   VC-Pred 4.72 L   VC-Pre 4.25 L   VC-%Pred-Pre 89 %   IC-Pred 3.60 L   IC-Pre 2.95 L   IC-%Pred-Pre 81 %   ERV-Pred 1.12 L   ERV-Pre 1.30 L   ERV-%Pred-Pre 115 %   FEV1FEV6-Pred 78 %   FEV1FEV6-Pre 74 %   FRCPleth-Pred 3.59 L   FRCPleth-Pre 3.95 L   FRCPleth-%Pred-Pre 110 %   RVPleth-Pred 2.47 L   RVPleth-Pre 2.64 L   RVPleth-%Pred-Pre 106 %   TLCPleth-Pred 6.92 L   TLCPleth-Pre 6.89 L   TLCPleth-%Pred-Pre 99 %   DLCOunc-Pred 26.12 ml/min/mmHg   DLCOunc-Pre 26.65 ml/min/mmHg   DLCOunc-%Pred-Pre 102 %   DLCOcor-Pre 28.29 ml/min/mmHg   DLCOcor-%Pred-Pre 108 %   VA-Pre 5.80 L   VA-%Pred-Pre 92 %   FEV1SVC-Pred 70 %   FEV1SVC-Pre 72 %   Result Narrative   The FVC, FEV1, FEV1/FVC ratio and NRD45-59% are within normal limits.  The inspiratory flow rates are within normal limits.  Lung volumes are within normal limits.  The diffusing capacity is normal.  IMPRESSION:  Normal spirometry, lung volumes and diffusing capacity.   Discussed normal lung function results with patient, he strongly prefers to not repeat at this time (and less MD appointments in time of Covid-19 as well as above PFTS s/p left lobectomy and data that SBRT doesn't significantly impact PFTs, reasonable to forgo at this time.)     Imaging: Imaging results 6 weeks:Ct Chest Without Contrast    Result Date: 11/27/2020  EXAM: CT CHEST WO CONTRAST LOCATION: [facility] DATE/TIME: 11/27/2020 2:36 PM INDICATION: Follow-up lung cancer COMPARISON: 09/04/2020 TECHNIQUE: CT chest without IV contrast.  Multiplanar reformats were obtained. Dose reduction techniques were used. CONTRAST: None. FINDINGS: LUNGS AND PLEURA: Prior new left lung nodules of resolved. Slight increased size of 10 x 13 mm connie-fissural right lung nodule versus 5 x 8 mm (series 4, image 92). Additional nodule along suture line in the right lung appears increased measuring 9 x 10 mm versus 7 x 8 mm (image 113). Redemonstrated left pleural thickening and/or fluid without significant change. MEDIASTINUM/AXILLAE: No adenopathy. Atherosclerosis. UPPER ABDOMEN: Surgical changes. MUSCULOSKELETAL: No focal bony lesion.     1.  Right lung connie-fissural and connie-sutural nodules appear slightly increased. 2.  Prior new left lung nodules have resolved. 3.  No other significant change.     Nm Pet Ct Skull To Mid Thigh    Result Date: 12/22/2020  EXAM: NM PET CT SKULL TO MID THIGH LOCATION: [facility] DATE/TIME: 12/22/2020 9:47 AM INDICATION: Subsequent treatment planning and restaging for malignant neoplasm of lower lobe, left bronchus or lung. Status post left lower lobectomy in 2018 with recent right upper lobe wedge resection revealing mucinous lung adenocarcinoma, currently receiving immunotherapy. 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 COMPARISON: FDG PET/CT dated 06/01/2020 and CT of the thorax dated 11/27/2020. TECHNIQUE: Serum glucose level 87 mg/dL. One hour post intravenous administration of 9.8 mCi F-18 FDG, PET imaging was performed from the skull base to the mid thighs utilizing attenuation correction with concurrent axial CT and PET/CT image fusion. Dose  reduction techniques were used. FINDINGS: Continued interval increase in size of the connie-fissural nodule along the lateral aspect the right upper lobe measuring 1.3 x 0.9 cm (max SUV 1.9, previously measured 0.9 x 0.7 cm with a max SUV of 1.6) and development of mildly FDG avid nodule  along the medial aspect of the  right upper lobe wedge resection margin measuring 0.9 x 0.8 cm (max SUV 1.7, previously measured 0.6 x 0.5 cm on 06/01/2020) suggesting progression of disease. Redemonstrated mildly FDG avid small pleural effusion/pleural thickening (max SUV 3.7, previously 3.7). Mild senescent intracranial changes. Postoperative change of the bilateral lenses. Mild carotid artery bifurcation calcification. Severe coronary artery calcium/stenting. Biapical scarring. Right upper lobectomy. Left lower lobectomy. Right renal cyst. Splenectomy. Pelvic phleboliths. Multilevel degenerative changes of the spine.     1. Mild progression of disease with continued increase in size of the two right upper lobe nodule. 2. Persistent FDG avid small left pleural effusion/pleural thickening.      Pathology:   No results found for this or any previous visit (from the past 8760 hour(s)).   Collected: 2/28/2020  FINAL DIAGNOSIS:  LUNG, RIGHT UPPER LOBE, WEDGE RESECTION:  - MUCINOUS ADENOCARCINOMA, multifocal (X4), measuring 0.9 cm, 0.7 cm, 0.4 cm and 0.4 cm in greatest dimension,  respectively.  - Parenchymal margin appears negative; closest tumor to margin is less   than 0.1 cm from staple line (staple  line not included).  - See comment.    COMMENT:  The previous mucinous adenocarcinoma found in the left lower lobe is noted  in the medical history (pathology  of the primary tumor not available for review). The morphologic features   of the four separate masses currently  found within this wedge resection are similar. The best interpretation is   that they represent metastatic  disease. Clinical correlation is necessary.      Collected:  11/1/2018  Final Diagnosis  I) LOWER LOBE OF LEFT LUNG, RESECTION, CONTAINING INVASIVE NEOPLASM:       - SEE FULL SYNOPTIC REPORT BELOW       - SUMMARY OF SYNOPSIS:          - INVASIVE MUCINOUS ADENOCARCINOMA, PREDOMINANTLY ACINAR PATTERN WITH             SMALLER COMPONENT (30%) OF LEPIDIC PATTERN          - TUMOR IS  MODERATELY DIFFERENTIATED (GRADE 2 OF 4)          - TUMOR IS 9 CM IN GREATEST DIMENSION          - SEPARATE TUMOR NODULE OF SAME HISTOLOGIC TYPE, 3.5 CM IN DIAMETER,             PRESENT AT BASE OF LOBE (INTRAPULMONARY METASTASIS)          - NEGATIVE FOR VISCERAL PLEURA INVASION OR LYMPHOVASCULAR INVASION          - ALL SURGICAL MARGINS UNINVOLVED BY CARCINOMA; CLOSEST MARGIN IS BRONCHIAL             MARGIN, 3 CM FROM TUMOR          - TWELVE INTRATHORACIC LYMPH NODES NEGATIVE FOR METASTATIC CARCINOMA             (0/12 LYMPH NODES POSITIVE)          - PARTIAL ATELECTASIS PRESENT, DISTAL TO TUMOR          - TUMOR IS STAGE pT4 and pN0   A) LYMPH NODE, STATION 4R:       - BENIGN LYMPHOID TISSUE       - NORMAL ADIPOSE AND FIBROUS TISSUE       B) LYMPH NODE, STATION 2R:       - BENIGN LYMPHOID TISSUE       - NORMAL ADIPOSE TISSUE     C) LYMPH NODE, STATION 4R:       - BENIGN LYMPHOID TISSUE       - NORMAL ADIPOSE TISSUE AND FIBROUS TISSUE     D) LYMPH NODE, STATION 2L:       - BENIGN LYMPHOID TISSUE     E) LYMPH NODE, STATION 7, SPECIMEN #1:       - BENIGN LYMPHOID TISSUE       - NORMAL FIBROUS AND ADIPOSE TISSUE     F) LYMPH NODE, STATION 7, SPECIMEN #2       - BENIGN LYMPHOID TISSUE       - NORMAL FIBROUS AND ADIPOSE TISSUE     G) LYMPH NODE, STATION 9L:       - BENIGN LYMPHOID TISSUE WITH MARKED SINUS HISTIOCYTOSIS     H) LYMPH NODE, STATION 11L:       - BENIGN LYMPHOID TISSUE WITH MARKED SINUS HISTIOCYTOSIS AND CENTRAL FIBROSIS       - NORMAL FIBROUS TISSUE    Signed by: Chelsea Grant MD

## 2021-07-03 NOTE — ADDENDUM NOTE
Addendum Note by Fang Domínguez CMA at 2/2/2021  9:00 AM     Author: Fang Domínguez CMA Service: -- Author Type: Certified Medical Assistant    Filed: 2/2/2021  9:58 AM Encounter Date: 2/2/2021 Status: Signed    : Fang Domínguez CMA (Certified Medical Assistant)    Addended by: FANG DOMÍNGUEZ on: 2/2/2021 09:58 AM        Modules accepted: Orders

## 2021-07-03 NOTE — ADDENDUM NOTE
Addendum Note by Melissa Hagen CMA at 6/15/2020  8:45 AM     Author: Melissa Hagen CMA Service: -- Author Type: Certified Medical Assistant    Filed: 6/15/2020 10:14 AM Encounter Date: 6/15/2020 Status: Signed    : Melissa Hagen CMA (Certified Medical Assistant)    Addended by: MELISSA HAGEN on: 6/15/2020 10:14 AM        Modules accepted: Orders

## 2021-07-03 NOTE — ADDENDUM NOTE
Addendum Note by Shira Gore RN at 3/7/2019  8:15 AM     Author: Shira Gore RN Service: -- Author Type: Registered Nurse    Filed: 3/8/2019  4:11 PM Encounter Date: 3/7/2019 Status: Signed    : Shira Gore RN (Registered Nurse)    Addended by: SHIRA GORE on: 3/8/2019 04:11 PM        Modules accepted: Orders

## 2021-07-03 NOTE — ADDENDUM NOTE
Addendum Note by Kevin Soto MD at 6/4/2020  8:15 AM     Author: Kevin Soto MD Service: -- Author Type: Physician    Filed: 6/4/2020  3:30 PM Encounter Date: 6/4/2020 Status: Signed    : Kevin Soto MD (Physician)    Addended by: KEVIN SOTO on: 6/4/2020 03:30 PM        Modules accepted: Level of Service

## 2021-07-03 NOTE — ADDENDUM NOTE
Addendum Note by Kevin Soto MD at 6/4/2020  8:15 AM     Author: Kevin Soto MD Service: -- Author Type: Physician    Filed: 6/4/2020 12:50 PM Encounter Date: 6/4/2020 Status: Signed    : Kevin Soto MD (Physician)    Addended by: KEVIN SOTO on: 6/4/2020 12:50 PM        Modules accepted: Orders

## 2021-07-03 NOTE — ADDENDUM NOTE
Addendum Note by Rhiannon Saenz CMA at 10/15/2019  4:20 PM     Author: Rhiannon Saenz CMA Service: -- Author Type: Certified Medical Assistant    Filed: 10/16/2019  8:28 AM Encounter Date: 10/15/2019 Status: Signed    : Rhiannon Saenz CMA (Certified Medical Assistant)    Addended by: RHIANNON SAENZ on: 10/16/2019 08:28 AM        Modules accepted: Orders

## 2021-07-04 NOTE — ADDENDUM NOTE
Addendum Note by Chelsea Mazariegos MD at 2/12/2021  9:00 AM     Author: Chelsea Mazariegos MD Service: -- Author Type: Physician    Filed: 2/18/2021  3:28 PM Encounter Date: 2/12/2021 Status: Signed    : Chelsea Mazariegos MD (Physician)    Addended by: CHELSEA MAZARIEGOS on: 2/18/2021 03:28 PM        Modules accepted: Orders, Level of Service

## 2021-07-04 NOTE — ADDENDUM NOTE
Addendum Note by Nereida Melchor RN at 5/17/2021  8:00 AM     Author: Nereida Melchor RN Service: -- Author Type: Registered Nurse    Filed: 5/17/2021  9:01 AM Encounter Date: 5/17/2021 Status: Signed    : Nereida Melchor RN (Registered Nurse)    Addended by: NEREIDA MELCHOR on: 5/17/2021 09:01 AM        Modules accepted: Orders

## 2021-07-07 NOTE — PROGRESS NOTES
Patient is in today for a lab recheck in regards to his thrombocytopenia.  On 6/24 his platelet count was 162.  We decreased his prednisone down to 10 mg daily from 20 mg daily.  Dr Soto has reviewed the lab results from today.  Platelet count is 228.  Per Dr Soto, we will do a slow taper.  He would like to keep him on 10 mg daily.  We can cancel his lab appointment for this Thursday, 7/1 and can recheck next Tuesday, 7/6 1 patient already has a lab appointment scheduled.  Patient waited in the lobby for lab results.  He verbalized understanding.    Sylvia Su RN

## 2021-07-12 NOTE — PROGRESS NOTES
Patient was in today for a lab only check for his ITP under the direction of Dr. Dr Soto.  Patient is currently taking prednisone 5 mg daily for this.  His platelet count last Friday was 119 and he was to have this rechecked today.  Platelet count today is 92 and has been reviewed by Dr Soto.  He would like the patient to increase his prednisone to 10 mg daily and to keep his lab appointment for 7/15 to have this level rechecked.  Patient waited in the lobby and was given this information at 9:54 AM.  He verbalized understanding.    Sylvia Su RN

## 2021-07-14 PROBLEM — N17.9 ARF (ACUTE RENAL FAILURE) (H): Status: RESOLVED | Noted: 2018-05-06 | Resolved: 2018-05-14

## 2021-07-15 NOTE — TELEPHONE ENCOUNTER
Patient was in today for a lab only check for his ITP under the direction of Dr Soto.  On 7/12/2021, patient was increased on his prednisone to 10 mg daily.  Dr Soto has reviewed the lab results from today with a platelet count of 162.  He would like the patient to stay on prednisone 10 mg daily and we will recheck levels on Monday, 7/19 prior to him seeing the doctor that day.  I called patient to report the above.  He verbalized understanding and was appreciative.    He did mention that he is unable to see the results of labs from Monday, 7/12.  I did let him know that with the merger of our epic systems, there may be a delay.  I did let him know that he could call the [employer] assistance number at [phone].    Sylvia Su RN

## 2021-07-19 PROBLEM — D69.3 IDIOPATHIC THROMBOCYTOPENIC PURPURA (H): Status: ACTIVE | Noted: 2021-01-01

## 2021-07-19 NOTE — PROGRESS NOTES
"Oncology Rooming Note    July 19, 2021 8:42 AM   Pardeep Wilson is a 66 year old male who presents for:    Chief Complaint   Patient presents with     Hematology     Idiopathic thrombocytopenic purpura     Initial Vitals: /73 (BP Location: Right arm)   Pulse 78   Temp 98  F (36.7  C)   Ht 1.753 m (5' 9\")   Wt 88.4 kg (194 lb 12.8 oz)   SpO2 98%   BMI 28.77 kg/m   Estimated body mass index is 28.77 kg/m  as calculated from the following:    Height as of this encounter: 1.753 m (5' 9\").    Weight as of this encounter: 88.4 kg (194 lb 12.8 oz). Body surface area is 2.07 meters squared.  No Pain (0) Comment: Data Unavailable   No LMP for male patient.  Allergies reviewed: Yes  Medications reviewed: Yes    Medications: Medication refills not needed today.  Pharmacy name entered into [employer]: [facility] #[id number] [place] [id number] AT [place] [id number]    Clinical concerns: None       Zoë Myers            "

## 2021-07-19 NOTE — LETTER
"    7/19/2021         RE: Pardeep Wilson  [address]  [place] [address]        Dear Colleague,    Thank you for referring your patient, Pardeep Wilson, to the [facility]. Please see a copy of my visit note below.    Oncology Rooming Note    July 19, 2021 8:42 AM   Pardeep Wilson is a 66 year old male who presents for:    Chief Complaint   Patient presents with     Hematology     Idiopathic thrombocytopenic purpura     Initial Vitals: /73 (BP Location: Right arm)   Pulse 78   Temp 98  F (36.7  C)   Ht 1.753 m (5' 9\")   Wt 88.4 kg (194 lb 12.8 oz)   SpO2 98%   BMI 28.77 kg/m   Estimated body mass index is 28.77 kg/m  as calculated from the following:    Height as of this encounter: 1.753 m (5' 9\").    Weight as of this encounter: 88.4 kg (194 lb 12.8 oz). Body surface area is 2.07 meters squared.  No Pain (0) Comment: Data Unavailable   No LMP for male patient.  Allergies reviewed: Yes  Medications reviewed: Yes    Medications: Medication refills not needed today.  Pharmacy name entered into [employer]: [facility] #[id number] [place] [id number] AT [place] [address]    Clinical concerns: None       Zoë Myers              [facility] Progress Note    Patient: Pardeep Wilson  MRN: [id number]  Date of Service: 06/21/2021        Reason for Visit    Chief Complaint   Patient presents with     HE Cancer     Lung Cancer     Benign Hematology       Assessment and Plan    Recurrent and metastatic mucinous lung adenocarcinoma, status post wedge resection, February 28, 2020  T4 N0 M0, stage III a mucinous left lung adenocarcinoma status post left lower lobe resection on November 1, 2018  Tumor 9 cm with 3.5 cm nodule, grade 2 out of 4 mucinous adenocarcinoma  Tumor is PDL 1-, no EGFR mutation.  No rearrangement of ALK, evaluation on the Alchemist trial  Tumor negative for ALK, ROS 1, RET, NTRK1, MET e14, EGFR " MUTATIONS, April 2020  Remote history of stage I a right axillary Hodgkin's disease status post mantle field radiation and splenectomy about 45 years ago  Right parotid cancer with lymph node involvement status post surgery and adjuvant radiation for 6 weeks in 1991  Coronary artery disease  Elevated BUN and creatinine  New right lung pulmonary nodules   Left pleural effusion/enhancement, 4/2020  Thrombocytopenia, ITP, February 2021  Diarrhea related to Keytruda    Patient currently on prednisone 10 mg p.o. daily.  On 5 mg daily platelet count did start to drop.  Today's count is stable.  We will try decreasing to 7.5 mg daily.  We will check labs again on Thursday this week.  We will continue with lab checks 1-2 times per week depending on his count.  We will see him back in 4 weeks for reevaluation.  No    Blood sugar and blood pressure under good control.  He has had weight gain.  Should continue with exercise and watching his diet to maintain weight.    Does have CT scan and follow-up with radiation oncology for his lung cancer in early September.    Plan: Decrease prednisone to 7.5 mg daily  Recheck platelet count later this week and then 1-2 times weekly until return in 4 weeks    Measurable disease: CT of the chest      Current therapy: Decrease prednisone to 7.5 mg p.o. daily beginning July 19, 2021      Patient tapered from 20 mg to 5 mg over the previous 3 weeks beginning in June 2021, prednisone dose increased to 10 mg daily because of drop in platelet count    Prednisone most recent dose was 5 mg every other day for the last 2 weeks     Prednisone, 5 mg daily beginning May 24, 2021  Decrease to 10 mg p.o. daily, May 17, 2021   decreased to 20 mg p.o. daily April 26   40 mg p.o. daily, beginning April 21  Recent course 60 mg daily started April 12, 2021    Started 60 mg p.o. daily, through 12/20/2021      Treatment history:     Nplate single dose around April 12, 2021    Prednisone 60 mg p.o. daily  started March 12 with a taper of 10 mg/week    Nplate started February 15, 2021 through March 1, additional dose on March 15    Rituxan x4 doses on February 17 and February 22, 2021 and March 15 and March 22      IVIG 1 g/kg's x2 doses on February 10 and February 12, 2021    Keytruda maintenance every 3 weeks,First dose September 8, 2020  Last dose December 23, 2020, 400 mg       Carboplatin, Taxol and Keytruda, 4 cycles, last August 18, 2020  Treatment started Collette 15, 2020    Wedge resection of right upper lobe nodule, February 28, 2020    Cisplatin and Alimta adjuvant chemotherapy, for 4 cycles: Day 1 cycle 4, February 15, 2019  First cycle  started December 14, 2018    Patient underwent mediastinoscopy, bronchoscopy, thoracoscopic surgery and left lower lobectomy on November 1, 2018      Cancer Staging  Malignant neoplasm of lower lobe of left lung (H)  Staging form: Lung, AJCC 8th Edition  - Clinical stage from 10/15/2018: Stage IIIA (cT4, cN0, cM0) - Signed by Melody Segovia CNP on 12/21/2018      ECOG Performance        Distress Assessment  Distress Assessment Score: 2    Pain           Problem List    1. Idiopathic thrombocytopenic purpura (H)  HM1(CBC and Differential)    Comprehensive Metabolic Panel   2. Malignant neoplasm of upper lobe of right lung (H)     3. Metastatic primary lung cancer, left (H)          CC: Dov Morales,     ______________________________________________________________________________    History of Present Illness    Mr. Pardeep Wilson is seen for follow-up.  He was increased to 20 mg daily of prednisone at his last visit 4 weeks ago.  Subsequently dropped to 10 mg and then 5 mg after which platelet count dropped as low as 92,000.  Resumed on 10 mg and now platelet count increased and stable.    Has had weight gain with prednisone.  Blood pressure is fine and blood sugars are fine.  No other side effects.  No bleeding.    He will be returning in 2 weeks  and does plan some travel.      Pain Status  Currently in Pain: No/denies    Review of Systems    As per the HPI.       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     Esophageal reflux      Hodgkin's lymphoma (H)     s/p radiation to the mediastinum at age 17     Hyperlipemia      Hypertension      Hypothyroidism     hx nodules     Lung cancer (H)      MI, old 12 years ago, 2008         Past Surgical History:   Procedure Laterality Date     CORONARY ANGIOPLASTY      Stent Placement     CT BIOPSY LUNG       CT BIOPSY LUNG  1/12/2021     EYE SURGERY Bilateral     Cataracts     left lower lobe lobectomy   11/01/2018     MEDIASTINOSCOPY N/A 11/1/2018    Procedure: MEDIASTINOSCOPY;  Surgeon: Bry Saunders MD;  Location: [facility];  Service:      PAROTIDECTOMY       IL LAP,DIAGNOSTIC ABDOMEN N/A 11/7/2017    Procedure: DIAGNOSTIC LAPAROSCOPY,  LYSIS OF ADHESIONS, SMALL BOWEL RESECTION;  Surgeon: Brian Granados MD;  Location: [facility];  Service: General     SPLENECTOMY, TOTAL  1973     wedge resection Right 02/28/2020     WISDOM TOOTH EXTRACTION         Physical Exam    Recent Vitals 6/21/2021   Height -   Weight 190 lbs 6 oz   BSA (m2) 2.06 m2   /76   Pulse 83   Temp 98.3   Temp src 1   SpO2 98   Some recent data might be hidden         GENERAL: Alert and oriented. Seated comfortably. In no distress.     HEAD: Atraumatic and normocephalic.  Alopecia     EYES: SOPHIE, EOMI.  No pallor.  No icterus.     Oral cavity: no mucosal lesion or tonsillar enlargement.     NECK: supple. JVP normal.  No thyroid enlargement.     LYMPH NODES: No palpable, cervical, axillary or inguinal lymphadenopathy.     CHEST: clear to auscultation bilaterally.  Resonant to percussion throughout  bilaterally.  Symmetrical breath movements bilaterally.     CVS: S1 and S2 are heard. Regular rate and rhythm.  No murmur or gallop or rub heard.     ABDOMEN: Soft. Not tender. Not distended.  No palpable hepatomegaly or splenomegaly.  No other mass palpable.  Bowel sounds heard.     EXTREMITIES: Warm.  No peripheral edema.     SKIN: no rash, or bruising or purpura.    CNS: Nonfocal          Lab Results    Recent Results (from the past 168 hour(s))   HM1 (CBC with Diff)   Result Value Ref Range    WBC 11.3 (H) 4.0 - 11.0 thou/uL    RBC 4.39 (L) 4.40 - 6.20 mill/uL    Hemoglobin 13.5 (L) 14.0 - 18.0 g/dL    Hematocrit 42.0 40.0 - 54.0 %    MCV 96 80 - 100 fL    MCH 30.8 27.0 - 34.0 pg    MCHC 32.1 32.0 - 36.0 g/dL    RDW 16.3 (H) 11.0 - 14.5 %    Platelets 94 (L) 140 - 440 thou/uL    MPV 10.9 8.5 - 12.5 fL    Neutrophils % 65 50 - 70 %    Lymphocytes % 22 20 - 40 %    Monocytes % 11 (H) 2 - 10 %    Eosinophils % 2 0 - 6 %    Basophils % 1 0 - 2 %    Immature Granulocyte % 0 <=0 %    Neutrophils Absolute 7.3 2.0 - 7.7 thou/uL    Lymphocytes Absolute 2.4 0.8 - 4.4 thou/uL    Monocytes Absolute 1.3 (H) 0.0 - 0.9 thou/uL    Eosinophils Absolute 0.2 0.0 - 0.4 thou/uL    Basophils Absolute 0.1 0.0 - 0.2 thou/uL    Immature Granulocyte Absolute 0.1 (H) <=0.0 thou/uL   HM1 (CBC with Diff)   Result Value Ref Range    WBC 10.6 4.0 - 11.0 thou/uL    RBC 4.32 (L) 4.40 - 6.20 mill/uL    Hemoglobin 13.3 (L) 14.0 - 18.0 g/dL    Hematocrit 41.6 40.0 - 54.0 %    MCV 96 80 - 100 fL    MCH 30.8 27.0 - 34.0 pg    MCHC 32.0 32.0 - 36.0 g/dL    RDW 16.3 (H) 11.0 - 14.5 %    Platelets 63 (L) 140 - 440 thou/uL    MPV      Neutrophils % 48 (L) 50 - 70 %    Lymphocytes % 29 20 - 40 %    Monocytes % 16 (H) 2 - 10 %    Eosinophils % 6 0 - 6 %    Basophils % 1 0 - 2 %    Immature Granulocyte % 0 <=0 %    Neutrophils Absolute 5.1 2.0 - 7.7 thou/uL    Lymphocytes Absolute 3.1 0.8 - 4.4 thou/uL    Monocytes Absolute 1.7 (H) 0.0 - 0.9 thou/uL     Eosinophils Absolute 0.6 (H) 0.0 - 0.4 thou/uL    Basophils Absolute 0.1 0.0 - 0.2 thou/uL    Immature Granulocyte Absolute 0.0 <=0.0 thou/uL   Manual Differential   Result Value Ref Range    Platelet Estimate Decreased (!) Normal    Target Cells 1+ (!) Negative    Eastman-Jolly Bodies 1+ (!) Negative   HM1 (CBC with Diff)   Result Value Ref Range    WBC 11.8 (H) 4.0 - 11.0 thou/uL    RBC 4.34 (L) 4.40 - 6.20 mill/uL    Hemoglobin 13.5 (L) 14.0 - 18.0 g/dL    Hematocrit 40.8 40.0 - 54.0 %    MCV 94 80 - 100 fL    MCH 31.1 27.0 - 34.0 pg    MCHC 33.1 32.0 - 36.0 g/dL    RDW 16.0 (H) 11.0 - 14.5 %    Platelets 40 (LL) 140 - 440 thou/uL    MPV      Neutrophils % 64 50 - 70 %    Lymphocytes % 22 20 - 40 %    Monocytes % 10 2 - 10 %    Eosinophils % 3 0 - 6 %    Basophils % 1 0 - 2 %    Immature Granulocyte % 1 (H) <=0 %    Neutrophils Absolute 7.6 2.0 - 7.7 thou/uL    Lymphocytes Absolute 2.6 0.8 - 4.4 thou/uL    Monocytes Absolute 1.2 (H) 0.0 - 0.9 thou/uL    Eosinophils Absolute 0.4 0.0 - 0.4 thou/uL    Basophils Absolute 0.1 0.0 - 0.2 thou/uL    Immature Granulocyte Absolute 0.1 (H) <=0.0 thou/uL       Imaging    Ct Chest Without Contrast    Result Date: 5/27/2021  EXAM: CT CHEST WO CONTRAST LOCATION: [facility] DATE/TIME: 5/27/2021 7:12 AM INDICATION: NSCLC status post right upper lobe stereotactic radiosurgery completed 2/2021 (of note, only 1 of 2 right upper lobe nodules could be biopsied and marked with fiducials at that time), right upper lobe wedge resection 2020 and left lower lobectomy 2018 as well as chemotherapy and immunotherapy. History of remote Hodgkin's lymphoma with mantle radiation and right parotid cancer status post right parotidectomy and radiation. COMPARISON: CT guidance for right upper lobe biopsy and fiducial placement 01/12/2021, 12/22/2020 PET/CT and 11/27/2020 CT chest TECHNIQUE: CT chest without IV contrast. Multiplanar reformats were obtained. Dose reduction  techniques were used. CONTRAST: None. FINDINGS: LUNGS AND PLEURA: The 10 mm right upper lobe lesion marked with fiducial is no longer visible consistent with response to interval therapy (image 6 of series 4). The second right upper lobe lesion which was not marked with fiducial hasn't decreased from 12 x 9 mm to 10 x 7 mm (image 83). Focus of consolidation and volume loss posterolateral right upper lobe and thin rind of peripheral consolidation in volume loss in the anterior medial and perifissural right upper lobe have developed and most likely represent post radiation changes. Faint nonspecific nodular pleural thickening right major fissure has developed. Right upper lobe wedge resection. Left lower lobectomy with associated chronic pleural thickening unchanged. Upper paramediastinal radiation fibrosis both lungs. MEDIASTINUM/AXILLAE: No lymphadenopathy. No thoracic aortic aneurysm. CORONARY ARTERY CALCIFICATION: Moderate. UPPER ABDOMEN: No significant finding. MUSCULOSKELETAL: No bone lesion. Bones are demineralized.     1.  The 10 mm right upper lobe lesion marked with fiducial is no longer visible consistent with response to interval therapy . 2.  The second right upper lobe lesion which was not marked with fiducial is decreased from 12 x 9 mm to 10 x 7 mm. 3.  Faint nonspecific nodular pleural thickening right major fissure has developed. Consider follow-up CT in 3-6 months.         Signed by: Kevin Soto MD        Again, thank you for allowing me to participate in the care of your patient.        Sincerely,        Kevin Soto MD

## 2021-07-19 NOTE — PROGRESS NOTES
[facility] Hematology and Oncology Progress Note    Patient: Pardeep Wilson  MRN: [id number]  Date of Service: 06/21/2021        Reason for Visit    Chief Complaint   Patient presents with     HE Cancer     Lung Cancer     Benign Hematology       Assessment and Plan    Recurrent and metastatic mucinous lung adenocarcinoma, status post wedge resection, February 28, 2020  T4 N0 M0, stage III a mucinous left lung adenocarcinoma status post left lower lobe resection on November 1, 2018  Tumor 9 cm with 3.5 cm nodule, grade 2 out of 4 mucinous adenocarcinoma  Tumor is PDL 1-, no EGFR mutation.  No rearrangement of ALK, evaluation on the Alchemist trial  Tumor negative for ALK, ROS 1, RET, NTRK1, MET e14, EGFR MUTATIONS, April 2020  Remote history of stage I a right axillary Hodgkin's disease status post mantle field radiation and splenectomy about 45 years ago  Right parotid cancer with lymph node involvement status post surgery and adjuvant radiation for 6 weeks in 1991  Coronary artery disease  Elevated BUN and creatinine  New right lung pulmonary nodules   Left pleural effusion/enhancement, 4/2020  Thrombocytopenia, ITP, February 2021  Diarrhea related to Keytruda    Patient currently on prednisone 10 mg p.o. daily.  On 5 mg daily platelet count did start to drop.  Today's count is stable.  We will try decreasing to 7.5 mg daily.  We will check labs again on Thursday this week.  We will continue with lab checks 1-2 times per week depending on his count.  We will see him back in 4 weeks for reevaluation.  No    Blood sugar and blood pressure under good control.  He has had weight gain.  Should continue with exercise and watching his diet to maintain weight.    Does have CT scan and follow-up with radiation oncology for his lung cancer in early September.    Plan: Decrease prednisone to 7.5 mg daily  Recheck platelet count later this week and then 1-2 times weekly until return in 4 weeks    Measurable disease: CT of the  chest      Current therapy: Decrease prednisone to 7.5 mg p.o. daily beginning July 19, 2021      Patient tapered from 20 mg to 5 mg over the previous 3 weeks beginning in June 2021, prednisone dose increased to 10 mg daily because of drop in platelet count    Prednisone most recent dose was 5 mg every other day for the last 2 weeks     Prednisone, 5 mg daily beginning May 24, 2021  Decrease to 10 mg p.o. daily, May 17, 2021   decreased to 20 mg p.o. daily April 26   40 mg p.o. daily, beginning April 21  Recent course 60 mg daily started April 12, 2021    Started 60 mg p.o. daily, through 12/20/2021      Treatment history:     Nplate single dose around April 12, 2021    Prednisone 60 mg p.o. daily started March 12 with a taper of 10 mg/week    Nplate started February 15, 2021 through March 1, additional dose on March 15    Rituxan x4 doses on February 17 and February 22, 2021 and March 15 and March 22      IVIG 1 g/kg's x2 doses on February 10 and February 12, 2021    Keytruda maintenance every 3 weeks,First dose September 8, 2020  Last dose December 23, 2020, 400 mg       Carboplatin, Taxol and Keytruda, 4 cycles, last August 18, 2020  Treatment started Collette 15, 2020    Wedge resection of right upper lobe nodule, February 28, 2020    Cisplatin and Alimta adjuvant chemotherapy, for 4 cycles: Day 1 cycle 4, February 15, 2019  First cycle  started December 14, 2018    Patient underwent mediastinoscopy, bronchoscopy, thoracoscopic surgery and left lower lobectomy on November 1, 2018      Cancer Staging  Malignant neoplasm of lower lobe of left lung (H)  Staging form: Lung, AJCC 8th Edition  - Clinical stage from 10/15/2018: Stage IIIA (cT4, cN0, cM0) - Signed by Melody Segovia, IVANNA on 12/21/2018      ECOG Performance        Distress Assessment  Distress Assessment Score: 2    Pain           Problem List    1. Idiopathic thrombocytopenic purpura (H)  HM1(CBC and Differential)    Comprehensive Metabolic Panel    2. Malignant neoplasm of upper lobe of right lung (H)     3. Metastatic primary lung cancer, left (H)          CC: Carmen, Dov Narvaez,     ______________________________________________________________________________    History of Present Illness    Mr. Pardeep Wilson is seen for follow-up.  He was increased to 20 mg daily of prednisone at his last visit 4 weeks ago.  Subsequently dropped to 10 mg and then 5 mg after which platelet count dropped as low as 92,000.  Resumed on 10 mg and now platelet count increased and stable.    Has had weight gain with prednisone.  Blood pressure is fine and blood sugars are fine.  No other side effects.  No bleeding.    He will be returning in 2 weeks and does plan some travel.      Pain Status  Currently in Pain: No/denies    Review of Systems    As per the HPI.       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     Esophageal reflux      Hodgkin's lymphoma (H)     s/p radiation to the mediastinum at age 17     Hyperlipemia      Hypertension      Hypothyroidism     hx nodules     Lung cancer (H)      MI, old 12 years ago, 2008         Past Surgical History:   Procedure Laterality Date     CORONARY ANGIOPLASTY      Stent Placement     CT BIOPSY LUNG       CT BIOPSY LUNG  1/12/2021     EYE SURGERY Bilateral     Cataracts     left lower lobe lobectomy   11/01/2018     MEDIASTINOSCOPY N/A 11/1/2018    Procedure: MEDIASTINOSCOPY;  Surgeon: Bry Saunders MD;  Location: [facility] OR;  Service:      PAROTIDECTOMY       MO LAP,DIAGNOSTIC ABDOMEN N/A 11/7/2017    Procedure: DIAGNOSTIC LAPAROSCOPY,  LYSIS OF ADHESIONS, SMALL BOWEL RESECTION;  Surgeon: Brian Granados MD;  Location: [facility] OR;  Service: General     SPLENECTOMY, TOTAL  1973      wedge resection Right 02/28/2020     WISDOM TOOTH EXTRACTION         Physical Exam    Recent Vitals 6/21/2021   Height -   Weight 190 lbs 6 oz   BSA (m2) 2.06 m2   /76   Pulse 83   Temp 98.3   Temp src 1   SpO2 98   Some recent data might be hidden         GENERAL: Alert and oriented. Seated comfortably. In no distress.     HEAD: Atraumatic and normocephalic.  Alopecia     EYES: SOPHIE, EOMI.  No pallor.  No icterus.     Oral cavity: no mucosal lesion or tonsillar enlargement.     NECK: supple. JVP normal.  No thyroid enlargement.     LYMPH NODES: No palpable, cervical, axillary or inguinal lymphadenopathy.     CHEST: clear to auscultation bilaterally.  Resonant to percussion throughout bilaterally.  Symmetrical breath movements bilaterally.     CVS: S1 and S2 are heard. Regular rate and rhythm.  No murmur or gallop or rub heard.     ABDOMEN: Soft. Not tender. Not distended.  No palpable hepatomegaly or splenomegaly.  No other mass palpable.  Bowel sounds heard.     EXTREMITIES: Warm.  No peripheral edema.     SKIN: no rash, or bruising or purpura.    CNS: Nonfocal          Lab Results    Recent Results (from the past 168 hour(s))   HM1 (CBC with Diff)   Result Value Ref Range    WBC 11.3 (H) 4.0 - 11.0 thou/uL    RBC 4.39 (L) 4.40 - 6.20 mill/uL    Hemoglobin 13.5 (L) 14.0 - 18.0 g/dL    Hematocrit 42.0 40.0 - 54.0 %    MCV 96 80 - 100 fL    MCH 30.8 27.0 - 34.0 pg    MCHC 32.1 32.0 - 36.0 g/dL    RDW 16.3 (H) 11.0 - 14.5 %    Platelets 94 (L) 140 - 440 thou/uL    MPV 10.9 8.5 - 12.5 fL    Neutrophils % 65 50 - 70 %    Lymphocytes % 22 20 - 40 %    Monocytes % 11 (H) 2 - 10 %    Eosinophils % 2 0 - 6 %    Basophils % 1 0 - 2 %    Immature Granulocyte % 0 <=0 %    Neutrophils Absolute 7.3 2.0 - 7.7 thou/uL    Lymphocytes Absolute 2.4 0.8 - 4.4 thou/uL    Monocytes Absolute 1.3 (H) 0.0 - 0.9 thou/uL    Eosinophils Absolute 0.2 0.0 - 0.4 thou/uL    Basophils Absolute 0.1 0.0 - 0.2 thou/uL    Immature Granulocyte  Absolute 0.1 (H) <=0.0 thou/uL   HM1 (CBC with Diff)   Result Value Ref Range    WBC 10.6 4.0 - 11.0 thou/uL    RBC 4.32 (L) 4.40 - 6.20 mill/uL    Hemoglobin 13.3 (L) 14.0 - 18.0 g/dL    Hematocrit 41.6 40.0 - 54.0 %    MCV 96 80 - 100 fL    MCH 30.8 27.0 - 34.0 pg    MCHC 32.0 32.0 - 36.0 g/dL    RDW 16.3 (H) 11.0 - 14.5 %    Platelets 63 (L) 140 - 440 thou/uL    MPV      Neutrophils % 48 (L) 50 - 70 %    Lymphocytes % 29 20 - 40 %    Monocytes % 16 (H) 2 - 10 %    Eosinophils % 6 0 - 6 %    Basophils % 1 0 - 2 %    Immature Granulocyte % 0 <=0 %    Neutrophils Absolute 5.1 2.0 - 7.7 thou/uL    Lymphocytes Absolute 3.1 0.8 - 4.4 thou/uL    Monocytes Absolute 1.7 (H) 0.0 - 0.9 thou/uL    Eosinophils Absolute 0.6 (H) 0.0 - 0.4 thou/uL    Basophils Absolute 0.1 0.0 - 0.2 thou/uL    Immature Granulocyte Absolute 0.0 <=0.0 thou/uL   Manual Differential   Result Value Ref Range    Platelet Estimate Decreased (!) Normal    Target Cells 1+ (!) Negative    Eastman-Jolly Bodies 1+ (!) Negative   HM1 (CBC with Diff)   Result Value Ref Range    WBC 11.8 (H) 4.0 - 11.0 thou/uL    RBC 4.34 (L) 4.40 - 6.20 mill/uL    Hemoglobin 13.5 (L) 14.0 - 18.0 g/dL    Hematocrit 40.8 40.0 - 54.0 %    MCV 94 80 - 100 fL    MCH 31.1 27.0 - 34.0 pg    MCHC 33.1 32.0 - 36.0 g/dL    RDW 16.0 (H) 11.0 - 14.5 %    Platelets 40 (LL) 140 - 440 thou/uL    MPV      Neutrophils % 64 50 - 70 %    Lymphocytes % 22 20 - 40 %    Monocytes % 10 2 - 10 %    Eosinophils % 3 0 - 6 %    Basophils % 1 0 - 2 %    Immature Granulocyte % 1 (H) <=0 %    Neutrophils Absolute 7.6 2.0 - 7.7 thou/uL    Lymphocytes Absolute 2.6 0.8 - 4.4 thou/uL    Monocytes Absolute 1.2 (H) 0.0 - 0.9 thou/uL    Eosinophils Absolute 0.4 0.0 - 0.4 thou/uL    Basophils Absolute 0.1 0.0 - 0.2 thou/uL    Immature Granulocyte Absolute 0.1 (H) <=0.0 thou/uL       Imaging    Ct Chest Without Contrast    Result Date: 5/27/2021  EXAM: CT CHEST WO CONTRAST LOCATION: [facility]  HOSPITAL DATE/TIME: 5/27/2021 7:12 AM INDICATION: NSCLC status post right upper lobe stereotactic radiosurgery completed 2/2021 (of note, only 1 of 2 right upper lobe nodules could be biopsied and marked with fiducials at that time), right upper lobe wedge resection 2020 and left lower lobectomy 2018 as well as chemotherapy and immunotherapy. History of remote Hodgkin's lymphoma with mantle radiation and right parotid cancer status post right parotidectomy and radiation. COMPARISON: CT guidance for right upper lobe biopsy and fiducial placement 01/12/2021, 12/22/2020 PET/CT and 11/27/2020 CT chest TECHNIQUE: CT chest without IV contrast. Multiplanar reformats were obtained. Dose reduction techniques were used. CONTRAST: None. FINDINGS: LUNGS AND PLEURA: The 10 mm right upper lobe lesion marked with fiducial is no longer visible consistent with response to interval therapy (image 6 of series 4). The second right upper lobe lesion which was not marked with fiducial hasn't decreased from 12 x 9 mm to 10 x 7 mm (image 83). Focus of consolidation and volume loss posterolateral right upper lobe and thin rind of peripheral consolidation in volume loss in the anterior medial and perifissural right upper lobe have developed and most likely represent post radiation changes. Faint nonspecific nodular pleural thickening right major fissure has developed. Right upper lobe wedge resection. Left lower lobectomy with associated chronic pleural thickening unchanged. Upper paramediastinal radiation fibrosis both lungs. MEDIASTINUM/AXILLAE: No lymphadenopathy. No thoracic aortic aneurysm. CORONARY ARTERY CALCIFICATION: Moderate. UPPER ABDOMEN: No significant finding. MUSCULOSKELETAL: No bone lesion. Bones are demineralized.     1.  The 10 mm right upper lobe lesion marked with fiducial is no longer visible consistent with response to interval therapy . 2.  The second right upper lobe lesion which was not marked with fiducial is  decreased from 12 x 9 mm to 10 x 7 mm. 3.  Faint nonspecific nodular pleural thickening right major fissure has developed. Consider follow-up CT in 3-6 months.         Signed by: Kevin Soto MD

## 2021-07-22 NOTE — TELEPHONE ENCOUNTER
Patient was in today for a lab only check in regards to his idiopathic thrombocytopenic purpura diagnosis under the direction of Dr Soto.  Since Dr Soto is out of the office today, I had Melody Segovia CNP review his results.  Platelet count is good at 287.  Patient is currently taking prednisone 7.5 mg daily and has been since 7/19.  Patient should continue on the same dose of prednisone and we will recheck on Monday, 7/26.  Patient verbalized understanding and was appreciative.    Patient previously had my chart in our previous epic system and is unable to login now.  I did send a new activation code to his email and also gave him the activation code over the phone hoping that he will be able to get this accomplished today.  He was appreciative.    Sylvia Su RN

## 2021-07-26 NOTE — TELEPHONE ENCOUNTER
Patient was in today for a lab only check in regards to his idiopathic thrombocytopenic purpura diagnosis under the direction of Dr Soto.  Dr Soto has reviewed lab results from today with a platelet count of 243.  Patient is currently taking 7.5 mg daily prednisone and has been since 7/19/2021.  Per Dr Soto, he should stay on this dosing and we can move his checks to once weekly versus twice weekly.  Patient verbalized understanding and is aware that I will be taking off lab appointments for 7/29, 8/5 and 8/12.    Sylvia Su RN

## 2021-08-02 PROBLEM — E03.8 OTHER SPECIFIED HYPOTHYROIDISM: Status: ACTIVE | Noted: 2019-10-30

## 2021-08-02 PROBLEM — E03.9 HYPOTHYROIDISM: Status: ACTIVE | Noted: 2019-10-30

## 2021-08-02 NOTE — TELEPHONE ENCOUNTER
"Routing refill request to provider for review/approval because:  Medication is reported/historical          Last Written Prescription Date:  Listed as historical  Last Fill Quantity: historical,  # refills: historical   Last office visit provider:   5/7/21 with Dr. Morales for an E-visit.     Requested Prescriptions   Pending Prescriptions Disp Refills     levothyroxine (SYNTHROID/LEVOTHROID) 125 MCG tablet [Pharmacy Med Name: Levothyroxine Sodium 125 MCG Oral Tablet] 90 tablet      Sig: TAKE 1 TABLET BY MOUTH  DAILY       Thyroid Protocol Passed - 7/29/2021 11:05 AM        Passed - Patient is 12 years or older        Passed - Recent (12 mo) or future (30 days) visit within the authorizing provider's specialty     Patient has had an office visit with the authorizing provider or a provider within the authorizing providers department within the previous 12 mos or has a future within next 30 days. See \"Patient Info\" tab in inbasket, or \"Choose Columns\" in Meds & Orders section of the refill encounter.              Passed - Medication is active on med list        Passed - Normal TSH on file in past 12 months     Recent Labs   Lab Test 02/02/21  0851   TSH 0.54                   Anna Rowe RN 08/02/21 3:02 PM  "

## 2021-08-02 NOTE — TELEPHONE ENCOUNTER
Patient was in today for a lab only check in regards to his idiopathic thrombocytopenic purpura diagnosis under the direction of Dr Soto.  Dr Soto has reviewed lab results from today with a platelet count of 216.  Patient is currently taking 7.5 mg daily prednisone and has been since 7/19/2021.  Per Dr Soto he should stay on this dosing and we can keep him on weekly lab checks.  Patient verbalized understanding.    Sylvia Su RN

## 2021-08-03 PROBLEM — K56.600 PARTIAL SMALL BOWEL OBSTRUCTION (H): Status: RESOLVED | Noted: 2017-11-07 | Resolved: 2018-05-14

## 2021-08-03 PROBLEM — A41.9 SEPSIS DUE TO PNEUMONIA (H): Status: RESOLVED | Noted: 2018-05-06 | Resolved: 2018-05-14

## 2021-08-03 PROBLEM — J18.9 SEPSIS DUE TO PNEUMONIA (H): Status: RESOLVED | Noted: 2018-05-06 | Resolved: 2018-05-14

## 2021-08-09 NOTE — TELEPHONE ENCOUNTER
Patient was in today for a lab only check in regards to his idiopathic thrombocytopenic purpura diagnosis under the direction of Dr Soto.  Melody Segovia CNP has reviewed his labs since Dr Soto is out of the office.  Platelet count is 210.  Patient is currently taking 7.5 mg daily prednisone and has been since 7/19/2021.  Per Melody Segovia CNP, patient should stay on the same dose prednisone and we can cancel Mondays lab check.  He will be checked just prior to his doctor appointment on 8/19.  Patient verbalized understanding and was appreciative of the call.    Sylvia Su RN

## 2021-08-19 NOTE — LETTER
"    8/19/2021         RE: Pardeep Wilson  [address]  [facility] [address]        Dear Colleague,    Thank you for referring your patient, Pardeep Wilson, to the [facility]. Please see a copy of my visit note below.    Oncology Rooming Note    August 19, 2021 10:30 AM   Pardeep Wilson is a 66 year old male who presents for:    Chief Complaint   Patient presents with     Oncology Clinic Visit     Initial Vitals: BP (!) 141/84 (BP Location: Right arm, Cuff Size: Adult Regular)   Pulse 80   Temp 98.3  F (36.8  C) (Oral)   Resp 16   Wt 85.8 kg (189 lb 1.6 oz)   SpO2 97%   BMI 27.93 kg/m   Estimated body mass index is 27.93 kg/m  as calculated from the following:    Height as of 7/19/21: 1.753 m (5' 9\").    Weight as of this encounter: 85.8 kg (189 lb 1.6 oz). Body surface area is 2.04 meters squared.  No Pain (0) Comment: Data Unavailable   No LMP for male patient.  Allergies reviewed: Yes  Medications reviewed: Yes    Medications: Medication refills not needed today.  Pharmacy name entered into [employer]:    [facility] - [place], [place] - [address], [address]  [id number] - [place], [place] - [address] AT [facility] [address]    Clinical concerns: Getting over chest cold. Productive cough with yellow colored mucus. Noted SOB with ambulation. Also, noticed L facial swelling for about 1 week. Denies pain.    Theresa Lai                [facility] Hematology and Oncology Progress Note    Patient: Pardeep Wilson  MRN: [id number]  Date of Service: 06/21/2021        Reason for Visit    Chief Complaint   Patient presents with     HE Cancer     Lung Cancer     Benign Hematology       Assessment and Plan    Recurrent and metastatic mucinous lung adenocarcinoma, status post wedge resection, February 28, 2020  T4 N0 M0, stage III a mucinous left lung adenocarcinoma status post left lower lobe resection on November 1, 2018  Tumor 9 cm " with 3.5 cm nodule, grade 2 out of 4 mucinous adenocarcinoma  Tumor is PDL 1-, no EGFR mutation.  No rearrangement of ALK, evaluation on the Alchemist trial  Tumor negative for ALK, ROS 1, RET, NTRK1, MET e14, EGFR MUTATIONS, April 2020  Remote history of stage I a right axillary Hodgkin's disease status post mantle field radiation and splenectomy about 45 years ago  Right parotid cancer with lymph node involvement status post surgery and adjuvant radiation for 6 weeks in 1991  Coronary artery disease  Elevated BUN and creatinine  New right lung pulmonary nodules   Left pleural effusion/enhancement, 4/2020  Thrombocytopenia, ITP, February 2021  Diarrhea related to Keytruda  Left facial swelling    Patient i has remained on prednisone 7.5 mg daily.  Tolerating this fine.  Platelet counts have been stable.  We will continue the same dose.  We will check labs every 2 weeks and see back in 2 months.    He has follow-up CT scan in September with radiation oncology.    He can proceed with COVID-19 vaccination as he is close to 6 months out after Rituxan.  Reviewed rare ITP with the vaccination.  Benefits outweigh risks.    Minimal left facial swelling.  No associated symptoms.  Observe for now.    We will see him in a couple months.    Plan: As above    Addendum: Potassium elevated at 5.7 slight increase in BUN creatinine stable.  Patient not on any potassium supplements, other medications that should affect this, no change in his diet.  Could be related to the blood draw but specimen was not hemolyzed.  We will have him push fluids.  We will have him come back tomorrow for check of the BMP to ensure this is not worsening.      Measurable disease: CT of the chest      Current therapy: Decrease prednisone to 7.5 mg p.o. daily beginning July 19, 2021      Patient tapered from 20 mg to 5 mg over the previous 3 weeks beginning in June 2021, prednisone dose increased to 10 mg daily because of drop in platelet  count    Prednisone most recent dose was 5 mg every other day for the last 2 weeks     Prednisone, 5 mg daily beginning May 24, 2021  Decrease to 10 mg p.o. daily, May 17, 2021   decreased to 20 mg p.o. daily April 26   40 mg p.o. daily, beginning April 21  Recent course 60 mg daily started April 12, 2021    Started 60 mg p.o. daily, through 12/20/2021      Treatment history:     Nplate single dose around April 12, 2021    Prednisone 60 mg p.o. daily started March 12 with a taper of 10 mg/week    Nplate started February 15, 2021 through March 1, additional dose on March 15    Rituxan x4 doses on February 17 and February 22, 2021 and March 15 and March 22      IVIG 1 g/kg's x2 doses on February 10 and February 12, 2021    Keytruda maintenance every 3 weeks,First dose September 8, 2020  Last dose December 23, 2020, 400 mg       Carboplatin, Taxol and Keytruda, 4 cycles, last August 18, 2020  Treatment started Collette 15, 2020    Wedge resection of right upper lobe nodule, February 28, 2020    Cisplatin and Alimta adjuvant chemotherapy, for 4 cycles: Day 1 cycle 4, February 15, 2019  First cycle  started December 14, 2018    Patient underwent mediastinoscopy, bronchoscopy, thoracoscopic surgery and left lower lobectomy on November 1, 2018      Cancer Staging  Malignant neoplasm of lower lobe of left lung (H)  Staging form: Lung, AJCC 8th Edition  - Clinical stage from 10/15/2018: Stage IIIA (cT4, cN0, cM0) - Signed by Melody Segovia, IVANNA on 12/21/2018      ECOG Performance        Distress Assessment  Distress Assessment Score: 2    Pain           Problem List    1. Idiopathic thrombocytopenic purpura (H)  HM1(CBC and Differential)    Comprehensive Metabolic Panel   2. Malignant neoplasm of upper lobe of right lung (H)     3. Metastatic primary lung cancer, left (H)          CC: Dov Morales,     ______________________________________________________________________________    History of Present  Sabino Roberto returns for reevaluation.  Has continued prednisone 7.5 mg daily.  Some left facial swelling just under the left eye is noted.  No vision change.  No problems with nose or ear.  No pain.  Getting over a chest cold.  No bleeding symptoms.  ECOG status is 0.      July 2021:  Mr. Pardeep Wilson is seen for follow-up.  He was increased to 20 mg daily of prednisone at his last visit 4 weeks ago.  Subsequently dropped to 10 mg and then 5 mg after which platelet count dropped as low as 92,000.  Resumed on 10 mg and now platelet count increased and stable.    Has had weight gain with prednisone.  Blood pressure is fine and blood sugars are fine.  No other side effects.  No bleeding.    He will be returning in 2 weeks and does plan some travel.      Pain Status  Currently in Pain: No/denies    Review of Systems    As per the HPI.       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     Esophageal reflux      Hodgkin's lymphoma (H)     s/p radiation to the mediastinum at age 17     Hyperlipemia      Hypertension      Hypothyroidism     hx nodules     Lung cancer (H)      MI, old 12 years ago, 2008         Past Surgical History:   Procedure Laterality Date     CORONARY ANGIOPLASTY      Stent Placement     CT BIOPSY LUNG       CT BIOPSY LUNG  1/12/2021     EYE SURGERY Bilateral     Cataracts     left lower lobe lobectomy   11/01/2018     MEDIASTINOSCOPY N/A 11/1/2018    Procedure: MEDIASTINOSCOPY;  Surgeon: Bry Saunders MD;  Location: [facility];  Service:      PAROTIDECTOMY       WI LAP,DIAGNOSTIC ABDOMEN N/A 11/7/2017    Procedure: DIAGNOSTIC LAPAROSCOPY,  LYSIS OF ADHESIONS, SMALL BOWEL RESECTION;  Surgeon: Brian Granados MD;  Location: [facility];  Service: General      SPLENECTOMY, TOTAL  1973     wedge resection Right 02/28/2020     WISDOM TOOTH EXTRACTION         Physical Exam    Recent Vitals 6/21/2021   Height -   Weight 190 lbs 6 oz   BSA (m2) 2.06 m2   /76   Pulse 83   Temp 98.3   Temp src 1   SpO2 98   Some recent data might be hidden         GENERAL: Alert and oriented. Seated comfortably. In no distress.     HEAD: Atraumatic and normocephalic.  Alopecia, minimal left facial swelling.  No tenderness.  No erythema.  No abnormalities with cranial nerves, ear nose or oral cavity.  Some left facial deviation related to previous right parotid surgery.     EYES: SOPHIE, EOMI.  No pallor.  No icterus.     Oral cavity: no mucosal lesion or tonsillar enlargement.     NECK: supple. JVP normal.  No thyroid enlargement.     LYMPH NODES: No palpable, cervical, axillary or inguinal lymphadenopathy.     CHEST: clear to auscultation bilaterally.  Resonant to percussion throughout bilaterally.  Symmetrical breath movements bilaterally.     CVS: S1 and S2 are heard. Regular rate and rhythm.  No murmur or gallop or rub heard.     ABDOMEN: Soft. Not tender. Not distended.  No palpable hepatomegaly or splenomegaly.  No other mass palpable.  Bowel sounds heard.     EXTREMITIES: Warm.  No peripheral edema.     SKIN: no rash, or bruising or purpura.    CNS: Nonfocal          Lab Results    Recent Results (from the past 168 hour(s))   HM1 (CBC with Diff)   Result Value Ref Range    WBC 11.3 (H) 4.0 - 11.0 thou/uL    RBC 4.39 (L) 4.40 - 6.20 mill/uL    Hemoglobin 13.5 (L) 14.0 - 18.0 g/dL    Hematocrit 42.0 40.0 - 54.0 %    MCV 96 80 - 100 fL    MCH 30.8 27.0 - 34.0 pg    MCHC 32.1 32.0 - 36.0 g/dL    RDW 16.3 (H) 11.0 - 14.5 %    Platelets 94 (L) 140 - 440 thou/uL    MPV 10.9 8.5 - 12.5 fL    Neutrophils % 65 50 - 70 %    Lymphocytes % 22 20 - 40 %    Monocytes % 11 (H) 2 - 10 %    Eosinophils % 2 0 - 6 %    Basophils % 1 0 - 2 %    Immature Granulocyte % 0 <=0 %    Neutrophils Absolute 7.3  2.0 - 7.7 thou/uL    Lymphocytes Absolute 2.4 0.8 - 4.4 thou/uL    Monocytes Absolute 1.3 (H) 0.0 - 0.9 thou/uL    Eosinophils Absolute 0.2 0.0 - 0.4 thou/uL    Basophils Absolute 0.1 0.0 - 0.2 thou/uL    Immature Granulocyte Absolute 0.1 (H) <=0.0 thou/uL   HM1 (CBC with Diff)   Result Value Ref Range    WBC 10.6 4.0 - 11.0 thou/uL    RBC 4.32 (L) 4.40 - 6.20 mill/uL    Hemoglobin 13.3 (L) 14.0 - 18.0 g/dL    Hematocrit 41.6 40.0 - 54.0 %    MCV 96 80 - 100 fL    MCH 30.8 27.0 - 34.0 pg    MCHC 32.0 32.0 - 36.0 g/dL    RDW 16.3 (H) 11.0 - 14.5 %    Platelets 63 (L) 140 - 440 thou/uL    MPV      Neutrophils % 48 (L) 50 - 70 %    Lymphocytes % 29 20 - 40 %    Monocytes % 16 (H) 2 - 10 %    Eosinophils % 6 0 - 6 %    Basophils % 1 0 - 2 %    Immature Granulocyte % 0 <=0 %    Neutrophils Absolute 5.1 2.0 - 7.7 thou/uL    Lymphocytes Absolute 3.1 0.8 - 4.4 thou/uL    Monocytes Absolute 1.7 (H) 0.0 - 0.9 thou/uL    Eosinophils Absolute 0.6 (H) 0.0 - 0.4 thou/uL    Basophils Absolute 0.1 0.0 - 0.2 thou/uL    Immature Granulocyte Absolute 0.0 <=0.0 thou/uL   Manual Differential   Result Value Ref Range    Platelet Estimate Decreased (!) Normal    Target Cells 1+ (!) Negative    Eastman-Jolly Bodies 1+ (!) Negative   HM1 (CBC with Diff)   Result Value Ref Range    WBC 11.8 (H) 4.0 - 11.0 thou/uL    RBC 4.34 (L) 4.40 - 6.20 mill/uL    Hemoglobin 13.5 (L) 14.0 - 18.0 g/dL    Hematocrit 40.8 40.0 - 54.0 %    MCV 94 80 - 100 fL    MCH 31.1 27.0 - 34.0 pg    MCHC 33.1 32.0 - 36.0 g/dL    RDW 16.0 (H) 11.0 - 14.5 %    Platelets 40 (LL) 140 - 440 thou/uL    MPV      Neutrophils % 64 50 - 70 %    Lymphocytes % 22 20 - 40 %    Monocytes % 10 2 - 10 %    Eosinophils % 3 0 - 6 %    Basophils % 1 0 - 2 %    Immature Granulocyte % 1 (H) <=0 %    Neutrophils Absolute 7.6 2.0 - 7.7 thou/uL    Lymphocytes Absolute 2.6 0.8 - 4.4 thou/uL    Monocytes Absolute 1.2 (H) 0.0 - 0.9 thou/uL    Eosinophils Absolute 0.4 0.0 - 0.4 thou/uL    Basophils  Absolute 0.1 0.0 - 0.2 thou/uL    Immature Granulocyte Absolute 0.1 (H) <=0.0 thou/uL       Imaging    Ct Chest Without Contrast    Result Date: 5/27/2021  EXAM: CT CHEST WO CONTRAST LOCATION: [facility] DATE/TIME: 5/27/2021 7:12 AM INDICATION: NSCLC status post right upper lobe stereotactic radiosurgery completed 2/2021 (of note, only 1 of 2 right upper lobe nodules could be biopsied and marked with fiducials at that time), right upper lobe wedge resection 2020 and left lower lobectomy 2018 as well as chemotherapy and immunotherapy. History of remote Hodgkin's lymphoma with mantle radiation and right parotid cancer status post right parotidectomy and radiation. COMPARISON: CT guidance for right upper lobe biopsy and fiducial placement 01/12/2021, 12/22/2020 PET/CT and 11/27/2020 CT chest TECHNIQUE: CT chest without IV contrast. Multiplanar reformats were obtained. Dose reduction techniques were used. CONTRAST: None. FINDINGS: LUNGS AND PLEURA: The 10 mm right upper lobe lesion marked with fiducial is no longer visible consistent with response to interval therapy (image 6 of series 4). The second right upper lobe lesion which was not marked with fiducial hasn't decreased from 12 x 9 mm to 10 x 7 mm (image 83). Focus of consolidation and volume loss posterolateral right upper lobe and thin rind of peripheral consolidation in volume loss in the anterior medial and perifissural right upper lobe have developed and most likely represent post radiation changes. Faint nonspecific nodular pleural thickening right major fissure has developed. Right upper lobe wedge resection. Left lower lobectomy with associated chronic pleural thickening unchanged. Upper paramediastinal radiation fibrosis both lungs. MEDIASTINUM/AXILLAE: No lymphadenopathy. No thoracic aortic aneurysm. CORONARY ARTERY CALCIFICATION: Moderate. UPPER ABDOMEN: No significant finding. MUSCULOSKELETAL: No bone lesion. Bones are  demineralized.     1.  The 10 mm right upper lobe lesion marked with fiducial is no longer visible consistent with response to interval therapy . 2.  The second right upper lobe lesion which was not marked with fiducial is decreased from 12 x 9 mm to 10 x 7 mm. 3.  Faint nonspecific nodular pleural thickening right major fissure has developed. Consider follow-up CT in 3-6 months.         Signed by: Kevin Soto MD        Again, thank you for allowing me to participate in the care of your patient.        Sincerely,        Kevin Soto MD

## 2021-08-19 NOTE — PROGRESS NOTES
"Oncology Rooming Note    August 19, 2021 10:30 AM   Pardeep Wilson is a 66 year old male who presents for:    Chief Complaint   Patient presents with     Oncology Clinic Visit     Initial Vitals: BP (!) 141/84 (BP Location: Right arm, Cuff Size: Adult Regular)   Pulse 80   Temp 98.3  F (36.8  C) (Oral)   Resp 16   Wt 85.8 kg (189 lb 1.6 oz)   SpO2 97%   BMI 27.93 kg/m   Estimated body mass index is 27.93 kg/m  as calculated from the following:    Height as of 7/19/21: 1.753 m (5' 9\").    Weight as of this encounter: 85.8 kg (189 lb 1.6 oz). Body surface area is 2.04 meters squared.  No Pain (0) Comment: Data Unavailable   No LMP for male patient.  Allergies reviewed: Yes  Medications reviewed: Yes    Medications: Medication refills not needed today.  Pharmacy name entered into [id number]:    [facility] - [place], [place] - [address], [address]  [id number] - [place], [place] - [address] AT [facility] [address]    Clinical concerns: Getting over chest cold. Productive cough with yellow colored mucus. Noted SOB with ambulation. Also, noticed L facial swelling for about 1 week. Denies pain.    Theresa Lai              "

## 2021-08-19 NOTE — PROGRESS NOTES
[facility] Hematology and Oncology Progress Note    Patient: Pardeep Wilson  MRN: [id number]  Date of Service: 06/21/2021        Reason for Visit    Chief Complaint   Patient presents with     HE Cancer     Lung Cancer     Benign Hematology       Assessment and Plan    Recurrent and metastatic mucinous lung adenocarcinoma, status post wedge resection, February 28, 2020  T4 N0 M0, stage III a mucinous left lung adenocarcinoma status post left lower lobe resection on November 1, 2018  Tumor 9 cm with 3.5 cm nodule, grade 2 out of 4 mucinous adenocarcinoma  Tumor is PDL 1-, no EGFR mutation.  No rearrangement of ALK, evaluation on the Alchemist trial  Tumor negative for ALK, ROS 1, RET, NTRK1, MET e14, EGFR MUTATIONS, April 2020  Remote history of stage I a right axillary Hodgkin's disease status post mantle field radiation and splenectomy about 45 years ago  Right parotid cancer with lymph node involvement status post surgery and adjuvant radiation for 6 weeks in 1991  Coronary artery disease  Elevated BUN and creatinine  New right lung pulmonary nodules   Left pleural effusion/enhancement, 4/2020  Thrombocytopenia, ITP, February 2021  Diarrhea related to Keytruda  Left facial swelling    Patient i has remained on prednisone 7.5 mg daily.  Tolerating this fine.  Platelet counts have been stable.  We will continue the same dose.  We will check labs every 2 weeks and see back in 2 months.    He has follow-up CT scan in September with radiation oncology.    He can proceed with COVID-19 vaccination as he is close to 6 months out after Rituxan.  Reviewed rare ITP with the vaccination.  Benefits outweigh risks.    Minimal left facial swelling.  No associated symptoms.  Observe for now.    We will see him in a couple months.    Plan: As above    Addendum: Potassium elevated at 5.7 slight increase in BUN creatinine stable.  Patient not on any potassium supplements, other medications that should affect this, no change in his  diet.  Could be related to the blood draw but specimen was not hemolyzed.  We will have him push fluids.  We will have him come back tomorrow for check of the BMP to ensure this is not worsening.      Measurable disease: CT of the chest      Current therapy: Decrease prednisone to 7.5 mg p.o. daily beginning July 19, 2021      Patient tapered from 20 mg to 5 mg over the previous 3 weeks beginning in June 2021, prednisone dose increased to 10 mg daily because of drop in platelet count    Prednisone most recent dose was 5 mg every other day for the last 2 weeks     Prednisone, 5 mg daily beginning May 24, 2021  Decrease to 10 mg p.o. daily, May 17, 2021   decreased to 20 mg p.o. daily April 26   40 mg p.o. daily, beginning April 21  Recent course 60 mg daily started April 12, 2021    Started 60 mg p.o. daily, through 12/20/2021      Treatment history:     Nplate single dose around April 12, 2021    Prednisone 60 mg p.o. daily started March 12 with a taper of 10 mg/week    Nplate started February 15, 2021 through March 1, additional dose on March 15    Rituxan x4 doses on February 17 and February 22, 2021 and March 15 and March 22      IVIG 1 g/kg's x2 doses on February 10 and February 12, 2021    Keytruda maintenance every 3 weeks,First dose September 8, 2020  Last dose December 23, 2020, 400 mg       Carboplatin, Taxol and Keytruda, 4 cycles, last August 18, 2020  Treatment started Col[place] 15, 2020    Wedge resection of right upper lobe nodule, February 28, 2020    Cisplatin and Alimta adjuvant chemotherapy, for 4 cycles: Day 1 cycle 4, February 15, 2019  First cycle  started December 14, 2018    Patient underwent mediastinoscopy, bronchoscopy, thoracoscopic surgery and left lower lobectomy on November 1, 2018      Cancer Staging  Malignant neoplasm of lower lobe of left lung (H)  Staging form: Lung, AJCC 8th Edition  - Clinical stage from 10/15/2018: Stage IIIA (cT4, cN0, cM0) - Signed by Melody Segovia, CNP  on 12/21/2018      ECOG Performance        Distress Assessment  Distress Assessment Score: 2    Pain           Problem List    1. Idiopathic thrombocytopenic purpura (H)  HM1(CBC and Differential)    Comprehensive Metabolic Panel   2. Malignant neoplasm of upper lobe of right lung (H)     3. Metastatic primary lung cancer, left (H)          CC: Dov Morales, DO    ______________________________________________________________________________    History of Present Illness    Pardeep returns for reevaluation.  Has continued prednisone 7.5 mg daily.  Some left facial swelling just under the left eye is noted.  No vision change.  No problems with nose or ear.  No pain.  Getting over a chest cold.  No bleeding symptoms.  ECOG status is 0.      July 2021:  Mr. Pardeep Wilson is seen for follow-up.  He was increased to 20 mg daily of prednisone at his last visit 4 weeks ago.  Subsequently dropped to 10 mg and then 5 mg after which platelet count dropped as low as 92,000.  Resumed on 10 mg and now platelet count increased and stable.    Has had weight gain with prednisone.  Blood pressure is fine and blood sugars are fine.  No other side effects.  No bleeding.    He will be returning in 2 weeks and does plan some travel.      Pain Status  Currently in Pain: No/denies    Review of Systems    As per the HPI.       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     Esophageal reflux      Hodgkin's lymphoma (H)     s/p radiation to the mediastinum at age 17     Hyperlipemia      Hypertension      Hypothyroidism     hx nodules     Lung cancer (H)      MI, old 12 years ago, 2008         Past Surgical History:   Procedure Laterality Date     CORONARY ANGIOPLASTY      Stent Placement     CT BIOPSY LUNG        CT BIOPSY LUNG  1/12/2021     EYE SURGERY Bilateral     Cataracts     left lower lobe lobectomy   11/01/2018     MEDIASTINOSCOPY N/A 11/1/2018    Procedure: MEDIASTINOSCOPY;  Surgeon: Bry Saunders MD;  Location: [facility];  Service:      PAROTIDECTOMY       [place] LAP,DIAGNOSTIC ABDOMEN N/A 11/7/2017    Procedure: DIAGNOSTIC LAPAROSCOPY,  LYSIS OF ADHESIONS, SMALL BOWEL RESECTION;  Surgeon: Brian Granados MD;  Location: [facility];  Service: General     SPLENECTOMY, TOTAL  1973     wedge resection Right 02/28/2020     WISDOM TOOTH EXTRACTION         Physical Exam    Recent Vitals 6/21/2021   Height -   Weight 190 lbs 6 oz   BSA (m2) 2.06 m2   /76   Pulse 83   Temp 98.3   Temp src 1   SpO2 98   Some recent data might be hidden         GENERAL: Alert and oriented. Seated comfortably. In no distress.     HEAD: Atraumatic and normocephalic.  Alopecia, minimal left facial swelling.  No tenderness.  No erythema.  No abnormalities with cranial nerves, ear nose or oral cavity.  Some left facial deviation related to previous right parotid surgery.     EYES: SOPHIE, EOMI.  No pallor.  No icterus.     Oral cavity: no mucosal lesion or tonsillar enlargement.     NECK: supple. JVP normal.  No thyroid enlargement.     LYMPH NODES: No palpable, cervical, axillary or inguinal lymphadenopathy.     CHEST: clear to auscultation bilaterally.  Resonant to percussion throughout bilaterally.  Symmetrical breath movements bilaterally.     CVS: S1 and S2 are heard. Regular rate and rhythm.  No murmur or gallop or rub heard.     ABDOMEN: Soft. Not tender. Not distended.  No palpable hepatomegaly or splenomegaly.  No other mass palpable.  Bowel sounds heard.     EXTREMITIES: Warm.  No peripheral edema.     SKIN: no rash, or bruising or purpura.    CNS: Nonfocal          Lab Results    Recent Results (from the past 168 hour(s))   HM1 (CBC with Diff)   Result Value Ref Range    WBC 11.3 (H) 4.0 - 11.0 thou/uL    RBC  4.39 (L) 4.40 - 6.20 mill/uL    Hemoglobin 13.5 (L) 14.0 - 18.0 g/dL    Hematocrit 42.0 40.0 - 54.0 %    MCV 96 80 - 100 fL    MCH 30.8 27.0 - 34.0 pg    MCHC 32.1 32.0 - 36.0 g/dL    RDW 16.3 (H) 11.0 - 14.5 %    Platelets 94 (L) 140 - 440 thou/uL    MPV 10.9 8.5 - 12.5 fL    Neutrophils % 65 50 - 70 %    Lymphocytes % 22 20 - 40 %    Monocytes % 11 (H) 2 - 10 %    Eosinophils % 2 0 - 6 %    Basophils % 1 0 - 2 %    Immature Granulocyte % 0 <=0 %    Neutrophils Absolute 7.3 2.0 - 7.7 thou/uL    Lymphocytes Absolute 2.4 0.8 - 4.4 thou/uL    Monocytes Absolute 1.3 (H) 0.0 - 0.9 thou/uL    Eosinophils Absolute 0.2 0.0 - 0.4 thou/uL    Basophils Absolute 0.1 0.0 - 0.2 thou/uL    Immature Granulocyte Absolute 0.1 (H) <=0.0 thou/uL   HM1 (CBC with Diff)   Result Value Ref Range    WBC 10.6 4.0 - 11.0 thou/uL    RBC 4.32 (L) 4.40 - 6.20 mill/uL    Hemoglobin 13.3 (L) 14.0 - 18.0 g/dL    Hematocrit 41.6 40.0 - 54.0 %    MCV 96 80 - 100 fL    MCH 30.8 27.0 - 34.0 pg    MCHC 32.0 32.0 - 36.0 g/dL    RDW 16.3 (H) 11.0 - 14.5 %    Platelets 63 (L) 140 - 440 thou/uL    MPV      Neutrophils % 48 (L) 50 - 70 %    Lymphocytes % 29 20 - 40 %    Monocytes % 16 (H) 2 - 10 %    Eosinophils % 6 0 - 6 %    Basophils % 1 0 - 2 %    Immature Granulocyte % 0 <=0 %    Neutrophils Absolute 5.1 2.0 - 7.7 thou/uL    Lymphocytes Absolute 3.1 0.8 - 4.4 thou/uL    Monocytes Absolute 1.7 (H) 0.0 - 0.9 thou/uL    Eosinophils Absolute 0.6 (H) 0.0 - 0.4 thou/uL    Basophils Absolute 0.1 0.0 - 0.2 thou/uL    Immature Granulocyte Absolute 0.0 <=0.0 thou/uL   Manual Differential   Result Value Ref Range    Platelet Estimate Decreased (!) Normal    Target Cells 1+ (!) Negative    Eastman-Jolly Bodies 1+ (!) Negative   HM1 (CBC with Diff)   Result Value Ref Range    WBC 11.8 (H) 4.0 - 11.0 thou/uL    RBC 4.34 (L) 4.40 - 6.20 mill/uL    Hemoglobin 13.5 (L) 14.0 - 18.0 g/dL    Hematocrit 40.8 40.0 - 54.0 %    MCV 94 80 - 100 fL    MCH 31.1 27.0 - 34.0 pg     MCHC 33.1 32.0 - 36.0 g/dL    RDW 16.0 (H) 11.0 - 14.5 %    Platelets 40 (LL) 140 - 440 thou/uL    MPV      Neutrophils % 64 50 - 70 %    Lymphocytes % 22 20 - 40 %    Monocytes % 10 2 - 10 %    Eosinophils % 3 0 - 6 %    Basophils % 1 0 - 2 %    Immature Granulocyte % 1 (H) <=0 %    Neutrophils Absolute 7.6 2.0 - 7.7 thou/uL    Lymphocytes Absolute 2.6 0.8 - 4.4 thou/uL    Monocytes Absolute 1.2 (H) 0.0 - 0.9 thou/uL    Eosinophils Absolute 0.4 0.0 - 0.4 thou/uL    Basophils Absolute 0.1 0.0 - 0.2 thou/uL    Immature Granulocyte Absolute 0.1 (H) <=0.0 thou/uL       Imaging    Ct Chest Without Contrast    Result Date: 5/27/2021  EXAM: CT CHEST WO CONTRAST LOCATION: [facility] DATE/TIME: 5/27/2021 7:12 AM INDICATION: NSCLC status post right upper lobe stereotactic radiosurgery completed 2/2021 (of note, only 1 of 2 right upper lobe nodules could be biopsied and marked with fiducials at that time), right upper lobe wedge resection 2020 and left lower lobectomy 2018 as well as chemotherapy and immunotherapy. History of remote Hodgkin's lymphoma with mantle radiation and right parotid cancer status post right parotidectomy and radiation. COMPARISON: CT guidance for right upper lobe biopsy and fiducial placement 01/12/2021, 12/22/2020 PET/CT and 11/27/2020 CT chest TECHNIQUE: CT chest without IV contrast. Multiplanar reformats were obtained. Dose reduction techniques were used. CONTRAST: None. FINDINGS: LUNGS AND PLEURA: The 10 mm right upper lobe lesion marked with fiducial is no longer visible consistent with response to interval therapy (image 6 of series 4). The second right upper lobe lesion which was not marked with fiducial hasn't decreased from 12 x 9 mm to 10 x 7 mm (image 83). Focus of consolidation and volume loss posterolateral right upper lobe and thin rind of peripheral consolidation in volume loss in the anterior medial and perifissural right upper lobe have developed and most  likely represent post radiation changes. Faint nonspecific nodular pleural thickening right major fissure has developed. Right upper lobe wedge resection. Left lower lobectomy with associated chronic pleural thickening unchanged. Upper paramediastinal radiation fibrosis both lungs. MEDIASTINUM/AXILLAE: No lymphadenopathy. No thoracic aortic aneurysm. CORONARY ARTERY CALCIFICATION: Moderate. UPPER ABDOMEN: No significant finding. MUSCULOSKELETAL: No bone lesion. Bones are demineralized.     1.  The 10 mm right upper lobe lesion marked with fiducial is no longer visible consistent with response to interval therapy . 2.  The second right upper lobe lesion which was not marked with fiducial is decreased from 12 x 9 mm to 10 x 7 mm. 3.  Faint nonspecific nodular pleural thickening right major fissure has developed. Consider follow-up CT in 3-6 months.         Signed by: Kevin Soto MD

## 2021-08-20 NOTE — TELEPHONE ENCOUNTER
Patient was in today for a recheck of his potassium level.  Potassium today came back at 4.0.  This is in the normal range.  Call placed the patient to let him know and nothing further is needed based off of this.  He can continue to follow-up with our office as previously scheduled.    Sylvia Su RN

## 2021-09-02 NOTE — TELEPHONE ENCOUNTER
Today's labs were reviewed by Dr. Soto. He indicates no changes to patient's care plan. Labs will get rechecked on Tues, 9/14.    I call Pardeep to report this. He verbalized understanding and appreciation of our conversation.     Shira Abdullahi  RN, Oncology Clinic   [facility]

## 2021-09-09 PROBLEM — Z85.118 PERSONAL HISTORY OF MALIGNANT NEOPLASM OF BRONCHUS AND LUNG: Status: ACTIVE | Noted: 2021-01-01

## 2021-09-09 NOTE — LETTER
9/9/2021         RE: Pardeep Wilson  [address]  [place] [address]        Dear Colleague,    Thank you for referring your patient, Pardeep Wilson, to the [facility] RADIATION ONCOLOGY [place]. Please see a copy of my visit note below.      [facility] Radiation Oncology Follow Up Note    Patient: Pardeep Wilson  MRN: [id number]  Date of Service: 09/09/2021    Assessment / Impression   67 y/o with 2 growing lung nodules right upper lobe on maintenance Keytruda, suspicious for oligoprogressive  NSCLC s/p SBRT completed 2/12/21      Malignant neoplasm of upper lobe of right lung (H)    Personal history of malignant neoplasm of bronchus and lung [Z85.118]    Extensive history of prior malignancy:  Stage IIIA ([id number]) NSCLC, mucinous adenocarcinoma, of Left Lower Lobe s/p Left lower lobectomy 11/1/2018 and Adjuvant Chemotherapy with Cisplatin and Alimta x4c, began 12/14/18.     Recurrent (Stage IV) NSCLC, mucinous adenocarcinoma, of the Right upper lobe s/p wedge resection 2/28/2020 with 4 separate lesions noted in the resection.  Followed by carboplatin, taxol and Keytruda x 4c (6/15/20-8/18/20), maintenance Keytruda began 9/8/20.     Remote history of Stage I Right axillary Hodgkin's Lymphoma s/p Mantel field RT and splenectomy 45 yrs ago at [facility] (supected ~40 Gy).  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    RT delivered:  Body site: Thorax   SITE TREATED: Right lung x2 nodules  TOTAL DOSE: 5000 cGy  NUMBER OF FRACTION: 5 fractions  DATES COMPLETED: 2/4/2021-2/12/2021    No evidence of residual recurrent disease.    Sequela of radiation therapy seen in the right upper lobe.  Small bilateral effusions and left lower lobe atelectasis.    He and his wife have some concerns regarding his respiratory function and shortness of breath.  Discussed repeating PFTs to evaluate if any changes since last PFTs  2/20/2020.  He would like to hold off for now.    He will discuss his back pain with his PCP.    Plan:   Return to clinic in 3 months with repeat restaging chest CT  We will consider PFTs at that time or sooner should he wish.    Total time of this visit, including time spent face-to-face with patient and or via video/audio, and also in preparing for today's visit for MDM and documentation. Medical decision-making included consideration and possible diagnoses, management options, complex record review, review of diagnostic tests and information, consideration and discussion of significant complications based on comorbidities, discussion with providers involved in the care of the patient.     30 Minutes spent.      This note has been generated using voice recognition software. There may be some errors that were not identified at the time of documented.       Subjective:      HPI: Pardeep Wilson is a 66 year old male with   Chief Complaint   Patient presents with     Radiation Therapy     He continues to do well status post SBRT to the right upper lobe.  He reports his breathing continues to feel tight.  Shortness of breath is fairly stable and most notable when he is walking fast or running.    He has been slowly working to increase his endurance and feels he is able to run longer and faster now.  He has occasional cough sometimes dry sometimes productive of thick sputum.  No hemoptysis.    His energy is good.    He has some chronic back pain that is bothersome.  He is followed by heme-onc for thrombocytopenia, ITP, February 2021.  On prednisone for therapy.    He was able to access his CT chest report online but they are interested in reviewing the images in clinic again.    Current Outpatient Medications   Medication Sig Dispense Refill     acetaminophen (TYLENOL) 160 MG/5ML liquid Take 31.25 mLs (1,000 mg) by mouth every 6 hours as needed for mild pain or fever 473 mL 3     atorvastatin 40 MG PO tablet Take 40 mg  by mouth       biotin 300 MCG PO TABS tablet Take 300 mg by mouth       levothyroxine (SYNTHROID/LEVOTHROID) 125 MCG tablet Take 1 tablet (125 mcg) by mouth daily 30 tablet 0     metoprolol succinate ER 25 MG PO 24 hr tablet TAKE ONE-HALF (1/2) TABLET DAILY       multivitamin (CENTRUM) liquid Take 15 mLs by mouth daily 236 mL 3     pantoprazole (PROTONIX) 40 MG EC tablet        predniSONE (DELTASONE) 10 MG tablet Take 7.5 mg by mouth        sodium fluoride dental gel 1.1 % DT GEL topical gel        fluocinonide 0.05 % EX external cream Apply 0.05 Application topically as needed (Patient not taking: Reported on 9/9/2021)       multivitamin PO TABS Take 2 tablets by mouth (Patient not taking: Reported on 9/9/2021)       omeprazole (PRILOSEC) 2 mg/mL suspension Take 10 mLs (20 mg) by mouth 2 times daily (Patient not taking: Reported on 8/19/2021) 600 mL 1       The following portions of the patient's history were reviewed and updated as appropriate: allergies, current medications, past family history, past medical history, past social history, past surgical history and problem list.    Review of Systems    General  Constitutional  Constitutional (WDL): Exceptions to WDL  Fatigue: Fatigue relieved by rest  Fever: Absent or within normal limits  Chills: Absent or within normal limits  Weight Gain: Absent or within normal limits  Weight Loss: Absent or within normal limits  Hot Flashes: Absent or within normal limits  EENT  Eye Disorders  Eye Disorder (WDL): All eye disorder elements are within defined limits  Ear Disorders  Ear Disorder (WDL): All ear disorder elements are within defined limits  Respiratory    Respiratory  Respiratory (WDL): Exceptions to WDL  Cough: Absent or within normal limits  Dyspnea: Shortness of breath with moderate exertion (sometimes more difficult to catch breath)  Hypoxia: Absent or within normal limits  Cardiovascular  Cardiovascular  Cardiovascular (WDL): Exceptions to WDL  Palpitations:  Absent or within normal limits  Edema: No  Superficial Thrombophlebitis: Absent or within normal limits  Chest Pain - Cardiac: Absent or within normal limits  Gastrointestinal  Gastrointestinal  Gastrointestinal (WDL): Exceptions to WDL  Anorexia: Absent or within normal limits  Nausea: Absent or within normal limits  Vomiting: Absent or within normal limits  Dehydration: Absent or within normal limits  Dysgeusia: Absent or within normal limits  Dysphagia: Absent or within normal limits  Mucositis Oral: Absent or within normal limits  Esophagitis: Absent or within normal limits  Constipation: Absent or within normal limits  Diarrhea: Absent or within normal limits  Pharyngitis: Absent or within normal limits  Dry Mouth: Absent or within normal limits  Musculoskeletal  Musculoskeletal and Connective Tissue Disorders  Musculoskeletal & Connective (WDL): Exceptions to WDL  Arthralgia: Mild pain (lower left back and upper neck)  Bone Pain: Absent or within normal limits  Generalized Muscle Weakness: Absent or within normal limits  Myalgia: Absent or within normal limits  Integumentary              Integumentary  Integumentary (WDL): All integumentary elements are within defined limits  Neurological  Neurosensory  Neurosensory (WDL): Exceptions to WDL  Peripheral Motor Neuropathy: Absent or within normal limits  Ataxia: Absent or within normal limits  Peripheral Sensory Neuropathy: Asymptomatic (feet)  Confusion: Absent or within normal limits  Dizziness: Absent or within normal limits  Syncope: Absent or within normal limits  Dysesthesia: Absent or within normal limits  Genitourinary/Reproductive  Genitourinary  Genitourinary (WDL): All genitourinary elements are within defined limits  Lymphatic   Lymph System Disorders  Lymph (WDL): All lymph elements are within defined limits    Pain   Pain Score: No Pain (0)   AUA Assessment                                                                         Accompanied  by  Accompanied By: family (wife Eliane)      Objective:     Physical Exam    Vitals:    09/09/21 1307   BP: (!) 141/79   Pulse: 84   Resp: 20   Temp: 98  F (36.7  C)   TempSrc: Oral   SpO2: 96%   Weight: 86.7 kg (191 lb 3.2 oz)   PainSc: No Pain (0)     Gen: Alert, in NAD pleasant interactive, well nourished  Eyes:  EOMI, sclera anicteric  Pulm: No wheezing, stridor or respiratory distress, crackles right upper lung  CV: Well-perfused, no cyanosis  Neurologic: A/O, speech fluent, no focal motor deficits. Gait normal and unaided  Psychiatric: Appropriate mood and affect    Recent Labs: No results found for this or any previous visit (from the past 168 hour(s)).      Personally reviewed images and images reviewed with patient and wife during visit  Imaging: Imaging results 6 weeks:CT Chest w/o Contrast    Result Date: 9/3/2021  EXAM: CT CHEST W/O CONTRAST LOCATION: [facility] DATE/TIME: 9/3/2021 7:54 AM INDICATION: Restaging right lung NSCLC s/p SBRT COMPARISON: 05/27/2021 CT. TECHNIQUE: CT chest without IV contrast. Multiplanar reformats were obtained. Dose reduction techniques were used. CONTRAST: None. FINDINGS: LUNGS AND PLEURA: Previous wedge resection changes right upper lobe. Slight increasing interstitial infiltrates near the fiducial marker placement for recurrent tumor consistent with mild radiation change.. The previous nodule no longer defined similar to 05/27/2021. New small right pleural effusion and tiny left pleural effusion. Nothing concerning for tumor recurrence or metastatic disease in the chest. Some mild atelectasis left lower lobe. MEDIASTINUM/AXILLAE: No lymphadenopathy. No thoracic aortic aneurysm. CORONARY ARTERY CALCIFICATION: Moderate. UPPER ABDOMEN: No significant finding. MUSCULOSKELETAL: Unremarkable.     IMPRESSION: 1.  Post right gluteus resection changes and post radiation change right upper lobe with the previous nodule no longer identified. Radiation  changes has slightly increased since 05/27/2021. 2.  Nothing concerning for tumor recurrence or progression. 3.  Small right and tiny left pleural effusions.       Signed by: Chelsea Grant MD                Again, thank you for allowing me to participate in the care of your patient.        Sincerely,        Chelsea Grant MD

## 2021-09-09 NOTE — PROGRESS NOTES
[facility] Radiation Oncology Follow Up Note    Patient: Pardeep Wilson  MRN: [id number]  Date of Service: 09/09/2021    Assessment / Impression   65 y/o with 2 growing lung nodules right upper lobe on maintenance Keytruda, suspicious for oligoprogressive  NSCLC s/p SBRT completed 2/12/21      Malignant neoplasm of upper lobe of right lung (H)    Personal history of malignant neoplasm of bronchus and lung [Z85.118]    Extensive history of prior malignancy:  Stage IIIA ([id number]) NSCLC, mucinous adenocarcinoma, of Left Lower Lobe s/p Left lower lobectomy 11/1/2018 and Adjuvant Chemotherapy with Cisplatin and Alimta x4c, began 12/14/18.     Recurrent (Stage IV) NSCLC, mucinous adenocarcinoma, of the Right upper lobe s/p wedge resection 2/28/2020 with 4 separate lesions noted in the resection.  Followed by carboplatin, taxol and Keytruda x 4c (6/15/20-8/18/20), maintenance Keytruda began 9/8/20.     Remote history of Stage I Right axillary Hodgkin's Lymphoma s/p Mantel field RT and splenectomy 45 yrs ago at [facility] (supected ~40 Gy).  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    RT delivered:  Body site: Thorax   SITE TREATED: Right lung x2 nodules  TOTAL DOSE: 5000 cGy  NUMBER OF FRACTION: 5 fractions  DATES COMPLETED: 2/4/2021-2/12/2021    No evidence of residual recurrent disease.    Sequela of radiation therapy seen in the right upper lobe.  Small bilateral effusions and left lower lobe atelectasis.    He and his wife have some concerns regarding his respiratory function and shortness of breath.  Discussed repeating PFTs to evaluate if any changes since last PFTs 2/20/2020.  He would like to hold off for now.    He will discuss his back pain with his PCP.    Plan:   Return to clinic in 3 months with repeat restaging chest CT  We will consider PFTs at that time or sooner should he wish.    Total time of this visit, including time  spent face-to-face with patient and or via video/audio, and also in preparing for today's visit for MDM and documentation. Medical decision-making included consideration and possible diagnoses, management options, complex record review, review of diagnostic tests and information, consideration and discussion of significant complications based on comorbidities, discussion with providers involved in the care of the patient.     30 Minutes spent.      This note has been generated using voice recognition software. There may be some errors that were not identified at the time of documented.       Subjective:      HPI: Pardeep Wilson is a 66 year old male with   Chief Complaint   Patient presents with     Radiation Therapy     He continues to do well status post SBRT to the right upper lobe.  He reports his breathing continues to feel tight.  Shortness of breath is fairly stable and most notable when he is walking fast or running.    He has been slowly working to increase his endurance and feels he is able to run longer and faster now.  He has occasional cough sometimes dry sometimes productive of thick sputum.  No hemoptysis.    His energy is good.    He has some chronic back pain that is bothersome.  He is followed by heme-onc for thrombocytopenia, ITP, February 2021.  On prednisone for therapy.    He was able to access his CT chest report online but they are interested in reviewing the images in clinic again.    Current Outpatient Medications   Medication Sig Dispense Refill     acetaminophen (TYLENOL) 160 MG/5ML liquid Take 31.25 mLs (1,000 mg) by mouth every 6 hours as needed for mild pain or fever 473 mL 3     atorvastatin 40 MG PO tablet Take 40 mg by mouth       biotin 300 MCG PO TABS tablet Take 300 mg by mouth       levothyroxine (SYNTHROID/LEVOTHROID) 125 MCG tablet Take 1 tablet (125 mcg) by mouth daily 30 tablet 0     metoprolol succinate ER 25 MG PO 24 hr tablet TAKE ONE-HALF (1/2) TABLET DAILY        multivitamin (CENTRUM) liquid Take 15 mLs by mouth daily 236 mL 3     pantoprazole (PROTONIX) 40 MG EC tablet        predniSONE (DELTASONE) 10 MG tablet Take 7.5 mg by mouth        sodium fluoride dental gel 1.1 % DT GEL topical gel        fluocinonide 0.05 % EX external cream Apply 0.05 Application topically as needed (Patient not taking: Reported on 9/9/2021)       multivitamin PO TABS Take 2 tablets by mouth (Patient not taking: Reported on 9/9/2021)       omeprazole (PRILOSEC) 2 mg/mL suspension Take 10 mLs (20 mg) by mouth 2 times daily (Patient not taking: Reported on 8/19/2021) 600 mL 1       The following portions of the patient's history were reviewed and updated as appropriate: allergies, current medications, past family history, past medical history, past social history, past surgical history and problem list.    Review of Systems    General  Constitutional  Constitutional (WDL): Exceptions to WDL  Fatigue: Fatigue relieved by rest  Fever: Absent or within normal limits  Chills: Absent or within normal limits  Weight Gain: Absent or within normal limits  Weight Loss: Absent or within normal limits  Hot Flashes: Absent or within normal limits  EENT  Eye Disorders  Eye Disorder (WDL): All eye disorder elements are within defined limits  Ear Disorders  Ear Disorder (WDL): All ear disorder elements are within defined limits  Respiratory    Respiratory  Respiratory (WDL): Exceptions to WDL  Cough: Absent or within normal limits  Dyspnea: Shortness of breath with moderate exertion (sometimes more difficult to catch breath)  Hypoxia: Absent or within normal limits  Cardiovascular  Cardiovascular  Cardiovascular (WDL): Exceptions to WDL  Palpitations: Absent or within normal limits  Edema: No  Superficial Thrombophlebitis: Absent or within normal limits  Chest Pain - Cardiac: Absent or within normal limits  Gastrointestinal  Gastrointestinal  Gastrointestinal (WDL): Exceptions to WDL  Anorexia: Absent or within  normal limits  Nausea: Absent or within normal limits  Vomiting: Absent or within normal limits  Dehydration: Absent or within normal limits  Dysgeusia: Absent or within normal limits  Dysphagia: Absent or within normal limits  Mucositis Oral: Absent or within normal limits  Esophagitis: Absent or within normal limits  Constipation: Absent or within normal limits  Diarrhea: Absent or within normal limits  Pharyngitis: Absent or within normal limits  Dry Mouth: Absent or within normal limits  Musculoskeletal  Musculoskeletal and Connective Tissue Disorders  Musculoskeletal & Connective (WDL): Exceptions to WDL  Arthralgia: Mild pain (lower left back and upper neck)  Bone Pain: Absent or within normal limits  Generalized Muscle Weakness: Absent or within normal limits  Myalgia: Absent or within normal limits  Integumentary              Integumentary  Integumentary (WDL): All integumentary elements are within defined limits  Neurological  Neurosensory  Neurosensory (WDL): Exceptions to WDL  Peripheral Motor Neuropathy: Absent or within normal limits  Ataxia: Absent or within normal limits  Peripheral Sensory Neuropathy: Asymptomatic (feet)  Confusion: Absent or within normal limits  Dizziness: Absent or within normal limits  Syncope: Absent or within normal limits  Dysesthesia: Absent or within normal limits  Genitourinary/Reproductive  Genitourinary  Genitourinary (WDL): All genitourinary elements are within defined limits  Lymphatic   Lymph System Disorders  Lymph (WDL): All lymph elements are within defined limits    Pain   Pain Score: No Pain (0)   AUA Assessment                                                                         Accompanied by  Accompanied By: family (wife Eliane)      Objective:     Physical Exam    Vitals:    09/09/21 1307   BP: (!) 141/79   Pulse: 84   Resp: 20   Temp: 98  F (36.7  C)   TempSrc: Oral   SpO2: 96%   Weight: 86.7 kg (191 lb 3.2 oz)   PainSc: No Pain (0)     Gen: Alert, in NAD  pleasant interactive, well nourished  Eyes:  EOMI, sclera anicteric  Pulm: No wheezing, stridor or respiratory distress, crackles right upper lung  CV: Well-perfused, no cyanosis  Neurologic: A/O, speech fluent, no focal motor deficits. Gait normal and unaided  Psychiatric: Appropriate mood and affect    Recent Labs: No results found for this or any previous visit (from the past 168 hour(s)).      Personally reviewed images and images reviewed with patient and wife during visit  Imaging: Imaging results 6 weeks:CT Chest w/o Contrast    Result Date: 9/3/2021  EXAM: CT CHEST W/O CONTRAST LOCATION: [facility] DATE/TIME: 9/3/2021 7:54 AM INDICATION: Restaging right lung NSCLC s/p SBRT COMPARISON: 05/27/2021 CT. TECHNIQUE: CT chest without IV contrast. Multiplanar reformats were obtained. Dose reduction techniques were used. CONTRAST: None. FINDINGS: LUNGS AND PLEURA: Previous wedge resection changes right upper lobe. Slight increasing interstitial infiltrates near the fiducial marker placement for recurrent tumor consistent with mild radiation change.. The previous nodule no longer defined similar to 05/27/2021. New small right pleural effusion and tiny left pleural effusion. Nothing concerning for tumor recurrence or metastatic disease in the chest. Some mild atelectasis left lower lobe. MEDIASTINUM/AXILLAE: No lymphadenopathy. No thoracic aortic aneurysm. CORONARY ARTERY CALCIFICATION: Moderate. UPPER ABDOMEN: No significant finding. MUSCULOSKELETAL: Unremarkable.     IMPRESSION: 1.  Post right gluteus resection changes and post radiation change right upper lobe with the previous nodule no longer identified. Radiation changes has slightly increased since 05/27/2021. 2.  Nothing concerning for tumor recurrence or progression. 3.  Small right and tiny left pleural effusions.       Signed by: Chelsea Grant MD

## 2021-09-14 NOTE — TELEPHONE ENCOUNTER
Patient was in today for a lab only recheck for his diagnosis of thrombocytopenia, ITP under the care of Dr Soto.  Patient is currently taking prednisone 7.5 mg daily.  This is a 2-week check.  Dr Soto has reviewed results and states that things are looking good.  Patient should keep his schedule as previously planned checking labs every 2 weeks and he will see Dr Soto on 10/14.  Patient did not answer when I called him so a detailed message was left on his self identifying voicemail letting him know the above information.  I asked that he call back if he has any questions or concerns.    Sylvia Su RN

## 2021-09-30 NOTE — TELEPHONE ENCOUNTER
Patient was in today for a lab only recheck for his diagnosis of thrombocytopenia, ITP under the care of Dr Soto.  Patient is currently taking prednisone 7.5 mg daily.  This is a 2-week check.  Dr Soto has reviewed results and states that things are looking good.  Platelets are 306.  Patient should keep his schedule as previously planned checking labs every 2 weeks and he will see Dr Soto on 10/14.  Patient verbalized understanding and had no further questions or concerns.

## 2021-10-13 PROBLEM — I10 HYPERTENSION: Status: ACTIVE | Noted: 2019-10-09

## 2021-10-13 PROBLEM — M54.2 NECK PAIN: Status: ACTIVE | Noted: 2021-01-01

## 2021-10-13 PROBLEM — M54.42 CHRONIC BILATERAL LOW BACK PAIN WITH LEFT-SIDED SCIATICA: Status: ACTIVE | Noted: 2021-01-01

## 2021-10-13 PROBLEM — G89.29 CHRONIC BILATERAL LOW BACK PAIN WITH LEFT-SIDED SCIATICA: Status: ACTIVE | Noted: 2021-01-01

## 2021-10-13 NOTE — ASSESSMENT & PLAN NOTE
Given the increased fatigue, will recheck thyroid levels with next lab draw.  Currently on 125 mcg daily.

## 2021-10-13 NOTE — PROGRESS NOTES
Problem List Items Addressed This Visit        Nervous and Auditory    Chronic bilateral low back pain with left-sided sciatica - Primary     Chronic in nature and not new.  Given the radiation left leg as well as the recurrent neck stiffness, will send to physical therapy.  If not improving in 6 to 8 weeks then will look at further imaging.         Relevant Orders    Physical Therapy Referral    Neck pain     See treatment plan for back pain         Relevant Orders    Physical Therapy Referral       Endocrine    Other specified hypothyroidism     Given the increased fatigue, will recheck thyroid levels with next lab draw.  Currently on 125 mcg daily.         Relevant Orders    TSH with free T4 reflex       Circulatory    Coronary artery disease involving native coronary artery of native heart without angina pectoris     Given the slight increase in the TELLEZ will get Stress Echo         Relevant Orders    Echocardiogram Exercise Stress        Return in about 3 months (around 1/13/2022) for Routine preventive.    Ayanna Roberto is a 66 year old who presents for the following health issues   Lower back pain that radiates in left leg been present for over 6 years.  Flares occasionally and makes it difficult to walk.  Once he gets up and moving it does improve.  Also same for his neck.  Tends to get stiff and especially on the right side.  Normally when he stretches it it does improve.  No radiation of pain from the neck.  Again present for over 6 years.  He has never seen physical therapy or been on a stretching exercise.  Denies any loss of bowel or bladder.  Denies any saddle paresthesia.  Of note he did mention that he is getting more more short of breath with activity.  If he stops and rests it improves.  Normal day-to-day activities he is not noticing it but if he climbs stairs or tries to walk faster than normal it develops, if he slows down it improves.  He was go to mention to his oncologist as he was  "wondering if it might be a lung dysfunction.  Discussed with patient that given his underlying cardiac history, the first step is to get a stress echo.  Additionally he is going to discuss with his oncologist about his overall fatigue.  Of note have not checked his thyroid in quite some time.  And he is gone through substantial treatment as well as weight changes since the last test.  He is scheduled for a lab draw later this week.       Review of Systems   All other systems reviewed and are negative.           Objective    /64 (BP Location: Left arm, Patient Position: Sitting, Cuff Size: Adult Large)   Pulse 60   Temp 98.1  F (36.7  C) (Oral)   Resp 12   Ht 1.753 m (5' 9\")   Wt 85.2 kg (187 lb 14.4 oz)   BMI 27.75 kg/m    Body mass index is 27.75 kg/m .  Physical Exam  Vitals and nursing note reviewed.   Constitutional:       General: He is not in acute distress.     Appearance: Normal appearance. He is not ill-appearing.   HENT:      Head: Normocephalic and atraumatic.   Eyes:      Extraocular Movements: Extraocular movements intact.      Conjunctiva/sclera: Conjunctivae normal.   Neck:      Vascular: No carotid bruit.   Cardiovascular:      Rate and Rhythm: Normal rate and regular rhythm.      Pulses: Normal pulses.      Heart sounds: Normal heart sounds. No murmur heard.     Pulmonary:      Effort: Pulmonary effort is normal.      Breath sounds: Normal breath sounds.   Musculoskeletal:      Cervical back: Normal range of motion.      Right lower leg: No edema.      Left lower leg: No edema.   Skin:     Capillary Refill: Capillary refill takes less than 2 seconds.   Neurological:      Mental Status: He is alert and oriented to person, place, and time.   Psychiatric:         Attention and Perception: Attention normal.         Mood and Affect: Mood normal.         Speech: Speech normal.         Thought Content: Thought content normal.                  "

## 2021-10-13 NOTE — ASSESSMENT & PLAN NOTE
Chronic in nature and not new.  Given the radiation left leg as well as the recurrent neck stiffness, will send to physical therapy.  If not improving in 6 to 8 weeks then will look at further imaging.

## 2021-10-14 NOTE — LETTER
"    10/14/2021         RE: Pardeep Wilson  [address]  [facility] [address]        Dear Colleague,    Thank you for referring your patient, Pardeep Wilson, to the [facility]. Please see a copy of my visit note below.    Oncology Rooming Note    October 14, 2021 11:21 AM   Pardeep Wilson is a 66 year old male who presents for:    Chief Complaint   Patient presents with     Oncology Clinic Visit     Malignant neoplasm of upper lobe of right lung (H)     Initial Vitals: BP (!) 152/74   Pulse 88   Temp 98.2  F (36.8  C)   Resp 18   Wt 85.7 kg (189 lb)   SpO2 97%   BMI 27.91 kg/m   Estimated body mass index is 27.91 kg/m  as calculated from the following:    Height as of 10/13/21: 1.753 m (5' 9\").    Weight as of this encounter: 85.7 kg (189 lb). Body surface area is 2.04 meters squared.  No Pain (0) Comment: Data Unavailable   No LMP for male patient.  Allergies reviewed: Yes  Medications reviewed: Yes    Medications: Medication refills not needed today.  Pharmacy name entered into [employer]: [id number] [place], [place] - [address]    Clinical concerns: None       Melissa Vogel LPN              [facility] Hematology and Oncology Progress Note    Patient: Pardeep Wilson  MRN: [id number]  Date of Service: 06/21/2021        Reason for Visit    Chief Complaint   Patient presents with     HE Cancer     Lung Cancer     Benign Hematology       Assessment and Plan    Recurrent and metastatic mucinous lung adenocarcinoma, status post wedge resection, February 28, 2020  T4 N0 M0, stage III a mucinous left lung adenocarcinoma status post left lower lobe resection on November 1, 2018  Tumor 9 cm with 3.5 cm nodule, grade 2 out of 4 mucinous adenocarcinoma  Tumor is PDL 1-, no EGFR mutation.  No rearrangement of ALK, evaluation on the Alchemist trial  Tumor negative for ALK, ROS 1, RET, NTRK1, MET e14, EGFR MUTATIONS, April 2020  Remote history of stage I a right " axillary Hodgkin's disease status post mantle field radiation and splenectomy about 45 years ago  Right parotid cancer with lymph node involvement status post surgery and adjuvant radiation for 6 weeks in 1991  Coronary artery disease  Elevated BUN and creatinine  New right lung pulmonary nodules   Left pleural effusion/enhancement, 4/2020  Thrombocytopenia, ITP, February 2021  Diarrhea related to Keytruda  Left facial swelling, resolved    Platelet counts have been very stable.  Having some side effects with prednisone.  Therefore will lower dose of 5 mg p.o. daily.  We will check labs every 2 weeks.  We will see back in 8 weeks for follow-up.  He will be due CT of the chest just prior to that as well.    Plan: As above        Measurable disease: CT of the chest      Current therapy: Decrease prednisone to 5 mg p.o. daily beginning October 14, 2021      Decrease prednisone to 7.5 mg p.o. daily beginning July 19, 2021      Patient tapered from 20 mg to 5 mg over the previous 3 weeks beginning in June 2021, prednisone dose increased to 10 mg daily because of drop in platelet count    Prednisone most recent dose was 5 mg every other day for the last 2 weeks     Prednisone, 5 mg daily beginning May 24, 2021  Decrease to 10 mg p.o. daily, May 17, 2021   decreased to 20 mg p.o. daily April 26   40 mg p.o. daily, beginning April 21  Recent course 60 mg daily started April 12, 2021    Started 60 mg p.o. daily, through 12/20/2021      Treatment history:     Nplate single dose around April 12, 2021    Prednisone 60 mg p.o. daily started March 12 with a taper of 10 mg/week    Nplate started February 15, 2021 through March 1, additional dose on March 15    Rituxan x4 doses on February 17 and February 22, 2021 and March 15 and March 22      IVIG 1 g/kg's x2 doses on February 10 and February 12, 2021    Keytruda maintenance every 3 weeks,First dose September 8, 2020  Last dose December 23, 2020, 400 mg       Carboplatin, Taxol  and Keytruda, 4 cycles, last August 18, 2020  Treatment started Collette 15, 2020    Wedge resection of right upper lobe nodule, February 28, 2020    Cisplatin and Alimta adjuvant chemotherapy, for 4 cycles: Day 1 cycle 4, February 15, 2019  First cycle  started December 14, 2018    Patient underwent mediastinoscopy, bronchoscopy, thoracoscopic surgery and left lower lobectomy on November 1, 2018      Cancer Staging  Malignant neoplasm of lower lobe of left lung (H)  Staging form: Lung, AJCC 8th Edition  - Clinical stage from 10/15/2018: Stage IIIA (cT4, cN0, cM0) - Signed by Melody Segovia CNP on 12/21/2018      ECOG Performance        Distress Assessment  Distress Assessment Score: 2    Pain           Problem List    1. Idiopathic thrombocytopenic purpura (H)  HM1(CBC and Differential)    Comprehensive Metabolic Panel   2. Malignant neoplasm of upper lobe of right lung (H)     3. Metastatic primary lung cancer, left (H)          CC: Dov Morales,     ______________________________________________________________________________    History of Present Illness    Pardeep returns for reevaluation.  He has continued on same dose of prednisone, 7.5 mg p.o. daily.  Having some fatigue and other issues he thinks related to the prednisone.  No bleeding symptoms.  No infectious problems.  ECOG status is 0.    He did receive 2 Pfizer COVID-19 vaccinations without problems.  He can go ahead with booster and flu vaccine as well.  Some shortness of breath.  I did see primary and will have stress test first and then pulmonary function tests.    Last CT scan was stable and he is due to have them again in early December.    July 2021:  . Pardeep Wilson is seen for follow-up.  He was increased to 20 mg daily of prednisone at his last visit 4 weeks ago.  Subsequently dropped to 10 mg and then 5 mg after which platelet count dropped as low as 92,000.  Resumed on 10 mg and now platelet count increased and  stable.    Has had weight gain with prednisone.  Blood pressure is fine and blood sugars are fine.  No other side effects.  No bleeding.    He will be returning in 2 weeks and does plan some travel.      Pain Status  Currently in Pain: No/denies    Review of Systems    As per the HPI.       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     Esophageal reflux      Hodgkin's lymphoma (H)     s/p radiation to the mediastinum at age 17     Hyperlipemia      Hypertension      Hypothyroidism     hx nodules     Lung cancer (H)      MI, old 12 years ago, 2008         Past Surgical History:   Procedure Laterality Date     CORONARY ANGIOPLASTY      Stent Placement     CT BIOPSY LUNG       CT BIOPSY LUNG  1/12/2021     EYE SURGERY Bilateral     Cataracts     left lower lobe lobectomy   11/01/2018     MEDIASTINOSCOPY N/A 11/1/2018    Procedure: MEDIASTINOSCOPY;  Surgeon: Bry Saunders MD;  Location: [facility];  Service:      PAROTIDECTOMY       ME LAP,DIAGNOSTIC ABDOMEN N/A 11/7/2017    Procedure: DIAGNOSTIC LAPAROSCOPY,  LYSIS OF ADHESIONS, SMALL BOWEL RESECTION;  Surgeon: Brian Granados MD;  Location: [facility];  Service: General     SPLENECTOMY, TOTAL  1973     wedge resection Right 02/28/2020     WISDOM TOOTH EXTRACTION         Physical Exam    Recent Vitals 6/21/2021   Height -   Weight 190 lbs 6 oz   BSA (m2) 2.06 m2   /76   Pulse 83   Temp 98.3   Temp src 1   SpO2 98   Some recent data might be hidden         GENERAL: Alert and oriented. Seated comfortably. In no distress.     HEAD: Atraumatic and normocephalic.  Alopecia, minimal left facial swelling.  No tenderness.  No erythema.  No abnormalities with cranial nerves, ear nose or oral cavity.  Some left facial deviation  related to previous right parotid surgery.     EYES: SOPHIE, EOMI.  No pallor.  No icterus.     Oral cavity: no mucosal lesion or tonsillar enlargement.     NECK: supple. JVP normal.  No thyroid enlargement.     LYMPH NODES: No palpable, cervical, axillary or inguinal lymphadenopathy.     CHEST: clear to auscultation bilaterally.  Resonant to percussion throughout bilaterally.  Symmetrical breath movements bilaterally.     CVS: S1 and S2 are heard. Regular rate and rhythm.  No murmur or gallop or rub heard.     ABDOMEN: Soft. Not tender. Not distended.  No palpable hepatomegaly or splenomegaly.  No other mass palpable.  Bowel sounds heard.     EXTREMITIES: Warm.  No peripheral edema.     SKIN: no rash, or bruising or purpura.    CNS: Nonfocal          Lab Results    Recent Results (from the past 168 hour(s))   HM1 (CBC with Diff)   Result Value Ref Range    WBC 11.3 (H) 4.0 - 11.0 thou/uL    RBC 4.39 (L) 4.40 - 6.20 mill/uL    Hemoglobin 13.5 (L) 14.0 - 18.0 g/dL    Hematocrit 42.0 40.0 - 54.0 %    MCV 96 80 - 100 fL    MCH 30.8 27.0 - 34.0 pg    MCHC 32.1 32.0 - 36.0 g/dL    RDW 16.3 (H) 11.0 - 14.5 %    Platelets 94 (L) 140 - 440 thou/uL    MPV 10.9 8.5 - 12.5 fL    Neutrophils % 65 50 - 70 %    Lymphocytes % 22 20 - 40 %    Monocytes % 11 (H) 2 - 10 %    Eosinophils % 2 0 - 6 %    Basophils % 1 0 - 2 %    Immature Granulocyte % 0 <=0 %    Neutrophils Absolute 7.3 2.0 - 7.7 thou/uL    Lymphocytes Absolute 2.4 0.8 - 4.4 thou/uL    Monocytes Absolute 1.3 (H) 0.0 - 0.9 thou/uL    Eosinophils Absolute 0.2 0.0 - 0.4 thou/uL    Basophils Absolute 0.1 0.0 - 0.2 thou/uL    Immature Granulocyte Absolute 0.1 (H) <=0.0 thou/uL   HM1 (CBC with Diff)   Result Value Ref Range    WBC 10.6 4.0 - 11.0 thou/uL    RBC 4.32 (L) 4.40 - 6.20 mill/uL    Hemoglobin 13.3 (L) 14.0 - 18.0 g/dL    Hematocrit 41.6 40.0 - 54.0 %    MCV 96 80 - 100 fL    MCH 30.8 27.0 - 34.0 pg    MCHC 32.0 32.0 - 36.0 g/dL    RDW 16.3 (H) 11.0 - 14.5 %     Platelets 63 (L) 140 - 440 thou/uL    MPV      Neutrophils % 48 (L) 50 - 70 %    Lymphocytes % 29 20 - 40 %    Monocytes % 16 (H) 2 - 10 %    Eosinophils % 6 0 - 6 %    Basophils % 1 0 - 2 %    Immature Granulocyte % 0 <=0 %    Neutrophils Absolute 5.1 2.0 - 7.7 thou/uL    Lymphocytes Absolute 3.1 0.8 - 4.4 thou/uL    Monocytes Absolute 1.7 (H) 0.0 - 0.9 thou/uL    Eosinophils Absolute 0.6 (H) 0.0 - 0.4 thou/uL    Basophils Absolute 0.1 0.0 - 0.2 thou/uL    Immature Granulocyte Absolute 0.0 <=0.0 thou/uL   Manual Differential   Result Value Ref Range    Platelet Estimate Decreased (!) Normal    Target Cells 1+ (!) Negative    Eastman-Jolly Bodies 1+ (!) Negative   HM1 (CBC with Diff)   Result Value Ref Range    WBC 11.8 (H) 4.0 - 11.0 thou/uL    RBC 4.34 (L) 4.40 - 6.20 mill/uL    Hemoglobin 13.5 (L) 14.0 - 18.0 g/dL    Hematocrit 40.8 40.0 - 54.0 %    MCV 94 80 - 100 fL    MCH 31.1 27.0 - 34.0 pg    MCHC 33.1 32.0 - 36.0 g/dL    RDW 16.0 (H) 11.0 - 14.5 %    Platelets 40 (LL) 140 - 440 thou/uL    MPV      Neutrophils % 64 50 - 70 %    Lymphocytes % 22 20 - 40 %    Monocytes % 10 2 - 10 %    Eosinophils % 3 0 - 6 %    Basophils % 1 0 - 2 %    Immature Granulocyte % 1 (H) <=0 %    Neutrophils Absolute 7.6 2.0 - 7.7 thou/uL    Lymphocytes Absolute 2.6 0.8 - 4.4 thou/uL    Monocytes Absolute 1.2 (H) 0.0 - 0.9 thou/uL    Eosinophils Absolute 0.4 0.0 - 0.4 thou/uL    Basophils Absolute 0.1 0.0 - 0.2 thou/uL    Immature Granulocyte Absolute 0.1 (H) <=0.0 thou/uL       Imaging    Ct Chest Without Contrast    Result Date: 5/27/2021  EXAM: CT CHEST WO CONTRAST LOCATION: [facility] DATE/TIME: 5/27/2021 7:12 AM INDICATION: NSCLC status post right upper lobe stereotactic radiosurgery completed 2/2021 (of note, only 1 of 2 right upper lobe nodules could be biopsied and marked with fiducials at that time), right upper lobe wedge resection 2020 and left lower lobectomy 2018 as well as chemotherapy and  immunotherapy. History of remote Hodgkin's lymphoma with mantle radiation and right parotid cancer status post right parotidectomy and radiation. COMPARISON: CT guidance for right upper lobe biopsy and fiducial placement 01/12/2021, 12/22/2020 PET/CT and 11/27/2020 CT chest TECHNIQUE: CT chest without IV contrast. Multiplanar reformats were obtained. Dose reduction techniques were used. CONTRAST: None. FINDINGS: LUNGS AND PLEURA: The 10 mm right upper lobe lesion marked with fiducial is no longer visible consistent with response to interval therapy (image 6 of series 4). The second right upper lobe lesion which was not marked with fiducial hasn't decreased from 12 x 9 mm to 10 x 7 mm (image 83). Focus of consolidation and volume loss posterolateral right upper lobe and thin rind of peripheral consolidation in volume loss in the anterior medial and perifissural right upper lobe have developed and most likely represent post radiation changes. Faint nonspecific nodular pleural thickening right major fissure has developed. Right upper lobe wedge resection. Left lower lobectomy with associated chronic pleural thickening unchanged. Upper paramediastinal radiation fibrosis both lungs. MEDIASTINUM/AXILLAE: No lymphadenopathy. No thoracic aortic aneurysm. CORONARY ARTERY CALCIFICATION: Moderate. UPPER ABDOMEN: No significant finding. MUSCULOSKELETAL: No bone lesion. Bones are demineralized.     1.  The 10 mm right upper lobe lesion marked with fiducial is no longer visible consistent with response to interval therapy . 2.  The second right upper lobe lesion which was not marked with fiducial is decreased from 12 x 9 mm to 10 x 7 mm. 3.  Faint nonspecific nodular pleural thickening right major fissure has developed. Consider follow-up CT in 3-6 months.         Signed by: Kevin Soto MD        Again, thank you for allowing me to participate in the care of your patient.        Sincerely,        Kevin RAJPUT  MD Brittany

## 2021-10-14 NOTE — PROGRESS NOTES
"Oncology Rooming Note    October 14, 2021 11:21 AM   Pardeep Wilson is a 66 year old male who presents for:    Chief Complaint   Patient presents with     Oncology Clinic Visit     Malignant neoplasm of upper lobe of right lung (H)     Initial Vitals: BP (!) 152/74   Pulse 88   Temp 98.2  F (36.8  C)   Resp 18   Wt 85.7 kg (189 lb)   SpO2 97%   BMI 27.91 kg/m   Estimated body mass index is 27.91 kg/m  as calculated from the following:    Height as of 10/13/21: 1.753 m (5' 9\").    Weight as of this encounter: 85.7 kg (189 lb). Body surface area is 2.04 meters squared.  No Pain (0) Comment: Data Unavailable   No LMP for male patient.  Allergies reviewed: Yes  Medications reviewed: Yes    Medications: Medication refills not needed today.  Pharmacy name entered into [employer]: [id number] [place], [place] - [address]    Clinical concerns: None       Melissa Vogel LPN            "

## 2021-10-14 NOTE — PROGRESS NOTES
[facility] Hematology and Oncology Progress Note    Patient: Pardeep Wilson  MRN: [id number]  Date of Service: 06/21/2021        Reason for Visit    Chief Complaint   Patient presents with     HE Cancer     Lung Cancer     Benign Hematology       Assessment and Plan    Recurrent and metastatic mucinous lung adenocarcinoma, status post wedge resection, February 28, 2020  T4 N0 M0, stage III a mucinous left lung adenocarcinoma status post left lower lobe resection on November 1, 2018  Tumor 9 cm with 3.5 cm nodule, grade 2 out of 4 mucinous adenocarcinoma  Tumor is PDL 1-, no EGFR mutation.  No rearrangement of ALK, evaluation on the Alchemist trial  Tumor negative for ALK, ROS 1, RET, NTRK1, MET e14, EGFR MUTATIONS, April 2020  Remote history of stage I a right axillary Hodgkin's disease status post mantle field radiation and splenectomy about 45 years ago  Right parotid cancer with lymph node involvement status post surgery and adjuvant radiation for 6 weeks in 1991  Coronary artery disease  Elevated BUN and creatinine  New right lung pulmonary nodules   Left pleural effusion/enhancement, 4/2020  Thrombocytopenia, ITP, February 2021  Diarrhea related to Keytruda  Left facial swelling, resolved    Platelet counts have been very stable.  Having some side effects with prednisone.  Therefore will lower dose of 5 mg p.o. daily.  We will check labs every 2 weeks.  We will see back in 8 weeks for follow-up.  He will be due CT of the chest just prior to that as well.    Plan: As above        Measurable disease: CT of the chest      Current therapy: Decrease prednisone to 5 mg p.o. daily beginning October 14, 2021      Decrease prednisone to 7.5 mg p.o. daily beginning July 19, 2021      Patient tapered from 20 mg to 5 mg over the previous 3 weeks beginning in June 2021, prednisone dose increased to 10 mg daily because of drop in platelet count    Prednisone most recent dose was 5 mg every other day for the last 2 weeks      Prednisone, 5 mg daily beginning May 24, 2021  Decrease to 10 mg p.o. daily, May 17, 2021   decreased to 20 mg p.o. daily April 26   40 mg p.o. daily, beginning April 21  Recent course 60 mg daily started April 12, 2021    Started 60 mg p.o. daily, through 12/20/2021      Treatment history:     Nplate single dose around April 12, 2021    Prednisone 60 mg p.o. daily started March 12 with a taper of 10 mg/week    Nplate started February 15, 2021 through March 1, additional dose on March 15    Rituxan x4 doses on February 17 and February 22, 2021 and March 15 and March 22      IVIG 1 g/kg's x2 doses on February 10 and February 12, 2021    Keytruda maintenance every 3 weeks,First dose September 8, 2020  Last dose December 23, 2020, 400 mg       Carboplatin, Taxol and Keytruda, 4 cycles, last August 18, 2020  Treatment started Collette 15, 2020    Wedge resection of right upper lobe nodule, February 28, 2020    Cisplatin and Alimta adjuvant chemotherapy, for 4 cycles: Day 1 cycle 4, February 15, 2019  First cycle  started December 14, 2018    Patient underwent mediastinoscopy, bronchoscopy, thoracoscopic surgery and left lower lobectomy on November 1, 2018      Cancer Staging  Malignant neoplasm of lower lobe of left lung (H)  Staging form: Lung, AJCC 8th Edition  - Clinical stage from 10/15/2018: Stage IIIA (cT4, cN0, cM0) - Signed by Meloyd Segovia, IVANNA on 12/21/2018      ECOG Performance        Distress Assessment  Distress Assessment Score: 2    Pain           Problem List    1. Idiopathic thrombocytopenic purpura (H)  HM1(CBC and Differential)    Comprehensive Metabolic Panel   2. Malignant neoplasm of upper lobe of right lung (H)     3. Metastatic primary lung cancer, left (H)          CC: Dov Morales,     ______________________________________________________________________________    History of Present Illness    Pardeep returns for reevaluation.  He has continued on same dose of prednisone,  7.5 mg p.o. daily.  Having some fatigue and other issues he thinks related to the prednisone.  No bleeding symptoms.  No infectious problems.  ECOG status is 0.    He did receive 2 Pfizer COVID-19 vaccinations without problems.  He can go ahead with booster and flu vaccine as well.  Some shortness of breath.  I did see primary and will have stress test first and then pulmonary function tests.    Last CT scan was stable and he is due to have them again in early December.    July 2021:  Mr. Pardeep Wilson is seen for follow-up.  He was increased to 20 mg daily of prednisone at his last visit 4 weeks ago.  Subsequently dropped to 10 mg and then 5 mg after which platelet count dropped as low as 92,000.  Resumed on 10 mg and now platelet count increased and stable.    Has had weight gain with prednisone.  Blood pressure is fine and blood sugars are fine.  No other side effects.  No bleeding.    He will be returning in 2 weeks and does plan some travel.      Pain Status  Currently in Pain: No/denies    Review of Systems    As per the HPI.       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     Esophageal reflux      Hodgkin's lymphoma (H)     s/p radiation to the mediastinum at age 17     Hyperlipemia      Hypertension      Hypothyroidism     hx nodules     Lung cancer (H)      MI, old 12 years ago, 2008         Past Surgical History:   Procedure Laterality Date     CORONARY ANGIOPLASTY      Stent Placement     CT BIOPSY LUNG       CT BIOPSY LUNG  1/12/2021     EYE SURGERY Bilateral     Cataracts     left lower lobe lobectomy   11/01/2018     MEDIASTINOSCOPY N/A 11/1/2018    Procedure: MEDIASTINOSCOPY;  Surgeon: Bry Saunders MD;  Location: [facility];  Service:      PAROTIDECTOMY       NH  LAP,DIAGNOSTIC ABDOMEN N/A 11/7/2017    Procedure: DIAGNOSTIC LAPAROSCOPY,  LYSIS OF ADHESIONS, SMALL BOWEL RESECTION;  Surgeon: Brian Granados MD;  Location: [facility];  Service: General     SPLENECTOMY, TOTAL  1973     wedge resection Right 02/28/2020     WISDOM TOOTH EXTRACTION         Physical Exam    Recent Vitals 6/21/2021   Height -   Weight 190 lbs 6 oz   BSA (m2) 2.06 m2   /76   Pulse 83   Temp 98.3   Temp src 1   SpO2 98   Some recent data might be hidden         GENERAL: Alert and oriented. Seated comfortably. In no distress.     HEAD: Atraumatic and normocephalic.  Alopecia, minimal left facial swelling.  No tenderness.  No erythema.  No abnormalities with cranial nerves, ear nose or oral cavity.  Some left facial deviation related to previous right parotid surgery.     EYES: SOPHIE, EOMI.  No pallor.  No icterus.     Oral cavity: no mucosal lesion or tonsillar enlargement.     NECK: supple. JVP normal.  No thyroid enlargement.     LYMPH NODES: No palpable, cervical, axillary or inguinal lymphadenopathy.     CHEST: clear to auscultation bilaterally.  Resonant to percussion throughout bilaterally.  Symmetrical breath movements bilaterally.     CVS: S1 and S2 are heard. Regular rate and rhythm.  No murmur or gallop or rub heard.     ABDOMEN: Soft. Not tender. Not distended.  No palpable hepatomegaly or splenomegaly.  No other mass palpable.  Bowel sounds heard.     EXTREMITIES: Warm.  No peripheral edema.     SKIN: no rash, or bruising or purpura.    CNS: Nonfocal          Lab Results    Recent Results (from the past 168 hour(s))   HM1 (CBC with Diff)   Result Value Ref Range    WBC 11.3 (H) 4.0 - 11.0 thou/uL    RBC 4.39 (L) 4.40 - 6.20 mill/uL    Hemoglobin 13.5 (L) 14.0 - 18.0 g/dL    Hematocrit 42.0 40.0 - 54.0 %    MCV 96 80 - 100 fL    MCH 30.8 27.0 - 34.0 pg    MCHC 32.1 32.0 - 36.0 g/dL    RDW 16.3 (H) 11.0 - 14.5 %    Platelets 94 (L) 140 - 440 thou/uL    MPV 10.9 8.5 - 12.5 fL     Neutrophils % 65 50 - 70 %    Lymphocytes % 22 20 - 40 %    Monocytes % 11 (H) 2 - 10 %    Eosinophils % 2 0 - 6 %    Basophils % 1 0 - 2 %    Immature Granulocyte % 0 <=0 %    Neutrophils Absolute 7.3 2.0 - 7.7 thou/uL    Lymphocytes Absolute 2.4 0.8 - 4.4 thou/uL    Monocytes Absolute 1.3 (H) 0.0 - 0.9 thou/uL    Eosinophils Absolute 0.2 0.0 - 0.4 thou/uL    Basophils Absolute 0.1 0.0 - 0.2 thou/uL    Immature Granulocyte Absolute 0.1 (H) <=0.0 thou/uL   HM1 (CBC with Diff)   Result Value Ref Range    WBC 10.6 4.0 - 11.0 thou/uL    RBC 4.32 (L) 4.40 - 6.20 mill/uL    Hemoglobin 13.3 (L) 14.0 - 18.0 g/dL    Hematocrit 41.6 40.0 - 54.0 %    MCV 96 80 - 100 fL    MCH 30.8 27.0 - 34.0 pg    MCHC 32.0 32.0 - 36.0 g/dL    RDW 16.3 (H) 11.0 - 14.5 %    Platelets 63 (L) 140 - 440 thou/uL    MPV      Neutrophils % 48 (L) 50 - 70 %    Lymphocytes % 29 20 - 40 %    Monocytes % 16 (H) 2 - 10 %    Eosinophils % 6 0 - 6 %    Basophils % 1 0 - 2 %    Immature Granulocyte % 0 <=0 %    Neutrophils Absolute 5.1 2.0 - 7.7 thou/uL    Lymphocytes Absolute 3.1 0.8 - 4.4 thou/uL    Monocytes Absolute 1.7 (H) 0.0 - 0.9 thou/uL    Eosinophils Absolute 0.6 (H) 0.0 - 0.4 thou/uL    Basophils Absolute 0.1 0.0 - 0.2 thou/uL    Immature Granulocyte Absolute 0.0 <=0.0 thou/uL   Manual Differential   Result Value Ref Range    Platelet Estimate Decreased (!) Normal    Target Cells 1+ (!) Negative    Eastman-Jolly Bodies 1+ (!) Negative   HM1 (CBC with Diff)   Result Value Ref Range    WBC 11.8 (H) 4.0 - 11.0 thou/uL    RBC 4.34 (L) 4.40 - 6.20 mill/uL    Hemoglobin 13.5 (L) 14.0 - 18.0 g/dL    Hematocrit 40.8 40.0 - 54.0 %    MCV 94 80 - 100 fL    MCH 31.1 27.0 - 34.0 pg    MCHC 33.1 32.0 - 36.0 g/dL    RDW 16.0 (H) 11.0 - 14.5 %    Platelets 40 (LL) 140 - 440 thou/uL    MPV      Neutrophils % 64 50 - 70 %    Lymphocytes % 22 20 - 40 %    Monocytes % 10 2 - 10 %    Eosinophils % 3 0 - 6 %    Basophils % 1 0 - 2 %    Immature Granulocyte % 1 (H)  <=0 %    Neutrophils Absolute 7.6 2.0 - 7.7 thou/uL    Lymphocytes Absolute 2.6 0.8 - 4.4 thou/uL    Monocytes Absolute 1.2 (H) 0.0 - 0.9 thou/uL    Eosinophils Absolute 0.4 0.0 - 0.4 thou/uL    Basophils Absolute 0.1 0.0 - 0.2 thou/uL    Immature Granulocyte Absolute 0.1 (H) <=0.0 thou/uL       Imaging    Ct Chest Without Contrast    Result Date: 5/27/2021  EXAM: CT CHEST WO CONTRAST LOCATION: [facility] DATE/TIME: 5/27/2021 7:12 AM INDICATION: NSCLC status post right upper lobe stereotactic radiosurgery completed 2/2021 (of note, only 1 of 2 right upper lobe nodules could be biopsied and marked with fiducials at that time), right upper lobe wedge resection 2020 and left lower lobectomy 2018 as well as chemotherapy and immunotherapy. History of remote Hodgkin's lymphoma with mantle radiation and right parotid cancer status post right parotidectomy and radiation. COMPARISON: CT guidance for right upper lobe biopsy and fiducial placement 01/12/2021, 12/22/2020 PET/CT and 11/27/2020 CT chest TECHNIQUE: CT chest without IV contrast. Multiplanar reformats were obtained. Dose reduction techniques were used. CONTRAST: None. FINDINGS: LUNGS AND PLEURA: The 10 mm right upper lobe lesion marked with fiducial is no longer visible consistent with response to interval therapy (image 6 of series 4). The second right upper lobe lesion which was not marked with fiducial hasn't decreased from 12 x 9 mm to 10 x 7 mm (image 83). Focus of consolidation and volume loss posterolateral right upper lobe and thin rind of peripheral consolidation in volume loss in the anterior medial and perifissural right upper lobe have developed and most likely represent post radiation changes. Faint nonspecific nodular pleural thickening right major fissure has developed. Right upper lobe wedge resection. Left lower lobectomy with associated chronic pleural thickening unchanged. Upper paramediastinal radiation fibrosis both  lungs. MEDIASTINUM/AXILLAE: No lymphadenopathy. No thoracic aortic aneurysm. CORONARY ARTERY CALCIFICATION: Moderate. UPPER ABDOMEN: No significant finding. MUSCULOSKELETAL: No bone lesion. Bones are demineralized.     1.  The 10 mm right upper lobe lesion marked with fiducial is no longer visible consistent with response to interval therapy . 2.  The second right upper lobe lesion which was not marked with fiducial is decreased from 12 x 9 mm to 10 x 7 mm. 3.  Faint nonspecific nodular pleural thickening right major fissure has developed. Consider follow-up CT in 3-6 months.         Signed by: Kevin Soto MD

## 2021-10-15 NOTE — TELEPHONE ENCOUNTER
Reason for Call:  Patient is calling to discuss his recent lab results. Thyroid levels are low.    Detailed comments: please call patient back to discuss.    Phone Number Patient can be reached at: Home number on file [phone] (home)    Best Time: any    Can we leave a detailed message on this number? YES    Call taken on 10/15/2021 at 9:04 AM by Rama Larson

## 2021-10-15 NOTE — TELEPHONE ENCOUNTER
Problem List Items Addressed This Visit        Endocrine    Hypothyroidism, unspecified type     Reviewed recent TSH result with patient.  Actually shows his levothyroxine dose is too high given that the TSH is below goal levels.  As such will decrease from 125 mcg down to 112 mcg.  New prescription sent over to his pharmacy.  Will retest in 6 weeks.         Relevant Medications    levothyroxine (SYNTHROID/LEVOTHROID) 112 MCG tablet    Other Relevant Orders    TSH with free T4 reflex

## 2021-10-15 NOTE — ASSESSMENT & PLAN NOTE
Reviewed recent TSH result with patient.  Actually shows his levothyroxine dose is too high given that the TSH is below goal levels.  As such will decrease from 125 mcg down to 112 mcg.  New prescription sent over to his pharmacy.  Will retest in 6 weeks.

## 2021-10-28 NOTE — TELEPHONE ENCOUNTER
Patient was in today for a 2-week lab check in regards to his diagnosis of ITP under the direction of Dr Soto.  Patient is currently taking prednisone 5 mg daily and has been since 10/14/2021.  Dr Soto has reviewed his blood work from today noting that the platelet count is 314.  Patient should remain on the same dose of prednisone and keep his already scheduled appointments for labs every 2 weeks and following up with Dr Soto on 12/9.  Call placed to patient and gave him this information.  He verbalized understanding and was very appreciative of the call.    Sylvia Su RN

## 2021-10-31 NOTE — PATIENT INSTRUCTIONS
"  Dear Pardeep Wilson,    Based on your exposure to COVID-19 (coronavirus), we would like to test you for this virus. I have placed an order for this test.The best time for testing is 5-7 days after the exposure.    How to schedule:  Go to your [employer] home page and scroll down to the section that says  You have an appointment that needs to be scheduled  and click the large green button that says  Schedule Now  and follow the steps to find the next available opening.     If you are unable to complete these [employer] scheduling steps, please call [phone] to schedule your testing.     Return to work/school/ guidance:   For people with high risk exposures outside the home    Please let your workplace manager and staffing office know when your quarantine ends.     We can not give you an exact date as it depends on the information below. You can calculate this on your own or work with your manager/staffing office to calculate this. (For example if you were exposed on 10/4, you would have to quarantine for 14 full days. That would be through 10/18. You could return on 10/19.)    Quarantine Guidelines:  Patients (\"contacts\") who have been in close prolonged contact of an infected person(s) (within six feet for at least 15 minutes within a 24 hour period), and remain asymptomatic should enter quarantine based on the following options:    14-day quarantine period (this remains the CDC recommendation for the greatest protection against spread of COVID-19) OR    Minimum 7-day quarantine with negative RT-PCR test collected on day 5 or later OR    10-day quarantine with no test  Quarantine Guideline exceptions are as follows:    People who have been fully vaccinated do not need to quarantine if the exposure was at least 2 weeks after the last vaccination. This includes vaccinated health care workers.    Not fully vaccinated and unvaccinated Individuals who work in health care, congregate care, or congregate living " should be off work for 14 days from their last date of exposure. Community activities for this group can be resumed based on options above. Fully vaccinated individuals in this group do not need to quarantine from work after exposure.    Not fully vaccinated and unvaccinated people whose high-risk exposure was a household member should always quarantine for 14 days from their last date of exposure. Fully vaccinated people in this category do not need to quarantine.    Not fully vaccinated or unvaccinated residents of congregate care and congregate living settings should always quarantine for 14 days from their last date of exposure. Fully vaccinated residents do not need to quarantine.  Note: If you have ongoing exposure to the covid positive person, this quarantine period may be more than 14 days. (For example, if you are continued to be exposed to your child who tested positive and cannot isolate from them, then the quarantine of 7-14 days can't start until your child is no longer contagious. This is typically 10 days from onset of the child's symptoms. So the total duration may be 17-24 days in this case.)    You should continue symptom monitoring until day 14 post-exposure. If you develop signs or symptoms of COVID-19, isolate and get tested (even if you have been tested already).    How to quarantine:   Stay home and away from others. Don't go to school or anywhere else. Generally quarantine means staying home from work but there are some exceptions to this. Please contact your workplace.  No hugging, kissing or shaking hands.  Don't let anyone visit.  Cover your mouth and nose with a mask, tissue or washcloth to avoid spreading germs.  Wash your hands and face often. Use soap and water.    What are the symptoms of COVID-19?  The most common symptoms are cough, fever and trouble breathing. Less common symptoms include headache, body aches, fatigue (feeling very tired), chills, sore throat, stuffy or runny nose,  diarrhea (loose poop), loss of taste or smell, belly pain, and nausea or vomiting (feeling sick to your stomach or throwing up).  After 14 days, if you have still don't have symptoms, you likely don't have this virus.  If you develop symptoms, follow these guidelines.  If you're normally healthy: Please start another eVisit.  If you have a serious health problem (like cancer, heart failure, an organ transplant or kidney disease): Call your specialty clinic. Let them know that you might have COVID-19.    Where can I get more information?  [facility] - About COVID-19: [email or web address]  CDC - What to Do If You're Sick: www.cdc.gov/coronavirus/2019-ncov/about/steps-when-sick.html  CDC - Ending Home Isolation: www.cdc.gov/coronavirus/2019-ncov/hcp/disposition-in-home-patients.html  CDC - Caring for Someone: www.cdc.gov/coronavirus/2019-ncov/if-you-are-sick/care-for-someone.html  [facility] clinical trials (COVID-19 research studies): clinicalaffairs.[email or web address]-clinical-trials  Below are the COVID-19 hotlines at the [place] [facility] ([facility]). Interpreters are available.  For health questions: Call 840-361-0285 or 1-699.950.1623 (7 a.m. to 7 p.m.)  For questions about schools and childcare: Call 257-097-9445 or 1-772.909.9864 (7 a.m. to 7 p.m.)

## 2021-11-04 NOTE — DISCHARGE INSTRUCTIONS
Back exercise      Lift hips up only as far as feels good - x10-15 reps 1-2x/day - when it gets easy - can try x3-5 seconds hold    Neck exercises    Use pillow - push down into pillow x3-5 seconds x5-10 reps - work up to x5-10 seconds x10-15 reps 1-2x/day      Slight chin tuck to activate front of neck - lift head parallel off the ground x3-5 seconds x5-10 reps work up to x5-10 seconds x10-15 reps 1-2x/day      Gentle neck rotation range of motion x5-10 reps 2-3x/day - easy - just for movement and blood flow!

## 2021-11-05 NOTE — PROGRESS NOTES
Rehabilitation Services          OUTPATIENT PHYSICAL THERAPY ORTHOPEDIC EVALUATION  PLAN OF TREATMENT FOR OUTPATIENT REHABILITATION  (COMPLETE FOR INITIAL CLAIMS ONLY)  Patient's Last Name, First Name, M.I.  YOB: 1955  Pardeep Wilson    Provider s Name:  Lavern Lala PT   Medical Record No.  [id number]   Start of Care Date:  11/04/21   Onset Date:  10/13/21   Type:     _X__PT   ___OT   ___SLP Medical Diagnosis:  Chronic bilateral low back pain with left-sided sciatica M54.42, G89.29  Neck Pain     PT Diagnosis:  Chronic bilateral low back pain without sciatica, chronic neck pain, muscle weakness, decreased trunk and neck ROM   Visits from SOC:  1      _________________________________________________________________________________  Plan of Treatment/Functional Goals:  manual therapy, joint mobilization, ROM, strengthening, stretching           Goals     Goal Description: Pt will be able to perform yard work without increase in back or neck pain in 90 days for improvement to QOL.  Target Date: 02/02/22       Goal Description: Pt will be able to walk 1 mile without increase in back or leg pain for improved quality with walking in 90 days.  Target Date: 02/02/22       Goal Description: Pt will be able to bring his head back to neutral position with ADLs without increase in neck pain for improved QOL in 90 days.           Therapy Frequency:  1 time/week  Predicted Duration of Therapy Intervention:  90 days    Lavern Lala PT                 I CERTIFY THE NEED FOR THESE SERVICES FURNISHED UNDER        THIS PLAN OF TREATMENT AND WHILE UNDER MY CARE     (Physician co-signature of this document indicates review and certification of the therapy plan).                       Certification Date From:  11/04/21   Certification Date To:  02/02/22    Referring Provider:  Dov Morales DO    Initial Assessment        See Epic Evaluation Start of Care Date: 11/04/21 11/04/21 0900    General Information   Type of Visit Initial OP Ortho PT Evaluation   Start of Care Date 11/04/21   Referring Physician Dov Morales DO   Patient/Family Goals Statement lower back and neck pain   Orders Evaluate and Treat   Date of Order 10/13/21   Certification Required? Yes   Medical Diagnosis Chronic bilateral low back pain with left-sided sciatica M54.42, G89.29  Neck pain   Surgical/Medical history reviewed Yes   Precautions/Limitations no known precautions/limitations   General Information Comments Pt reports having lung cancer 3 years ago - removed left lung, chemo x2, radiation this year ending in February 2021. Also 1973 and 1992 has had radiation doses for Hodgkins disease and a parotid tumor. Pt will have a next scan in December but so far 2 scans have shown no current metastasis. Pt also had abdominal surgery in 73 and then about 5 years ago pt underwent surgery to remove scar tissue. Pt has a long incision from original abdominal surgery.    Body Part(s)   Body Part(s) Lumbar Spine/SI;Cervical Spine   Presentation and Etiology   Pertinent history of current problem (include personal factors and/or comorbidities that impact the POC) Pt reports having sciatica for awhile. Low back insidious years ago that used to come and go but lately feels more permanently. Pt feels like it limits how far he can walk and will bother with yard work. Neck pain has bothered long term with insidious onset - both side of neck R> L. When pt's head is down like with mowing the grass or yard work - it will feel like he can't pick his head up. This can happen during any ADLs that involve moving head forward for an extended period of time. Pt reports recent stress test that he had to quit early due to increase in blood pressure but workup looks good. Pt is going to see a pulmonologist soon as well for a consult and hoping to get a PFT.    Impairments A. Pain;D. Decreased ROM;E. Decreased flexibility;F. Decreased strength  and endurance   Functional Limitations perform activities of daily living;perform desired leisure / sports activities   Symptom Location Neck and back   How/Where did it occur From insidious onset   Onset date of current episode/exacerbation 10/13/21   Chronicity Chronic   Pain rating (0-10 point scale) Other   Pain rating comment 3-6/10   Pain quality C. Aching;A. Sharp   Frequency of pain/symptoms C. With activity   Progression of symptoms since onset: Worsened   Prior Level of Function   Prior Level of Function-Mobility being able to walk without increase in back pain limiting distance   Fall Risk Screen   Fall screen completed by PT   Have you fallen 2 or more times in the past year? No   Have you fallen and had an injury in the past year? No   Is patient a fall risk? No   Abuse Screen (yes response referral indicated)   Feels Unsafe at Home or Work/School no   Feels Threatened by Someone no   Does Anyone Try to Keep You From Having Contact with Others or Doing Things Outside Your Home? no   Physical Signs of Abuse Present no   System Outcome Measures   Outcome Measures Low Back Pain (see Oswestry and Miguel)   Functional Scales   Functional Scales Other   Other Scales  SOFIYA 26%   Lumbar Spine/SI Objective Findings   Posture Atrophy noted with lumbar paraspinals and gluteals   Gait/Locomotion B trendelenberg   Flexion ROM WFL with fingertips to distal ankle   Extension ROM Mod limited with increased back pain   Right Side Bending ROM WNL   Left Side Bending ROM WNL but pain on return   Lumbar ROM Comment Quadrant test positive L side bend ext   Hip Flexion (L2) Strength B 5/5   Knee Extension (L3) Strength B 5/5   Ankle Dorsiflexion (L4) Strength B 5/5   Ankle Plantar Flexion (S1) Strength B 5/5   Lumbar/Hip/Knee/Foot Strength Comments Pt demo's trunk and core weakness with bed mobility challenging   Cervical Spine   Cervical Flexion ROM 50 degrees   Cervical Extension ROM 40 degrees   Cervical Right Side Bending  ROM 10 cm ear to acromion   Cervical Left Side Bending ROM 10 cm ear to acromion   Cervical Right Rotation ROM 50 degrees with stiffness   Cervical Left Rotation ROM 65 degrees with some pain   Cervical/Thoracic/Shoulder ROM Comments quadrant test positive   Shoulder Shrug (C2-C4) Strength 5/5 B   Shoulder Abd (C5) Strength 5/5 B   Shoulder ER (C5, C6) Strength R 4/5, L 4+/5   Shoulder IR (C5, C6) Strength 5/5 B   Elbow Flexion (C5, C6) Strength 5/5 B   Elbow Extension (C7) Strength 5/5 B   Shoulder/Wrist/Hand Strength Comments  WNL   Planned Therapy Interventions   Planned Therapy Interventions manual therapy;joint mobilization;ROM;strengthening;stretching   Clinical Impression   Criteria for Skilled Therapeutic Interventions Met yes, treatment indicated   PT Diagnosis Chronic bilateral low back pain without sciatica, chronic neck pain, muscle weakness, decreased trunk and neck ROM   Clinical Presentation Stable/Uncomplicated   Clinical Decision Making (Complexity) Low complexity   Therapy Frequency 1 time/week   Predicted Duration of Therapy Intervention (days/wks) 90 days   Risk & Benefits of therapy have been explained Yes   Patient, Family & other staff in agreement with plan of care Yes   Clinical Impression Comments Pt demo's signs and sx consistent with arthritic spinal changes with decreased core, neck and trunk strength and muscle atrophy - potential exacerbated recently with cancer treatments including chemo and radiation over the past multiple years causing difficulty with head control and neck and back pain with ADLs-especially reaching, walking long distances and house/yard work.   ORTHO GOALS   PT Ortho Eval Goals 1;2;3   Ortho Goal 1   Goal Description Pt will be able to perform yard work without increase in back or neck pain in 90 days for improvement to QOL.   Target Date 02/02/22   Ortho Goal 2   Goal Description Pt will be able to walk 1 mile without increase in back or leg pain for improved  quality with walking in 90 days.   Target Date 02/02/22   Ortho Goal 3   Goal Description Pt will be able to bring his head back to neutral position with ADLs without increase in neck pain for improved QOL in 90 days.    Total Evaluation Time   PT Eval, Low Complexity Minutes (34321) 34   Therapy Certification   Certification date from 11/04/21   Certification date to 02/02/22   Medical Diagnosis Chronic bilateral low back pain with left-sided sciatica M54.42, G89.29  Neck Pain

## 2021-11-11 NOTE — TELEPHONE ENCOUNTER
Patient was in today for a 2-week lab check in regards to his diagnosis of ITP under the direction of Dr Soto.  Patient is currently taking prednisone 5 mg daily and has been since 10/14/2021.  Dr Soto has reviewed his blood work from today noting that the platelet count is 366.  Dr Soto would like the patient to try going down to taking prednisone 2.5 mg daily.  He should keep his appointments for lab only in 2 weeks as well as lab, MD in 4 weeks.  Patient verbalized understanding stating that he has 2-1/2 mg tablets at home.  He will call if he needs anything further.    Sylvia Su RN

## 2021-11-12 NOTE — TELEPHONE ENCOUNTER
Pt LM at 1000, called him back at 1300. He is concerned about ongoing SOB with activity, maybe got worse a few months ago. RT was 2/2021 to lung. Denies fever, productive cough, or loss of taste/smell. COVID-19 test 10/31 was negative. I spoke with Dr. Grant who offered two options: see pt soon and listen to lungs and do walking/resting O2 checks. Or move up CT and PFT (if possible) and then see pt. Pt would like to be seen so transferred to scheduling and he'll be seen next Thursday 11/18.

## 2021-11-12 NOTE — TELEPHONE ENCOUNTER
"Patient calls in today wanting to give an update to Dr Soto.  He also plans to get transferred to the radiation department after a conversation to see what their insight is.  Patient tells me that he completed radiation treatments in February for his lung cancer.  He states that ever since then, he has noticed that his breathing is not the same.  He states over the last month he has been experiencing a dry cough for the most part.  He states that sometimes it is a little bit wet in the morning.  But for the most part he has this dry cough the majority of the day and it is dry.  He states that the biggest concern is the shortness of breath he is having with exertion.  Over the last month this shortness of breath has increased dramatically.  He notes significant change comparing how he feels now to when he completed radiation treatments.  He states it is affecting his sleeping.  He tells me that he does not think this is cancer related because he \"had a clean scan 2 months ago.\"  He does have an upcoming CT scan on 12/9 just before he sees Dr Soto on the same day.  He states he just wants this information given to Dr Soto but he does not anticipate moving any scans or appointments up.  I let him know that he is out of the office today but I will get this information over to him and we will get back to him with Dr Soto thoughts.  I did touch base with patient about pneumonitis which can happen from Keytruda.  Patient last received Keytruda about a year ago.  I think it would be unlikely that he is having pneumonitis issues now after completing the treatment about a year ago.  I also talked with him about possible post radiation treatment changes that can happen in the lungs.  He verbalized understanding and was  then transferred down to the radiation oncology clinic nurse line.    Patient also stated that he saw his general practitioner and had an echo stress test done.  This came back normal.  He " states they then ordered PFTs and a pulmonary consult.  These are not happening until 12/28/2021.    Sylvia Su RN

## 2021-11-12 NOTE — TELEPHONE ENCOUNTER
Call placed back to patient letting him know that Dr Soto states that we should await radiation oncology input as well as pulmonary evaluation.  He did asked that we try to get the pulmonary evaluation moved up.  We will work on this.  This information has been given to the patient who verbalized understanding.    Sylvia Su RN

## 2021-11-18 NOTE — PROGRESS NOTES
Pt ambulatory to radiation clinic with wife for follow up. Walked pt with pulse oximeter, resting HR 92, oxygen 96% room air. Walked for 3 minutes, , oxygen 95% room air. Further recommendations and orders per provider.

## 2021-11-18 NOTE — LETTER
11/18/2021         RE: Pardeep Wilson  [address]  [facility] [address]        Dear Colleague,    Thank you for referring your patient, Pardeep Wilson, to the [facility] RADIATION ONCOLOGY [place]. Please see a copy of my visit note below.      [facility] Radiation Oncology Follow Up Note    Patient: Pardeep Wilson  MRN: [id number]  Date of Service: 11/18/2021    Assessment / Impression   67 y/o with 2 growing lung nodules right upper lobe on maintenance Keytruda, suspicious for oligoprogressive  NSCLC s/p SBRT completed 2/12/21       Malignant neoplasm of upper lobe of right lung (H)    Personal history of malignant neoplasm of bronchus and lung [Z85.118]     Extensive history of prior malignancy:  Stage IIIA ([id number]) NSCLC, mucinous adenocarcinoma, of Left Lower Lobe s/p Left lower lobectomy 11/1/2018 and Adjuvant Chemotherapy with Cisplatin and Alimta x4c, began 12/14/18.     Recurrent (Stage IV) NSCLC, mucinous adenocarcinoma, of the Right upper lobe s/p wedge resection 2/28/2020 with 4 separate lesions noted in the resection.  Followed by carboplatin, taxol and Keytruda x 4c (6/15/20-8/18/20), maintenance Keytruda began 9/8/20.     Remote history of Stage I Right axillary Hodgkin's Lymphoma s/p Mantel field RT and splenectomy 45 yrs ago at [facility] (supected ~40 Gy).  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     RT delivered:  Body site: Thorax   SITE TREATED: Right lung x2 nodules  TOTAL DOSE: 5000 cGy  NUMBER OF FRACTION: 5 fractions  DATES COMPLETED: 2/4/2021-2/12/2021     Progressive shortness of breath with activity.  No desaturation with activity in clinic.  Discussed could represent inflammation from radiation/radiation pneumonitis or increasing pleural effusions.  Infection seems less likely.     Last CT chest with sequela of radiation therapy seen in the right upper lobe.  Small bilateral  effusions and left lower lobe atelectasis.           Plan:   Will begin oral and inhaled steroids to empirically treat possible radiation pneumonitis.  Will discontinue use if no symptom improvement.    CT chest with contrast scheduled 12/9/2021 will be moved to next week.    PFTs scheduled 12/28/2021, unfortunately these cannot be moved up but patient placed on waiting list if openings.    Total time of this visit, including time spent face-to-face with patient and or via video/audio, and also in preparing for today's visit for MDM and documentation. Medical decision-making included consideration and possible diagnoses, management options, complex record review, review of diagnostic tests and information, consideration and discussion of significant complications based on comorbidities, discussion with providers involved in the care of the patient.     60 Minutes spent.     Subjective:      HPI: Pardeep Wilson is a 66 year old male with increasing shortness of breath since completion of radiation therapy.    Last seen 9/9/21 and He and his wife had some concerns regarding his respiratory function and shortness of breath.  Discussed repeating PFTs to evaluate if any changes since last PFTs 2/20/2020.  He at that time wanted to hold off.  He saw his PCP and echo was performed 11/1/2021.  Stress echo was normal with no evidence of stress-induced ischemia.  Exercise tolerance was fair.  Exercise stopped due to hypertensive response to exercise.  PFTs were also ordered.  PFTs scheduled 12/28/2021.    He has no inhalers and has never used any.    He reports about 2 weeks after his Covid booster shot his breathing became tighter and had some pain with inhalation however this is improved now.  He has no fever no chills.  He continues to be active but has noticed slowly since completion of radiation a small progression in shortness of breath.  He has cough productive of small volume yellowish sputum.  No hemoptysis.    In  clinic today, oxygen saturation is monitored while walking and does not drop with activity and increasing heart rate.    Current Outpatient Medications   Medication Sig Dispense Refill     acetaminophen (TYLENOL) 160 MG/5ML liquid Take 31.25 mLs (1,000 mg) by mouth every 6 hours as needed for mild pain or fever 473 mL 3     atorvastatin 40 MG PO tablet Take 40 mg by mouth       biotin 300 MCG PO TABS tablet Take 300 mg by mouth       levothyroxine (SYNTHROID/LEVOTHROID) 112 MCG tablet Take 1 tablet (112 mcg) by mouth daily 90 tablet 3     metoprolol succinate ER 25 MG PO 24 hr tablet TAKE ONE-HALF (1/2) TABLET DAILY       multivitamin (CENTRUM) liquid Take 15 mLs by mouth daily (Patient not taking: Reported on 10/14/2021) 236 mL 3     pantoprazole (PROTONIX) 40 MG EC tablet        predniSONE (DELTASONE) 2.5 MG tablet Take 2 tablets(5mg) daily 180 tablet 1     sodium fluoride dental gel 1.1 % DT GEL topical gel          The following portions of the patient's history were reviewed and updated as appropriate: allergies, current medications, past family history, past medical history, past social history, past surgical history and problem list.    Review of Systems    General     EENT     Respiratory       Cardiovascular     Gastrointestinal     Musculoskeletal     Integumentary                 Neurological     Genitourinary/Reproductive     Lymphatic        Pain       AUA Assessment                                                                         Accompanied by         Objective:     Physical Exam    There were no vitals filed for this visit.     Gen: Alert, in NAD pleasant interactive, well nourished  Eyes:  EOMI, sclera anicteric  Pulm: No wheezing, stridor or respiratory distress, decreased breath sounds in right base compared to left.  CV: Well-perfused, no cyanosis  Skin: Normal tone and turgor, warm, dry, intact  Neurologic: A/O, speech fluent, no focal motor deficits, Gait normal and unaided  Psychiatric:  Appropriate mood and affect    Recent Labs: No results found for this or any previous visit (from the past 168 hour(s)).    Imaging: Imaging results 6 weeks:Echocardiogram Exercise Stress    Result Date: 2021  [id number]^NOLBERTO^WALTER^[place] [address]  Name: LAWRENCE HENDRIX MRN: [id number] : 1955 Study Date: 2021 08:14 AM Age: 66 yrs Gender: Male Patient Location: [facility] Reason For Study: Coronary artery disease involving native coronary artery of david Ordering Physician: WALTER ARVIZU Referring Physician: WALTER ARVIZU Performed By: FLACO  BSA: 2.0 m2 Height: 69 in Weight: 189 lb HR: 88 BP: 116/74 mmHg ______________________________________________________________________________ Procedure Stress Echo Complete. ______________________________________________________________________________ Interpretation Summary Normal stress echocardiogram with no evidence of stress-induced ischemia. Fair exercise tolerance There was a hypertensive BP response to exercise. ______________________________________________________________________________ Stress The patient did not exhibit any symptoms during exercise. There was a hypertensive BP response to exercise. RPP 25593. Exercise stopped due to hypertensive response to exercise. This was a normal stress EKG with no evidence of stress-induced ischemia. Normal stress echocardiogram with no evidence of stress-induced ischemia. Target Heart Rate was achieved. No arrhythmia noted. There were no ST segment changes observed with stress.  Baseline Normal baseline electrocardiogram. No arrhythmia noted.  Stress Results            Protocol:  Alberto          Maximum Predicted HR:   154 bpm            Target HR: 131 bpm        % Maximum Predicted HR: 86 %                          Stage   DurationHeart Rate  BP                                 (mm:ss)   (bpm)                      Peak  Stress  5:00     133    238/112                       RecoveryR   9:00      91    198/95          Stress Duration:   5:00 mm:ss        Recovery Time: 9:00 mm:ss         Maximum Stress HR: 133 bpm           METS:          7  I      WMSI = 1.00     % Normal = 100  II      WMSI = 1.00     % Normal = 100                                                                  Segments  Size X - Cannot   0 -          1 - Normal  (2) - Mildly 2 -          1-2     small Interpret    Hyperkinetic             Hypokinetic  Hypokinetic  3-5    moderate              4 -          5 -         6 - Akinetic 7 -          6-14    large 3 - Akinetic Dyskinetic   Aneurysmal  w/scar       Dyskinetic   15-16   diffuse                                                    w/scar  Left Ventricle The left ventricle is normal in structure, function and size. There is mild concentric left ventricular hypertrophy. The visual ejection fraction is 60- 65%.  Right Ventricle Normal right ventricle structure and size.  Atria Normal left atrial size. Right atrial size is normal.  Mitral Valve The mitral valve is normal in structure and function.  Tricuspid Valve The tricuspid valve is normal in structure and function.  Aortic Valve There is mild trileaflet aortic sclerosis.  Pulmonic Valve The pulmonic valve is not well visualized.  Pericardium There is no pericardial effusion. ______________________________________________________________________________ Report approved by: Rip Geiger 11/01/2021 02:13 PM  ______________________________________________________________________________        Signed by: Chelsea Grant MD    Pt ambulatory to radiation clinic with wife for follow up. Walked pt with pulse oximeter, resting HR 92, oxygen 96% room air. Walked for 3 minutes, , oxygen 95% room air. Further recommendations and orders per provider.      Again, thank you for allowing me to participate in the care of your patient.         Sincerely,        Chelsea Grant MD

## 2021-11-18 NOTE — PROGRESS NOTES
[facility] Radiation Oncology Follow Up Note    Patient: Pardeep Wilson  MRN: [id number]  Date of Service: 11/18/2021    Assessment / Impression   67 y/o with 2 growing lung nodules right upper lobe on maintenance Keytruda, suspicious for oligoprogressive  NSCLC s/p SBRT completed 2/12/21       Malignant neoplasm of upper lobe of right lung (H)    Personal history of malignant neoplasm of bronchus and lung [Z85.118]     Extensive history of prior malignancy:  Stage IIIA ([id number]) NSCLC, mucinous adenocarcinoma, of Left Lower Lobe s/p Left lower lobectomy 11/1/2018 and Adjuvant Chemotherapy with Cisplatin and Alimta x4c, began 12/14/18.     Recurrent (Stage IV) NSCLC, mucinous adenocarcinoma, of the Right upper lobe s/p wedge resection 2/28/2020 with 4 separate lesions noted in the resection.  Followed by carboplatin, taxol and Keytruda x 4c (6/15/20-8/18/20), maintenance Keytruda began 9/8/20.     Remote history of Stage I Right axillary Hodgkin's Lymphoma s/p Mantel field RT and splenectomy 45 yrs ago at [facility] (supected ~40 Gy).  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     RT delivered:  Body site: Thorax   SITE TREATED: Right lung x2 nodules  TOTAL DOSE: 5000 cGy  NUMBER OF FRACTION: 5 fractions  DATES COMPLETED: 2/4/2021-2/12/2021     Progressive shortness of breath with activity.  No desaturation with activity in clinic.  Discussed could represent inflammation from radiation/radiation pneumonitis or increasing pleural effusions.  Infection seems less likely.     Last CT chest with sequela of radiation therapy seen in the right upper lobe.  Small bilateral effusions and left lower lobe atelectasis.           Plan:   Will begin oral and inhaled steroids to empirically treat possible radiation pneumonitis.  Will discontinue use if no symptom improvement.    CT chest with contrast scheduled 12/9/2021 will be moved to next  week.    PFTs scheduled 12/28/2021, unfortunately these cannot be moved up but patient placed on waiting list if openings.    Total time of this visit, including time spent face-to-face with patient and or via video/audio, and also in preparing for today's visit for MDM and documentation. Medical decision-making included consideration and possible diagnoses, management options, complex record review, review of diagnostic tests and information, consideration and discussion of significant complications based on comorbidities, discussion with providers involved in the care of the patient.     60 Minutes spent.     Subjective:      HPI: Pardeep Wilson is a 66 year old male with increasing shortness of breath since completion of radiation therapy.    Last seen 9/9/21 and He and his wife had some concerns regarding his respiratory function and shortness of breath.  Discussed repeating PFTs to evaluate if any changes since last PFTs 2/20/2020.  He at that time wanted to hold off.  He saw his PCP and echo was performed 11/1/2021.  Stress echo was normal with no evidence of stress-induced ischemia.  Exercise tolerance was fair.  Exercise stopped due to hypertensive response to exercise.  PFTs were also ordered.  PFTs scheduled 12/28/2021.    He has no inhalers and has never used any.    He reports about 2 weeks after his Covid booster shot his breathing became tighter and had some pain with inhalation however this is improved now.  He has no fever no chills.  He continues to be active but has noticed slowly since completion of radiation a small progression in shortness of breath.  He has cough productive of small volume yellowish sputum.  No hemoptysis.    In clinic today, oxygen saturation is monitored while walking and does not drop with activity and increasing heart rate.    Current Outpatient Medications   Medication Sig Dispense Refill     acetaminophen (TYLENOL) 160 MG/5ML liquid Take 31.25 mLs (1,000 mg) by mouth  every 6 hours as needed for mild pain or fever 473 mL 3     atorvastatin 40 MG PO tablet Take 40 mg by mouth       biotin 300 MCG PO TABS tablet Take 300 mg by mouth       levothyroxine (SYNTHROID/LEVOTHROID) 112 MCG tablet Take 1 tablet (112 mcg) by mouth daily 90 tablet 3     metoprolol succinate ER 25 MG PO 24 hr tablet TAKE ONE-HALF (1/2) TABLET DAILY       multivitamin (CENTRUM) liquid Take 15 mLs by mouth daily (Patient not taking: Reported on 10/14/2021) 236 mL 3     pantoprazole (PROTONIX) 40 MG EC tablet        predniSONE (DELTASONE) 2.5 MG tablet Take 2 tablets(5mg) daily 180 tablet 1     sodium fluoride dental gel 1.1 % DT GEL topical gel          The following portions of the patient's history were reviewed and updated as appropriate: allergies, current medications, past family history, past medical history, past social history, past surgical history and problem list.    Review of Systems    General     EENT     Respiratory       Cardiovascular     Gastrointestinal     Musculoskeletal     Integumentary                 Neurological     Genitourinary/Reproductive     Lymphatic        Pain       AUA Assessment                                                                         Accompanied by         Objective:     Physical Exam    There were no vitals filed for this visit.     Gen: Alert, in NAD pleasant interactive, well nourished  Eyes:  EOMI, sclera anicteric  Pulm: No wheezing, stridor or respiratory distress, decreased breath sounds in right base compared to left.  CV: Well-perfused, no cyanosis  Skin: Normal tone and turgor, warm, dry, intact  Neurologic: A/O, speech fluent, no focal motor deficits, Gait normal and unaided  Psychiatric: Appropriate mood and affect    Recent Labs: No results found for this or any previous visit (from the past 168 hour(s)).    Imaging: Imaging results 6 weeks:Echocardiogram Exercise Stress    Result Date: 11/1/2021  [id number]  [id number]^NOLBERTO^WALTER^[place] [address]  Name: LAWRENCE HENDRIX MRN: [id number] : 1955 Study Date: 2021 08:14 AM Age: 66 yrs Gender: Male Patient Location: [facility] Reason For Study: Coronary artery disease involving native coronary artery of david Ordering Physician: WALTER ARVIZU Referring Physician: WALTER ARVIZU Performed By: FLACO  BSA: 2.0 m2 Height: 69 in Weight: 189 lb HR: 88 BP: 116/74 mmHg ______________________________________________________________________________ Procedure Stress Echo Complete. ______________________________________________________________________________ Interpretation Summary Normal stress echocardiogram with no evidence of stress-induced ischemia. Fair exercise tolerance There was a hypertensive BP response to exercise. ______________________________________________________________________________ Stress The patient did not exhibit any symptoms during exercise. There was a hypertensive BP response to exercise. RPP 53625. Exercise stopped due to hypertensive response to exercise. This was a normal stress EKG with no evidence of stress-induced ischemia. Normal stress echocardiogram with no evidence of stress-induced ischemia. Target Heart Rate was achieved. No arrhythmia noted. There were no ST segment changes observed with stress.  Baseline Normal baseline electrocardiogram. No arrhythmia noted.  Stress Results            Protocol:  Alberto          Maximum Predicted HR:   154 bpm            Target HR: 131 bpm        % Maximum Predicted HR: 86 %                          Stage   DurationHeart Rate  BP                                 (mm:ss)   (bpm)                      Peak Stress  5:00     133    238/112                       RecoveryR   9:00      91    198/95          Stress Duration:   5:00 mm:ss        Recovery Time: 9:00 mm:ss         Maximum Stress HR: 133 bpm           METS:          7  I      WMSI = 1.00      % Normal = 100  II      WMSI = 1.00     % Normal = 100                                                                  Segments  Size X - Cannot   0 -          1 - Normal  (2) - Mildly 2 -          1-2     small Interpret    Hyperkinetic             Hypokinetic  Hypokinetic  3-5    moderate              4 -          5 -         6 - Akinetic 7 -          6-14    large 3 - Akinetic Dyskinetic   Aneurysmal  w/scar       Dyskinetic   15-16   diffuse                                                    w/scar  Left Ventricle The left ventricle is normal in structure, function and size. There is mild concentric left ventricular hypertrophy. The visual ejection fraction is 60- 65%.  Right Ventricle Normal right ventricle structure and size.  Atria Normal left atrial size. Right atrial size is normal.  Mitral Valve The mitral valve is normal in structure and function.  Tricuspid Valve The tricuspid valve is normal in structure and function.  Aortic Valve There is mild trileaflet aortic sclerosis.  Pulmonic Valve The pulmonic valve is not well visualized.  Pericardium There is no pericardial effusion. ______________________________________________________________________________ Report approved by: Rip Geiger 11/01/2021 02:13 PM  ______________________________________________________________________________        Signed by: Chelsea Grant MD

## 2021-11-19 NOTE — TELEPHONE ENCOUNTER
Reason for Call: patient is calling to request his RX's be sent to [facility], not [facility] anymore.  [phone].  Fax [phone]    Detailed comments:     levothyroxine (SYNTHROID/LEVOTHROID) 112 MCG tablet    pantoprazole (PROTONIX) 40 MG EC tablet    metoprolol succinate ER 25 MG PO 24 hr tablet    atorvastatin 40 MG PO tablet    sodium fluoride dental gel 1.1 % DT GEL topical gel    Last seen 10/2021, patient is out of medication.    Phone Number Patient can be reached at: Home number on file [phone] (home)    Best Time: any    Can we leave a detailed message on this number? YES    Call taken on 11/19/2021 at 10:33 AM by Rama Larson

## 2021-11-22 NOTE — TELEPHONE ENCOUNTER
Pt called with an update for Dr. Grant (see her note from last week). Pardeep thinks he is worse, or at least no better, on steroids. SOB and cough continue. Dr. Grant would like him to stay on the current steroid dose if he's not having s/e from them. He states he is not having s/e. Okay to move up CT scan to earlier this week rather than Friday per Dr. Grant. IC will check and see if scan can be moved up.     Scan moved up to Wednesday.

## 2021-11-23 NOTE — TELEPHONE ENCOUNTER
Jerilyn from [facility] pharmacy calling to clarify the dosing of the prescription for the patients inhaler.  Confirmed with Dr. Grant that patient is needing more due to radiation pneumonitis and the inhaler with the steroids will be tapered later as he recovers.  Prescriptions will be filled today.

## 2021-11-23 NOTE — TELEPHONE ENCOUNTER
Reason for Call: pharmacy is calling to clarify the dose of    fluticasone (ARNUITY ELLIPTA) 200 MCG/ACT inhaler    It is typically only used 1 puff per day for geriatric dosing.    Detailed comments: please call back to advise, patient awaiting medication.    Phone Number Patient can be reached at: Other phone number: [phone]  Ref#[id number]    Best Time: any    Can we leave a detailed message on this number? YES    Call taken on 11/23/2021 at 11:37 AM by Rama Larson

## 2021-11-24 NOTE — TELEPHONE ENCOUNTER
Patient was in today for a 2-week lab check in regards to his ITP diagnosis under the care of Dr Soto.  Patient was given the instructions on 11/11 to decrease his prednisone to 2.5 mg daily.  When I called patient to discuss his platelet count of 401, he tells me that he has been working with Dr. Vega in radiation regarding his chest tightness.  She actually put him on dexamethasone 4 mg daily starting 11/19/2021 so he stopped the prednisone 2.5 mg for now.  He is will be on the 4 mg daily dexamethasone for 21 days total.  Patient will keep his appointments with us and keep doing the dexamethasone 4 mg daily until told otherwise by Dr. Vega.  This information will be given to Dr Soto.    Sylvia Su RN

## 2021-11-26 NOTE — TELEPHONE ENCOUNTER
[facility] Radiation Oncology     Patient: Pardeep Wilson  MRN: [id number]  Date of Service: 11/26/2021    Assessment / Impression   Extensive history of prior malignancy:  Stage IIIA ([id number]) NSCLC, mucinous adenocarcinoma, of Left Lower Lobe s/p Left lower lobectomy 11/1/2018 and Adjuvant Chemotherapy with Cisplatin and Alimta x4c, began 12/14/18.     Recurrent (Stage IV) NSCLC, mucinous adenocarcinoma, of the Right upper lobe s/p wedge resection 2/28/2020 with 4 separate lesions noted in the resection.  Followed by carboplatin, taxol and Keytruda x 4c (6/15/20-8/18/20), maintenance Keytruda began 9/8/20.     Remote history of Stage I Right axillary Hodgkin's Lymphoma s/p Mantel field RT and splenectomy 45 yrs ago at [facility] (supected ~40 Gy).  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     RT delivered:  Body site: Thorax   SITE TREATED: Right lung x2 nodules  TOTAL DOSE: 5000 cGy  NUMBER OF FRACTION: 5 fractions  DATES COMPLETED: 2/4/2021-2/12/2021    Increasing shortness of breath with increasing right-sided pleural effusion on CT scan along with increased nodularity and thickening adjacent to right major fissure and other pleural surfaces, increased.    Plan:   Discussed therapeutic and diagnostic thoracentesis which patient is in agreement with and are willing to be scheduled anywhere that has the next available appointment.    Drop steroids from 4 mg daily to 2 mg daily and continue taper.    Total time of this visit, including time spent via video/audio, and also in preparing for today's visit for MDM and documentation. Medical decision-making included consideration and possible diagnoses, management options, complex record review, review of diagnostic tests and information, consideration and discussion of significant complications based on comorbidities, discussion with providers involved in the care of the patient.      20 Minutes spent.   Subjective:      HPI: Pardeep Wilson is a 66 year old male with SOB was contacted on phone following results of CT chest.    He reports his shortness of breath is not significantly improved on steroids.  He thinks may be breathing is slightly easier but not significant change.    Reviewed CT scan as well as priors.  First restaging CT scan following SBRT on 5/27/2021 identified faint nonspecific nodular pleural thickening right major fissure which had developed.      Second restaging CT scan following SBRT on 9/3/2021 identified a new small right pleural effusion and tiny left pleural effusion but nothing concerning for tumor recurrence or metastatic disease in the chest.    CT chest 11/24/2021 reveals increasing soft tissue nodularity adjacent to the right major fissure and other pleural surfaces  and moderate size right pleural effusion which was previously small in size.    Reviewed that while radiation can cause small inflammatory pleural effusions, they're uncommonly seen with SBRT and CT findings are concerning for potential malignancy which can be investigated with thoracentesis.    Current Outpatient Medications   Medication Sig Dispense Refill     acetaminophen (TYLENOL) 160 MG/5ML liquid Take 31.25 mLs (1,000 mg) by mouth every 6 hours as needed for mild pain or fever 473 mL 3     atorvastatin (LIPITOR) 40 MG tablet Take 1 tablet (40 mg) by mouth daily 90 tablet 3     biotin 300 MCG PO TABS tablet Take 300 mg by mouth       dexamethasone (DECADRON) 4 MG tablet Take 1 tablet (4 mg) by mouth 2 times daily (with meals) Take 6 mg BID day one, Take 4 mg BID day 2-7, Take 4 mg daily day 8-14, Take 2 mg daily day 15-20 30 tablet 1                   levothyroxine (SYNTHROID/LEVOTHROID) 112 MCG tablet Take 1 tablet (112 mcg) by mouth daily 90 tablet 3     metoprolol succinate ER 25 MG PO 24 hr tablet TAKE ONE-HALF (1/2) TABLET DAILY       multivitamin (CENTRUM) liquid Take 15 mLs by mouth  daily (Patient not taking: Reported on 10/14/2021) 236 mL 3     pantoprazole (PROTONIX) 40 MG EC tablet Take 1 tablet (40 mg) by mouth daily 90 tablet 1     predniSONE (DELTASONE) 2.5 MG tablet Take 2 tablets(5mg) daily 180 tablet 1     sodium fluoride dental gel (PREVIDENT) 1.1 % GEL topical gel Apply to affected area daily 100 mL 3       The following portions of the patient's history were reviewed and updated as appropriate: allergies, current medications, past family history, past medical history, past social history, past surgical history and problem list.      Objective:       Recent Labs:   Recent Results (from the past 168 hour(s))   CBC with platelets and differential   Result Value Ref Range    WBC Count 13.6 (H) 4.0 - 11.0 10e3/uL    RBC Count 5.06 4.40 - 5.90 10e6/uL    Hemoglobin 15.5 13.3 - 17.7 g/dL    Hematocrit 48.0 40.0 - 53.0 %    MCV 95 78 - 100 fL    MCH 30.6 26.5 - 33.0 pg    MCHC 32.3 31.5 - 36.5 g/dL    RDW 16.1 (H) 10.0 - 15.0 %    Platelet Count 401 150 - 450 10e3/uL    % Neutrophils 75 %    % Lymphocytes 13 %    % Monocytes 10 %    % Eosinophils 0 %    % Basophils 0 %    % Immature Granulocytes 2 %    NRBCs per 100 WBC 0 <1 /100    Absolute Neutrophils 10.4 (H) 1.6 - 8.3 10e3/uL    Absolute Lymphocytes 1.7 0.8 - 5.3 10e3/uL    Absolute Monocytes 1.4 (H) 0.0 - 1.3 10e3/uL    Absolute Eosinophils 0.0 0.0 - 0.7 10e3/uL    Absolute Basophils 0.0 0.0 - 0.2 10e3/uL    Absolute Immature Granulocytes 0.2 (H) <=0.0 10e3/uL    Absolute NRBCs 0.0 10e3/uL   TSH with free T4 reflex   Result Value Ref Range    TSH 0.27 (L) 0.30 - 5.00 uIU/mL   T4 free   Result Value Ref Range    Free T4 1.13 0.70 - 1.80 ng/dL   Creatinine POCT   Result Value Ref Range    Creatinine POCT 1.4 (H) 0.7 - 1.3 mg/dL    GFR, ESTIMATED POCT 52 (L) >60 mL/min/1.73m2       Imaging personally reviewed    Imaging: Imaging results 6 weeks:Echocardiogram Exercise Stress    Result Date: 11/1/2021  [id number]  [id number]^NOLBERTO^WALTER^[place] [address]  Name: LAWRENCE HENDRIX MRN: [id number] : 1955 Study Date: 2021 08:14 AM Age: 66 yrs Gender: Male Patient Location: [facility] Reason For Study: Coronary artery disease involving native coronary artery of david Ordering Physician: WALTER ARVIZU Referring Physician: WALTER ARVIZU Performed By: FLACO  BSA: 2.0 m2 Height: 69 in Weight: 189 lb HR: 88 BP: 116/74 mmHg ______________________________________________________________________________ Procedure Stress Echo Complete. ______________________________________________________________________________ Interpretation Summary Normal stress echocardiogram with no evidence of stress-induced ischemia. Fair exercise tolerance There was a hypertensive BP response to exercise. ______________________________________________________________________________ Stress The patient did not exhibit any symptoms during exercise. There was a hypertensive BP response to exercise. RPP 75928. Exercise stopped due to hypertensive response to exercise. This was a normal stress EKG with no evidence of stress-induced ischemia. Normal stress echocardiogram with no evidence of stress-induced ischemia. Target Heart Rate was achieved. No arrhythmia noted. There were no ST segment changes observed with stress.  Baseline Normal baseline electrocardiogram. No arrhythmia noted.  Stress Results            Protocol:  Alberto          Maximum Predicted HR:   154 bpm            Target HR: 131 bpm        % Maximum Predicted HR: 86 %                          Stage   DurationHeart Rate  BP                                 (mm:ss)   (bpm)                      Peak Stress  5:00     133    238/112                       RecoveryR   9:00      91    198/95          Stress Duration:   5:00 mm:ss        Recovery Time: 9:00 mm:ss         Maximum Stress HR: 133 bpm           METS:          7  I      WMSI = 1.00      % Normal = 100  II      WMSI = 1.00     % Normal = 100                                                                  Segments  Size X - Cannot   0 -          1 - Normal  (2) - Mildly 2 -          1-2     small Interpret    Hyperkinetic             Hypokinetic  Hypokinetic  3-5    moderate              4 -          5 -         6 - Akinetic 7 -          6-14    large 3 - Akinetic Dyskinetic   Aneurysmal  w/scar       Dyskinetic   15-16   diffuse                                                    w/scar  Left Ventricle The left ventricle is normal in structure, function and size. There is mild concentric left ventricular hypertrophy. The visual ejection fraction is 60- 65%.  Right Ventricle Normal right ventricle structure and size.  Atria Normal left atrial size. Right atrial size is normal.  Mitral Valve The mitral valve is normal in structure and function.  Tricuspid Valve The tricuspid valve is normal in structure and function.  Aortic Valve There is mild trileaflet aortic sclerosis.  Pulmonic Valve The pulmonic valve is not well visualized.  Pericardium There is no pericardial effusion. ______________________________________________________________________________ Report approved by: Rip Geiger 11/01/2021 02:13 PM  ______________________________________________________________________________      CT Chest w contrast*    Result Date: 11/24/2021  EXAM: CT CHEST W CONTRAST LOCATION: [facility] DATE/TIME: 11/24/2021 7:30 PM INDICATION: Non-small cell lung cancer, monitor COMPARISON: 09/03/2021. TECHNIQUE: CT chest with IV contrast. Multiplanar reformats were obtained. Dose reduction techniques were used. CONTRAST: 75ml Isovue 370 FINDINGS: LUNGS AND PLEURA: Status post left lower lobectomy. There is no new soft tissue at the suture line. Right paramediastinal soft tissue and architectural distortion are again seen. There is a fiducial marker in the anterior right upper lung.  Soft tissue and nodularity adjacent to the right major fissure and other pleural surfaces has increased. A moderate size right pleural effusion was previously small in size. A small left pleural effusion there is minimal round atelectasis in the mild scarring in the  medial left upper lobe.  There is left lung base. . Has not changed in size MEDIASTINUM/AXILLAE: The right thyroid lobe is small in size or absent. There is heterogeneity of the left thyroid lobe, which is normal in size.  No lymphadenopathy. CORONARY ARTERY CALCIFICATION: Previous intervention (stents or CABG). UPPER ABDOMEN: A simple right renal cyst does not require follow-up. Status post cholecystectomy. MUSCULOSKELETAL: Unremarkable.     IMPRESSION: 1.  Left lower lobectomy. 2.  Right pleural nodularity and soft tissue has increased from the comparison study, which is concerning for increased malignancy. A moderate sized right pleural effusion has significantly increased in size from the comparison study.       Signed by: Chelsea Grant MD

## 2021-11-30 NOTE — TELEPHONE ENCOUNTER
Per Dr Soto, keep appointments as is.  We will wait and see if what the results of the cytology are from the thoracentesis and go from there.  Patient verbalized understanding but also states that it is quite disconcerting and he would rather get things moving.  I did let him know that we can check and see if there are any cancellations to try to get in sooner for the thoracentesis.  He would appreciate that.  If anything comes up he is aware that we will give him a call.    Sylvia Su RN

## 2021-11-30 NOTE — TELEPHONE ENCOUNTER
"Patient calls in today and states that he had a CT scan on 11/24/2021 that was ordered by Dr. Vega.  He states that the results are concerning for malignancy.  He has \"a moderate size right pleural effusion has significantly increased in size from the comparison study.\"  There is also \"right pleural nodularity and soft tissue has increased from the comparison study, which is concerning for increased malignancy.\"  Patient tells me that he is having a thoracentesis but this is not scheduled until 12/9, after an appointment in our office with Dr Soto earlier that day.  Dr. Vega has ordered testing to be done on the fluid from the thoracentesis.  Patient wants to know if Dr Soto has any other thoughts.  Patient is comfortable leaving the appointment on 12/9 but states that if Dr Soto feels he needs to see him sooner, he can make this work.  I let the patient know I would get back to him.    Sylvia Su RN  "

## 2021-12-01 NOTE — TELEPHONE ENCOUNTER
Reason for Call: patient is calling to request a refill of    metoprolol succinate ER 25 MG PO 24 hr tablet    SEND TO [facility] [place]    Detailed comments: LAST SEEN 10/13/21. Patient has tried to request this through the pharmacy, no response. Now he has only 2 days supply left. Would like a small amount sent to [facility], [facility] [place].   He will run out before the mail order would be received.    Phone Number Patient can be reached at: Home number on file [phone] (home)    Best Time: any    Can we leave a detailed message on this number? YES    Call taken on 12/1/2021 at 9:53 AM by Rama Larson

## 2021-12-02 NOTE — TELEPHONE ENCOUNTER
Patient calls in today to give an update that he has tested positive for Covid today. He states that he started having symptoms on Monday, 11/30 and decided that since they were not going away he was going to get tested. He states that it was a very smooth process over at the [facility] in [place] and he had his test results back within 3 hours. He is scheduled for a ultrasound thoracentesis on 12/8 at the [facility] location which we will send off for testing. I did call over and talk with the ultrasound technician today who stated that it is fine for the patient to come in on 12/8. He will wear a mask as usual and they will wear additional PPE to protect themselves. This will actually be day 10 of when he started having symptoms. He states that he already feels better this afternoon compared to this morning. I let him know to give us a call if there is anything else we can do for him.    Sylvia Su RN

## 2021-12-05 PROBLEM — R09.02 HYPOXIA: Status: ACTIVE | Noted: 2021-01-01

## 2021-12-05 NOTE — ED PROVIDER NOTES
EMERGENCY DEPARTMENT ENCOUNTER      NAME: Pardeep Wilson  AGE: 66 year old male  YOB: 1955  MRN: [id number]  EVALUATION DATE & TIME: 2021  2:33 AM    PCP: Dov Morales    ED PROVIDER: Dorita Redman M.D.      Chief Complaint   Patient presents with     Shortness of Breath     Covid Concern         FINAL IMPRESSION:  1. Hypoxia    2. Infection due to 2019 novel coronavirus    3. Pleural effusion        MEDICAL DECISION MAKIN:37 AM I met with the patient, obtained history, performed an initial exam, and discussed options and plan for diagnostics and treatment here in the ED. I wore full COVID-19 PPE including surgical mask, N95 mask, gown and face shield.     Pertinent Labs & Imaging studies reviewed. (See chart for details)     Pardeep Wilson is a 66 year old male who presents with increasing shortness of breath.  Patient is hypoxic on arrival.  He was placed on 5 L nasal cannula with improvement of his oxygen saturation to 93%.  He has increased work of breathing.  He is got the obvious right-sided pleural effusion with very poor air movement in the right base.  He also has crackles at the left base.  He is tachycardic and febrile to 103.  His complicated history includes lung malignancy.  He is status post left lobectomy.  Known right-sided pleural effusion was planning on drainage early this week.  Diagnosed yesterday with COVID-19 he is vaccinated.  Because of the temperature, this could just be related to COVID-19 but could also be a bacterial process overlying viral process.  I will get septic labs, chest x-ray, ECG and give him treatments with albuterol, Toradol, Decadron and remdesivir if creatinine and LFTs are appropriate.    His labs show a mild increase in his creatinine at 1.36 but GFR is still within range to receive the antiviral treatment.  Lactic acid is normal.  Troponin is normal.  His white blood cell count is slightly elevated and hemoglobin is 9.7 today  which is lower than prior values.  His VBG shows a normal pH but he is retaining carbon dioxide to some extent.  He is doing okay with the supplemental oxygen.  I did consider initiating BiPAP however I think ultimately a thoracentesis to drain the fluid in his right lung will help him most significantly.  The order for the ultrasound-guided thoracentesis was placed.    He will be admitted to the hospitalist service for further cares.  Discussed with Dr. Lyman who accepted the patient.      MEDICATIONS GIVEN IN THE EMERGENCY:  Medications   sodium chloride (PF) 0.9% PF flush 3 mL (has no administration in time range)   sodium chloride (PF) 0.9% PF flush 3 mL (3 mLs Intracatheter Given 12/5/21 0427)   sodium chloride 0.9% infusion ( Intravenous New Bag 12/5/21 0427)   remdesivir 200 mg in sodium chloride 0.9 % 250 mL intermittent infusion (has no administration in time range)     Followed by   0.9% sodium chloride BOLUS (has no administration in time range)   remdesivir 100 mg in sodium chloride 0.9 % 250 mL intermittent infusion (has no administration in time range)     And   0.9% sodium chloride BOLUS (has no administration in time range)   albuterol (PROVENTIL HFA/VENTOLIN HFA) inhaler (6 puffs Inhalation Given 12/5/21 0245)   ketorolac (TORADOL) injection 15 mg (15 mg Intravenous Given 12/5/21 0428)   dexamethasone PF (DECADRON) injection 6 mg (6 mg Intravenous Given 12/5/21 0433)         =================================================================    HPI    Patient information was obtained from:     Use of : N/A       Pardeep VERA Katie is a 66 year old male with a history of Hodgkin's lymphoma, lung cancer, HTN, coronary artery disease, coronary angioplasty, hypothyroidism  who presents with COVID-19 and pleural effusion concern.      On Monday (11/29) patient started having COVID symptoms. Patient has low-grade fevers and generally feels tired. Over the last couple of days, he also has significantly  worsening shortness of breath from baseline. Upon ED arrival his SpO2 was 87%, and patient was placed on 4 liters of O2. He also has nausea and decreased appetite that remains unchanged from baseline. No associated vomiting. Denies urinary symptoms. Patient tested positive for COVID-19 on Thursday (12/2). Patient is vaccinated x3 against COVID-19. He has lung cancer but is not currently undergoing treatment. Patient also has known pleural effusion and is scheduled for a ultrasound thoracentesis next week (12/8). Patient is taking 2 mg of prednisone AM daily to help with platelet count that has been running low.     REVIEW OF SYSTEMS   Review of Systems   Constitutional: Positive for appetite change (decreased), fatigue and fever. Negative for chills.   HENT: Negative for congestion and sore throat.    Respiratory: Positive for shortness of breath. Negative for cough.    Cardiovascular: Negative for chest pain.   Gastrointestinal: Positive for nausea (of baseline). Negative for abdominal pain, blood in stool, diarrhea and vomiting.   Genitourinary: Negative for dysuria and hematuria.   Skin: Negative for rash.   Neurological: Negative for dizziness, light-headedness and headaches.   All other systems reviewed and are negative.       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     Esophageal reflux      Heart disease      History of blood transfusion      Hodgkin's lymphoma (H)     s/p radiation to the mediastinum at age 17     Hyperlipemia      Hypertension      Hypertension      Hypothyroidism     hx nodules     Lung cancer (H)      MI, old 12 years ago, 2008     Thyroid disease        PAST SURGICAL HISTORY:  Past Surgical History:   Procedure Laterality Date     ABDOMEN SURGERY      SPLENECTOMY     CARDIAC SURGERY      STENT     COLONOSCOPY       CORONARY ANGIOPLASTY      Stent Placement      CT BIOPSY LUNG       CT BIOPSY LUNG  1/12/2021     EYE SURGERY Bilateral     Cataracts     MEDIASTINOSCOPY N/A 11/1/2018    Procedure: MEDIASTINOSCOPY;  Surgeon: Bry Saunders MD;  Location: [facility];  Service:      OTHER SURGICAL HISTORY  11/01/2018    left lower lobe lobectomy      OTHER SURGICAL HISTORY Right 02/28/2020    wedge resection     PAROTIDECTOMY       IL LAP,DIAGNOSTIC ABDOMEN N/A 11/7/2017    Procedure: DIAGNOSTIC LAPAROSCOPY,  LYSIS OF ADHESIONS, SMALL BOWEL RESECTION;  Surgeon: Brian Granados MD;  Location: [facility] OR;  Service: General     SPLENECTOMY, TOTAL  1973     THORACOSCOPIC WEDGE RESECTION LUNG Right 2/28/2020    Procedure: RIGHT THORACOSCOPIC WEDGE RESECTION;  Surgeon: Bry Saunders MD;  Location: [facility] OR     WISDOM TOOTH EXTRACTION         CURRENT MEDICATIONS:      Current Facility-Administered Medications:      ketorolac (TORADOL) injection 15 mg, 15 mg, Intravenous, Once, Dorita Redman MD     sodium chloride (PF) 0.9% PF flush 3 mL, 3 mL, Intracatheter, q1 min prn, Dorita Redman MD     sodium chloride (PF) 0.9% PF flush 3 mL, 3 mL, Intracatheter, Q8H, Dorita Redman MD     sodium chloride 0.9% infusion, , Intravenous, Continuous, Dorita Redman MD    Current Outpatient Medications:      acetaminophen (TYLENOL) 160 MG/5ML liquid, Take 31.25 mLs (1,000 mg) by mouth every 6 hours as needed for mild pain or fever, Disp: 473 mL, Rfl: 3     atorvastatin (LIPITOR) 40 MG tablet, Take 1 tablet (40 mg) by mouth daily, Disp: 90 tablet, Rfl: 3     biotin 300 MCG PO TABS tablet, Take 300 mg by mouth, Disp: , Rfl:      dexamethasone (DECADRON) 4 MG tablet, Take 1 tablet (4 mg) by mouth 2 times daily (with meals) Take 6 mg BID day one, Take 4 mg BID day 2-7, Take 4 mg daily day 8-14, Take 2 mg daily day 15-20, Disp: 30 tablet, Rfl: 1     fluticasone (ARNUITY ELLIPTA) 200 MCG/ACT inhaler, Inhale 1 puff into the lungs daily, Disp: 1 each, Rfl:  "0     fluticasone (ARNUITY ELLIPTA) 200 MCG/ACT inhaler, Inhale 1 puff into the lungs 2 times daily, Disp: 1 each, Rfl: 1     levothyroxine (SYNTHROID/LEVOTHROID) 112 MCG tablet, Take 1 tablet (112 mcg) by mouth daily, Disp: 90 tablet, Rfl: 3     metoprolol succinate ER (TOPROL-XL) 25 MG 24 hr tablet, Take 0.5 tablets (12.5 mg) by mouth daily, Disp: 90 tablet, Rfl: 3     metoprolol succinate ER (TOPROL-XL) 25 MG 24 hr tablet, Take 0.5 tablets (12.5 mg) by mouth daily, Disp: 7 tablet, Rfl: 0     multivitamin (CENTRUM) liquid, Take 15 mLs by mouth daily (Patient not taking: Reported on 10/14/2021), Disp: 236 mL, Rfl: 3     pantoprazole (PROTONIX) 40 MG EC tablet, Take 1 tablet (40 mg) by mouth daily, Disp: 90 tablet, Rfl: 1     predniSONE (DELTASONE) 2.5 MG tablet, Take 2 tablets(5mg) daily, Disp: 180 tablet, Rfl: 1     sodium fluoride dental gel (PREVIDENT) 1.1 % GEL topical gel, Apply to affected area daily, Disp: 100 mL, Rfl: 3    ALLERGIES:  Allergies   Allergen Reactions     Penicillins Rash       FAMILY HISTORY:  Family History   Problem Relation Age of Onset     Dementia Mother      Cancer Father 67.00        Thinks of the diaphragm, history of working with noxious chemicals     Kidney Disease Brother      No Known Problems Maternal Grandmother      No Known Problems Maternal Grandfather      No Known Problems Paternal Grandmother      Heart Disease Paternal Grandfather      No Known Problems Son      No Known Problems Daughter        SOCIAL HISTORY:   Social History     Tobacco Use     Smoking status: Never Smoker     Smokeless tobacco: Never Used   Vaping Use     Vaping Use: Never used   Substance Use Topics     Alcohol use: Not Currently     Drug use: No        PHYSICAL EXAM:    Vitals: /71   Pulse 113   Temp (!) 103.5  F (39.7  C)   Resp 30   Ht 1.753 m (5' 9\")   Wt 83.9 kg (185 lb)   SpO2 93%   BMI 27.32 kg/m     General: Cachetic  Alert and interactive, comfortable appearing.   HENT: " Oropharynx without erythema or exudates. MMM.  TMs clear bilaterally.  Eyes: Pupils mid-sized and equally reactive.   Neck: Full AROM.  No midline tenderness to palpation.  Cardiovascular: Tachycardic. Regular rhythm. Peripheral pulses 2+ bilaterally.  Chest/Pulmonary: Increased work of breathing. Accessory muscle use. Decreased air movement at right base. Scattered wheezing and crackles. No chest wall tenderness or deformities.  Abdomen: Soft, nondistended. Nontender without guarding or rebound.  Back/Spine: No CVA or midline tenderness.  Extremities: Normal ROM of all major joints. No lower extremity edema.   Skin: Warm and dry. Normal skin color.   Neuro: Speech clear. CNs grossly intact. Moves all extremities appropriately. Strength and sensation grossly intact to all extremities.   Psych: Normal affect/mood, cooperative, memory appropriate.    LAB:  All pertinent labs reviewed and interpreted.  Labs Ordered and Resulted from Time of ED Arrival to Time of ED Departure   COMPREHENSIVE METABOLIC PANEL - Abnormal       Result Value    Sodium 142      Potassium 4.4      Chloride 104      Carbon Dioxide (CO2) 25      Anion Gap 13      Urea Nitrogen 29 (*)     Creatinine 1.36 (*)     Calcium 9.1      Glucose 101      Alkaline Phosphatase 94      AST 31      ALT 19      Protein Total 6.6      Albumin 3.1 (*)     Bilirubin Total 0.9      GFR Estimate 54 (*)    MAGNESIUM - Abnormal    Magnesium 1.3 (*)    BLOOD GAS VENOUS - Abnormal    pH Venous 7.39      pCO2 Venous 51 (*)     pO2 Venous 19 (*)     Bicarbonate Venous 27      Base Excess/Deficit (+/-) 5.9      Oxyhemoglobin Venous 26.3 (*)     O2 Sat, Venous 26.7 (*)    CBC WITH PLATELETS AND DIFFERENTIAL - Abnormal    WBC Count 12.3 (*)     RBC Count 3.12 (*)     Hemoglobin 9.7 (*)     Hematocrit 30.2 (*)     MCV 97      MCH 31.1      MCHC 32.1      RDW 16.7 (*)     Platelet Count 54 (*)     % Neutrophils 68      % Lymphocytes 25      % Monocytes 6      % Eosinophils 0       % Basophils 0      % Immature Granulocytes 1      NRBCs per 100 WBC 0      Absolute Neutrophils 8.6 (*)     Absolute Lymphocytes 3.0      Absolute Monocytes 0.7      Absolute Eosinophils 0.0      Absolute Basophils 0.0      Absolute Immature Granulocytes 0.1      Absolute NRBCs 0.0     LACTIC ACID WHOLE BLOOD - Normal    Lactic Acid 1.7     TROPONIN I - Normal    Troponin I 0.06     LACTATE DEHYDROGENASE   BLOOD CULTURE   BLOOD CULTURE       RADIOLOGY:  XR Chest Port 1 View   Final Result   IMPRESSION: Heart size and pulmonary vascularity within normal limits. Large right and probable tiny left pleural effusions. Hazy right perihilar and bilateral lower lung infiltrates versus atelectasis. Pneumonitis not excluded and clinical correlation    is needed. Aortic calcification. No significant bony abnormalities.         IR Thoracentesis    (Results Pending)       EKG:   Sinus tachycardia  Normal intervals  No arrhythmias  No acute ST changes or signs of ischemia  When compared with ECG of 1-8-21, ventricular rate has increased otherwise no concerning changes.    I, Sara Behmanesh , am serving as a scribe to document services personally performed by Dr. Dorita Redman  based on my observation and the provider's statements to me. I, Dorita Redman MD attest that Sara Behmanesh is acting in a scribe capacity, has observed my performance of the services and has documented them in accordance with my direction.      Dorita Redman M.D.  Emergency Medicine  [facility] EMERGENCY DEPARTMENT  [facility] [facility] [address]  [phone]  Dept: [phone]           Dorita Redman MD  12/05/21 1112

## 2021-12-05 NOTE — PROGRESS NOTES
H&P by Dr. Armas from this morning at 5:43am reviewed.  D/W ID.  US Thoracentesis w/ labs as ordered.  Plan of care in place.

## 2021-12-05 NOTE — ED NOTES
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     Esophageal reflux      Heart disease      History of blood transfusion      Hodgkin's lymphoma (H)     s/p radiation to the mediastinum at age 17     Hyperlipemia      Hypertension      Hypertension      Hypothyroidism     hx nodules     Lung cancer (H)      MI, old 12 years ago, 2008     Thyroid disease         Code Status:  No Order     Falls Risk: No Band: Not applicable    Current Living Situation/Residence: lives with a significant other     Elimination Status: Continent: Yes     Activity Level: Independent    Patients Preferred Language:  English     Needed: No    Vital Signs:  BP 97/52   Pulse 114   Temp (!) 100.5  F (38.1  C) (Oral)   Resp 29   Ht 1.753 m (5' 9\")   Wt 83.9 kg (185 lb)   SpO2 91%   BMI 27.32 kg/m       Cardiac Rhythm: st/sr    Pain Score: 0/10    Is the Patient Confused:  No    Last Food or Drink: 12/4/21 at 8PM    Focused Assessment:  Respiratory      Tests Performed: Done: Labs    Treatments Provided:  Inhaler, antibiotics, fluids      Family Dynamics/Concerns: No    Family Updated On Visitor Policy: Yes    Plan of Care Communicated to Family: Yes    Who Was Updated about Plan of Care: wife, patient    Belongings Checklist Done and Signed by Patient: Yes    Covid: symptomatic, positive    Additional Information: He is a lung cancer patient. Tested positive for covid on Thurdsay. Here with SOB, and dry cough. Sats in upper 90's% on 4 liter nasal cannula. A&O, " independent, pain free. He is a hard IV start. His IV infiltrated. He wants PICC to place new IV. PICC has been paged to place peripheral IV, and returned call. They might be able to place IV arounf 0745. Fluids and Remdesivir on hold due to lack of IV.    RN: LO Mullins      ** 12/5/2021 6:18 AM

## 2021-12-05 NOTE — ED TRIAGE NOTES
Pt here with sob and fever, known COVID on Thursday tested positive. Pt has lung cancer with no current treatment plan. Known pleural effusion and scheduled for a tap next week. Taking 2 mg of prednisone daily in the morning to help with his platelet count that has been running low. SOB has been getting worse over the last couple days. Once placed in the room, put on O2 per nasal cannula at 4L.

## 2021-12-05 NOTE — PROGRESS NOTES
12/05/21 1310   Quick Adds   Type of Visit Initial Occupational Therapy Evaluation   Living Environment   People in home spouse   Current Living Arrangements house   Home Accessibility stairs within home   Number of Stairs, Within Home, Primary 10   Self-Care   Usual Activity Tolerance good   Instrumental Activities of Daily Living (IADL)   Previous Responsibilities meal prep;housekeeping;laundry;shopping;yardwork;medication management;finances;driving   Disability/Function   Hearing Difficulty or Deaf no   General Information   Onset of Illness/Injury or Date of Surgery 12/05/21   Referring Physician bo mckay   Range of Motion Comprehensive   General Range of Motion no range of motion deficits identified   Bed Mobility   Bed Mobility supine-sit;sit-supine   Supine-Sit [place] (Bed Mobility) supervision   Sit-Supine [place] (Bed Mobility) supervision   Transfers   Transfers bed-chair transfer;sit-stand transfer   Transfer Skill: Bed to Chair/Chair to Bed   Bed-Chair [place] (Transfers) supervision   Sit-Stand Transfer   Sit-Stand [place] (Transfers) supervision   Activities of Daily Living   BADL Assessment lower body dressing;grooming   Lower Body Dressing Assessment   [place] Level (Lower Body Dressing) independent   Grooming Assessment   [place] Level (Grooming) supervision   Clinical Impression   Criteria for Skilled Therapeutic Interventions Met (OT) no   Therapy Frequency (OT) 1x eval and treat   Total Evaluation Time (Minutes)   Total Evaluation Time (Minutes) 15

## 2021-12-05 NOTE — PLAN OF CARE
Occupational Therapy Discharge Summary    Reason for therapy discharge:    All goals and outcomes met, no further needs identified.    Progress towards therapy goal(s). See goals on Care Plan in [facility] electronic health record for goal details.  Goals met    Therapy recommendation(s):    No further therapy is recommended.

## 2021-12-05 NOTE — CONSULTS
Consultation - INFECTIOUS DISEASE CONSULTATION  Pardeep Wilson,  1955, MRN [id number]      Pleural effusion [J90]  Hypoxia [R09.02]  Infection due to 2019 novel coronavirus [U07.1]    PCP: Dov Morales, [phone]   Code status:  No Order               Chief Complaint:  Acute hypoxic respiratory failure     Assessment:  Pardeep Wilson is a 66 year old old male with   1. Remote history of stage I right axillary Hodgkin lymphoma status post mantel field RT and splenectomy in .  2. History of non-small cell lung cancer status post SBRT completed 2021. Previously on maintenance therapy with Keytruda.  3. Right-sided pleural effusion. Was supposed to have thoracentesis on 2021  4. COVID-19 vaccinated on 2021, 9/10/2021, and 2021.  5. COVID-19 infection. Onset of symptoms x1 week prior to admission. Received monoclonal antibody infusion on 2021. 6. Acute hypoxic respiratory failure. Presumed secondary to COVID-19 infection. Large right-sided effusion could be infected. On dexamethasone plus remdesivir plus meropenem. We'll continue same for now. Thoracentesis scheduled for today.      Recommendations:   Continue IV meropenem pending thoracentesis labs.  Continue remdesivir and dexamethasone.  Thoracentesis with labs as ordered.  Monitor oxygen status.    Discussed with the patient, nursing staff.    Thank you for letting us be part of the patient care team. We will follow    Isatu Zhu MD,MD  [facility].   [facility]  Office Telephone [phone].  Fax [phone]  [facility] paging    HPI:    Pardeep Wilson is a 66 year old old male. History is provided by the patient, chart review.   ID is asked to see this patient for COVID-19 infection.  The patient has a history of Hodgkin lymphoma in , non-small cell lung cancer status post radiation therapy, chemotherapy. Had a positive biopsy for adenocarcinoma in  1/21. He has been dealing with right-sided pleural effusion and was scheduled to have thoracentesis on 12/8/2021.  He is vaccinated for COVID-19 as above.  About a week prior to admission, the patient started to have chest congestion, low-grade fever, cough, fatigue.  Was diagnosed with COVID-19 infection outpatient and received monoclonal antibody infusion on 12/4/2021.  His respiratory status worsened reason why he came to the ER and now is admitted.  On remdesivir plus dexamethasone plus meropenem.  On the schedule for thoracentesis later today.  High-grade fever on admission.  Better.  He feels better today.  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     Esophageal reflux      Heart disease      History of blood transfusion      Hodgkin's lymphoma (H)     s/p radiation to the mediastinum at age 17     Hyperlipemia      Hypertension      Hypertension      Hypothyroidism     hx nodules     Lung cancer (H)      MI, old 12 years ago, 2008     Thyroid disease         Surgical History  Past Surgical History:   Procedure Laterality Date     ABDOMEN SURGERY      SPLENECTOMY     CARDIAC SURGERY      STENT     COLONOSCOPY       CORONARY ANGIOPLASTY      Stent Placement     CT BIOPSY LUNG       CT BIOPSY LUNG  1/12/2021     EYE SURGERY Bilateral     Cataracts     MEDIASTINOSCOPY N/A 11/1/2018    Procedure: MEDIASTINOSCOPY;  Surgeon: Bry Saunders MD;  Location: [facility];  Service:      OTHER SURGICAL HISTORY  11/01/2018    left lower lobe lobectomy      OTHER SURGICAL HISTORY Right 02/28/2020    wedge resection     PAROTIDECTOMY       IN LAP,DIAGNOSTIC ABDOMEN N/A 11/7/2017    Procedure: DIAGNOSTIC LAPAROSCOPY,  LYSIS OF ADHESIONS, SMALL BOWEL RESECTION;  Surgeon: Brian Granados MD;  [place] [facility] [place]  Service: General      SPLENECTOMY, TOTAL  1973     THORACOSCOPIC WEDGE RESECTION LUNG Right 2/28/2020    Procedure: RIGHT THORACOSCOPIC WEDGE RESECTION;  Surgeon: Bry Saunders MD;  Location: [facility] OR     WISDOM TOOTH EXTRACTION              Social History  Reviewed, and he  reports that he has never smoked. He has never used smokeless tobacco. He reports previous alcohol use. He reports that he does not use drugs.        Family History  Family History   Problem Relation Age of Onset     Dementia Mother      Cancer Father 67.00        Thinks of the diaphragm, history of working with noxious chemicals     Kidney Disease Brother      No Known Problems Maternal Grandmother      No Known Problems Maternal Grandfather      No Known Problems Paternal Grandmother      Heart Disease Paternal Grandfather      No Known Problems Son      No Known Problems Daughter       Psychosocial Needs  Social History     Social History Narrative     Not on file     Additional psychosocial needs reviewed per nursing assessment.       Allergies   Allergen Reactions     Penicillins Rash        Medications Prior to Admission   Medication Sig Dispense Refill Last Dose     atorvastatin (LIPITOR) 40 MG tablet Take 1 tablet (40 mg) by mouth daily 90 tablet 3 12/4/2021 at Unknown time     dexamethasone (DECADRON) 4 MG tablet Take 1 tablet (4 mg) by mouth 2 times daily (with meals) Take 6 mg BID day one, Take 4 mg BID day 2-7, Take 4 mg daily day 8-14, Take 2 mg daily day 15-20 30 tablet 1 12/4/2021 at Unknown time     levothyroxine (SYNTHROID/LEVOTHROID) 112 MCG tablet Take 1 tablet (112 mcg) by mouth daily 90 tablet 3 12/4/2021 at Unknown time     metoprolol succinate ER (TOPROL-XL) 25 MG 24 hr tablet Take 0.5 tablets (12.5 mg) by mouth daily 90 tablet 3 12/4/2021 at Unknown time     pantoprazole (PROTONIX) 40 MG EC tablet Take 1 tablet (40 mg) by mouth daily 90 tablet 1 12/4/2021 at Unknown time     predniSONE (DELTASONE) 2.5 MG tablet Take 2 tablets(5mg)  daily 180 tablet 1 More than a month at Unknown time     sodium fluoride dental gel (PREVIDENT) 1.1 % GEL topical gel Apply to affected area daily 100 mL 3 Past Week at Unknown time     fluticasone (ARNUITY ELLIPTA) 200 MCG/ACT inhaler Inhale 1 puff into the lungs daily (Patient not taking: Reported on 12/5/2021) 1 each 0 Not Taking at Unknown time        Review of Systems:  Negative except for findings in the HPI Physical Exam:  Temp:  [38.5  F (3.6  C)-103.5  F (39.7  C)] 98.1  F (36.7  C)  Pulse:  [] 84  Resp:  [20-33] 20  BP: ()/(52-71) 100/63  SpO2:  [87 %-97 %] 94 %    Gen: Pleasant in no acute distress.  Cachectic.  HEENT: NCAT. EOMI. PERRL.  Neck: No bruit, JVD or thyromegaly.  Lungs: Absent breath sounds right base.  Card: RRR. NSR. No RMG. Peripheral pulses present and symmetric. No edema.  Abd: Soft NT ND. No mass. Normal bowel sounds.  Skin: No rash.  Extr: No edema.  Neuro: Alert and oriented to place time and person. Cranial nerves II to XII intact. Motor and sensory intact. Normal gait.           ============================    Pertinent Labs  personally reviewed.   Recent Labs   Lab 12/05/21  0352   WBC 12.3*   HGB 9.7*   HCT 30.2*   PLT 54*       Recent Labs   Lab 12/05/21  0351      CO2 25   BUN 29*   ALBUMIN 3.1*   ALKPHOS 94   ALT 19   AST 31       MICROBIOLOGY DATA:  Personally reviewed      Pertinent Radiology  personally reviewed.      Study Result    Narrative & Impression   EXAM: CT CHEST W CONTRAST  LOCATION: [facility]  DATE/TIME: 11/24/2021 7:30 PM     INDICATION: Non-small cell lung cancer, monitor  COMPARISON: 09/03/2021.  TECHNIQUE: CT chest with IV contrast. Multiplanar reformats were obtained. Dose reduction techniques were used.     CONTRAST: 75ml Isovue 370     FINDINGS:   LUNGS AND PLEURA: Status post left lower lobectomy. There is no new soft tissue at the suture line. Right paramediastinal soft tissue and architectural distortion are  again seen. There is a fiducial marker in the anterior right upper lung. Soft tissue   and nodularity adjacent to the right major fissure and other pleural surfaces has increased. A moderate size right pleural effusion was previously small in size. A small left pleural effusion there is minimal round atelectasis in the mild scarring in the   medial left upper lobe.  There is left lung base. . Has not changed in size      MEDIASTINUM/AXILLAE: The right thyroid lobe is small in size or absent. There is heterogeneity of the left thyroid lobe, which is normal in size.  No lymphadenopathy.     CORONARY ARTERY CALCIFICATION: Previous intervention (stents or CABG).     UPPER ABDOMEN: A simple right renal cyst does not require follow-up. Status post cholecystectomy.      MUSCULOSKELETAL: Unremarkable.                                                                         IMPRESSION:   1.  Left lower lobectomy.  2.  Right pleural nodularity and soft tissue has increased from the comparison study, which is concerning for increased malignancy. A moderate sized right pleural effusion has significantly increased in size from the comparison study.

## 2021-12-05 NOTE — ED NOTES
PICC nurse will be sent to floor to place PIV when they are available. IV medications will resume at that time.

## 2021-12-05 NOTE — PHARMACY-ADMISSION MEDICATION HISTORY
Pharmacy Note - Admission Medication History    Pertinent Provider Information: Prednisone ON HOLD while taking dexamethasone, currently taking 2mg of dexamethasone every day, unsure of which day of the cycle he is exactly on and how many are left.      ______________________________________________________________________    Prior To Admission (PTA) med list completed and updated in EMR.       PTA Med List   Medication Sig Note Last Dose     atorvastatin (LIPITOR) 40 MG tablet Take 1 tablet (40 mg) by mouth daily  12/4/2021 at Unknown time     dexamethasone (DECADRON) 4 MG tablet Take 1 tablet (4 mg) by mouth 2 times daily (with meals) Take 6 mg BID day one, Take 4 mg BID day 2-7, Take 4 mg daily day 8-14, Take 2 mg daily day 15-20 12/5/2021: Currently taking 2mg as of 12/5/21 12/4/2021 at Unknown time     levothyroxine (SYNTHROID/LEVOTHROID) 112 MCG tablet Take 1 tablet (112 mcg) by mouth daily  12/4/2021 at Unknown time     metoprolol succinate ER (TOPROL-XL) 25 MG 24 hr tablet Take 0.5 tablets (12.5 mg) by mouth daily  12/4/2021 at Unknown time     pantoprazole (PROTONIX) 40 MG EC tablet Take 1 tablet (40 mg) by mouth daily  12/4/2021 at Unknown time     predniSONE (DELTASONE) 2.5 MG tablet Take 2 tablets(5mg) daily 12/5/2021: ON HOLD because on dexamethasone taper More than a month at Unknown time     sodium fluoride dental gel (PREVIDENT) 1.1 % GEL topical gel Apply to affected area daily  Past Week at Unknown time       Information source(s): Patient and CareEverywhere/SureScripts  Method of interview communication: phone    Summary of Changes to PTA Med List  New: N/A  Discontinued: biotin, arnuity ellipta, duplicate metoprolol  Changed: N/A    Patient was asked about OTC/herbal products specifically.  PTA med list reflects this.    In the past week, patient estimated taking medication this percent of the time:  greater than 90%.    Allergies were reviewed, assessed, and updated with the patient.       Patient does not anticipate needing any multi-use medications during admission. Doesn't need fluoride gel while here.     The information provided in this note is only as accurate as the sources available at the time of the update(s).    Thank you for the opportunity to participate in the care of this patient.    Jina Lemus  12/5/2021 8:40 AM

## 2021-12-05 NOTE — PROGRESS NOTES
[facility] Hematology and Oncology Inpatient Progress Note    Patient: Pardeep Wilson  MRN: [id number]  Date of Service: December 5, 2021         Reason for Visit    Lung cancer  ITP    Assessment and Plan    1.  A very pleasant 66 years old gentleman with non-small cell lung cancer.  He had prior surgery.  Followed by adjuvant chemotherapy with cisplatin and Alimta.  Then he also got few doses of Keytruda.  He is being evaluated by radiation oncology.  2.  Recently diagnosed to have increasing size of the right pleural nodule.  Enlarging right pleural effusion.  Pleural effusion has been tapped.  Awaiting cytology.  3.  History of ITP treated with steroids and Nplate.  That seems to be under control.  Steroid dose is being tapered down.  4.  Recently diagnosed to have Covid pneumonia.  He has been admitted for that.  Is in isolation.  Remdesivir and steroids are going to be started.    Cancer Staging  No matching staging information was found for the patient.    ECOG Performance Status: 2    History of Present Illness    Mr. Pardeep Wilson is a very pleasant 66-year-old man with history of non-small cell lung cancer.  He also has a history of ITP for which he has been on steroid.  Came into the emergency room because of progressive shortness of breath which was thought to be secondary to COVID-19 pneumonia.  He contracted COVID-19 sometime in the last few days of November.  His breathing started to get worse couple of days prior to coming into the emergency room.  He also has a large right-sided pleural effusion which is felt that it could be malignant.  In the emergency room orders were placed and he subsequently got the right pleural effusion tapped.    Currently on the floor.  Seems to be comfortable.  He has been started on steroids and remdesivir by ID for his COVID-19 pneumonia.    Review of Systems    Pertinent findings noted in history.    Physical Exam    /59 (BP Location: Left arm, Patient  "Position: Semi-Colon's)   Pulse 81   Temp 97.7  F (36.5  C) (Oral)   Resp 18   Ht 1.753 m (5' 9\")   Wt 82.7 kg (182 lb 6.4 oz)   SpO2 93%   BMI 26.94 kg/m      GENERAL: no acute distress.  Sleepy at the time of my visit with him.  HEENT: Oral mucosa is moist and intact.  RESP:Chest symmetric. Regular respiratory rate. No stridor.  Good oxygen saturation.  ABD: Nondistended, soft.  EXTREMITIES: No lower extremity edema.   NEURO: non focal.  Full exam deferred.  PSYCH: within normal limits. No depression or anxiety.  SKIN: warm dry intact     Lab Results    Recent Results (from the past 24 hour(s))   Comprehensive metabolic panel    Collection Time: 12/05/21  3:51 AM   Result Value Ref Range    Sodium 142 136 - 145 mmol/L    Potassium 4.4 3.5 - 5.0 mmol/L    Chloride 104 98 - 107 mmol/L    Carbon Dioxide (CO2) 25 22 - 31 mmol/L    Anion Gap 13 5 - 18 mmol/L    Urea Nitrogen 29 (H) 8 - 22 mg/dL    Creatinine 1.36 (H) 0.70 - 1.30 mg/dL    Calcium 9.1 8.5 - 10.5 mg/dL    Glucose 101 70 - 125 mg/dL    Alkaline Phosphatase 94 45 - 120 U/L    AST 31 0 - 40 U/L    ALT 19 0 - 45 U/L    Protein Total 6.6 6.0 - 8.0 g/dL    Albumin 3.1 (L) 3.5 - 5.0 g/dL    Bilirubin Total 0.9 0.0 - 1.0 mg/dL    GFR Estimate 54 (L) >60 mL/min/1.73m2   Magnesium    Collection Time: 12/05/21  3:51 AM   Result Value Ref Range    Magnesium 1.3 (L) 1.8 - 2.6 mg/dL   Troponin I    Collection Time: 12/05/21  3:51 AM   Result Value Ref Range    Troponin I 0.06 0.00 - 0.29 ng/mL   CRP inflammation    Collection Time: 12/05/21  3:51 AM   Result Value Ref Range    CRP 6.9 (H) 0.0-<0.8 mg/dL   CK total    Collection Time: 12/05/21  3:51 AM   Result Value Ref Range     30 - 190 U/L   Lactic acid whole blood    Collection Time: 12/05/21  3:52 AM   Result Value Ref Range    Lactic Acid 1.7 0.7 - 2.0 mmol/L   Lactate Dehydrogenase    Collection Time: 12/05/21  3:52 AM   Result Value Ref Range    Lactate Dehydrogenase 264 (H) 125 - 220 U/L   CBC " with platelets and differential    Collection Time: 12/05/21  3:52 AM   Result Value Ref Range    WBC Count 12.3 (H) 4.0 - 11.0 10e3/uL    RBC Count 3.12 (L) 4.40 - 5.90 10e6/uL    Hemoglobin 9.7 (L) 13.3 - 17.7 g/dL    Hematocrit 30.2 (L) 40.0 - 53.0 %    MCV 97 78 - 100 fL    MCH 31.1 26.5 - 33.0 pg    MCHC 32.1 31.5 - 36.5 g/dL    RDW 16.7 (H) 10.0 - 15.0 %    Platelet Count 54 (L) 150 - 450 10e3/uL    % Neutrophils 68 %    % Lymphocytes 25 %    % Monocytes 6 %    % Eosinophils 0 %    % Basophils 0 %    % Immature Granulocytes 1 %    NRBCs per 100 WBC 0 <1 /100    Absolute Neutrophils 8.6 (H) 1.6 - 8.3 10e3/uL    Absolute Lymphocytes 3.0 0.8 - 5.3 10e3/uL    Absolute Monocytes 0.7 0.0 - 1.3 10e3/uL    Absolute Eosinophils 0.0 0.0 - 0.7 10e3/uL    Absolute Basophils 0.0 0.0 - 0.2 10e3/uL    Absolute Immature Granulocytes 0.1 <=0.4 10e3/uL    Absolute NRBCs 0.0 10e3/uL   Procalcitonin    Collection Time: 12/05/21  3:52 AM   Result Value Ref Range    Procalcitonin 0.07 0.00 - 0.49 ng/mL   Blood gas venous    Collection Time: 12/05/21  4:10 AM   Result Value Ref Range    pH Venous 7.39 7.35 - 7.45    pCO2 Venous 51 (H) 35 - 50 mm Hg    pO2 Venous 19 (L) 25 - 47 mm Hg    Bicarbonate Venous 27 24 - 30 mmol/L    Base Excess/Deficit (+/-) 5.9   mmol/L    Oxyhemoglobin Venous 26.3 (L) 70.0 - 75.0 %    O2 Sat, Venous 26.7 (L) 70.0 - 75.0 %   Extra Purple Top Tube    Collection Time: 12/05/21 10:17 AM   Result Value Ref Range    Hold Specimen JIC    INR    Collection Time: 12/05/21 10:35 AM   Result Value Ref Range    INR 0.96 0.85 - 1.15   Protein total    Collection Time: 12/05/21 10:35 AM   Result Value Ref Range    Protein Total 5.8 (L) 6.0 - 8.0 g/dL   Lactate Dehydrogenase    Collection Time: 12/05/21 12:56 PM   Result Value Ref Range    Lactate Dehydrogenase 238 (H) 125 - 220 U/L   Glucose fluid    Collection Time: 12/05/21 12:59 PM   Result Value Ref Range    Glucose fluid 119 mg/dL   Lactate dehydrogenase fluid     Collection Time: 12/05/21 12:59 PM   Result Value Ref Range    Lactate dehydrogenase fluid 119 U/L   Protein fluid    Collection Time: 12/05/21 12:59 PM   Result Value Ref Range    Protein Total Fluid 3.8 g/dL   pH fluid    Collection Time: 12/05/21 12:59 PM   Result Value Ref Range    pH Fluid 8.0 pH   Cell Count Body Fluid    Collection Time: 12/05/21 12:59 PM   Result Value Ref Range    Color Yellow Colorless, Yellow    Clarity Clear Clear, Bloody    Total Nucleated Cells 394 /uL    RBC Count 1,000 /uL   Differential Body Fluid    Collection Time: 12/05/21 12:59 PM   Result Value Ref Range    % Neutrophils 1 %    % Lymphocytes 71 %    % Monocyte/Macrophages 27 %    % Lining Cells 1 %        Imaging    US Thoracentesis    Result Date: 12/5/2021  EXAM: 1. RIGHT THORACENTESIS 2. ULTRASOUND GUIDANCE LOCATION: [facility] DATE/TIME: 12/5/2021 11:57 AM INDICATION: Pleural effusion. PROCEDURE: Informed consent obtained. Time out performed. The chest was prepped and draped in sterile fashion. 10 mL of 1 % lidocaine was infused into the local soft tissues. Under direct ultrasound guidance, a 5 Mongolian catheter system was placed into the pleural effusion. 2 liters of clear yellow fluid were removed and sent to lab, if requested. Patient tolerated procedure well. Ultrasound imaging was obtained and placed in the patient's permanent medical record.     IMPRESSION: Status post right ultrasound-guided thoracentesis.     XR Chest Port 1 View    Result Date: 12/5/2021  EXAM: XR CHEST PORTABLE 1 VIEW LOCATION: [facility] DATE/TIME: 12/05/2021, 4:16 AM INDICATION: Dyspnea/shortness of breath. COMPARISON: Chest CT from 11/24/2021.     IMPRESSION: Heart size and pulmonary vascularity within normal limits. Large right and probable tiny left pleural effusions. Hazy right perihilar and bilateral lower lung infiltrates versus atelectasis. Pneumonitis not excluded and clinical correlation  is needed. Aortic calcification. No significant bony abnormalities.        Signed by: Reji Ann MD, MD    This note has been dictated using voice recognition software. Any grammatical or context distortions are unintentional and inherent to the software

## 2021-12-05 NOTE — PLAN OF CARE
Problem: Adult Inpatient Plan of Care  Goal: Plan of Care Review  Outcome: Improving   Patient arrived from the ED at 0800. NPO, ice chips ok d/t pending thoracentesis. PICC nurse arrived mid morning to put in new IV. Patient went for thoracentesis, tolerated well. Advanced diet to regular, ate 100%.       Problem: Adult Inpatient Plan of Care  Goal: Optimal Comfort and Wellbeing  Outcome: Improving   Patient denies pain or shortness of breath. Patient on 2L O2 via NC, sats low-mid 90's. Alert and oriented x4. Telemetry monitoring = NSR.

## 2021-12-05 NOTE — H&P
Admission History and Physical   Pardeep Wilson,    1955,   [id number]    [facility]   Pleural effusion [J90]    PCP: Dov Morales,    Code status:  No Order       Extended Emergency Contact Information  Primary Emergency Contact: Eliane Wilson  Address: [address] [place] [address] [place]  Home Phone: [phone]  Work Phone: [phone]  Mobile Phone: [phone]  Relation: Spouse       Active Problems:    Pleural effusion    Hypoxia    Infection due to 2019 novel coronavirus       ASSESSMENT AND PLAN:  Covid pneumonia.  Symptom onset 4 days ago.  Diagnosed in outside facility yesterday with Covid.  He is triple vaccinated.  Isolate per protocol.  Begin steroids/remdesivir.  Consult ID for evaluation.    Probable secondary pneumonia.  Noted with leukocytosis.  Check procalcitonin CRP now.  Begin meropenem empirically due to penicillin allergy.  ID to evaluate later today, follow blood culture results    Respiratory failure acute due to above.  Mild hypercarbia noted on blood gas.  Doing well currently on nasal cannula.  BiPAP if he deteriorates.    Large right pleural effusion.  ED scheduled thoracentesis by IR.  Follow results, consult pulmonary if consistent with exudate    History of Hodgkin lymphoma lung cancer.  Completed chemotherapy and half ago.  Follows with oncology.  Oncology consult for evaluation    History of CAD post angioplasty.  No chest pain or EKG changes.  Follow symptoms closely    Hypertension controlled continue home meds parameters placed    Acute kidney injury due to above infection.  Careful hydration.  BMP daily.  Avoid nephrotoxins.  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  Platelet count noted trending downward significantly.  Oncology consult    Full code per his wishes    DVT PPX:  noted with low platelet, however despite high risk for clots due to cancer history and Covid will avoid  Lovenox till seen by oncology-Case discussed with pharmacy      Barriers to discharge respiratory failure    Anticipated Discharge date inpatient more than 2 midnight          Chief Complaint:  Breathing difficulty few days    HPI:  Pardeep Wilsno is a 66 year old old male cough and fever a few days duration.  Also feels generally tired and short of of breath.  He went to outside facility and was diagnosed with Covid pneumonia.  He has not been improving at home therefore came to ED for evaluation.  No diarrhea abdominal pain.  No chest pain.  Work-up showed a large right pleural effusion.  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     Esophageal reflux      Heart disease      History of blood transfusion      Hodgkin's lymphoma (H)     s/p radiation to the mediastinum at age 17     Hyperlipemia      Hypertension      Hypertension      Hypothyroidism     hx nodules     Lung cancer (H)      MI, old 12 years ago, 2008     Thyroid disease           Surgical History  He  has a past surgical history that includes colonoscopy; Cardiac surgery; Abdomen surgery; Thoracoscopic wedge resection lung (Right, 2/28/2020); Splenectomy, Total (1973); Parker Tooth Extraction; Parotidectomy; Coronary Angioplasty; Pr Lap,Diagnostic Abdomen (N/A, 11/7/2017); Eye surgery (Bilateral); Mediastinoscopy (N/A, 11/1/2018); Ct Biopsy Lung; other surgical history (11/01/2018); other surgical history (Right, 02/28/2020); and Ct Biopsy Lung (1/12/2021).      SOCIAL HISTORY:  Social History     Socioeconomic History     Marital status:      Spouse name: Eliane     Number of children: 2     Years of education: 18     Highest education level: Master's degree (e.g., MA, MS, Iain, MEd, MSW, ABAD)   Occupational History     Occupation: Retired   Tobacco Use     Smoking status: Never Smoker  "    Smokeless tobacco: Never Used   Vaping Use     Vaping Use: Never used   Substance and Sexual Activity     Alcohol use: Not Currently     Drug use: No     Sexual activity: Not on file   Other Topics Concern     Parent/sibling w/ CABG, MI or angioplasty before 65F 55M? Not Asked   Social History Narrative     Not on file     Social Determinants of Health     Financial Resource Strain: Not on file   Food Insecurity: Not on file   Transportation Needs: Not on file   Physical Activity: Not on file   Stress: Not on file   Social Connections: Not on file   Intimate Partner Violence: Not on file   Housing Stability: Not on file       FAMILY HISTORY:  Family History   Problem Relation Age of Onset     Dementia Mother      Cancer Father 67.00        Thinks of the diaphragm, history of working with noxious chemicals     Kidney Disease Brother      No Known Problems Maternal Grandmother      No Known Problems Maternal Grandfather      No Known Problems Paternal Grandmother      Heart Disease Paternal Grandfather      No Known Problems Son      No Known Problems Daughter          ALLERGIES:  Allergies   Allergen Reactions     Penicillins Rash       MEDICATIONS:  Pending pharmacy review      ROS:  12 point review of systems reviewed and is negative except as stated above      PHYSICAL EXAM:  BP 97/52   Pulse 114   Temp (!) 100.5  F (38.1  C) (Oral)   Resp 29   Ht 1.753 m (5' 9\")   Wt 83.9 kg (185 lb)   SpO2 91%   BMI 27.32 kg/m      General: Alert and oriented x 3. Not in obvious distress.  HEENT: Pupils equal and reactive,ENT WNL   Neck- supple, No JVP elevation, lymphadenopathy or thyromegaly. Trachea-central.  Chest: Reduced breath sounds on right side  Heart: S1S2 regular. No M/R/G.  Abdomen: Soft. NT, ND. No organomegaly. Bowel sounds- active.  Back: No spine tenderness. No CVA tenderness.  Extremities: No leg swelling. Peripheral pulses 2+ bilaterally.  Neuro: No focal neurological deficit  Skin: no skin rashes "     DIAGNOSTIC DATA:      EKG Results: Personally reviewed        Advanced Care Planning       Sinan Armas MD

## 2021-12-05 NOTE — ED NOTES
Patient's IV infiltrated. He wants PICC to put in IV. PICC paged by [facility] to place new IV.

## 2021-12-06 NOTE — PLAN OF CARE
Problem: Adult Inpatient Plan of Care  Goal: Plan of Care Review  Outcome: Improving   POC discussed, pt verbalizes understanding. Pt alert and orientated X4. Up indep in room. Oxygen weaned down to RA. Sats low 90s on room air. Pt does desat with some activity into high 90s. LSCTA, has occasional congested cough. Pt states he feels well enough to go home. Pulmonary toilet encouarged.  Pt continues on IV abx.

## 2021-12-06 NOTE — PROGRESS NOTES
A/P    COVID-19 viral pneumonia: superimposed bacterial pneumonia not excluded. Onset of symptoms x1 week prior to admission. PCR + 12/2.  Received monoclonal antibody infusion on 12/4/2021.   -Remdesivir  -Dexamethasone 6mg daily  -IV meropenem per ID  -precautions  -I.S.  -    Acute hypoxic respiratory failure: resolved post thoracentesis    FAIZA: resolved    Large right-sided pleural effusion:  S/p thoracentesis 12/5 with 2L yellow fluid removed.  Exudate based on light criteria.  Cultures/cytology pending.    History of non-small cell lung cancer status post SBRT completed 2/12/2021.  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    H/O ITP w/ acute thrombocytopenia: plt improving 54-->83.  Prveiously platelet count had remained in normal range for several months.    Full code     Lovenox    Discharge when OK from ID standpoint        S:  Afebrile. No acute events overnight    O:  Temp: 97.6  F (36.4  C) Temp src: Oral BP: 106/55 Pulse: 71   Resp: 20 SpO2: 96 % O2 Device: None (Room air) Oxygen Delivery: 1 LPM  gen nad  cv rrr  Lungs decreased bases, non labored  abd bs+, nttp    Lab/imaging reviewed

## 2021-12-06 NOTE — PLAN OF CARE
Physical Therapy Discharge Summary    Reason for therapy discharge:    All goals and outcomes met, no further needs identified.    Progress towards therapy goal(s). See goals on Care Plan in [facility] electronic health record for goal details.  Goals met    Therapy recommendation(s):    No further therapy is recommended.    Sue Brewer, PT  12/6/2021

## 2021-12-06 NOTE — PROGRESS NOTES
Infectious Diseases Progress Note  [facility]    Date of visit: 12/06/2021       Assessment:  Pardeep Wilson is a 66 year old old male with   1. Remote history of stage I right axillary Hodgkin lymphoma status post mantel field RT and splenectomy in 1973.  2. History of non-small cell lung cancer status post SBRT completed 2/12/2021. Previously on maintenance therapy with Keytruda.  3. Right-sided pleural effusion. Was supposed to have thoracentesis on 2021-12-08  4. COVID-19 vaccinated on 8/20/2021, 9/10/2021, and 11/5/2021.  5. COVID-19 infection. Onset of symptoms x1 week prior to admission. Received monoclonal antibody infusion on 12/4/2021. 6. Acute hypoxic respiratory failure. Presumed secondary to COVID-19 infection. Large right-sided effusion could be infected. On dexamethasone plus remdesivir plus meropenem. We'll continue same for now. Thoracentesis on 12/5, 2 L removed. Borderline exudate based on protein levels. Cultures pending      Recommendations:   Continue IV meropenem; narrow/discontinue based on cultures tomorrow  Repeat procalcitonin   Continue remdesivir and dexamethasone, reassess based on oxygen needs  Repeat chest x-ray in am    Rodolfo Moffett MD  [facility]  Direct messaging: [employer] Paging  On-Call ID provider: [phone], option: 9    ===========================    SUBJECTIVE / INTERVAL HISTORY:     No events. Feeling better after thora. Removed his oxygen this afternoon. sats >92% at rest, worse with activity.       Review of Systems     No fever today. No rashes         Physical Exam:  Temp:  [97.4  F (36.3  C)-98.1  F (36.7  C)] 97.6  F (36.4  C)  Pulse:  [71-81] 71  Resp:  [18-24] 20  BP: ()/(53-71) 106/55  SpO2:  [92 %-96 %] 96 %       Gen: Pleasant in no acute distress.  Cachectic.  HEENT: NCAT. EOMI.   Lungs: crackles bilaterally, with decrease breath sounds at right base  Card: RRR.   Abd: Soft NT ND. No mass. Normal bowel  sounds.  Skin: No rash.  Extr: No edema.  Neuro: Alert and oriented to place time and person. Cranial nerves II to XII intact. Motor and sensory intact.     ============================  12/5 pleural fluid: no organisms; culture pending    Pertinent Labs:     Recent Labs   Lab 12/06/21 0534 12/05/21  0352   WBC 16.1* 12.3*   HGB 13.7 9.7*   PLT 83* 54*       Recent Labs   Lab 12/06/21 0534 12/05/21  0351    142   CO2 23 25   BUN 42* 29*   ALBUMIN 2.5* 3.1*   ALKPHOS 88 94   ALT 18 19   AST 25 31       Recent Labs   Lab 12/06/21 0534 12/05/21 0351   CRP 14.9* 6.9*           Imaging:     US Thoracentesis    Result Date: 12/5/2021  EXAM: 1. RIGHT THORACENTESIS 2. ULTRASOUND GUIDANCE LOCATION: [facility] DATE/TIME: 12/5/2021 11:57 AM INDICATION: Pleural effusion. PROCEDURE: Informed consent obtained. Time out performed. The chest was prepped and draped in sterile fashion. 10 mL of 1 % lidocaine was infused into the local soft tissues. Under direct ultrasound guidance, a 5 [place] catheter system was placed into the pleural effusion. 2 liters of clear yellow fluid were removed and sent to lab, if requested. Patient tolerated procedure well. Ultrasound imaging was obtained and placed in the patient's permanent medical record.     IMPRESSION: Status post right ultrasound-guided thoracentesis.     XR Chest Port 1 View    Result Date: 12/5/2021  EXAM: XR CHEST PORTABLE 1 VIEW LOCATION: [facility] DATE/TIME: 12/05/2021, 4:16 AM INDICATION: Dyspnea/shortness of breath. COMPARISON: Chest CT from 11/24/2021.     IMPRESSION: Heart size and pulmonary vascularity within normal limits. Large right and probable tiny left pleural effusions. Hazy right perihilar and bilateral lower lung infiltrates versus atelectasis. Pneumonitis not excluded and clinical correlation is needed. Aortic calcification. No significant bony abnormalities.         Data reviewed today: I reviewed all  medications, new labs and imaging results over the last 24 hours. I personally reviewed no images or EKG's today.  The patient's care was discussed with the Patient.

## 2021-12-06 NOTE — PROGRESS NOTES
12/06/21 0830   Quick Adds   Type of Visit Initial PT Evaluation   Living Environment   People in home spouse   Current Living Arrangements house   Home Accessibility stairs within home   Number of Stairs, Within Home, Primary greater than 10 stairs   Stair Railings, Within Home, Primary railings safe and in good condition   Self-Care   Regular Exercise Yes   Activity/Exercise Type walking   Exercise Amount/Frequency 3-5 times/wk   Equipment Currently Used at Home none   Disability/Function   Walking or Climbing Stairs Difficulty no   Dressing/Bathing Difficulty no   Toileting issues no   Doing Errands Independently Difficulty (such as shopping) no   Fall history within last six months no   Change in Functional Status Since Onset of Current Illness/Injury yes   General Information   Onset of Illness/Injury or Date of Surgery 12/05/21   Referring Physician Bruce Márquez DO    Patient/Family Therapy Goals Statement (PT) None stated.   Pertinent History of Current Problem (include personal factors and/or comorbidities that impact the POC) Pt admitted with COVID, pleural effusion.   Existing Precautions/Restrictions other (see comments)  (special precautions)   Strength   Manual Muscle Testing Quick Adds No deficits observed during functional mobility   Bed Mobility   Bed Mobility supine-sit   Supine-Sit [place] (Bed Mobility) modified independence   Assistive Device (Bed Mobility) bed rails   Transfers   Transfers sit-stand transfer   Sit-Stand Transfer   Sit-Stand [place] (Transfers) independent   Gait/Stairs (Locomotion)   [place] Level (Gait) supervision   Distance in Feet (Required for LE Total Joints) 72'   Pattern (Gait) step-through   Clinical Impression   Criteria for Skilled Therapeutic Intervention yes, treatment indicated   PT Diagnosis (PT) impaired functional mobility   Influenced by the following impairments decreased endurance   Functional limitations due to impairments  difficulty with ambulation   Clinical Presentation Stable/Uncomplicated   Clinical Presentation Rationale Pt presents as medically diagnosed.   Clinical Decision Making (Complexity) low complexity   Therapy Frequency (PT) One time eval and treatment only   Predicted Duration of Therapy Intervention (days/wks) 1 day   Planned Therapy Interventions (PT) balance training;bed mobility training;gait training;home exercise program;progressive activity/exercise   Risk & Benefits of therapy have been explained evaluation/treatment results reviewed;participants voiced agreement with care plan;participants included;patient   PT Discharge Planning    PT Discharge Recommendation (DC Rec) home with assist   PT Rationale for DC Rec Pt moving well with supervision. Pt provided information for walking program. No further skilled inpatient PT needs.   Total Evaluation Time   Total Evaluation Time (Minutes) 10     Sue Brewer, PT  12/6/2021

## 2021-12-06 NOTE — PLAN OF CARE
Patient is pleasant and cooperative. Denied pain. Good food and fluid intake. BRPs. Sepsis protocol fired at bedtime for respiration 21 and wbc of 12.3. Vital signs x 2 completed. Lab to draw lactic acid level.  Problem: Adult Inpatient Plan of Care  Goal: Optimal Comfort and Wellbeing  Outcome: Improving     Problem: Gas Exchange Impaired  Goal: Optimal Gas Exchange  Outcome: Improving  Intervention: Optimize Oxygenation and Ventilation  Recent Flowsheet Documentation  Taken 12/5/2021 2100 by Maci Bob, RN  Head of Bed (HOB) Positioning: HOB at 20-30 degrees

## 2021-12-07 NOTE — PROGRESS NOTES
Discharge instructions reviewed with patient.  Appt made with PCP for 12/13.    No further questions or concerns.    Ricarda Gutierrez RN

## 2021-12-07 NOTE — TELEPHONE ENCOUNTER
Patient calls back in and states that he was discharged today.  He is at home and states that he is in quarantine until 12/11/21.  He wants to know how to proceed and what treatment plan may be.  I let him know that the cytology testing is still in process and Dr Soto will need that back before deciding on any sort of treatment plan.  I told Pardeep that I will get this update over to Dr Soto and LO Silva care coordinator.    Sylvia Su RN

## 2021-12-07 NOTE — PROGRESS NOTES
Infectious Diseases Progress Note  [facility]    Date of visit: 12/07/2021       Assessment:  Pardeep Wilson is a 66 year old old male with   1. Remote history of stage I right axillary Hodgkin lymphoma status post mantel field RT and splenectomy in 1973.  2. History of non-small cell lung cancer status post SBRT completed 2/12/2021. Previously on maintenance therapy with Keytruda.  3. Right-sided pleural effusion. Was supposed to have thoracentesis on 2021-12-08  4. COVID-19 vaccinated on 8/20/2021, 9/10/2021, and 11/5/2021.  5. COVID-19 infection. Onset of symptoms x1 week prior to admission. Received monoclonal antibody infusion on 12/4/2021. 6. Acute hypoxic respiratory failure. Presumed secondary to COVID-19 infection. Large right-sided effusion could be infected; cultures negative to date. On dexamethasone plus remdesivir plus meropenem. Thoracentesis on 12/5, 2 L removed. Borderline exudate based on protein levels.       Recommendations:   Discontinue IV meropenem  Discontinue remdesivir and dexamethasone    Okay to discharge from ID perspective.     Rodolfo Moffett MD  [facility]  Direct messaging: [employer] Paging  On-Call ID provider: [phone], option: 9    ===========================    SUBJECTIVE / INTERVAL HISTORY:     No events. Not needing oxygen since yesterday. Denies any shortness of breath. Repeat chest x-ray with improvement in effusion, no infiltrate.    Review of Systems     No fever today. No rashes         Physical Exam:  Temp:  [97.5  F (36.4  C)-98.6  F (37  C)] 97.7  F (36.5  C)  Pulse:  [66-92] 69  Resp:  [18-22] 22  BP: (104-126)/(55-70) 104/57  SpO2:  [92 %-96 %] 96 %     Gen: Pleasant in no acute distress.  Cachectic.  HEENT: NCAT. EOMI.   Lungs: decreased breath sounds at right base  Card: RRR.   Abd: Soft NT ND. No mass. Normal bowel sounds.  Skin: No rash.  Extr: No edema.  Neuro: Alert and oriented to place time and person. Cranial nerves II  to XII intact. Motor and sensory intact.     ============================  12/5 pleural fluid: no organisms; culture no growth to date     Pertinent Labs:     Recent Labs   Lab 12/07/21 0603 12/06/21 0534 12/05/21  0352   WBC 22.4* 16.1* 12.3*   HGB 13.3 13.7 9.7*   * 83* 54*       Recent Labs   Lab 12/07/21 0603 12/06/21  0534 12/05/21  0351    140 142   CO2 21* 23 25   BUN 39* 42* 29*   ALBUMIN  --  2.5* 3.1*   ALKPHOS  --  88 94   ALT  --  18 19   AST  --  25 31       Recent Labs   Lab 12/07/21 0603 12/06/21 0534 12/05/21  0351   CRP 7.1* 14.9* 6.9*           Imaging:     US Thoracentesis    Result Date: 12/5/2021  EXAM: 1. RIGHT THORACENTESIS 2. ULTRASOUND GUIDANCE LOCATION: [facility] DATE/TIME: 12/5/2021 11:57 AM INDICATION: Pleural effusion. PROCEDURE: Informed consent obtained. Time out performed. The chest was prepped and draped in sterile fashion. 10 mL of 1 % lidocaine was infused into the local soft tissues. Under direct ultrasound guidance, a 5 [place] catheter system was placed into the pleural effusion. 2 liters of clear yellow fluid were removed and sent to lab, if requested. Patient tolerated procedure well. Ultrasound imaging was obtained and placed in the patient's permanent medical record.     IMPRESSION: Status post right ultrasound-guided thoracentesis.     XR Chest Port 1 View    Result Date: 12/5/2021  EXAM: XR CHEST PORTABLE 1 VIEW LOCATION: [facility] DATE/TIME: 12/05/2021, 4:16 AM INDICATION: Dyspnea/shortness of breath. COMPARISON: Chest CT from 11/24/2021.     IMPRESSION: Heart size and pulmonary vascularity within normal limits. Large right and probable tiny left pleural effusions. Hazy right perihilar and bilateral lower lung infiltrates versus atelectasis. Pneumonitis not excluded and clinical correlation is needed. Aortic calcification. No significant bony abnormalities.         Data reviewed today: I reviewed all  medications, new labs and imaging results over the last 24 hours. I personally reviewed the chest x-ray image(s) showing improved effusion.  The patient's care was discussed with the Patient.

## 2021-12-07 NOTE — TELEPHONE ENCOUNTER
Patient calls in today and leaves message on the nurse triage line stating that he is in Covid quarantine and has an upcoming appointment with Dr Soto on 12/9.  He wants to know whether or not he is to keep that appointment as in the office or if this should be changed to virtual.  Patient did have a thoracentesis on 12/5 while he was in the hospital and this was sent for testing.  This information is being sent over to Dr Soto as well as the RN care coordinator Shira.  Patient asked that he be called back as soon as we have an update for him.  From what I can see, patient is still in the hospital.    Sylvia Su, LO

## 2021-12-07 NOTE — DISCHARGE SUMMARY
[facility] MEDICINE  DISCHARGE SUMMARY     Primary Care Physician: Dov Morales  Admission Date: 12/5/2021   Discharge Provider: Bruce Márquez DO,  Discharge Date: 12/7/2021   Diet:   Active Diet and Nourishment Order   Procedures     Regular Diet Adult     Diet       Code Status: Full Code   Activity: as dion        Condition at Discharge: Stable     REASON FOR PRESENTATION(See Admission Note for Details)   Refer to h&p    PRINCIPAL & ACTIVE DISCHARGE DIAGNOSES     Active Problems:    Pleural effusion    Hypoxia    Infection due to 2019 novel coronavirus      PENDING LABS     Unresulted Labs Ordered in the Past 30 Days of this Admission     Date and Time Order Name Status Description    12/5/2021 12:58 PM Acid-Fast Bacilli Culture and Stain In process     12/5/2021  9:22 AM Cytology, non-gynecologic In process     12/5/2021  9:22 AM Acid-Fast Bacilli Culture and Stain In process     12/5/2021  9:22 AM Pleural fluid Aerobic Bacterial Culture Routine with Gram Stain Preliminary     12/5/2021  2:38 AM Blood Culture Line, venous Preliminary     12/5/2021  2:38 AM Blood Culture Line, venous Preliminary             PROCEDURES ( this hospitalization only)          RECOMMENDATIONS TO OUTPATIENT PROVIDER FOR F/U VISIT     Follow-up Appointments     Follow-up and recommended labs and tests       Follow up with primary care provider, Dov Morales, within 7 days   for hospital follow- up.  No follow up labs or test are needed.    Dr. Soto, Oncology, as advised/scheduled                 DISPOSITION     Home    SUMMARY OF HOSPITAL COURSE:    COVID-19 viral pneumonia: superimposed bacterial pneumonia not excluded but bacterial cultures remain negative. Onset of symptoms x1 week prior to admission. PCR + 12/2.  Received monoclonal antibody infusion on 12/4/2021.   -Remdesivir stopped by ID on discharge  -continues on Dexamethasone chronic taper off on d/c  -abx's  stopped by ID  -I.S.  -patient ambulatory.  D/w Dr. Soto and no prophylactic anticoagulation recommended by him on discharge due to patient's hematological issues and that patient at his baseline ambulatory status.     Acute hypoxic respiratory failure: 2/2 large right pleural effusion.  resolved post thoracentesis     FAIZA: resolved     Large right-sided pleural effusion:  S/p thoracentesis 12/5 with 2L yellow fluid removed.  Exudate based on light criteria.  Cultures negative. Cytology pending and Dr. Soto will f/u on this next week when he sees patient in follow-up.     History of non-small cell lung cancer status post SBRT completed 2/12/2021.  Previously on maintenance therapy with Keytruda.  Follows with Dr. Soto.  D/W him on day of discharge.     History of stage I right axillary Hodgkin lymphoma status post [facility] field RT and splenectomy in 1973.     H/O ITP w/ acute thrombocytopenia: plt improving 54-->83-->136.  Prveiously platelet count had remained in normal range for several months.    ADDENDUM (12/8/21)  -Hypomagnesemia, replaced        Discharge Medications with Med changes:     Current Discharge Medication List      CONTINUE these medications which have NOT CHANGED    Details   atorvastatin (LIPITOR) 40 MG tablet Take 1 tablet (40 mg) by mouth daily  Qty: 90 tablet, Refills: 3    Associated Diagnoses: Coronary artery disease involving native coronary artery of native heart without angina pectoris      dexamethasone (DECADRON) 4 MG tablet Take 1 tablet (4 mg) by mouth 2 times daily (with meals) Take 6 mg BID day one, Take 4 mg BID day 2-7, Take 4 mg daily day 8-14, Take 2 mg daily day 15-20  Qty: 30 tablet, Refills: 1    Associated Diagnoses: Malignant neoplasm of upper lobe of right lung (H)      levothyroxine (SYNTHROID/LEVOTHROID) 112 MCG tablet Take 1 tablet (112 mcg) by mouth daily  Qty: 90 tablet, Refills: 3    Associated Diagnoses: Hypothyroidism, unspecified type      metoprolol  succinate ER (TOPROL-XL) 25 MG 24 hr tablet Take 0.5 tablets (12.5 mg) by mouth daily  Qty: 90 tablet, Refills: 3    Associated Diagnoses: Coronary artery disease involving native coronary artery of native heart without angina pectoris      pantoprazole (PROTONIX) 40 MG EC tablet Take 1 tablet (40 mg) by mouth daily  Qty: 90 tablet, Refills: 1    Associated Diagnoses: Gastroesophageal reflux disease with esophagitis without hemorrhage      sodium fluoride dental gel (PREVIDENT) 1.1 % GEL topical gel Apply to affected area daily  Qty: 100 mL, Refills: 3    Associated Diagnoses: Malignant neoplasm of upper lobe of right lung (H)         STOP taking these medications       predniSONE (DELTASONE) 2.5 MG tablet Comments:   Reason for Stopping:                     Rationale for medication changes:              Consults       HEMATOLOGY & ONCOLOGY IP CONSULT  INFECTIOUS DISEASES IP CONSULT  PHYSICAL THERAPY ADULT IP CONSULT  OCCUPATIONAL THERAPY ADULT IP CONSULT    Immunizations given this encounter     Most Recent Immunizations   Administered Date(s) Administered     COVID-19,PF,Pfizer (12+ Yrs) 11/05/2021     Flu, Unspecified 09/26/2012     Influenza Quad, Recombinant, pf(RIV4) (Flublok) 09/28/2018     Influenza Vaccine IM > 6 months Valent IIV4 (Alfuria,Fluzone) 10/28/2019     Influenza Vaccine, 6+MO IM (QUADRIVALENT W/PRESERVATIVES) 09/14/2016     Influenza, Quad, High Dose, Pf, 65yr+ (Fluzone HD) 11/05/2021     Pneumococcal 23 valent 10/06/2017           Anticoagulation Information          SIGNIFICANT IMAGING FINDINGS     Results for orders placed or performed during the hospital encounter of 12/05/21   XR Chest Port 1 View    Impression    IMPRESSION: Heart size and pulmonary vascularity within normal limits. Large right and probable tiny left pleural effusions. Hazy right perihilar and bilateral lower lung infiltrates versus atelectasis. Pneumonitis not excluded and clinical correlation   is needed. Aortic  calcification. No significant bony abnormalities.     US Thoracentesis    Impression    IMPRESSION:  Status post right ultrasound-guided thoracentesis.     XR Chest Port 1 View    Impression    IMPRESSION: Small right pleural effusion decrease since 12/05/2021. Tiny left pleural effusion unchanged. Minimal interstitial prominence in both lung bases greater on the left unchanged.       SIGNIFICANT LABORATORY FINDINGS         Discharge Orders        COVID-19 GetWell Loop Referral      Reason for your hospital stay    Pleural effusion     Follow-up and recommended labs and tests     Follow up with primary care provider, Dov Morales, within 7 days for hospital follow- up.  No follow up labs or test are needed.    Dr. Soto, Oncology, as advised/scheduled     Activity    Your activity upon discharge: activity as tolerated     Discharge - Quarantine/Isolation Instruction    Date of symptom onset:     Date of first positive test:    If you tested positive COVID-19 and show symptoms (fever, cough, body aches or trouble breathing):        Stay home and away from others (self-isolate) until:        At least 10 days have passed since your symptoms started. AND...        You've had no fever-and no medicine that reduces fever for 1 full day (24 hours). AND...         Your other symptoms have resolved (gotten better).  If you tested positive for COVID-19 but don't show symptoms:       Stay home and away from others (self-isolate) until at least 10 days have passed since the date of your first positive COVID-19 test.     Diet    Follow this diet upon discharge: Orders Placed This Encounter      Regular Diet Adult       Examination   Physical Exam   Temp:  [97.5  F (36.4  C)-98.6  F (37  C)] 97.7  F (36.5  C)  Pulse:  [66-92] 69  Resp:  [18-22] 22  BP: (104-126)/(57-70) 104/57  SpO2:  [92 %-96 %] 96 %  Wt Readings from Last 1 Encounters:   12/05/21 82.7 kg (182 lb 6.4 oz)   gen nad  cv rrr  Lungs decreased bases, non  labored  abd bs+, nttp  Neuro nonfocal    Total unit/floor time 31minutes.  Time consisted of examination of patient, reviewing the record, lab results, imaging results, completing documentation.  Coordination of care 20minutes discussing  with nursing, care management teams, and Physicians involved directly in the care of this patient.  Counseling time 11minutes consisted of discharge planning.      Bruce Márquez DO, DO  [facility]    CC:Dov Morales

## 2021-12-07 NOTE — PLAN OF CARE
Problem: Adult Inpatient Plan of Care  Goal: Patient-Specific Goal (Individualized)  Outcome: No Change     Problem: Gas Exchange Impaired  Goal: Optimal Gas Exchange  Outcome: No Change     Uneventful night; stated he slept well. Continues on IV antibiotic. No adverse reactions. SpO2 92-93% room air. Denied pain. Asked writer to remove continuous pulse oximeter and stated he was told by previous shift it was ok. However, he told staff he wanted to keep it on for awhile longer as he liked to see where his numbers were. Writer informed patient orders still in place and discontinuation of orders to be determined by MD.

## 2021-12-08 NOTE — TELEPHONE ENCOUNTER
Per Dr Soto, we will do a virtual/video visit with the patient tomorrow.  This was called to patient who verbalized understanding.  I have asked the IC's to change this in the system.  Patient is aware that someone will call him around 815am prior to him meeting with Dr Soto.    Sylvia Su RN

## 2021-12-09 NOTE — PROGRESS NOTES
"Video visit Using My Chart -   Oncology Rooming Note    December 9, 2021 8:16 AM   Pardeep Wilson is a 66 year old male who presents for:    Chief Complaint   Patient presents with     Hematology     2 month return Idiopathic thrombocytopenic purpura, labs cancelled by Juanita      Initial Vitals: There were no vitals taken for this visit. Estimated body mass index is 26.94 kg/m  as calculated from the following:    Height as of 12/5/21: 1.753 m (5' 9\").    Weight as of 12/5/21: 82.7 kg (182 lb 6.4 oz). There is no height or weight on file to calculate BSA.  Data Unavailable Comment: Data Unavailable   No LMP for male patient.  Allergies reviewed: Yes  Medications reviewed: Yes    Medications: Medication refills not needed today.  Pharmacy name entered into [employer]:      [id number] [place], [place] - [address]    Clinical concerns: 2 month return Idiopathic thrombocytopenic purpura, labs cancelled by Juanita. Hospital follow up.       Migdalia Francis CMA              "

## 2021-12-09 NOTE — PROGRESS NOTES
[facility] Hematology and Oncology Progress Note    Patient: Pardeep Wilson  MRN: [id number]  Date of Service: Dec 9, 2021         Reason for Visit    Chief Complaint   Patient presents with     Hematology     2 month return Idiopathic thrombocytopenic purpura, labs cancelled by Juanita        Assessment and Plan      Relapsed lung adenocarcinoma with malignant right pleural effusion diagnosed in December 2021  COVID-19 infection with hospitalization early December 2021  Recurrent and metastatic mucinous lung adenocarcinoma, status post wedge resection, February 28, 2020  T4 N0 M0, stage III a mucinous left lung adenocarcinoma status post left lower lobe resection on November 1, 2018  Tumor 9 cm with 3.5 cm nodule, grade 2 out of 4 mucinous adenocarcinoma  Tumor is PDL 1-, no EGFR mutation.  No rearrangement of ALK, evaluation on the Alchemist trial  Tumor negative for ALK, ROS 1, RET, NTRK1, MET e14, EGFR MUTATIONS, April 2020  Remote history of stage I a right axillary Hodgkin's disease status post mantle field radiation and splenectomy about 45 years ago  Right parotid cancer with lymph node involvement status post surgery and adjuvant radiation for 6 weeks in 1991  Coronary artery disease  Elevated BUN and creatinine  New right lung pulmonary nodules   Left pleural effusion/enhancement, 4/2020  Thrombocytopenia, ITP, February 2021  Diarrhea related to Keytruda  Left facial swelling, resolved      Pathology is reviewed with Dr. Hu and is consistent with lung adenocarcinoma involving right sided malignant pleural effusion.    Reviewed situation in detail with the patient and his wife.  We will get PET scan and MRI of the brain for additional staging.    Discussed at that the primary treatment would be systemic and we could consider chemotherapy such as combination of Taxotere and ramucirumab.    Explained that he has now stage IV disease and treatment is likely with a palliative intent.    Discussed  prognosis and median survival of 1 to 2 years.  Patient may do better than average because of long intervals between recurrences of his cancer.    Patient is distraught at the [facility].    His breathing has improved after thoracentesis.  We will place standing order for therapeutic thoracentesis weekly as needed.    Platelet counts have been under control.    Encouraged him to work on recovery from the COVID-19 and hopefully we can start systemic therapy in 2 to 3 weeks from now.    Discussed potential of pleurodesis to manage the effusion.    40 minutes spent.    Plan: We will schedule PET scan and MRI  Place order for therapeutic thoracentesis weekly as needed  Follow-up after imaging to discuss systemic therapy    Current therapy: Observation         Decrease prednisone to 5 mg p.o. daily beginning October 14, 2021        Decrease prednisone to 7.5 mg p.o. daily beginning July 19, 2021        Patient tapered from 20 mg to 5 mg over the previous 3 weeks beginning in June 2021, prednisone dose increased to 10 mg daily because of drop in platelet count     Prednisone most recent dose was 5 mg every other day for the last 2 weeks      Prednisone, 5 mg daily beginning May 24, 2021  Decrease to 10 mg p.o. daily, May 17, 2021   decreased to 20 mg p.o. daily April 26   40 mg p.o. daily, beginning April 21  Recent course 60 mg daily started April 12, 2021     Started 60 mg p.o. daily, through 12/20/2021        Treatment history:     Stereotactic radiation therapy to lung nodules completed February 2021     Nplate single dose around April 12, 2021     Prednisone 60 mg p.o. daily started March 12 with a taper of 10 mg/week     Nplate started February 15, 2021 through March 1, additional dose on March 15     Rituxan x4 doses on February 17 and February 22, 2021 and March 15 and March 22        IVIG 1 g/kg's x2 doses on February 10 and February 12, 2021     Keytruda maintenance every 3 weeks,First dose September 8, 2020  Last  dose December 23, 2020, 400 mg         Carboplatin, Taxol and Keytruda, 4 cycles, last August 18, 2020  Treatment started Collette 15, 2020     Wedge resection of right upper lobe nodule, February 28, 2020     Cisplatin and Alimta adjuvant chemotherapy, for 4 cycles: Day 1 cycle 4, February 15, 2019  First cycle  started December 14, 2018     Patient underwent mediastinoscopy, bronchoscopy, thoracoscopic surgery and left lower lobectomy on November 1, 2018       ECOG Performance    1 - Can't do physically strenuous work, but fully ambyulatory and can do light sedentary work     Pain                 Encounter Diagnoses:    Problem List Items Addressed This Visit        Respiratory    Malignant neoplasm of upper lobe of right lung (H) - Primary    Relevant Orders    Check Out Appointment Request    MR Brain w/o & w Contrast    PET Oncology (Eyes to Thighs)    US Thoracentesis       Immune    Idiopathic thrombocytopenic purpura (H)             CC: Dov Morales, DO   ______________________________________________________________________________    History of Present Illness    Mr. Pardeep Wilson is seen by video visit.  Recently hospitalized with increasing shortness of breath and diagnosis of COVID-19 infection.  Had thoracentesis.  Breathing is much improved.  Still quite fatigued but is overall improving.    Discussed finding of malignant adenocarcinoma involving the right pleural effusion.        Review of systems.  No fever or night sweats.  No loss of weight.  No lumps or bumps anywhere.  No unusual headaches or eyesight issues.  No dizziness.  No bleeding from the nose.  No sores in the mouth. No problems with swallowing.  No chest pain. No shortness of breath. No cough.  No abdominal pain. No nausea or vomiting.  No diarrhea or constipation.  No blood in stool or black colored stools.  No problems passing urine.  No numbness or tingling in hands or feet.  No skin rashes.  A 14 point review of systems is  otherwise negative.        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     Esophageal reflux      Heart disease      History of blood transfusion      Hodgkin's lymphoma (H)     s/p radiation to the mediastinum at age 17     Hyperlipemia      Hypertension      Hypertension      Hypothyroidism     hx nodules     Lung cancer (H)      MI, old 12 years ago, 2008     Thyroid disease        Past Surgical History:   Procedure Laterality Date     ABDOMEN SURGERY      SPLENECTOMY     CARDIAC SURGERY      STENT     COLONOSCOPY       CORONARY ANGIOPLASTY      Stent Placement     CT BIOPSY LUNG       CT BIOPSY LUNG  1/12/2021     EYE SURGERY Bilateral     Cataracts     MEDIASTINOSCOPY N/A 11/1/2018    Procedure: MEDIASTINOSCOPY;  Surgeon: Bry Saunders MD;  Location: [facility];  Service:      OTHER SURGICAL HISTORY  11/01/2018    left lower lobe lobectomy      OTHER SURGICAL HISTORY Right 02/28/2020    wedge resection     PAROTIDECTOMY       ID LAP,DIAGNOSTIC ABDOMEN N/A 11/7/2017    Procedure: DIAGNOSTIC LAPAROSCOPY,  LYSIS OF ADHESIONS, SMALL BOWEL RESECTION;  Surgeon: Brian Granados MD;  Location: [facility] OR;  Service: General     SPLENECTOMY, TOTAL  1973     THORACOSCOPIC WEDGE RESECTION LUNG Right 2/28/2020    Procedure: RIGHT THORACOSCOPIC WEDGE RESECTION;  Surgeon: Bry Saunders MD;  Location: [facility] OR     WISDOM TOOTH EXTRACTION           Physical Exam    There were no vitals taken for this visit.      GENERAL: Alert and oriented to time place and person.  He appears in no distress.  He does look chronically ill.            Lab Results    Recent Results (from the past 168 hour(s))   Comprehensive metabolic panel   Result Value Ref Range    Sodium 142 136 - 145 mmol/L    Potassium 4.4 3.5 - 5.0 mmol/L    Chloride 104 98 - 107 mmol/L    Carbon Dioxide (CO2) 25 22 - 31  mmol/L    Anion Gap 13 5 - 18 mmol/L    Urea Nitrogen 29 (H) 8 - 22 mg/dL    Creatinine 1.36 (H) 0.70 - 1.30 mg/dL    Calcium 9.1 8.5 - 10.5 mg/dL    Glucose 101 70 - 125 mg/dL    Alkaline Phosphatase 94 45 - 120 U/L    AST 31 0 - 40 U/L    ALT 19 0 - 45 U/L    Protein Total 6.6 6.0 - 8.0 g/dL    Albumin 3.1 (L) 3.5 - 5.0 g/dL    Bilirubin Total 0.9 0.0 - 1.0 mg/dL    GFR Estimate 54 (L) >60 mL/min/1.73m2   Magnesium   Result Value Ref Range    Magnesium 1.3 (L) 1.8 - 2.6 mg/dL   Troponin I   Result Value Ref Range    Troponin I 0.06 0.00 - 0.29 ng/mL   CRP inflammation   Result Value Ref Range    CRP 6.9 (H) 0.0-<0.8 mg/dL   CK total   Result Value Ref Range     30 - 190 U/L   Lactic acid whole blood   Result Value Ref Range    Lactic Acid 1.7 0.7 - 2.0 mmol/L   Lactate Dehydrogenase   Result Value Ref Range    Lactate Dehydrogenase 264 (H) 125 - 220 U/L   CBC with platelets and differential   Result Value Ref Range    WBC Count 12.3 (H) 4.0 - 11.0 10e3/uL    RBC Count 3.12 (L) 4.40 - 5.90 10e6/uL    Hemoglobin 9.7 (L) 13.3 - 17.7 g/dL    Hematocrit 30.2 (L) 40.0 - 53.0 %    MCV 97 78 - 100 fL    MCH 31.1 26.5 - 33.0 pg    MCHC 32.1 31.5 - 36.5 g/dL    RDW 16.7 (H) 10.0 - 15.0 %    Platelet Count 54 (L) 150 - 450 10e3/uL    % Neutrophils 68 %    % Lymphocytes 25 %    % Monocytes 6 %    % Eosinophils 0 %    % Basophils 0 %    % Immature Granulocytes 1 %    NRBCs per 100 WBC 0 <1 /100    Absolute Neutrophils 8.6 (H) 1.6 - 8.3 10e3/uL    Absolute Lymphocytes 3.0 0.8 - 5.3 10e3/uL    Absolute Monocytes 0.7 0.0 - 1.3 10e3/uL    Absolute Eosinophils 0.0 0.0 - 0.7 10e3/uL    Absolute Basophils 0.0 0.0 - 0.2 10e3/uL    Absolute Immature Granulocytes 0.1 <=0.4 10e3/uL    Absolute NRBCs 0.0 10e3/uL   Procalcitonin   Result Value Ref Range    Procalcitonin 0.07 0.00 - 0.49 ng/mL   Blood Culture Line, venous    Specimen: Line, venous; Blood   Result Value Ref Range    Culture No growth after 3 days    Blood gas venous    Result Value Ref Range    pH Venous 7.39 7.35 - 7.45    pCO2 Venous 51 (H) 35 - 50 mm Hg    pO2 Venous 19 (L) 25 - 47 mm Hg    Bicarbonate Venous 27 24 - 30 mmol/L    Base Excess/Deficit (+/-) 5.9   mmol/L    Oxyhemoglobin Venous 26.3 (L) 70.0 - 75.0 %    O2 Sat, Venous 26.7 (L) 70.0 - 75.0 %   Blood Culture Line, venous    Specimen: Line, venous; Blood   Result Value Ref Range    Culture No growth after 3 days    ECG 12-LEAD WITH MUSE ([facility])   Result Value Ref Range    Systolic Blood Pressure 134 mmHg    Diastolic Blood Pressure 74 mmHg    Ventricular Rate 117 BPM    Atrial Rate 117 BPM    RI Interval 118 ms    QRS Duration 74 ms     ms    QTc 454 ms    P Axis 77 degrees    R AXIS -27 degrees    T Axis 69 degrees    Interpretation ECG       Sinus tachycardia  Otherwise normal ECG  When compared with ECG of 08-JAN-2021 10:19,  Vent. rate has increased BY  44 BPM  QRS axis Shifted left  T wave amplitude has increased in Inferior leads  Confirmed by SEE ED PROVIDER NOTE FOR, ECG INTERPRETATION ([id number]),  FLORIN MC ([id number]) on 12/7/2021 8:31:06 AM     Extra Purple Top Tube   Result Value Ref Range    Hold Specimen JIC    INR   Result Value Ref Range    INR 0.96 0.85 - 1.15   Protein total   Result Value Ref Range    Protein Total 5.8 (L) 6.0 - 8.0 g/dL   Lactate Dehydrogenase   Result Value Ref Range    Lactate Dehydrogenase 238 (H) 125 - 220 U/L   Acid-Fast Bacilli Culture and Stain    Specimen: Pleural Cavity; Pleural fluid   Result Value Ref Range    Acid Fast Stain No acid fast bacilli seen    Pleural fluid Aerobic Bacterial Culture Routine with Gram Stain    Specimen: Pleural Cavity, Right; Pleural fluid   Result Value Ref Range    Culture No growth after 3 days     Gram Stain Result No organisms seen     Gram Stain Result 3+ WBC seen    Glucose fluid   Result Value Ref Range    Glucose fluid 119 mg/dL   Lactate dehydrogenase fluid   Result Value Ref Range    Lactate dehydrogenase fluid 119 U/L    Protein fluid   Result Value Ref Range    Protein Total Fluid 3.8 g/dL   pH fluid   Result Value Ref Range    pH Fluid 8.0 pH   Cell Count Body Fluid   Result Value Ref Range    Color Yellow Colorless, Yellow    Clarity Clear Clear, Bloody    Total Nucleated Cells 394 /uL    RBC Count 1,000 /uL   Differential Body Fluid   Result Value Ref Range    % Neutrophils 1 %    % Lymphocytes 71 %    % Monocyte/Macrophages 27 %    % Lining Cells 1 %   Lactic Acid STAT   Result Value Ref Range    Lactic Acid 1.5 0.7 - 2.0 mmol/L   B-Type Natriuretic Peptide ([facility] Only)   Result Value Ref Range    BNP 82 (H) 0 - 60 pg/mL   CBC with platelets   Result Value Ref Range    WBC Count 16.1 (H) 4.0 - 11.0 10e3/uL    RBC Count 4.55 4.40 - 5.90 10e6/uL    Hemoglobin 13.7 13.3 - 17.7 g/dL    Hematocrit 41.0 40.0 - 53.0 %    MCV 90 78 - 100 fL    MCH 30.1 26.5 - 33.0 pg    MCHC 33.4 31.5 - 36.5 g/dL    RDW 16.4 (H) 10.0 - 15.0 %    Platelet Count 83 (L) 150 - 450 10e3/uL   Basic metabolic panel   Result Value Ref Range    Sodium 140 136 - 145 mmol/L    Potassium 4.6 3.5 - 5.0 mmol/L    Chloride 110 (H) 98 - 107 mmol/L    Carbon Dioxide (CO2) 23 22 - 31 mmol/L    Anion Gap 7 5 - 18 mmol/L    Urea Nitrogen 42 (H) 8 - 22 mg/dL    Creatinine 1.06 0.70 - 1.30 mg/dL    Calcium 8.8 8.5 - 10.5 mg/dL    Glucose 129 (H) 70 - 125 mg/dL    GFR Estimate 73 >60 mL/min/1.73m2   Fibrinogen activity   Result Value Ref Range    Fibrinogen Activity 677 (H) 170 - 490 mg/dL   CRP inflammation   Result Value Ref Range    CRP 14.9 (H) 0.0-<0.8 mg/dL   D dimer quantitative   Result Value Ref Range    D-Dimer Quantitative 1.48 (H) 0.00 - 0.50 ug/mL FEU   Hepatic panel   Result Value Ref Range    Bilirubin Total 0.5 0.0 - 1.0 mg/dL    Bilirubin Direct 0.2 <=0.5 mg/dL    Protein Total 5.7 (L) 6.0 - 8.0 g/dL    Albumin 2.5 (L) 3.5 - 5.0 g/dL    Alkaline Phosphatase 88 45 - 120 U/L    AST 25 0 - 40 U/L    ALT 18 0 - 45 U/L   Extra Red Top Tube   Result Value Ref  Range    Hold Specimen JIC    Magnesium   Result Value Ref Range    Magnesium 2.1 1.8 - 2.6 mg/dL   CBC with platelets   Result Value Ref Range    WBC Count 22.4 (H) 4.0 - 11.0 10e3/uL    RBC Count 4.34 (L) 4.40 - 5.90 10e6/uL    Hemoglobin 13.3 13.3 - 17.7 g/dL    Hematocrit 39.1 (L) 40.0 - 53.0 %    MCV 90 78 - 100 fL    MCH 30.6 26.5 - 33.0 pg    MCHC 34.0 31.5 - 36.5 g/dL    RDW 16.4 (H) 10.0 - 15.0 %    Platelet Count 136 (L) 150 - 450 10e3/uL   Basic metabolic panel   Result Value Ref Range    Sodium 141 136 - 145 mmol/L    Potassium 4.6 3.5 - 5.0 mmol/L    Chloride 110 (H) 98 - 107 mmol/L    Carbon Dioxide (CO2) 21 (L) 22 - 31 mmol/L    Anion Gap 10 5 - 18 mmol/L    Urea Nitrogen 39 (H) 8 - 22 mg/dL    Creatinine 0.88 0.70 - 1.30 mg/dL    Calcium 8.8 8.5 - 10.5 mg/dL    Glucose 110 70 - 125 mg/dL    GFR Estimate 90 >60 mL/min/1.73m2   Fibrinogen activity   Result Value Ref Range    Fibrinogen Activity 606 (H) 170 - 490 mg/dL   CRP inflammation   Result Value Ref Range    CRP 7.1 (H) 0.0-<0.8 mg/dL   D dimer quantitative   Result Value Ref Range    D-Dimer Quantitative 1.40 (H) 0.00 - 0.50 ug/mL FEU   Procalcitonin   Result Value Ref Range    Procalcitonin 0.17 0.00 - 0.49 ng/mL       Imaging    US Thoracentesis    Result Date: 12/5/2021  EXAM: 1. RIGHT THORACENTESIS 2. ULTRASOUND GUIDANCE LOCATION: [facility] DATE/TIME: 12/5/2021 11:57 AM INDICATION: Pleural effusion. PROCEDURE: Informed consent obtained. Time out performed. The chest was prepped and draped in sterile fashion. 10 mL of 1 % lidocaine was infused into the local soft tissues. Under direct ultrasound guidance, a 5 Bengali catheter system was placed into the pleural effusion. 2 liters of clear yellow fluid were removed and sent to lab, if requested. Patient tolerated procedure well. Ultrasound imaging was obtained and placed in the patient's permanent medical record.     IMPRESSION: Status post right ultrasound-guided  thoracentesis.     XR Chest Port 1 View    Result Date: 12/7/2021  EXAM: XR CHEST PORT 1 VIEW LOCATION: [facility] DATE/TIME: 12/7/2021 8:20 AM INDICATION: Follow-up pleural effusion. COMPARISON: 12/05/2021 chest x-ray, right thoracentesis 12/05/2021 with 2 L of fluid removed.     IMPRESSION: Small right pleural effusion decrease since 12/05/2021. Tiny left pleural effusion unchanged. Minimal interstitial prominence in both lung bases greater on the left unchanged.    XR Chest Port 1 View    Result Date: 12/5/2021  EXAM: XR CHEST PORTABLE 1 VIEW LOCATION: [facility] DATE/TIME: 12/05/2021, 4:16 AM INDICATION: Dyspnea/shortness of breath. COMPARISON: Chest CT from 11/24/2021.     IMPRESSION: Heart size and pulmonary vascularity within normal limits. Large right and probable tiny left pleural effusions. Hazy right perihilar and bilateral lower lung infiltrates versus atelectasis. Pneumonitis not excluded and clinical correlation is needed. Aortic calcification. No significant bony abnormalities.     CT Chest w contrast*    Result Date: 11/24/2021  EXAM: CT CHEST W CONTRAST LOCATION: [facility] DATE/TIME: 11/24/2021 7:30 PM INDICATION: Non-small cell lung cancer, monitor COMPARISON: 09/03/2021. TECHNIQUE: CT chest with IV contrast. Multiplanar reformats were obtained. Dose reduction techniques were used. CONTRAST: 75ml Isovue 370 FINDINGS: LUNGS AND PLEURA: Status post left lower lobectomy. There is no new soft tissue at the suture line. Right paramediastinal soft tissue and architectural distortion are again seen. There is a fiducial marker in the anterior right upper lung. Soft tissue and nodularity adjacent to the right major fissure and other pleural surfaces has increased. A moderate size right pleural effusion was previously small in size. A small left pleural effusion there is minimal round atelectasis in the mild scarring in the  medial  left upper lobe.  There is left lung base. . Has not changed in size MEDIASTINUM/AXILLAE: The right thyroid lobe is small in size or absent. There is heterogeneity of the left thyroid lobe, which is normal in size.  No lymphadenopathy. CORONARY ARTERY CALCIFICATION: Previous intervention (stents or CABG). UPPER ABDOMEN: A simple right renal cyst does not require follow-up. Status post cholecystectomy. MUSCULOSKELETAL: Unremarkable.     IMPRESSION: 1.  Left lower lobectomy. 2.  Right pleural nodularity and soft tissue has increased from the comparison study, which is concerning for increased malignancy. A moderate sized right pleural effusion has significantly increased in size from the comparison study.         Signed by: Kevin Soto MD

## 2021-12-21 NOTE — PROGRESS NOTES
"Oncology Rooming Note    12/21/21 3:23 PM     Pardeep Wilson is a 66 year old male who presents for:    Chief Complaint   Patient presents with     Oncology Clinic Visit       Initial Vitals: BP (!) 194/86 (BP Location: Left arm, Patient Position: Sitting)   Pulse 98   Temp 98.4  F (36.9  C) (Oral)   Resp 20   Ht 1.765 m (5' 9.5\")   Wt 82.3 kg (181 lb 6.4 oz)   SpO2 97%   BMI 26.40 kg/m   Estimated body mass index is 26.4 kg/m  as calculated from the following:    Height as of this encounter: 1.765 m (5' 9.5\").    Weight as of this encounter: 82.3 kg (181 lb 6.4 oz). Body surface area is 2.01 meters squared.    No Pain (0) Comment: None     No LMP for male patient.    Allergies reviewed: Yes  Medications reviewed: Yes    Medications: Medication refills not needed today.  Pharmacy name entered into [employer]:    [facility] [place], [place] - [address] AT PORTAL TO REGISTERED [facility] SITES  [facility] [address] [place], [place] - 2021 [facility] [place]    Clinical concerns:  no concerns    Accompanied by: self    Shira Abdullahi  RN Care Coordinator  [facility]    "

## 2021-12-21 NOTE — PATIENT INSTRUCTIONS
Patient Education     Docetaxel Solution for injection  What is this medicine?  DOCETAXEL (lopez se TAX el) is a chemotherapy drug. It targets fast dividing cells, like cancer cells, and causes these cells to die. This medicine is used to treat many types of cancers like breast cancer, certain stomach cancers, head and neck cancer, lung cancer, and prostate cancer.  This medicine may be used for other purposes; ask your health care provider or pharmacist if you have questions.  What should I tell my health care provider before I take this medicine?  They need to know if you have any of these conditions:    infection (especially a virus infection such as chickenpox, cold sores, or herpes)    liver disease    low blood counts, like low white cell, platelet, or red cell counts    an unusual or allergic reaction to docetaxel, polysorbate 80, other chemotherapy agents, other medicines, foods, dyes, or preservatives    pregnant or trying to get pregnant    breast-feeding  How should I use this medicine?  This drug is given as an infusion into a vein. It is administered in a hospital or clinic by a specially trained health care professional.  Talk to your pediatrician regarding the use of this medicine in children. Special care may be needed.  Overdosage: If you think you have taken too much of this medicine contact a poison control center or emergency room at once.  NOTE: This medicine is only for you. Do not share this medicine with others.  What if I miss a dose?  It is important not to miss your dose. Call your doctor or health care professional if you are unable to keep an appointment.  What may interact with this medicine?    cyclosporine    erythromycin    ketoconazole    medicines to increase blood counts like filgrastim, pegfilgrastim, sargramostim    vaccines  Talk to your doctor or health care professional before taking any of these  medicines:    acetaminophen    aspirin    ibuprofen    ketoprofen    naproxen  This list may not describe all possible interactions. Give your health care provider a list of all the medicines, herbs, non-prescription drugs, or dietary supplements you use. Also tell them if you smoke, drink alcohol, or use illegal drugs. Some items may interact with your medicine.  What should I watch for while using this medicine?  Your condition will be monitored carefully while you are receiving this medicine. You will need important blood work done while you are taking this medicine.  This drug may make you feel generally unwell. This is not uncommon, as chemotherapy can affect healthy cells as well as cancer cells. Report any side effects. Continue your course of treatment even though you feel ill unless your doctor tells you to stop.  In some cases, you may be given additional medicines to help with side effects. Follow all directions for their use.  Call your doctor or health care professional for advice if you get a fever, chills or sore throat, or other symptoms of a cold or flu. Do not treat yourself. This drug decreases your body's ability to fight infections. Try to avoid being around people who are sick.  This medicine may increase your risk to bruise or bleed. Call your doctor or health care professional if you notice any unusual bleeding.  This medicine may contain alcohol in the product. You may get drowsy or dizzy. Do not drive, use machinery, or do anything that needs mental alertness until you know how this medicine affects you. Do not stand or sit up quickly, especially if you are an older patient. This reduces the risk of dizzy or fainting spells. Avoid alcoholic drinks.  Do not become pregnant while taking this medicine. Women should inform their doctor if they wish to become pregnant or think they might be pregnant. There is a potential for serious side effects to an unborn child. Talk to your health care  professional or pharmacist for more information. Do not breast-feed an infant while taking this medicine.  What side effects may I notice from receiving this medicine?  Side effects that you should report to your doctor or health care professional as soon as possible:    allergic reactions like skin rash, itching or hives, swelling of the face, lips, or tongue    low blood counts - This drug may decrease the number of white blood cells, red blood cells and platelets. You may be at increased risk for infections and bleeding.    signs of infection - fever or chills, cough, sore throat, pain or difficulty passing urine    signs of decreased platelets or bleeding - bruising, pinpoint red spots on the skin, black, tarry stools, nosebleeds    signs of decreased red blood cells - unusually weak or tired, fainting spells, lightheadedness    breathing problems    fast or irregular heartbeat    low blood pressure    mouth sores    nausea and vomiting    pain, swelling, redness or irritation at the injection site    pain, tingling, numbness in the hands or feet    swelling of the ankle, feet, hands    weight gain  Side effects that usually do not require medical attention (report to your prescriber or health care professional if they continue or are bothersome):    bone pain    complete hair loss including hair on your head, underarms, pubic hair, eyebrows, and eyelashes    diarrhea    excessive tearing    changes in the color of fingernails    loosening of the fingernails    nausea    muscle pain    red flush to skin    sweating    weak or tired  This list may not describe all possible side effects. Call your doctor for medical advice about side effects. You may report side effects to FDA at 7-862-FDA-5919.  Where should I keep my medicine?  This drug is given in a hospital or clinic and will not be stored at home.  NOTE:This sheet is a summary. It may not cover all possible information. If you have questions about this  medicine, talk to your doctor, pharmacist, or health care provider. Copyright  2016 Gold Standard         Patient Education     Ramucirumab Solution for injection  What is this medicine?  RAMUCIRUMAB (hector felicianoe SIR ue mab) is a chemotherapy drug. It is used to treat stomach cancer, colorectal cancer, or lung cancer. This drug targets a specific protein receptor on cancer cells and stops the cancer cells from growing.  This medicine may be used for other purposes; ask your health care provider or pharmacist if you have questions.  What should I tell my health care provider before I take this medicine?  They need to know if you have any of these conditions:    bleeding disorders    blood clots    heart disease, including heart failure, heart attack, or chest pain (angina)    high blood pressure    infection (especially a virus infection such as chickenpox, cold sores, or herpes)    protein in your urine    recent surgery    stroke    an unusual or allergic reaction to ramucirumab, other medicines, foods, dyes, or preservatives    pregnant or trying to get pregnant    breast-feeding  How should I use this medicine?  This medicine is for infusion into a vein. It is given by a health care professional in a hospital or clinic setting.  Talk to your pediatrician regarding the use of this medicine in children. Special care may be needed.  Overdosage: If you think you've taken too much of this medicine contact a poison control center or emergency room at once.  NOTE: This medicine is only for you. Do not share this medicine with others.  What if I miss a dose?  It is important not to miss your dose. Call your doctor or health care professional if you are unable to keep an appointment.  What may interact with this medicine?  Interactions have not been studied.  This list may not describe all possible interactions. Give your health care provider a list of all the medicines, herbs, non-prescription drugs, or dietary supplements  you use. Also tell them if you smoke, drink alcohol, or use illegal drugs. Some items may interact with your medicine.  What should I watch for while using this medicine?  Your condition will be monitored carefully while you are receiving this medicine. You will need to to check your blood pressure and have your blood and urine tested while you are taking this medicine.  Your condition will be monitored carefully while you are receiving this medicine.  This medicine may increase your risk to bruise or bleed. Call your doctor or health care professional if you notice any unusual bleeding.  This medicine may rarely cause 'gastrointestinal perforation' (holes in the stomach, intestines or colon), a serious side effect requiring surgery to repair.  This medicine should be started at least 28 days following major surgery and the site of the surgery should be totally healed. Check with your doctor before scheduling dental work or surgery while you are receiving this treatment. Talk to your doctor if you have recently had surgery or if you have a wound that has not healed.  Do not become pregnant while taking this medicine or for 3 months after stopping it. Women should inform their doctor if they wish to become pregnant or think they might be pregnant. There is a potential for serious side effects to an unborn child. Talk to your health care professional or pharmacist for more information.  What side effects may I notice from receiving this medicine?  Side effects that you should report to your doctor or health care professional as soon as possible:    allergic reactions like skin rash, itching or hives, breathing problems, swelling of the face, lips, or tongue    signs of infection - fever or chills, cough, sore throat    chest pain or chest tightness    confusion    dizziness    feeling faint or lightheaded, falls    severe abdominal pain    severe nausea, vomiting    signs and symptoms of bleeding such as bloody or  black, tarry stools; red or dark-brown urine; spitting up blood or brown material that looks like coffee grounds; red spots on the skin; unusual bruising or bleeding from the eye, gums, or nose    signs and symptoms of a blood clot such as breathing problems; changes in vision; chest pain; severe, sudden headache; pain, swelling, warmth in the leg; trouble speaking; sudden numbness or weakness of the face, arm or leg    symptoms of a stroke: change in mental awareness, inability to talk or move one side of the body    trouble walking, dizziness, loss of balance or coordination  Side effects that usually do not require medical attention (Report these to your doctor or health care professional if they continue or are bothersome.):    cold, clammy skin    constipation    diarrhea    headache    nausea, vomiting    stomach pain    unusually slow heartbeat    unusually weak or tired  This list may not describe all possible side effects. Call your doctor for medical advice about side effects. You may report side effects to FDA at 7-208-GCF-4132.  Where should I keep my medicine?  This drug is given in a hospital or clinic and will not be stored at home.  NOTE: This sheet is a summary. It may not cover all possible information. If you have questions about this medicine, talk to your doctor, pharmacist, or health care provider.  NOTE:This sheet is a summary. It may not cover all possible information. If you have questions about this medicine, talk to your doctor, pharmacist, or health care provider. Copyright  2016 Gold Standard

## 2021-12-21 NOTE — LETTER
"    12/21/2021         RE: Pardeep Wilson  [address] [facility] [address]        Dear Colleague,    Thank you for referring your patient, Pardeep Wilson, to the [facility]. Please see a copy of my visit note below.    Oncology Rooming Note    12/21/21 3:23 PM     Pardeep Wilson is a 66 year old male who presents for:    Chief Complaint   Patient presents with     Oncology Clinic Visit       Initial Vitals: BP (!) 194/86 (BP Location: Left arm, Patient Position: Sitting)   Pulse 98   Temp 98.4  F (36.9  C) (Oral)   Resp 20   Ht 1.765 m (5' 9.5\")   Wt 82.3 kg (181 lb 6.4 oz)   SpO2 97%   BMI 26.40 kg/m   Estimated body mass index is 26.4 kg/m  as calculated from the following:    Height as of this encounter: 1.765 m (5' 9.5\").    Weight as of this encounter: 82.3 kg (181 lb 6.4 oz). Body surface area is 2.01 meters squared.    No Pain (0) Comment: None     No LMP for male patient.    Allergies reviewed: Yes  Medications reviewed: Yes    Medications: Medication refills not needed today.  Pharmacy name entered into AnybodyOutThere:    [facility] - [place], [place] - [address] AT PORTAL TO REGISTERED [facility] SITES  [facility] [address] [place], [place] - [address]    Clinical concerns:  no concerns    Accompanied by: self    Shira Abdullahi RN Care Coordinator  [facility]        Again, thank you for allowing me to participate in the care of your patient.        Sincerely,        Kevin Soto MD    "

## 2021-12-27 NOTE — TELEPHONE ENCOUNTER
Pardeep calls clinic to report that he does not want to have a port placed. He is comfortable getting treatment via peripheral access.     Appointment has been cancelled. He will be in clinic for commencement of treatment on 1/4/22.    Shira Abdullahi RN Care Coordinator  [facility]

## 2021-12-27 NOTE — TELEPHONE ENCOUNTER
Patient calls in today stating that he has questions about his upcoming treatment and plan of care.  He wants to discuss Port-A-Cath versus peripheral IV.  He also had a couple other questions for his care coordinator.  This message will be routed over to AYDEE Silva for the patient.    Sylvia Su RN

## 2021-12-29 NOTE — PROGRESS NOTES
Right sided thoracentesis performed by radiologist.    2000 ml of red tinged clear yellow fluid removed. Pressure held at site after catheter removed.  Band aid applied. No bleeding noted.

## 2021-12-29 NOTE — DISCHARGE INSTRUCTIONS
Discharge Instructions for Thoracentesis  Thoracentesis is a procedure that removes extra fluid from the pleural space. The space is between the outside surface of the lungs (pleura) and the chest wall. The extra fluid is called pleural effusion. The procedure may be done to take a sample of the fluid. This is so it can be tested to help find the cause. Or it may be done to drain the extra fluid if you are having trouble breathing.     Home care    You may have some pain after the procedure. Your provider can prescribe or advise what pain medicine for you to take at home, if needed. Take these exactly as directed.    If you stopped taking other medicines before the procedure, ask your provider when you can start them again.    Take it easy for 48 hours after the procedure. Don't do anything active until your provider says it s OK.    Don't do strenuous activities, such as lifting, until your provider says it s OK.    You will have a small bandage over the puncture site. You may remove the bandage in 24 hours.    Check the puncture site for the signs of infection listed below.    Follow-up  Make a follow-up appointment with your provider as directed. During your follow-up visit, your provider will check your healing. Be sure to let your provider know how you are feeling.   When to call your healthcare provider  Call your provider right away if you have any of these:     Coughing up blood    Chest pain (if chest pain suddenly gets worse, call 911)    Shortness of breath    Fever of 100.4 F (38 C) or higher, or as directed by your provider    Pain that doesn't get better after taking pain medicine    Signs of infection at the puncture site. These include increased pain, redness, warmth, swelling, or fluid leaking that is green or yellow or smells bad    Fluid draining from the puncture site    Bleeding from the puncture site  [employer] last reviewed this educational content on 3/1/2020    [id number] [employer]. All rights reserved. This information is not intended as a substitute for professional medical care. Always follow your healthcare professional's instructions.

## 2021-12-29 NOTE — PROGRESS NOTES
RADIOLOGY PROCEDURE NOTE  Patient name: Pardeep Wilson  MRN: [id number]  : 1955    Pre-procedure diagnosis: Right pleural effusion.  Post-procedure diagnosis: Same    Procedure Date/Time: 2021  12:56 PM  Procedure: US guided right thoracentesis.  Estimated blood loss: None  Specimen(s) collected with description: 2 L khurram colored fluid.  The patient tolerated the procedure well with no immediate complications.    See imaging dictation for procedural details.    Provider name: Sal Feliz MD  Assistant(s):None

## 2022-01-01 ENCOUNTER — ANESTHESIA (OUTPATIENT)
Dept: MEDSURG UNIT | Facility: HOSPITAL | Age: 67
DRG: 180 | End: 2022-01-01
Payer: MEDICARE

## 2022-01-01 ENCOUNTER — ANESTHESIA EVENT (OUTPATIENT)
Dept: MEDSURG UNIT | Facility: HOSPITAL | Age: 67
DRG: 180 | End: 2022-01-01
Payer: MEDICARE

## 2022-01-01 ENCOUNTER — HOSPITAL ENCOUNTER (INPATIENT)
Facility: HOSPITAL | Age: 67
LOS: 1 days | DRG: 180 | End: 2022-01-10
Attending: EMERGENCY MEDICINE | Admitting: INTERNAL MEDICINE
Payer: MEDICARE

## 2022-01-01 ENCOUNTER — INFUSION THERAPY VISIT (OUTPATIENT)
Dept: INFUSION THERAPY | Facility: HOSPITAL | Age: 67
End: 2022-01-01
Attending: INTERNAL MEDICINE
Payer: MEDICARE

## 2022-01-01 ENCOUNTER — APPOINTMENT (OUTPATIENT)
Dept: ULTRASOUND IMAGING | Facility: HOSPITAL | Age: 67
DRG: 180 | End: 2022-01-01
Attending: INTERNAL MEDICINE
Payer: MEDICARE

## 2022-01-01 ENCOUNTER — ONCOLOGY VISIT (OUTPATIENT)
Dept: ONCOLOGY | Facility: HOSPITAL | Age: 67
End: 2022-01-01
Attending: INTERNAL MEDICINE
Payer: MEDICARE

## 2022-01-01 ENCOUNTER — APPOINTMENT (OUTPATIENT)
Dept: CT IMAGING | Facility: HOSPITAL | Age: 67
DRG: 180 | End: 2022-01-01
Attending: EMERGENCY MEDICINE
Payer: MEDICARE

## 2022-01-01 ENCOUNTER — TELEPHONE (OUTPATIENT)
Dept: ONCOLOGY | Facility: HOSPITAL | Age: 67
End: 2022-01-01
Payer: MEDICARE

## 2022-01-01 ENCOUNTER — APPOINTMENT (OUTPATIENT)
Dept: RADIOLOGY | Facility: HOSPITAL | Age: 67
DRG: 180 | End: 2022-01-01
Attending: INTERNAL MEDICINE
Payer: MEDICARE

## 2022-01-01 VITALS
BODY MASS INDEX: 26.49 KG/M2 | SYSTOLIC BLOOD PRESSURE: 157 MMHG | TEMPERATURE: 97.7 F | HEART RATE: 87 BPM | DIASTOLIC BLOOD PRESSURE: 72 MMHG | WEIGHT: 182 LBS | RESPIRATION RATE: 12 BRPM | OXYGEN SATURATION: 97 %

## 2022-01-01 VITALS
BODY MASS INDEX: 26.93 KG/M2 | OXYGEN SATURATION: 60 % | TEMPERATURE: 98.6 F | RESPIRATION RATE: 20 BRPM | SYSTOLIC BLOOD PRESSURE: 205 MMHG | DIASTOLIC BLOOD PRESSURE: 104 MMHG | WEIGHT: 185 LBS | HEART RATE: 144 BPM

## 2022-01-01 DIAGNOSIS — C34.11 MALIGNANT NEOPLASM OF UPPER LOBE OF RIGHT LUNG (H): Primary | ICD-10-CM

## 2022-01-01 DIAGNOSIS — J18.9 PNEUMONIA OF BOTH LUNGS DUE TO INFECTIOUS ORGANISM, UNSPECIFIED PART OF LUNG: ICD-10-CM

## 2022-01-01 DIAGNOSIS — R06.00 DYSPNEA, UNSPECIFIED TYPE: ICD-10-CM

## 2022-01-01 DIAGNOSIS — R04.2 HEMOPTYSIS: ICD-10-CM

## 2022-01-01 DIAGNOSIS — U07.1 INFECTION DUE TO 2019 NOVEL CORONAVIRUS: ICD-10-CM

## 2022-01-01 LAB
ABO/RH(D): NORMAL
ACANTHOCYTES BLD QL SMEAR: SLIGHT
ALBUMIN SERPL-MCNC: 2.1 G/DL (ref 3.5–5)
ALBUMIN SERPL-MCNC: 2.8 G/DL (ref 3.5–5)
ALBUMIN SERPL-MCNC: 2.9 G/DL (ref 3.5–5)
ALBUMIN UR-MCNC: NEGATIVE MG/DL
ALP SERPL-CCNC: 102 U/L (ref 45–120)
ALP SERPL-CCNC: 95 U/L (ref 45–120)
ALP SERPL-CCNC: 99 U/L (ref 45–120)
ALT SERPL W P-5'-P-CCNC: 15 U/L (ref 0–45)
ALT SERPL W P-5'-P-CCNC: 18 U/L (ref 0–45)
ALT SERPL W P-5'-P-CCNC: 47 U/L (ref 0–45)
ANION GAP SERPL CALCULATED.3IONS-SCNC: 16 MMOL/L (ref 5–18)
ANION GAP SERPL CALCULATED.3IONS-SCNC: 5 MMOL/L (ref 5–18)
ANION GAP SERPL CALCULATED.3IONS-SCNC: 6 MMOL/L (ref 5–18)
ANION GAP SERPL CALCULATED.3IONS-SCNC: 9 MMOL/L (ref 5–18)
ANTIBODY SCREEN: NEGATIVE
APTT PPP: 32 SECONDS (ref 22–38)
AST SERPL W P-5'-P-CCNC: 20 U/L (ref 0–40)
AST SERPL W P-5'-P-CCNC: 28 U/L (ref 0–40)
AST SERPL W P-5'-P-CCNC: 70 U/L (ref 0–40)
ATRIAL RATE - MUSE: 123 BPM
BASE EXCESS BLDA CALC-SCNC: ABNORMAL MMOL/L
BASOPHILS # BLD AUTO: 0 10E3/UL (ref 0–0.2)
BASOPHILS # BLD MANUAL: 0 10E3/UL (ref 0–0.2)
BASOPHILS NFR BLD AUTO: 0 %
BASOPHILS NFR BLD MANUAL: 0 %
BILIRUB DIRECT SERPL-MCNC: 0.2 MG/DL
BILIRUB SERPL-MCNC: 0.6 MG/DL (ref 0–1)
BILIRUB SERPL-MCNC: 0.8 MG/DL (ref 0–1)
BILIRUB SERPL-MCNC: 1 MG/DL (ref 0–1)
BNP SERPL-MCNC: 36 PG/ML (ref 0–60)
BUN SERPL-MCNC: 28 MG/DL (ref 8–22)
BUN SERPL-MCNC: 31 MG/DL (ref 8–22)
BUN SERPL-MCNC: 32 MG/DL (ref 8–22)
BUN SERPL-MCNC: 38 MG/DL (ref 8–22)
BURR CELLS BLD QL SMEAR: SLIGHT
CALCIUM SERPL-MCNC: 8 MG/DL (ref 8.5–10.5)
CALCIUM SERPL-MCNC: 8.1 MG/DL (ref 8.5–10.5)
CALCIUM SERPL-MCNC: 8.5 MG/DL (ref 8.5–10.5)
CALCIUM SERPL-MCNC: 9.3 MG/DL (ref 8.5–10.5)
CHLORIDE BLD-SCNC: 104 MMOL/L (ref 98–107)
CHLORIDE BLD-SCNC: 106 MMOL/L (ref 98–107)
CHLORIDE BLD-SCNC: 108 MMOL/L (ref 98–107)
CHLORIDE BLD-SCNC: 109 MMOL/L (ref 98–107)
CO2 SERPL-SCNC: 18 MMOL/L (ref 22–31)
CO2 SERPL-SCNC: 24 MMOL/L (ref 22–31)
CO2 SERPL-SCNC: 25 MMOL/L (ref 22–31)
CO2 SERPL-SCNC: 26 MMOL/L (ref 22–31)
COHGB MFR BLD: 95.5 % (ref 95–96)
CREAT SERPL-MCNC: 1 MG/DL (ref 0.7–1.3)
CREAT SERPL-MCNC: 1.11 MG/DL (ref 0.7–1.3)
CREAT SERPL-MCNC: 1.21 MG/DL (ref 0.7–1.3)
CREAT SERPL-MCNC: 1.68 MG/DL (ref 0.7–1.3)
DIASTOLIC BLOOD PRESSURE - MUSE: NORMAL MMHG
EOSINOPHIL # BLD AUTO: 0 10E3/UL (ref 0–0.7)
EOSINOPHIL # BLD MANUAL: 0.2 10E3/UL (ref 0–0.7)
EOSINOPHIL NFR BLD AUTO: 0 %
EOSINOPHIL NFR BLD MANUAL: 2 %
ERYTHROCYTE [DISTWIDTH] IN BLOOD BY AUTOMATED COUNT: 16.9 % (ref 10–15)
ERYTHROCYTE [DISTWIDTH] IN BLOOD BY AUTOMATED COUNT: 17 % (ref 10–15)
ERYTHROCYTE [DISTWIDTH] IN BLOOD BY AUTOMATED COUNT: 17.2 % (ref 10–15)
ERYTHROCYTE [DISTWIDTH] IN BLOOD BY AUTOMATED COUNT: 17.2 % (ref 10–15)
ERYTHROCYTE [DISTWIDTH] IN BLOOD BY AUTOMATED COUNT: 17.7 % (ref 10–15)
FLUAV RNA SPEC QL NAA+PROBE: NEGATIVE
FLUBV RNA RESP QL NAA+PROBE: NEGATIVE
GFR SERPL CREATININE-BSD FRML MDRD: 45 ML/MIN/1.73M2
GFR SERPL CREATININE-BSD FRML MDRD: 66 ML/MIN/1.73M2
GFR SERPL CREATININE-BSD FRML MDRD: 73 ML/MIN/1.73M2
GFR SERPL CREATININE-BSD FRML MDRD: 83 ML/MIN/1.73M2
GLUCOSE BLD-MCNC: 127 MG/DL (ref 70–125)
GLUCOSE BLD-MCNC: 143 MG/DL (ref 70–125)
GLUCOSE BLD-MCNC: 308 MG/DL (ref 70–125)
GLUCOSE BLD-MCNC: 87 MG/DL (ref 70–125)
HCO3 BLD-SCNC: ABNORMAL MMOL/L
HCT VFR BLD AUTO: 37.9 % (ref 40–53)
HCT VFR BLD AUTO: 38.2 % (ref 40–53)
HCT VFR BLD AUTO: 41.2 % (ref 40–53)
HCT VFR BLD AUTO: 42.9 % (ref 40–53)
HCT VFR BLD AUTO: 43.8 % (ref 40–53)
HGB BLD-MCNC: 11.7 G/DL (ref 13.3–17.7)
HGB BLD-MCNC: 12.9 G/DL (ref 13.3–17.7)
HGB BLD-MCNC: 13.3 G/DL (ref 13.3–17.7)
HGB BLD-MCNC: 14.2 G/DL (ref 13.3–17.7)
HGB BLD-MCNC: 14.9 G/DL (ref 13.3–17.7)
HOLD SPECIMEN: NORMAL
IMM GRANULOCYTES # BLD: 0.2 10E3/UL
IMM GRANULOCYTES NFR BLD: 1 %
INR PPP: 1 (ref 0.85–1.15)
INTERPRETATION ECG - MUSE: NORMAL
LACTATE SERPL-SCNC: 1.5 MMOL/L (ref 0.7–2)
LACTATE SERPL-SCNC: 6.1 MMOL/L (ref 0.7–2)
LYMPHOCYTES # BLD AUTO: 1.1 10E3/UL (ref 0.8–5.3)
LYMPHOCYTES # BLD MANUAL: 2.4 10E3/UL (ref 0.8–5.3)
LYMPHOCYTES NFR BLD AUTO: 6 %
LYMPHOCYTES NFR BLD MANUAL: 27 %
MCH RBC QN AUTO: 30 PG (ref 26.5–33)
MCH RBC QN AUTO: 30.2 PG (ref 26.5–33)
MCH RBC QN AUTO: 30.5 PG (ref 26.5–33)
MCH RBC QN AUTO: 30.6 PG (ref 26.5–33)
MCH RBC QN AUTO: 30.6 PG (ref 26.5–33)
MCHC RBC AUTO-ENTMCNC: 30.9 G/DL (ref 31.5–36.5)
MCHC RBC AUTO-ENTMCNC: 32.3 G/DL (ref 31.5–36.5)
MCHC RBC AUTO-ENTMCNC: 33.1 G/DL (ref 31.5–36.5)
MCHC RBC AUTO-ENTMCNC: 33.8 G/DL (ref 31.5–36.5)
MCHC RBC AUTO-ENTMCNC: 34 G/DL (ref 31.5–36.5)
MCV RBC AUTO: 90 FL (ref 78–100)
MCV RBC AUTO: 91 FL (ref 78–100)
MCV RBC AUTO: 92 FL (ref 78–100)
MCV RBC AUTO: 93 FL (ref 78–100)
MCV RBC AUTO: 98 FL (ref 78–100)
MONOCYTES # BLD AUTO: 0.7 10E3/UL (ref 0–1.3)
MONOCYTES # BLD MANUAL: 0.2 10E3/UL (ref 0–1.3)
MONOCYTES NFR BLD AUTO: 4 %
MONOCYTES NFR BLD MANUAL: 2 %
MRSA DNA SPEC QL NAA+PROBE: NEGATIVE
NEUTROPHILS # BLD AUTO: 17.1 10E3/UL (ref 1.6–8.3)
NEUTROPHILS # BLD MANUAL: 6.1 10E3/UL (ref 1.6–8.3)
NEUTROPHILS NFR BLD AUTO: 89 %
NEUTROPHILS NFR BLD MANUAL: 69 %
NRBC # BLD AUTO: 0 10E3/UL
NRBC BLD AUTO-RTO: 0 /100
O2/TOTAL GAS SETTING VFR VENT: 1 %
OXYHGB MFR BLD: 94.6 % (ref 95–96)
P AXIS - MUSE: 89 DEGREES
PCO2 BLD: >98 MM HG (ref 35–45)
PH BLD: 6.87 [PH] (ref 7.37–7.44)
PLAT MORPH BLD: ABNORMAL
PLATELET # BLD AUTO: 118 10E3/UL (ref 150–450)
PLATELET # BLD AUTO: 128 10E3/UL (ref 150–450)
PLATELET # BLD AUTO: 144 10E3/UL (ref 150–450)
PLATELET # BLD AUTO: 159 10E3/UL (ref 150–450)
PLATELET # BLD AUTO: 305 10E3/UL (ref 150–450)
PO2 BLD: 138 MM HG (ref 75–85)
POTASSIUM BLD-SCNC: 3.7 MMOL/L (ref 3.5–5)
POTASSIUM BLD-SCNC: 3.9 MMOL/L (ref 3.5–5)
POTASSIUM BLD-SCNC: 4.3 MMOL/L (ref 3.5–5)
POTASSIUM BLD-SCNC: 4.8 MMOL/L (ref 3.5–5)
PR INTERVAL - MUSE: 130 MS
PROT SERPL-MCNC: 5.2 G/DL (ref 6–8)
PROT SERPL-MCNC: 6 G/DL (ref 6–8)
PROT SERPL-MCNC: 6.1 G/DL (ref 6–8)
QRS DURATION - MUSE: 68 MS
QT - MUSE: 306 MS
QTC - MUSE: 438 MS
R AXIS - MUSE: 38 DEGREES
RBC # BLD AUTO: 3.87 10E6/UL (ref 4.4–5.9)
RBC # BLD AUTO: 4.22 10E6/UL (ref 4.4–5.9)
RBC # BLD AUTO: 4.43 10E6/UL (ref 4.4–5.9)
RBC # BLD AUTO: 4.65 10E6/UL (ref 4.4–5.9)
RBC # BLD AUTO: 4.87 10E6/UL (ref 4.4–5.9)
RBC MORPH BLD: ABNORMAL
SA TARGET DNA: POSITIVE
SARS-COV-2 RNA RESP QL NAA+PROBE: POSITIVE
SODIUM SERPL-SCNC: 135 MMOL/L (ref 136–145)
SODIUM SERPL-SCNC: 139 MMOL/L (ref 136–145)
SODIUM SERPL-SCNC: 140 MMOL/L (ref 136–145)
SODIUM SERPL-SCNC: 142 MMOL/L (ref 136–145)
SPECIMEN EXPIRATION DATE: NORMAL
SYSTOLIC BLOOD PRESSURE - MUSE: NORMAL MMHG
T AXIS - MUSE: 66 DEGREES
TEMPERATURE: 37 DEGREES C
TROPONIN I SERPL-MCNC: 0.02 NG/ML (ref 0–0.29)
VARIANT LYMPHS BLD QL SMEAR: PRESENT
VENTRICULAR RATE- MUSE: 123 BPM
WBC # BLD AUTO: 18.9 10E3/UL (ref 4–11)
WBC # BLD AUTO: 3 10E3/UL (ref 4–11)
WBC # BLD AUTO: 4.3 10E3/UL (ref 4–11)
WBC # BLD AUTO: 6.4 10E3/UL (ref 4–11)
WBC # BLD AUTO: 8.9 10E3/UL (ref 4–11)

## 2022-01-01 PROCEDURE — 999N000026 HC STATISTIC CARDIOPULM RESUSCITATION

## 2022-01-01 PROCEDURE — 250N000011 HC RX IP 250 OP 636: Performed by: EMERGENCY MEDICINE

## 2022-01-01 PROCEDURE — 83605 ASSAY OF LACTIC ACID: CPT | Performed by: NURSE PRACTITIONER

## 2022-01-01 PROCEDURE — 258N000003 HC RX IP 258 OP 636: Performed by: INTERNAL MEDICINE

## 2022-01-01 PROCEDURE — 99215 OFFICE O/P EST HI 40 MIN: CPT | Performed by: NURSE PRACTITIONER

## 2022-01-01 PROCEDURE — 96375 TX/PRO/DX INJ NEW DRUG ADDON: CPT

## 2022-01-01 PROCEDURE — 250N000011 HC RX IP 250 OP 636: Performed by: INTERNAL MEDICINE

## 2022-01-01 PROCEDURE — 272N000706 US THORACENTESIS

## 2022-01-01 PROCEDURE — 258N000003 HC RX IP 258 OP 636: Performed by: NURSE PRACTITIONER

## 2022-01-01 PROCEDURE — 120N000001 HC R&B MED SURG/OB

## 2022-01-01 PROCEDURE — 80053 COMPREHEN METABOLIC PANEL: CPT | Performed by: EMERGENCY MEDICINE

## 2022-01-01 PROCEDURE — 99285 EMERGENCY DEPT VISIT HI MDM: CPT | Mod: 25

## 2022-01-01 PROCEDURE — 82040 ASSAY OF SERUM ALBUMIN: CPT | Performed by: EMERGENCY MEDICINE

## 2022-01-01 PROCEDURE — 85610 PROTHROMBIN TIME: CPT | Performed by: EMERGENCY MEDICINE

## 2022-01-01 PROCEDURE — 85730 THROMBOPLASTIN TIME PARTIAL: CPT | Performed by: EMERGENCY MEDICINE

## 2022-01-01 PROCEDURE — 85027 COMPLETE CBC AUTOMATED: CPT | Performed by: NURSE PRACTITIONER

## 2022-01-01 PROCEDURE — 96365 THER/PROPH/DIAG IV INF INIT: CPT

## 2022-01-01 PROCEDURE — 83605 ASSAY OF LACTIC ACID: CPT | Performed by: EMERGENCY MEDICINE

## 2022-01-01 PROCEDURE — 250N000013 HC RX MED GY IP 250 OP 250 PS 637: Performed by: INTERNAL MEDICINE

## 2022-01-01 PROCEDURE — 99221 1ST HOSP IP/OBS SF/LOW 40: CPT | Mod: 25 | Performed by: INTERNAL MEDICINE

## 2022-01-01 PROCEDURE — 96413 CHEMO IV INFUSION 1 HR: CPT

## 2022-01-01 PROCEDURE — 250N000011 HC RX IP 250 OP 636: Performed by: NURSE PRACTITIONER

## 2022-01-01 PROCEDURE — 87636 SARSCOV2 & INF A&B AMP PRB: CPT | Performed by: EMERGENCY MEDICINE

## 2022-01-01 PROCEDURE — 96376 TX/PRO/DX INJ SAME DRUG ADON: CPT

## 2022-01-01 PROCEDURE — 99233 SBSQ HOSP IP/OBS HIGH 50: CPT | Performed by: INTERNAL MEDICINE

## 2022-01-01 PROCEDURE — 0W993ZZ DRAINAGE OF RIGHT PLEURAL CAVITY, PERCUTANEOUS APPROACH: ICD-10-PCS | Performed by: RADIOLOGY

## 2022-01-01 PROCEDURE — 81003 URINALYSIS AUTO W/O SCOPE: CPT | Performed by: INTERNAL MEDICINE

## 2022-01-01 PROCEDURE — 85025 COMPLETE CBC W/AUTO DIFF WBC: CPT | Performed by: INTERNAL MEDICINE

## 2022-01-01 PROCEDURE — 82248 BILIRUBIN DIRECT: CPT | Performed by: INTERNAL MEDICINE

## 2022-01-01 PROCEDURE — 85027 COMPLETE CBC AUTOMATED: CPT | Performed by: INTERNAL MEDICINE

## 2022-01-01 PROCEDURE — 0BJ08ZZ INSPECTION OF TRACHEOBRONCHIAL TREE, VIA NATURAL OR ARTIFICIAL OPENING ENDOSCOPIC: ICD-10-PCS | Performed by: INTERNAL MEDICINE

## 2022-01-01 PROCEDURE — 36415 COLL VENOUS BLD VENIPUNCTURE: CPT | Performed by: NURSE PRACTITIONER

## 2022-01-01 PROCEDURE — 96417 CHEMO IV INFUS EACH ADDL SEQ: CPT

## 2022-01-01 PROCEDURE — 31622 DX BRONCHOSCOPE/WASH: CPT

## 2022-01-01 PROCEDURE — 999N000157 HC STATISTIC RCP TIME EA 10 MIN

## 2022-01-01 PROCEDURE — 71045 X-RAY EXAM CHEST 1 VIEW: CPT

## 2022-01-01 PROCEDURE — 36415 COLL VENOUS BLD VENIPUNCTURE: CPT | Performed by: INTERNAL MEDICINE

## 2022-01-01 PROCEDURE — 36415 COLL VENOUS BLD VENIPUNCTURE: CPT | Performed by: EMERGENCY MEDICINE

## 2022-01-01 PROCEDURE — 82805 BLOOD GASES W/O2 SATURATION: CPT | Performed by: NURSE PRACTITIONER

## 2022-01-01 PROCEDURE — 87641 MR-STAPH DNA AMP PROBE: CPT | Performed by: INTERNAL MEDICINE

## 2022-01-01 PROCEDURE — G0463 HOSPITAL OUTPT CLINIC VISIT: HCPCS | Mod: 25

## 2022-01-01 PROCEDURE — 99207 PR CDG-CORRECTLY CODED, REVIEWED AND AGREE: CPT | Performed by: INTERNAL MEDICINE

## 2022-01-01 PROCEDURE — 86850 RBC ANTIBODY SCREEN: CPT | Performed by: EMERGENCY MEDICINE

## 2022-01-01 PROCEDURE — 999N000203 HC STATISTICAL VASC ACCESS NURSE TIME, 16-31 MINUTES

## 2022-01-01 PROCEDURE — 92950 HEART/LUNG RESUSCITATION CPR: CPT | Performed by: INTERNAL MEDICINE

## 2022-01-01 PROCEDURE — 99223 1ST HOSP IP/OBS HIGH 75: CPT | Mod: AI | Performed by: INTERNAL MEDICINE

## 2022-01-01 PROCEDURE — 84484 ASSAY OF TROPONIN QUANT: CPT | Performed by: EMERGENCY MEDICINE

## 2022-01-01 PROCEDURE — 85027 COMPLETE CBC AUTOMATED: CPT | Performed by: EMERGENCY MEDICINE

## 2022-01-01 PROCEDURE — 93005 ELECTROCARDIOGRAM TRACING: CPT | Performed by: EMERGENCY MEDICINE

## 2022-01-01 PROCEDURE — 370N000003 HC ANESTHESIA WARD SERVICE

## 2022-01-01 PROCEDURE — 71275 CT ANGIOGRAPHY CHEST: CPT

## 2022-01-01 PROCEDURE — 999N000127 HC STATISTIC PERIPHERAL IV START W US GUIDANCE

## 2022-01-01 PROCEDURE — 80053 COMPREHEN METABOLIC PANEL: CPT | Performed by: INTERNAL MEDICINE

## 2022-01-01 PROCEDURE — 36600 WITHDRAWAL OF ARTERIAL BLOOD: CPT

## 2022-01-01 PROCEDURE — 83880 ASSAY OF NATRIURETIC PEPTIDE: CPT | Performed by: EMERGENCY MEDICINE

## 2022-01-01 PROCEDURE — 87040 BLOOD CULTURE FOR BACTERIA: CPT | Performed by: EMERGENCY MEDICINE

## 2022-01-01 PROCEDURE — C9803 HOPD COVID-19 SPEC COLLECT: HCPCS

## 2022-01-01 PROCEDURE — 258N000003 HC RX IP 258 OP 636: Performed by: EMERGENCY MEDICINE

## 2022-01-01 RX ORDER — PIPERACILLIN SODIUM, TAZOBACTAM SODIUM 3; .375 G/15ML; G/15ML
3.38 INJECTION, POWDER, LYOPHILIZED, FOR SOLUTION INTRAVENOUS ONCE
Status: DISCONTINUED | OUTPATIENT
Start: 2022-01-01 | End: 2022-01-01

## 2022-01-01 RX ORDER — ALBUTEROL SULFATE 90 UG/1
2 AEROSOL, METERED RESPIRATORY (INHALATION) EVERY 4 HOURS PRN
Status: DISCONTINUED | OUTPATIENT
Start: 2022-01-01 | End: 2022-01-11 | Stop reason: HOSPADM

## 2022-01-01 RX ORDER — LEVOTHYROXINE SODIUM 112 UG/1
112 TABLET ORAL
Status: DISCONTINUED | OUTPATIENT
Start: 2022-01-01 | End: 2022-01-11 | Stop reason: HOSPADM

## 2022-01-01 RX ORDER — CEFAZOLIN SODIUM 1 G/50ML
1750 SOLUTION INTRAVENOUS EVERY 12 HOURS
Status: DISCONTINUED | OUTPATIENT
Start: 2022-01-01 | End: 2022-01-01

## 2022-01-01 RX ORDER — IOPAMIDOL 755 MG/ML
100 INJECTION, SOLUTION INTRAVASCULAR ONCE
Status: COMPLETED | OUTPATIENT
Start: 2022-01-01 | End: 2022-01-01

## 2022-01-01 RX ORDER — NALOXONE HYDROCHLORIDE 0.4 MG/ML
0.2 INJECTION, SOLUTION INTRAMUSCULAR; INTRAVENOUS; SUBCUTANEOUS
Status: DISCONTINUED | OUTPATIENT
Start: 2022-01-01 | End: 2022-01-01 | Stop reason: HOSPADM

## 2022-01-01 RX ORDER — METHYLPREDNISOLONE SODIUM SUCCINATE 125 MG/2ML
125 INJECTION, POWDER, LYOPHILIZED, FOR SOLUTION INTRAMUSCULAR; INTRAVENOUS
Status: DISCONTINUED | OUTPATIENT
Start: 2022-01-01 | End: 2022-01-01 | Stop reason: HOSPADM

## 2022-01-01 RX ORDER — PIPERACILLIN SODIUM, TAZOBACTAM SODIUM 3; .375 G/15ML; G/15ML
3.38 INJECTION, POWDER, LYOPHILIZED, FOR SOLUTION INTRAVENOUS ONCE
Status: COMPLETED | OUTPATIENT
Start: 2022-01-01 | End: 2022-01-01

## 2022-01-01 RX ORDER — PIPERACILLIN SODIUM, TAZOBACTAM SODIUM 3; .375 G/15ML; G/15ML
3.38 INJECTION, POWDER, LYOPHILIZED, FOR SOLUTION INTRAVENOUS EVERY 8 HOURS
Status: DISCONTINUED | OUTPATIENT
Start: 2022-01-01 | End: 2022-01-11 | Stop reason: HOSPADM

## 2022-01-01 RX ORDER — PANTOPRAZOLE SODIUM 40 MG/1
40 TABLET, DELAYED RELEASE ORAL DAILY
Status: DISCONTINUED | OUTPATIENT
Start: 2022-01-01 | End: 2022-01-11 | Stop reason: HOSPADM

## 2022-01-01 RX ORDER — LIDOCAINE 40 MG/G
CREAM TOPICAL
Status: DISCONTINUED | OUTPATIENT
Start: 2022-01-01 | End: 2022-01-11 | Stop reason: HOSPADM

## 2022-01-01 RX ORDER — ALBUTEROL SULFATE 90 UG/1
1-2 AEROSOL, METERED RESPIRATORY (INHALATION)
Status: DISCONTINUED | OUTPATIENT
Start: 2022-01-01 | End: 2022-01-01 | Stop reason: HOSPADM

## 2022-01-01 RX ORDER — FUROSEMIDE 10 MG/ML
20 INJECTION INTRAMUSCULAR; INTRAVENOUS ONCE
Status: COMPLETED | OUTPATIENT
Start: 2022-01-01 | End: 2022-01-01

## 2022-01-01 RX ORDER — CEFAZOLIN SODIUM 1 G/50ML
1750 SOLUTION INTRAVENOUS ONCE
Status: DISCONTINUED | OUTPATIENT
Start: 2022-01-01 | End: 2022-01-01

## 2022-01-01 RX ORDER — DIPHENHYDRAMINE HYDROCHLORIDE 50 MG/ML
50 INJECTION INTRAMUSCULAR; INTRAVENOUS
Status: DISCONTINUED | OUTPATIENT
Start: 2022-01-01 | End: 2022-01-01 | Stop reason: HOSPADM

## 2022-01-01 RX ORDER — ALBUTEROL SULFATE 0.83 MG/ML
2.5 SOLUTION RESPIRATORY (INHALATION)
Status: DISCONTINUED | OUTPATIENT
Start: 2022-01-01 | End: 2022-01-01 | Stop reason: HOSPADM

## 2022-01-01 RX ORDER — CEFAZOLIN SODIUM 1 G/50ML
1750 SOLUTION INTRAVENOUS ONCE
Status: COMPLETED | OUTPATIENT
Start: 2022-01-01 | End: 2022-01-01

## 2022-01-01 RX ORDER — EPINEPHRINE 1 MG/ML
0.3 INJECTION, SOLUTION INTRAMUSCULAR; SUBCUTANEOUS EVERY 5 MIN PRN
Status: DISCONTINUED | OUTPATIENT
Start: 2022-01-01 | End: 2022-01-01 | Stop reason: HOSPADM

## 2022-01-01 RX ORDER — ONDANSETRON 2 MG/ML
4 INJECTION INTRAMUSCULAR; INTRAVENOUS EVERY 6 HOURS PRN
Status: DISCONTINUED | OUTPATIENT
Start: 2022-01-01 | End: 2022-01-11 | Stop reason: HOSPADM

## 2022-01-01 RX ORDER — MEPERIDINE HYDROCHLORIDE 25 MG/ML
25 INJECTION INTRAMUSCULAR; INTRAVENOUS; SUBCUTANEOUS EVERY 30 MIN PRN
Status: DISCONTINUED | OUTPATIENT
Start: 2022-01-01 | End: 2022-01-01 | Stop reason: HOSPADM

## 2022-01-01 RX ORDER — ACETAMINOPHEN 325 MG/1
650 TABLET ORAL EVERY 4 HOURS PRN
Status: DISCONTINUED | OUTPATIENT
Start: 2022-01-01 | End: 2022-01-11 | Stop reason: HOSPADM

## 2022-01-01 RX ORDER — ATORVASTATIN CALCIUM 40 MG/1
40 TABLET, FILM COATED ORAL DAILY
Status: DISCONTINUED | OUTPATIENT
Start: 2022-01-01 | End: 2022-01-11 | Stop reason: HOSPADM

## 2022-01-01 RX ORDER — PIPERACILLIN SODIUM, TAZOBACTAM SODIUM 3; .375 G/15ML; G/15ML
3.38 INJECTION, POWDER, LYOPHILIZED, FOR SOLUTION INTRAVENOUS EVERY 8 HOURS
Status: DISCONTINUED | OUTPATIENT
Start: 2022-01-01 | End: 2022-01-01

## 2022-01-01 RX ADMIN — FUROSEMIDE 20 MG: 10 INJECTION, SOLUTION INTRAMUSCULAR; INTRAVENOUS at 21:11

## 2022-01-01 RX ADMIN — PANTOPRAZOLE SODIUM 40 MG: 20 TABLET, DELAYED RELEASE ORAL at 15:32

## 2022-01-01 RX ADMIN — PIPERACILLIN SODIUM AND TAZOBACTAM SODIUM 3.38 G: 3; .375 INJECTION, POWDER, LYOPHILIZED, FOR SOLUTION INTRAVENOUS at 04:35

## 2022-01-01 RX ADMIN — LEVOTHYROXINE SODIUM 112 MCG: 0.11 TABLET ORAL at 08:46

## 2022-01-01 RX ADMIN — SODIUM CHLORIDE 800 MG: 9 INJECTION, SOLUTION INTRAVENOUS at 10:52

## 2022-01-01 RX ADMIN — ATORVASTATIN CALCIUM 40 MG: 40 TABLET, FILM COATED ORAL at 15:32

## 2022-01-01 RX ADMIN — VANCOMYCIN HYDROCHLORIDE 750 MG: 1 INJECTION, POWDER, LYOPHILIZED, FOR SOLUTION INTRAVENOUS at 14:51

## 2022-01-01 RX ADMIN — IOPAMIDOL 100 ML: 755 INJECTION, SOLUTION INTRAVENOUS at 05:25

## 2022-01-01 RX ADMIN — PIPERACILLIN SODIUM AND TAZOBACTAM SODIUM 3.38 G: 3; .375 INJECTION, POWDER, LYOPHILIZED, FOR SOLUTION INTRAVENOUS at 21:12

## 2022-01-01 RX ADMIN — ATORVASTATIN CALCIUM 40 MG: 40 TABLET, FILM COATED ORAL at 08:33

## 2022-01-01 RX ADMIN — PIPERACILLIN SODIUM AND TAZOBACTAM SODIUM 3.38 G: 3; .375 INJECTION, POWDER, LYOPHILIZED, FOR SOLUTION INTRAVENOUS at 12:38

## 2022-01-01 RX ADMIN — VANCOMYCIN HYDROCHLORIDE 750 MG: 1 INJECTION, POWDER, LYOPHILIZED, FOR SOLUTION INTRAVENOUS at 02:44

## 2022-01-01 RX ADMIN — ACETAMINOPHEN 650 MG: 325 TABLET ORAL at 08:47

## 2022-01-01 RX ADMIN — VANCOMYCIN HYDROCHLORIDE 1750 MG: 10 INJECTION, POWDER, LYOPHILIZED, FOR SOLUTION INTRAVENOUS at 15:35

## 2022-01-01 RX ADMIN — SODIUM CHLORIDE 250 ML: 9 INJECTION, SOLUTION INTRAVENOUS at 10:05

## 2022-01-01 RX ADMIN — ACETAMINOPHEN 650 MG: 325 TABLET ORAL at 14:56

## 2022-01-01 RX ADMIN — METOPROLOL SUCCINATE 12.5 MG: 25 TABLET, EXTENDED RELEASE ORAL at 15:32

## 2022-01-01 RX ADMIN — PIPERACILLIN SODIUM AND TAZOBACTAM SODIUM 3.38 G: 3; .375 INJECTION, POWDER, LYOPHILIZED, FOR SOLUTION INTRAVENOUS at 13:00

## 2022-01-01 RX ADMIN — DOCETAXEL 114 MG: 20 INJECTION, SOLUTION, CONCENTRATE INTRAVENOUS at 12:02

## 2022-01-01 RX ADMIN — PANTOPRAZOLE SODIUM 40 MG: 20 TABLET, DELAYED RELEASE ORAL at 08:33

## 2022-01-01 RX ADMIN — DIPHENHYDRAMINE HYDROCHLORIDE 25 MG: 50 INJECTION INTRAMUSCULAR; INTRAVENOUS at 10:36

## 2022-01-01 ASSESSMENT — ACTIVITIES OF DAILY LIVING (ADL)
ADLS_ACUITY_SCORE: 7
FALL_HISTORY_WITHIN_LAST_SIX_MONTHS: NO
ADLS_ACUITY_SCORE: 7
CONCENTRATING,_REMEMBERING_OR_MAKING_DECISIONS_DIFFICULTY: YES
ADLS_ACUITY_SCORE: 7
DRESSING/BATHING_DIFFICULTY: NO
WEAR_GLASSES_OR_BLIND: OTHER (SEE COMMENTS)
ADLS_ACUITY_SCORE: 7
HEARING_DIFFICULTY_OR_DEAF: NO
ADLS_ACUITY_SCORE: 7
TOILETING_ISSUES: NO
ADLS_ACUITY_SCORE: 7
DOING_ERRANDS_INDEPENDENTLY_DIFFICULTY: YES
ADLS_ACUITY_SCORE: 7
DIFFICULTY_EATING/SWALLOWING: OTHER (SEE COMMENTS)
WALKING_OR_CLIMBING_STAIRS_DIFFICULTY: NO
ADLS_ACUITY_SCORE: 7
EATING/SWALLOWING: SWALLOWING SOLID FOOD
ADLS_ACUITY_SCORE: 7
DIFFICULTY_COMMUNICATING: NO
ADLS_ACUITY_SCORE: 7
PATIENT_/_FAMILY_COMMUNICATION_STYLE: SPOKEN LANGUAGE (ENGLISH OR BILINGUAL)
ADLS_ACUITY_SCORE: 7

## 2022-01-01 ASSESSMENT — ENCOUNTER SYMPTOMS
DIARRHEA: 1
FEVER: 1
SHORTNESS OF BREATH: 1
COUGH: 1
NAUSEA: 0

## 2022-01-01 ASSESSMENT — PAIN SCALES - GENERAL: PAINLEVEL: NO PAIN (0)

## 2022-01-04 NOTE — PROGRESS NOTES
"Oncology Rooming Note    January 4, 2022 8:47 AM   Pardeep Wilson is a 66 year old male who presents for:    Chief Complaint   Patient presents with     Oncology Clinic Visit     Malignant neoplasm of upper lobe of right lung (H)     Initial Vitals: BP (!) 157/72 (BP Location: Left arm, Patient Position: Sitting, Cuff Size: Adult Regular)   Pulse 87   Temp 97.7  F (36.5  C) (Oral)   Resp 12   Wt 82.6 kg (182 lb)   SpO2 97%   BMI 26.49 kg/m   Estimated body mass index is 26.49 kg/m  as calculated from the following:    Height as of 12/21/21: 1.765 m (5' 9.5\").    Weight as of this encounter: 82.6 kg (182 lb). Body surface area is 2.01 meters squared.  No Pain (0) Comment: Data Unavailable   No LMP for male patient.  Allergies reviewed: Yes  Medications reviewed: Yes    Medications: Medication refills not needed today.  Pharmacy name entered into [employer]:    [facility] - [place], [place] - [address] AT [address] TO [address] [facility] [address]  [facility] [address] [place], [place] - [address]    Clinical concerns:  Malignant neoplasm of upper lobe of right lung     Cristiane Zhang, DAVION            "

## 2022-01-04 NOTE — PROGRESS NOTES
[facility] Progress Note    Patient: Pardeep Wilson  MRN: [id number]  Date of Service: Jan 4, 2022          Reason for Visit    Chief Complaint   Patient presents with     Oncology Clinic Visit     Malignant neoplasm of upper lobe of right lung (H)       Assessment and Plan    Cancer Staging  No matching staging information was found for the patient.    1.  Lung cancer, adenocarcinoma, relapsed with malignant pleural effusion diagnosed December 2021.  Patient is here today to start palliative chemotherapy with Taxotere and RAMucirumab.  We will give the Taxotere at a 25% reduction.  Patient will return in about 7 to 10 days for toxicity check and labs and then will return in 3 weeks for cycle 2.  I told patient we will likely do repeat imaging after 2-3 cycles.    2.  Malignant pleural effusion on the right side: He has had a couple of thoracentesis.  Last one was last week and he feels much better.  Continue to monitor symptoms and do thoracentesis as needed.  Hopefully will improve with treatment.    3. 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  He has had many relapses of his ITP.  We will have to monitor his platelet count closely with being off steroids and starting chemotherapy.    ECOG Performance    0 - Independent    Distress Screening (within last 30 days)    1. How concerned are you about your ability to eat? : 0  2. How concerned are you about unintended weight loss or your current weight? : 0  3. How concerned are you about feeling depressed or very sad? : 0  4. How concerned are you about feeling anxious or very scared? : 0  5. Do you struggle with the loss of meaning and leonor in your life? : Not at all  6. How concerned are you about work and home life issues that may be affected by your cancer? : 0  7. How concerned are you about knowing what resources are available to help you? : 0  8. Do you currently have what you would  describe as Methodist or spiritual struggles?            : Not at all       Pain  Pain Score: No Pain (0)    Problem List    Patient Active Problem List   Diagnosis     Anemia     Chronic renal insufficiency, stage III (moderate) (H)     Esophageal reflux     s/p subxiphoid RUL wedge rxn, 2/28/20     Lung nodule     Idiopathic thrombocytopenic purpura (H)     Hypothyroidism, unspecified type     Malignant neoplasm of upper lobe of right lung (H)     Personal history of malignant neoplasm of bronchus and lung     Hypertension     Coronary artery disease involving native coronary artery of native heart without angina pectoris     Chronic bilateral low back pain with left-sided sciatica     Neck pain     Pleural effusion     Hypoxia     Infection due to 2019 novel coronavirus        ______________________________________________________________________________    History of Present Illness    Measurable disease: PET scan, CT scan    Current treatment: Patient will start Taxotere and ramucirumab today.    Past treatment for lung cancer:  -Stereotactic radiotherapy to lung nodules completed February 2021.  -Keytruda maintenance every 3 weeks.  That was given from September 2020 until December 2020.  Stopped due to diarrhea and ITP.  -Carboplatin, Taxol and Keytruda for 4 cycles from June 2020 until August 2020.  -Wedge resection of right upper lobe nodule February 2020.  -Cisplatin and Alimta adjuvant chemotherapy for 4 cycles from December 2018 until February 2019.  -Patient had left lower lobectomy November 1, 2018.    Past treatment for ITP:  Many bouts of steroids.  He did have IVIG in February 2021.  He had 4 doses of Rituxan from February 2021 until March 2021.  Patient had Nplate in February 2021, March 2021, April 2021.     Interim history: Patient is here today to start a new chemo regimen.  He is feeling sad about having to be back on chemotherapy in the recent news.  He is hopeful that this chemotherapy works.   He states that he feels much better with breathing after getting the fluid removal last week.  He states that he feels stronger from his Covid infection and feels like he is improving a lot on that.  He states that he does feel ready to start treatment.  Worried about side effects.  He denies any fevers or infectious complaints.  He denies any new symptom that he is worried about.  He states that he wants to make sure that we monitor his platelets and diarrhea issues since he had those issues his last treatment.    Review of Systems    Pertinent items are noted in HPI.    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 Cooperative in conversation. Here with wife. Masks on  RESP: Regular respiratory rate. No expiratory wheezes   MUSCULOSKELETAL: no bilateral leg swelling  NEURO: non focal. Alert and oriented x3.   PSYCH: within normal limits. No depression or anxiety.  SKIN: exposed skin is dry intact.     Lab Results    Recent Results (from the past 168 hour(s))   Hepatic panel   Result Value Ref Range    Bilirubin Total 0.6 0.0 - 1.0 mg/dL    Bilirubin Direct 0.2 <=0.5 mg/dL    Protein Total 6.1 6.0 - 8.0 g/dL    Albumin 2.9 (L) 3.5 - 5.0 g/dL    Alkaline Phosphatase 99 45 - 120 U/L    AST 20 0 - 40 U/L    ALT 15 0 - 45 U/L   CBC with platelets and  differential   Result Value Ref Range    WBC Count 18.9 (H) 4.0 - 11.0 10e3/uL    RBC Count 4.43 4.40 - 5.90 10e6/uL    Hemoglobin 13.3 13.3 - 17.7 g/dL    Hematocrit 41.2 40.0 - 53.0 %    MCV 93 78 - 100 fL    MCH 30.0 26.5 - 33.0 pg    MCHC 32.3 31.5 - 36.5 g/dL    RDW 17.7 (H) 10.0 - 15.0 %    Platelet Count 305 150 - 450 10e3/uL    % Neutrophils 89 %    % Lymphocytes 6 %    % Monocytes 4 %    % Eosinophils 0 %    % Basophils 0 %    % Immature Granulocytes 1 %    NRBCs per 100 WBC 0 <1 /100    Absolute Neutrophils 17.1 (H) 1.6 - 8.3 10e3/uL    Absolute Lymphocytes 1.1 0.8 - 5.3 10e3/uL    Absolute Monocytes 0.7 0.0 - 1.3 10e3/uL    Absolute Eosinophils 0.0 0.0 - 0.7 10e3/uL    Absolute Basophils 0.0 0.0 - 0.2 10e3/uL    Absolute Immature Granulocytes 0.2 <=0.4 10e3/uL    Absolute NRBCs 0.0 10e3/uL   Basic metabolic panel  (Ca, Cl, CO2, Creat, Gluc, K, Na, BUN)   Result Value Ref Range    Sodium 142 136 - 145 mmol/L    Potassium 4.3 3.5 - 5.0 mmol/L    Chloride 108 (H) 98 - 107 mmol/L    Carbon Dioxide (CO2) 25 22 - 31 mmol/L    Anion Gap 9 5 - 18 mmol/L    Urea Nitrogen 38 (H) 8 - 22 mg/dL    Creatinine 1.11 0.70 - 1.30 mg/dL    Calcium 9.3 8.5 - 10.5 mg/dL    Glucose 127 (H) 70 - 125 mg/dL    GFR Estimate 73 >60 mL/min/1.73m2   Protein qualitative urine   Result Value Ref Range    Protein Albumin Urine Negative Negative mg/dL       Imaging    US Thoracentesis    Result Date: 12/29/2021  US THORACENTESIS 12/29/2021 12:49 PM HISTORY: Short of breath. Recurrent right pleural effusion. ULTRASOUND-GUIDED RIGHT THORACENTESIS: Risks and benefits of the procedure are discussed with the patient. Under sonographic guidance and utilizing 8 mL of 1% lidocaine as local anesthetic, a needle is inserted into the posterior right hemithorax. This is followed by a standard 8 [place] catheter. 2,000 mL of khurram-colored fluid is evacuated. The patient tolerated the procedure well. There is no significant blood loss.     IMPRESSION:  Technically uneventful ultrasound-guided thoracentesis. DONATO HAGEN MD   SYSTEM ID:  [id number]    XR Chest Port 1 View    Result Date: 12/7/2021  EXAM: XR CHEST PORT 1 VIEW LOCATION: [facility] DATE/TIME: 12/7/2021 8:20 AM INDICATION: Follow-up pleural effusion. COMPARISON: 12/05/2021 chest x-ray, right thoracentesis 12/05/2021 with 2 L of fluid removed.     IMPRESSION: Small right pleural effusion decrease since 12/05/2021. Tiny left pleural effusion unchanged. Minimal interstitial prominence in both lung bases greater on the left unchanged.    PET Oncology Whole Body    Result Date: 12/15/2021  Combined Report of:    PET and CT on  12/14/2021 3:50 PM : 1. PET of the neck, chest, abdomen, and pelvis. 2. PET CT Fusion for Attenuation Correction and Anatomical Localization:  3. 3D MIP and PET-CT fused images were processed on an independent workstation and archived to PACS and reviewed by a radiologist. Technique: 1. PET: The patient received 11.02 mCi of F-18-FDG; the serum glucose was 105 prior to administration, body weight was 82.7 kg. Images were evaluated in the axial, sagittal, and coronal planes as well as the rotational whole body MIP. Images were acquired from the Vertex to the Feet. UPTAKE WAS MEASURED AT 60 MINUTES. BACKGROUND:  Liver SUV max= 4.1,   Aorta Blood SUV Max: 2.8. 2. CT: Volumetric acquisition for clinical interpretation of the chest, abdomen, and pelvis acquired at 3 mm sections . The chest, abdomen, and pelvis were evaluated at 5 mm sections in bone, soft tissue, and lung windows.  3. 3D MIP and PET-CT fused images were processed on an independent workstation and archived to PACS and reviewed by a radiologist. INDICATION: Malignant neoplasm of upper lobe of right lung (H) ADDITIONAL INFORMATION OBTAINED FROM EMR: 66 yM with complicated medical history notable for recurrent (now stage IV) metastatic adenocarcinoma of the left lower lobe with malignant right pleural  effusion. COMPARISON: CT of the chest and upper abdomen from 2021 and 2020 and 2020 PET/CT FINDINGS: HEAD/NECK: There is no  suspicious FDG uptake in the neck. CHEST: There is a new pleural effusion with compared with the previous PET CT study but unchanged from 2021 CT of the chest. The left lower lobe hypermetabolic tumor is now absent but there are pleural metastases scattered along the lower half of the left hemithorax. The CT of the chest from 2021 demonstrates the right sided pleural metastases. Within the right hemithorax the pleural effusion is predominantly simple although there is a small amount of FDG hypermetabolism anteriorly. This could represent a malignant component to the pleural effusion. There are scattered pulmonary nodules within the lung and pleural space, best seen on image 205 of series 3. The SUV max of the largest nodule is 7.0. Within the upper anterior right chest there are parenchymal changes and scattered mild hypermetabolism. This could represent a pneumonic process although early metastasis cannot be excluded. There are no hilar or mediastinal hypermetabolic lymph nodes. ABDOMEN AND PELVIS: There is no suspicious FDG uptake in the abdomen or pelvis. There is normal GI and urinary excretion of the FDG. LOWER EXTREMITIES: No abnormal masses or hypermetabolic lesions. BONES: There are no suspicious lytic or blastic osseous lesions.  There is no abnormal FDG uptake in the skeleton.     IMPRESSION: In this patient with diagnosis of malignant neoplasm of the upper lobe of right lun. There has been interval progression of the lung cancer with multiple pleural-based pulmonary metastasis within the lower left hemithorax as well as right lower lung with pleural-based metastasis. 2. Continued moderate right-sided pleural effusion. I have personally reviewed the examination and initial interpretation and I agree with the findings. ROMARIO GONZALEZ MD   SYSTEM ID:   [id number]    MR Brain w/o & w Contrast    Result Date: 12/13/2021  EXAM: MR BRAIN W/O AND W CONTRAST LOCATION: [facility] DATE/TIME: 12/11/2021 11:12 AM INDICATION: Lung cancer. Clinical concern for brain metastases. COMPARISON: Head MRI 4/1/2020. CONTRAST: 8 mL Gadavist. TECHNIQUE: Routine multiplanar multisequence head MRI without and with intravenous contrast. FINDINGS: INTRACRANIAL CONTENTS: No acute or subacute infarct. No mass or acute hemorrhage. 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 These have progressed since 4/1/2020. Scattered subcortical microhemorrhages are again identified supratentorially. No evidence of metastatic disease. Mild generalized volume loss. No abnormal contrast enhancement. SELLA: No abnormality accounting for technique. OSSEOUS STRUCTURES/SOFT TISSUES: Normal marrow signal. The major intracranial vascular flow voids are maintained. ORBITS: No abnormality accounting for technique. SINUSES/MASTOIDS: 8.3 x 8.9 mm polypoid lesion in the right sphenoid sinus, presumably inspissated mucus. A cephalocele is not entirely excluded. Consider further assessment with a dedicated sinus CT. Mild mucosal thickening scattered about the paranasal  sinuses. No middle ear or mastoid effusion.     IMPRESSION: 1.  No evidence of metastatic disease. 2.  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 These have progressed. 3.  Unchanged scattered subcortical microhemorrhages supratentorially. 4.  8.3 x 8.9 mm polypoid lesion in the right sphenoid sinus presumably inspissated mucus. A cephalocele is not entirely excluded. Consider further assessment with a dedicated sinus CT.    Total time spent with patient in face to face time, chart review and documentation was 45 minutes.        Signed by: SYLVIE Ibarra CNP

## 2022-01-04 NOTE — PROGRESS NOTES
Pardeep came to chemo infusion this morning following lab and NP visits for cycle 1 day 1 treatment with Ramucirumab and Docetaxel.  Peripheral IV inserted with good blood return.  He was educated on each medication prior to administration.  He received treatment as ordered and tolerated it well while in clinic today.  IV maintained good blood return throughout.  IV was flushed with ns then removed and site covered.  Pardeep left clinic ambulatory.

## 2022-01-04 NOTE — LETTER
"    1/4/2022         RE: Pardeep Wilson  [address] [facility] [address]        Dear Colleague,    Thank you for referring your patient, Pardeep Wilson, to the [facility] [place]. Please see a copy of my visit note below.    Oncology Rooming Note    January 4, 2022 8:47 AM   Pardeep Wilson is a 66 year old male who presents for:    Chief Complaint   Patient presents with     Oncology Clinic Visit     Malignant neoplasm of upper lobe of right lung (H)     Initial Vitals: BP (!) 157/72 (BP Location: Left arm, Patient Position: Sitting, Cuff Size: Adult Regular)   Pulse 87   Temp 97.7  F (36.5  C) (Oral)   Resp 12   Wt 82.6 kg (182 lb)   SpO2 97%   BMI 26.49 kg/m   Estimated body mass index is 26.49 kg/m  as calculated from the following:    Height as of 12/21/21: 1.765 m (5' 9.5\").    Weight as of this encounter: 82.6 kg (182 lb). Body surface area is 2.01 meters squared.  No Pain (0) Comment: Data Unavailable   No LMP for male patient.  Allergies reviewed: Yes  Medications reviewed: Yes    Medications: Medication refills not needed today.  Pharmacy name entered into [facility] W[employer]:    [facility] [place], [place] - [address] AT PORTAL TO REGISTERED [facility] SITES  [facility] [address] [place], [place] - [address] [facility], [place]    Clinical concerns:  Malignant neoplasm of upper lobe of right lung     Cristiane Zhang, [facility] Hematology and Oncology Progress Note    Patient: Pardeep Wilson  MRN: [id number]  Date of Service: Jan 4, 2022          Reason for Visit    Chief Complaint   Patient presents with     Oncology Clinic Visit     Malignant neoplasm of upper lobe of right lung (H)       Assessment and Plan    Cancer Staging  No matching staging information was found for the patient.    1.  Lung cancer, adenocarcinoma, relapsed with malignant pleural effusion diagnosed December 2021.  Patient is here today to start palliative " chemotherapy with Taxotere and RAMucirumab.  We will give the Taxotere at a 25% reduction.  Patient will return in about 7 to 10 days for toxicity check and labs and then will return in 3 weeks for cycle 2.  I told patient we will likely do repeat imaging after 2-3 cycles.    2.  Malignant pleural effusion on the right side: He has had a couple of thoracentesis.  Last one was last week and he feels much better.  Continue to monitor symptoms and do thoracentesis as needed.  Hopefully will improve with treatment.    3. 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  He has had many relapses of his ITP.  We will have to monitor his platelet count closely with being off steroids and starting chemotherapy.    ECOG Performance    0 - Independent    Distress Screening (within last 30 days)    1. How concerned are you about your ability to eat? : 0  2. How concerned are you about unintended weight loss or your current weight? : 0  3. How concerned are you about feeling depressed or very sad? : 0  4. How concerned are you about feeling anxious or very scared? : 0  5. Do you struggle with the loss of meaning and leonor in your life? : Not at all  6. How concerned are you about work and home life issues that may be affected by your cancer? : 0  7. How concerned are you about knowing what resources are available to help you? : 0  8. Do you currently have what you would describe as Yazdanism or spiritual struggles?            : Not at all       Pain  Pain Score: No Pain (0)    Problem List    Patient Active Problem List   Diagnosis     Anemia     Chronic renal insufficiency, stage III (moderate) (H)     Esophageal reflux     s/p subxiphoid RUL wedge rxn, 2/28/20     Lung nodule     Idiopathic thrombocytopenic purpura (H)     Hypothyroidism, unspecified type     Malignant neoplasm of upper lobe of right lung (H)     Personal history of malignant neoplasm of bronchus and lung     Hypertension      Coronary artery disease involving native coronary artery of native heart without angina pectoris     Chronic bilateral low back pain with left-sided sciatica     Neck pain     Pleural effusion     Hypoxia     Infection due to 2019 novel coronavirus        ______________________________________________________________________________    History of Present Illness    Measurable disease: PET scan, CT scan    Current treatment: Patient will start Taxotere and ramucirumab today.    Past treatment for lung cancer:  -Stereotactic radiotherapy to lung nodules completed February 2021.  -Keytruda maintenance every 3 weeks.  That was given from September 2020 until December 2020.  Stopped due to diarrhea and ITP.  -Carboplatin, Taxol and Keytruda for 4 cycles from June 2020 until August 2020.  -Wedge resection of right upper lobe nodule February 2020.  -Cisplatin and Alimta adjuvant chemotherapy for 4 cycles from December 2018 until February 2019.  -Patient had left lower lobectomy November 1, 2018.    Past treatment for ITP:  Many bouts of steroids.  He did have IVIG in February 2021.  He had 4 doses of Rituxan from February 2021 until March 2021.  Patient had Nplate in February 2021, March 2021, April 2021.     Interim history: Patient is here today to start a new chemo regimen.  He is feeling sad about having to be back on chemotherapy in the recent news.  He is hopeful that this chemotherapy works.  He states that he feels much better with breathing after getting the fluid removal last week.  He states that he feels stronger from his Covid infection and feels like he is improving a lot on that.  He states that he does feel ready to start treatment.  Worried about side effects.  He denies any fevers or infectious complaints.  He denies any new symptom that he is worried about.  He states that he wants to make sure that we monitor his platelets and diarrhea issues since he had those issues his last treatment.    Review of  Systems    Pertinent items are noted in HPI.    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 Cooperative in conversation. Here with wife. Masks on  RESP: Regular respiratory rate. No expiratory wheezes   MUSCULOSKELETAL: no bilateral leg swelling  NEURO: non focal. Alert and oriented x3.   PSYCH: within normal limits. No depression or anxiety.  SKIN: exposed skin is dry intact.     Lab Results    Recent Results (from the past 168 hour(s))   Hepatic panel   Result Value Ref Range    Bilirubin Total 0.6 0.0 - 1.0 mg/dL    Bilirubin Direct 0.2 <=0.5 mg/dL    Protein Total 6.1 6.0 - 8.0 g/dL    Albumin 2.9 (L) 3.5 - 5.0 g/dL    Alkaline Phosphatase 99 45 - 120 U/L    AST 20 0 - 40 U/L    ALT 15 0 - 45 U/L   CBC with platelets and differential   Result Value Ref Range    WBC Count 18.9 (H) 4.0 - 11.0 10e3/uL    RBC Count 4.43 4.40 - 5.90 10e6/uL    Hemoglobin 13.3 13.3 - 17.7 g/dL    Hematocrit 41.2 40.0 - 53.0 %    MCV 93 78 - 100 fL    MCH 30.0 26.5 - 33.0 pg    MCHC 32.3 31.5 - 36.5 g/dL    RDW 17.7 (H) 10.0 - 15.0 %    Platelet Count 305 150 - 450 10e3/uL    % Neutrophils 89 %    % Lymphocytes 6 %    % Monocytes 4 %    % Eosinophils 0 %    % Basophils 0 %    % Immature Granulocytes 1 %    NRBCs per 100 WBC 0 <1 /100    Absolute Neutrophils 17.1 (H)  1.6 - 8.3 10e3/uL    Absolute Lymphocytes 1.1 0.8 - 5.3 10e3/uL    Absolute Monocytes 0.7 0.0 - 1.3 10e3/uL    Absolute Eosinophils 0.0 0.0 - 0.7 10e3/uL    Absolute Basophils 0.0 0.0 - 0.2 10e3/uL    Absolute Immature Granulocytes 0.2 <=0.4 10e3/uL    Absolute NRBCs 0.0 10e3/uL   Basic metabolic panel  (Ca, Cl, CO2, Creat, Gluc, K, Na, BUN)   Result Value Ref Range    Sodium 142 136 - 145 mmol/L    Potassium 4.3 3.5 - 5.0 mmol/L    Chloride 108 (H) 98 - 107 mmol/L    Carbon Dioxide (CO2) 25 22 - 31 mmol/L    Anion Gap 9 5 - 18 mmol/L    Urea Nitrogen 38 (H) 8 - 22 mg/dL    Creatinine 1.11 0.70 - 1.30 mg/dL    Calcium 9.3 8.5 - 10.5 mg/dL    Glucose 127 (H) 70 - 125 mg/dL    GFR Estimate 73 >60 mL/min/1.73m2   Protein qualitative urine   Result Value Ref Range    Protein Albumin Urine Negative Negative mg/dL       Imaging    US Thoracentesis    Result Date: 12/29/2021  US THORACENTESIS 12/29/2021 12:49 PM HISTORY: Short of breath. Recurrent right pleural effusion. ULTRASOUND-GUIDED RIGHT THORACENTESIS: Risks and benefits of the procedure are discussed with the patient. Under sonographic guidance and utilizing 8 mL of 1% lidocaine as local anesthetic, a needle is inserted into the posterior right hemithorax. This is followed by a standard 8 Cypriot catheter. 2,000 mL of khurram-colored fluid is evacuated. The patient tolerated the procedure well. There is no significant blood loss.     IMPRESSION: Technically uneventful ultrasound-guided thoracentesis. DONATO HAGEN MD   SYSTEM ID:  [id number]    XR Chest Port 1 View    Result Date: 12/7/2021  EXAM: XR CHEST PORT 1 VIEW LOCATION: [facility] DATE/TIME: 12/7/2021 8:20 AM INDICATION: Follow-up pleural effusion. COMPARISON: 12/05/2021 chest x-ray, right thoracentesis 12/05/2021 with 2 L of fluid removed.     IMPRESSION: Small right pleural effusion decrease since 12/05/2021. Tiny left pleural effusion unchanged. Minimal interstitial prominence in both  lung bases greater on the left unchanged.    PET Oncology Whole Body    Result Date: 12/15/2021  Combined Report of:    PET and CT on  12/14/2021 3:50 PM : 1. PET of the neck, chest, abdomen, and pelvis. 2. PET CT Fusion for Attenuation Correction and Anatomical Localization:  3. 3D MIP and PET-CT fused images were processed on an independent workstation and archived to PACS and reviewed by a radiologist. Technique: 1. PET: The patient received 11.02 mCi of F-18-FDG; the serum glucose was 105 prior to administration, body weight was 82.7 kg. Images were evaluated in the axial, sagittal, and coronal planes as well as the rotational whole body MIP. Images were acquired from the Vertex to the Feet. UPTAKE WAS MEASURED AT 60 MINUTES. BACKGROUND:  Liver SUV max= 4.1,   Aorta Blood SUV Max: 2.8. 2. CT: Volumetric acquisition for clinical interpretation of the chest, abdomen, and pelvis acquired at 3 mm sections . The chest, abdomen, and pelvis were evaluated at 5 mm sections in bone, soft tissue, and lung windows.  3. 3D MIP and PET-CT fused images were processed on an independent workstation and archived to PACS and reviewed by a radiologist. INDICATION: Malignant neoplasm of upper lobe of right lung (H) ADDITIONAL INFORMATION OBTAINED FROM EMR: 66 yM with complicated medical history notable for recurrent (now stage IV) metastatic adenocarcinoma of the left lower lobe with malignant right pleural effusion. COMPARISON: CT of the chest and upper abdomen from 11/24/2021 and 12/22/2020 and 6/1/2020 PET/CT FINDINGS: HEAD/NECK: There is no  suspicious FDG uptake in the neck. CHEST: There is a new pleural effusion with compared with the previous PET CT study but unchanged from 11/24/2021 CT of the chest. The left lower lobe hypermetabolic tumor is now absent but there are pleural metastases scattered along the lower half of the left hemithorax. The CT of the chest from 11/24/2021 demonstrates the right sided pleural metastases.  Within the right hemithorax the pleural effusion is predominantly simple although there is a small amount of FDG hypermetabolism anteriorly. This could represent a malignant component to the pleural effusion. There are scattered pulmonary nodules within the lung and pleural space, best seen on image 205 of series 3. The SUV max of the largest nodule is 7.0. Within the upper anterior right chest there are parenchymal changes and scattered mild hypermetabolism. This could represent a pneumonic process although early metastasis cannot be excluded. There are no hilar or mediastinal hypermetabolic lymph nodes. ABDOMEN AND PELVIS: There is no suspicious FDG uptake in the abdomen or pelvis. There is normal GI and urinary excretion of the FDG. LOWER EXTREMITIES: No abnormal masses or hypermetabolic lesions. BONES: There are no suspicious lytic or blastic osseous lesions.  There is no abnormal FDG uptake in the skeleton.     IMPRESSION: In this patient with diagnosis of malignant neoplasm of the upper lobe of right lun. There has been interval progression of the lung cancer with multiple pleural-based pulmonary metastasis within the lower left hemithorax as well as right lower lung with pleural-based metastasis. 2. Continued moderate right-sided pleural effusion. I have personally reviewed the examination and initial interpretation and I agree with the findings. ROMARIO GONZALEZ MD   SYSTEM ID:  [id number]    MR Brain w/o & w Contrast    Result Date: 2021  EXAM: MR BRAIN W/O AND W CONTRAST LOCATION: [facility] DATE/TIME: 2021 11:12 AM INDICATION: Lung cancer. Clinical concern for brain metastases. COMPARISON: Head MRI 2020. CONTRAST: 8 mL Gadavist. TECHNIQUE: Routine multiplanar multisequence head MRI without and with intravenous contrast. FINDINGS: INTRACRANIAL CONTENTS: No acute or subacute infarct. No mass or acute hemorrhage. Chronic infarcts and blood products in the  cerebellar hemispheres left greater than right. These have progressed since 4/1/2020. Scattered subcortical microhemorrhages are again identified supratentorially. No evidence of metastatic disease. Mild generalized volume loss. No abnormal contrast enhancement. SELLA: No abnormality accounting for technique. OSSEOUS STRUCTURES/SOFT TISSUES: Normal marrow signal. The major intracranial vascular flow voids are maintained. ORBITS: No abnormality accounting for technique. SINUSES/MASTOIDS: 8.3 x 8.9 mm polypoid lesion in the right sphenoid sinus, presumably inspissated mucus. A cephalocele is not entirely excluded. Consider further assessment with a dedicated sinus CT. Mild mucosal thickening scattered about the paranasal  sinuses. No middle ear or mastoid effusion.     IMPRESSION: 1.  No evidence of metastatic disease. 2.  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 These have progressed. 3.  Unchanged scattered subcortical microhemorrhages supratentorially. 4.  8.3 x 8.9 mm polypoid lesion in the right sphenoid sinus presumably inspissated mucus. A cephalocele is not entirely excluded. Consider further assessment with a dedicated sinus CT.    Total time spent with patient in face to face time, chart review and documentation was 45 minutes.        Signed by: SYLVIE Ibarra CNP      Again, thank you for allowing me to participate in the care of your patient.        Sincerely,        SYLVIE Ibarra CNP

## 2022-01-06 NOTE — TELEPHONE ENCOUNTER
Pardeep called me right back to report that he is doing fine and has no concerns at this time. I reminded him of his midcycle check on Fri, 1/14. And encouraged him to call us should anything arise prior to that appointment.     He verbalized understanding and appreciation of our conversation.     Shira Abdullahi  RN Care Coordinator  [facility]

## 2022-01-06 NOTE — TELEPHONE ENCOUNTER
I call Pardeep to check in on him after receiving chemo on Tues, 1/04. I was unable to get a hold of him. I left him a detailed message, stating the intent of my call and encouraging him to call us should he have any questions/concerns.     Shira Abdullahi  RN Care Coordinator  [facility]

## 2022-01-09 PROBLEM — R06.00 DYSPNEA, UNSPECIFIED TYPE: Status: ACTIVE | Noted: 2022-01-01

## 2022-01-09 PROBLEM — R04.2 HEMOPTYSIS: Status: ACTIVE | Noted: 2022-01-01

## 2022-01-09 PROBLEM — U07.1 INFECTION DUE TO 2019 NOVEL CORONAVIRUS: Status: ACTIVE | Noted: 2021-01-01

## 2022-01-09 PROBLEM — J18.9 PNEUMONIA OF BOTH LUNGS DUE TO INFECTIOUS ORGANISM, UNSPECIFIED PART OF LUNG: Status: ACTIVE | Noted: 2022-01-01

## 2022-01-09 NOTE — ED TRIAGE NOTES
PT HAD COVID ON 12/5/212.  WAS FEELING BETTER.  NOW TONIGHT WOKE UP AND COUGHING AND VOMITED LARGE AMOUNT OF BRIGHT RED BLOOD.  PT IS CHEMO PT

## 2022-01-09 NOTE — PHARMACY-ADMISSION MEDICATION HISTORY
Pharmacy Note - Admission Medication History    Pertinent Provider Information: he takes all of his pills once daily at 1200. He has recently completed a lengthy prednisone taper.      ______________________________________________________________________    Prior To Admission (PTA) med list completed and updated in EMR.       PTA Med List   Medication Sig Last Dose     atorvastatin (LIPITOR) 40 MG tablet Take 1 tablet (40 mg) by mouth daily 1/8/2022 at 1200     levothyroxine (SYNTHROID/LEVOTHROID) 112 MCG tablet Take 1 tablet (112 mcg) by mouth daily 1/8/2022 at 1200     metoprolol succinate ER (TOPROL-XL) 25 MG 24 hr tablet Take 0.5 tablets (12.5 mg) by mouth daily 1/8/2022 at 1200     pantoprazole (PROTONIX) 40 MG EC tablet Take 1 tablet (40 mg) by mouth daily 1/8/2022 at 1200     prochlorperazine (COMPAZINE) 10 MG tablet Take 1 tablet (10 mg) by mouth every 6 hours as needed (Nausea/Vomiting) Past Month     sodium fluoride dental gel (PREVIDENT) 1.1 % GEL topical gel Apply to affected area daily Past Week       Information source(s): Patient, Clinic records, Hospital records and [facility]  Method of interview communication: in-person    Summary of Changes to PTA Med List  New: nothing   Discontinued: aforementioned prednisone taper   Changed: nothing     Patient was asked about OTC/herbal products specifically.  PTA med list reflects this.    In the past week, patient estimated taking medication this percent of the time:  greater than 90%.    Allergies were reviewed, assessed, and updated with the patient.      Patient does not use any multi-dose medications prior to admission.    The information provided in this note is only as accurate as the sources available at the time of the update(s).    Thank you for the opportunity to participate in the care of this patient.    Poncho Mullen [facility]  1/9/2022 8:16 AM

## 2022-01-09 NOTE — PHARMACY-VANCOMYCIN DOSING SERVICE
"Pharmacy Vancomycin Initial Note  Date of Service 2022  Patient's  1955  66 year old, male    Indication: Community Acquired Pneumonia    Current estimated CrCl = Estimated Creatinine Clearance: 86.2 mL/min (based on SCr of 1 mg/dL).    Creatinine for last 3 days  2022:  3:55 AM Creatinine 1.00 mg/dL    Recent Vancomycin Level(s) for last 3 days  No results found for requested labs within last 72 hours.      Vancomycin IV Administrations (past 72 hours)                   vancomycin (VANCOCIN) 1,750 mg in sodium chloride 0.9 % 500 mL intermittent infusion (mg) 1,750 mg Given 22 1535                Nephrotoxins and other renal medications (From now, onward)    Start     Dose/Rate Route Frequency Ordered Stop    01/10/22 0330  vancomycin (VANCOCIN) 750 mg in sodium chloride 0.9 % 250 mL intermittent infusion         750 mg  over 60-90 Minutes Intravenous EVERY 12 HOURS 22 1551      22 1558  piperacillin-tazobactam (ZOSYN) 3.375 g vial to attach to  mL bag        Note to Pharmacy: Extended infusion dosing to start 6 hours after initial infusion.   \"Followed by\" Linked Group Details    3.375 g  over 240 Minutes Intravenous EVERY 8 HOURS 22 0959      22 1000  vancomycin (VANCOCIN) 1,750 mg in sodium chloride 0.9 % 500 mL intermittent infusion         1,750 mg  over 2 Hours Intravenous ONCE 22 1001            Contrast Orders - past 72 hours (72h ago, onward)            Start     Dose/Rate Route Frequency Ordered Stop    22 0530  iopamidol (ISOVUE-370) solution 100 mL         100 mL Intravenous ONCE 22 0524 22 0525          [employer] Prediction of Planned Initial Vancomycin Regimen  Loading dose: 1750 mg IV on 22 @ 15:35  Regimen: 750 mg IV every 12 hours.  Start time: 03:35 on 01/10/2022  Exposure target: AUC24 (range)400-600 mg/L.hr   AUC24,ss: 430 mg/L.hr  Probability of AUC24 > 400: 58 %  Ctrough,ss: 14.1 mg/L  Probability of " Ctrough,ss > 20: 21 %  Probability of nephrotoxicity (Lodise RUPINDER 2009): 9 %          Plan:  1. Start vancomycin  750 mg IV q12h.   2. Vancomycin monitoring method: AUC  3. Vancomycin therapeutic monitoring goal: 400-600 mg*h/L  4. Pharmacy will check vancomycin levels as appropriate in 1-3 Days.    5. Serum creatinine levels will be ordered daily for the first week of therapy and at least twice weekly for subsequent weeks.      Maggie Lynn, H

## 2022-01-09 NOTE — ED PROVIDER NOTES
EMERGENCY DEPARTMENT ENCOUNTER      NAME: Pardeep Wilson  AGE: 66 year old male  YOB: 1955  MRN: [id number]  EVALUATION DATE & TIME: 1/9/2022  3:54 AM    PCP: Dov Morales    ED PROVIDER: Myrna Randhawa M.D.      Chief Complaint   Patient presents with     Shortness of Breath         FINAL IMPRESSION:  No diagnosis found.    MEDICAL DECISION MAKING:    Pertinent Labs & Imaging studies reviewed. (See chart for details)  ED Course as of 01/09/22 0631   Sun Jan 09, 2022   0411 Afebrile.  Vital signs here initially with significant tachycardia, tachypnea.  Saturating 97% on room air.  Patient with history of stage IV lung cancer, currently undergoing chemotherapy, first dose this past Tuesday.  Had Covid previously 12/5, recovered but over the past week has developed URI type symptoms.  Wife just concerned as his breathing seemed noisy, raspy.  Was breathing loudly when he was sleeping, he then sat up and coughed up a tissue full of talisha blood and so they came in here to the emergency department.  Scattered right-sided chest discomfort, he always has right-sided chest discomfort as he has this where he has his pleural effusions, is scheduled to go in for a tap this coming Wednesday.  He is not short of breath when he sits at rest however with any activity becomes significantly short of breath.  Not actively bleeding in the emergency department.Physical exam for patient here ill-appearing, appears older than stated age, pale.  Hearts tachycardic.  Lungs coarse bilaterally with decreased breath sounds on the right to the midlung.  Abdomen soft nontender.  No significant lower extremity swelling, no asymmetry in upper or lower extremities.Plan for labs here, CT scan chest to evaluate for possible PE.  Swab for Covid as well.       Covid testing here did come back positive, unsure if this is just residual positive from previous.  Labs came back here fairly unremarkable.CT scan of the chest with few  small patchy nodular opacities scattered within both lungs mostly new since 12/14, possible infectious versus inflammatory.  Also noted to have moderate right-sided pleural effusion and tiny left pleural effusion unchanged from previous.  No pulmonary embolus.    Could be secondary to infectious etiology.  Started on Vanco and Zosyn.  We will also check blood cultures, lactic.  Patient here still not requiring any supplemental oxygen, will order small amount of fluids, heart rate currently at 104.  Spoke with hospitalist for admission.    Critical care: 0 minutes excluding separately billable procedures.  Includes bedside management, time reviewing test results, review of records, discussing the case with staff, documenting the medical record and time spent with family members (or surrogate decision makers) discussing specific treatment issues.          ED COURSE:  4:03 AM I met with the patient, obtained history, performed an initial exam, and discussed options and plan for diagnostics and treatment here in the ED.    6:29 AM  Spoke with hospitalist for admission.  Family updated as to plan.    The importance of close follow up was discussed. We reviewed warning signs and symptoms, and I instructed Mr. Wilson to return to the emergency department immediately if he develops any new or worsening symptoms. I provided additional verbal discharge instructions. Mr. Wilson expressed understanding and agreement with this plan of care, his questions were answered, and he was discharged in stable condition.     MEDICATIONS GIVEN IN THE EMERGENCY:  Medications   piperacillin-tazobactam (ZOSYN) 3.375 g vial to attach to  mL bag (has no administration in time range)   0.9% sodium chloride BOLUS (has no administration in time range)   iopamidol (ISOVUE-370) solution 100 mL (100 mLs Intravenous Given 1/9/22 0525)       NEW PRESCRIPTIONS STARTED AT TODAY'S ER VISIT:  New Prescriptions    No medications on file           =================================================================    HPI    Patient information was obtained from: Patient and Patient's Wife    Use of : N/A         Pardeep Wilson is a 66 year old male with a history of CAD, HTN, malignant neoplasm of bronchus and lung, pleural effusion, hypothyroidism, s/p subxiphoid RUL wedge rxn, who presents to the ED via walk-in for the evaluation of shortness of breath.     Patient reports that over the past week, he has had a cold at home and been more short of breath with any activity. Wife noted yesterday that patient looked more short of breath with exertion and states he had a fever at home, 101 degrees orally. Yesterday evening, patient sounded more noisy when sleeping. He then sat up and started coughing and noticed some talisha blood. Patient also reports right-sided chest discomfort that has been ongoing as he has a history of pleural effusions. He states that chest discomfort is not worse with inspiration. Patient notes he is scheduled for a thoracentesis in three days on his right side. He states that current symptoms are not similar to his pleural effusions.  Patient reports history of stage IV lung cancer, to which he started chemo five days ago. He also notes that he was diagnosed with COVID on 12/5/21 and has been recovering from this since. Otherwise, patient reports diarrhea for the past month that has not worsened. Denies any loss of consciousness and nausea. No other complaints at this time.    REVIEW OF SYSTEMS   Review of Systems   Constitutional: Positive for fever.   Respiratory: Positive for cough (with blood) and shortness of breath.    Cardiovascular: Positive for chest pain (right-sided discomfort).   Gastrointestinal: Positive for diarrhea (ongoing). Negative for nausea.   Neurological: Negative for syncope.   All other systems reviewed and are negative.    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     Esophageal reflux      Heart disease      History of blood transfusion      Hodgkin's lymphoma (H)     s/p radiation to the mediastinum at age 17     Hyperlipemia      Hypertension      Hypertension      Hypothyroidism     hx nodules     Lung cancer (H)      MI, old 12 years ago, 2008     Thyroid disease        PAST SURGICAL HISTORY:  Past Surgical History:   Procedure Laterality Date     ABDOMEN SURGERY      SPLENECTOMY     CARDIAC SURGERY      STENT     COLONOSCOPY       CORONARY ANGIOPLASTY      Stent Placement     CT BIOPSY LUNG       CT BIOPSY LUNG  1/12/2021     EYE SURGERY Bilateral     Cataracts     MEDIASTINOSCOPY N/A 11/1/2018    Procedure: MEDIASTINOSCOPY;  Surgeon: Bry Saunders MD;  Location: [facility];  Service:      OTHER SURGICAL HISTORY  11/01/2018    left lower lobe lobectomy      OTHER SURGICAL HISTORY Right 02/28/2020    wedge resection     PAROTIDECTOMY       [place] LAP,DIAGNOSTIC ABDOMEN N/A 11/7/2017    Procedure: DIAGNOSTIC LAPAROSCOPY,  LYSIS OF ADHESIONS, SMALL BOWEL RESECTION;  Surgeon: Brian Granados MD;  [place] [facility] [place];  Service: General     SPLENECTOMY, TOTAL  1973     THORACOSCOPIC WEDGE RESECTION LUNG Right 2/28/2020    Procedure: RIGHT THORACOSCOPIC WEDGE RESECTION;  Surgeon: Bry Saunders MD;  Location: [facility] OR     WISDOM TOOTH EXTRACTION         CURRENT MEDICATIONS:      Current Facility-Administered Medications:      0.9% sodium chloride BOLUS, 500 mL, Intravenous, Once, Meseret Randhawa MD     piperacillin-tazobactam (ZOSYN) 3.375 g vial to attach to  mL bag, 3.375 g, Intravenous, Once, Meseret Randhawa MD    Current Outpatient Medications:      atorvastatin (LIPITOR) 40 MG tablet, Take 1 tablet (40 mg) by mouth daily, Disp: 90 tablet, Rfl: 3     dexamethasone (DECADRON) 4 MG tablet, Take 2 tablets (8 mg) by mouth 2 times daily (with meals) for 6  doses Take the evening prior and morning of docetaxel, then for 4 more doses, Disp: 12 tablet, Rfl: 2     dexamethasone (DECADRON) 4 MG tablet, Take 1 tablet (4 mg) by mouth 2 times daily (with meals) Take 6 mg BID day one, Take 4 mg BID day 2-7, Take 4 mg daily day 8-14, Take 2 mg daily day 15-20, Disp: 30 tablet, Rfl: 1     levothyroxine (SYNTHROID/LEVOTHROID) 112 MCG tablet, Take 1 tablet (112 mcg) by mouth daily, Disp: 90 tablet, Rfl: 3     metoprolol succinate ER (TOPROL-XL) 25 MG 24 hr tablet, Take 0.5 tablets (12.5 mg) by mouth daily, Disp: 90 tablet, Rfl: 3     pantoprazole (PROTONIX) 40 MG EC tablet, Take 1 tablet (40 mg) by mouth daily, Disp: 90 tablet, Rfl: 1     prochlorperazine (COMPAZINE) 10 MG tablet, Take 1 tablet (10 mg) by mouth every 6 hours as needed (Nausea/Vomiting), Disp: 30 tablet, Rfl: 2     sodium fluoride dental gel (PREVIDENT) 1.1 % GEL topical gel, Apply to affected area daily, Disp: 100 mL, Rfl: 3    ALLERGIES:  Allergies   Allergen Reactions     Penicillins Rash       FAMILY HISTORY:  Family History   Problem Relation Age of Onset     Dementia Mother      Cancer Father 67.00        Thinks of the diaphragm, history of working with noxious chemicals     Kidney Disease Brother      No Known Problems Maternal Grandmother      No Known Problems Maternal Grandfather      No Known Problems Paternal Grandmother      Heart Disease Paternal Grandfather      No Known Problems Son      No Known Problems Daughter        SOCIAL HISTORY:   Social History     Socioeconomic History     Marital status:      Spouse name: Eliane     Number of children: 2     Years of education: 18     Highest education level: Master's degree (e.g., MA, MS, Iain, MEd, MSW, BAAD)   Occupational History     Occupation: Retired   Tobacco Use     Smoking status: Never Smoker     Smokeless tobacco: Never Used   Vaping Use     Vaping Use: Never used   Substance and Sexual Activity     Alcohol use: Not Currently     Drug  sudden onset use: No     Sexual activity: Not on file   Other Topics Concern     Parent/sibling w/ CABG, MI or angioplasty before 65F 55M? Not Asked   Social History Narrative     Not on file     Social Determinants of Health     Financial Resource Strain: Not on file   Food Insecurity: Not on file   Transportation Needs: Not on file   Physical Activity: Not on file   Stress: Not on file   Social Connections: Not on file   Intimate Partner Violence: Not on file   Housing Stability: Not on file       PHYSICAL EXAM:    Vitals: BP (!) 157/78   Pulse (!) 148   Temp 98  F (36.7  C) (Temporal)   Resp 28   Wt 83.9 kg (185 lb)   SpO2 97%   BMI 26.93 kg/m     General:. Alert and interactive, Ill appearing, appears older than stated age  HENT: Oropharynx without erythema or exudates. MMM.  TMs clear bilaterally.  Eyes: Pupils mid-sized and equally reactive.   Neck: Full AROM.  No midline tenderness to palpation.  Cardiovascular: Tachycardic, Regular rhythm. Peripheral pulses 2+ bilaterally.  Chest/Pulmonary: Normal work of breathing. Lungs coarse bilaterally decreased breath sounds on the right to the mid-lung. No chest wall tenderness or deformities.  Abdomen: Soft, nondistended. Nontender without guarding or rebound.  Back/Spine: No CVA or midline tenderness.  Extremities: Normal ROM of all major joints. No lower extremity edema.   Skin: Warm and dry. Pale.  Neuro: Speech clear. CNs grossly intact. Moves all extremities appropriately. Strength and sensation grossly intact to all extremities. No asymmetry in upper or lower extremities.  Psych: Normal affect/mood, cooperative, memory appropriate.     LAB:  All pertinent labs reviewed and interpreted.  Labs Ordered and Resulted from Time of ED Arrival to Time of ED Departure   COMPREHENSIVE METABOLIC PANEL - Abnormal       Result Value    Sodium 139      Potassium 4.8      Chloride 109 (*)     Carbon Dioxide (CO2) 24      Anion Gap 6      Urea Nitrogen 31 (*)     Creatinine 1.00       Calcium 8.5      Glucose 143 (*)     Alkaline Phosphatase 102      AST 28      ALT 18      Protein Total 6.0      Albumin 2.8 (*)     Bilirubin Total 1.0      GFR Estimate 83     CBC WITH PLATELETS AND DIFFERENTIAL - Abnormal    WBC Count 8.9      RBC Count 4.87      Hemoglobin 14.9      Hematocrit 43.8      MCV 90      MCH 30.6      MCHC 34.0      RDW 16.9 (*)     Platelet Count 159     INFLUENZA A/B & SARS-COV2 PCR MULTIPLEX - Abnormal    Influenza A PCR Negative      Influenza B PCR Negative      SARS CoV2 PCR Positive (*)    DIFFERENTIAL - Abnormal    % Neutrophils 69      % Lymphocytes 27      % Monocytes 2      % Eosinophils 2      % Basophils 0      Absolute Neutrophils 6.1      Absolute Lymphocytes 2.4      Absolute Monocytes 0.2      Absolute Eosinophils 0.2      Absolute Basophils 0.0      RBC Morphology Confirmed RBC Indices      Platelet Assessment        Value: Automated Count Confirmed. Platelet morphology is normal.    Acanthocytes Slight (*)     Lowell Cells Slight (*)     Reactive Lymphocytes Present (*)    TROPONIN I - Normal    Troponin I 0.02     B-TYPE NATRIURETIC PEPTIDE ([facility] ONLY) - Normal    BNP 36     PARTIAL THROMBOPLASTIN TIME - Normal    aPTT 32     INR - Normal    INR 1.00     LACTIC ACID WHOLE BLOOD   TYPE AND SCREEN, ADULT    ABO/RH(D) O NEG      Antibody Screen Negative      SPECIMEN EXPIRATION DATE [id number]     BLOOD CULTURE   BLOOD CULTURE   ABO/RH TYPE AND SCREEN       RADIOLOGY:  CT Chest Pulmonary Embolism w Contrast   Final Result   IMPRESSION:    1.  A few small patchy nodular opacities are scattered within both lungs, mostly new since 12/14/2021. These are nonspecific, but are most likely infectious or inflammatory in etiology.   2.  Moderate-sized right pleural effusion and tiny left pleural effusion, unchanged.   3.  No visualized pulmonary embolus.             EKG:  See MDM  I have independently reviewed and interpreted the EKG(s) documented above.       PROCEDURES:          I, Patricia Pickard, am serving as a scribe to document services personally performed by Dr. Myrna Randhawa  based on my observation and the provider's statements to me. I, Myrna Randhawa MD attest that Patricia Pickard is acting in a scribe capacity, has observed my performance of the services and has documented them in accordance with my direction.      Myrna Randhawa M.D.  Emergency Medicine  [facility] EMERGENCY DEPARTMENT  [address] [place] [id number]  [phone]  Dept: [phone]     Meseret Randhawa MD  01/09/22 0631

## 2022-01-09 NOTE — H&P
[facility]    History and Physical - Hospitalist Service       Date of Admission:  1/9/2022    Assessment & Plan      Pardeep Wilson is a 66 year old male with a history of CAD, HTN, stage IV lung cancer, pleural effusion, and hypothyroidism, who presents to the ED for the evaluation of shortness of breath. Admitted for pneumonia.    Community acquired pneumonia: Patient presented with fever, SOB, hemoptysis and right sided chest pain. CT chest reveals no pulmonary embolus, but a few small patchy nodular opacities are scattered within both lungs, mostly new since 12/14/2021.  - Started vancomycin and Zosyn in ER. Will continue  - Patient currently is not hypoxic. Will monitor respiratory status and start supplemental oxygen as indicated  - Albuterol inh.    Stage IV lung cancer of right upper lobe with recurrent right sided pleural effusion: Diagnosed in Dec 2021. Had thoracentesis for recurrent right sided pleural effusion on 12/5/21 and 12/29/21. The next one is scheduled on 1/12/22.  Started palliative chemotherapy with Taxotere and RAMucirumab since 1/4/22. Plan cycle 2 in 3 weeks.  - US thoracentesis  - Monitor blood counts  - Outpatient follow up    COVID19 infection: found positive on 12/5/21. Remains positive on 1/9/22. Received monoclonal antibody treatment on 12/4/21, Remdesivir for 2 days and completed a tapering course of dexamethasone.   - COVID-19 special precautions, continuous pulse-ox  - No indication to repeat dexamethasone and Remedesivir.     CAD: no anginal chest pain. Continue PTA metoprolol and statin    Hypothyroidism: continue PTA levothyroxine     History of ITP: platelet currently within normal range. Monitor platelet count.       Diet:  Regular diet  DVT Prophylaxis: Enoxaparin (Lovenox) SQ  Martinez Catheter: Not present  Central Lines: None  Code Status:  Full code, confirmed with patient    Disposition Plan   Expected Discharge: 2-3 days       The patient's care  was discussed with the Bedside Nurse, Care Coordinator/ and Patient.    Gamal Phan MD  [facility]  Securely message with the [employer] Web Console (learn more here)  Text page via [employer] Paging/Directory        _____________________________________________________________________    Chief Complaint   Shortness of breath    History is obtained from the patient    History of Present Illness   Pardeep Wilson is a 66 year old male with a history of CAD, HTN, stage IV lung cancer, pleural effusion, and hypothyroidism, who presents to the ED for the evaluation of shortness of breath. Patient reports that he started chemotherapy five days ago for stage IV lung cancer. For the past 5 days, he has been having SOB and intermittent fever. One day ago, he spiked fever with temperature of 101. His SOB gets worse. He feels SOB with any activity. This morning, he also coughed up phlem mixed with bright red blood. He also has right sided chest pain. Patient reports that he has been having thoracentesis for fluid on the right side of his cheset. The next apponitment is scheduled in 3 days. Patient also reports that he was diagnosed with COVID19 pneumonia on 12/2/21 and was hospitalized from 12/5/21 to 12/7/21. At that time, he had cough. His symptoms subsequently resolved. In ER, patient had oxygen saturation 97% on room air. His COVID was tested positive. CT chest reveals no pulmonary embolus, but a few small patchy nodular opacities are scattered within both lungs, mostly new since 12/14/2021. Patient was started vancomycin and Zosyn. Patient reports having chronic loose stool once daily, no diarrhea. No urinary symptoms.     Review of Systems    The 10 point Review of Systems is negative other than noted in the HPI or here.     Past Medical History    I have reviewed this patient's medical history and updated it with pertinent information if needed.   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  Past Surgical History:   Procedure Laterality Date     ABDOMEN SURGERY      SPLENECTOMY     CARDIAC SURGERY      STENT     COLONOSCOPY       CORONARY ANGIOPLASTY      Stent Placement     CT BIOPSY LUNG       CT BIOPSY LUNG  1/12/2021     EYE SURGERY Bilateral     Cataracts     MEDIASTINOSCOPY N/A 11/1/2018    Procedure: MEDIASTINOSCOPY;  Surgeon: Bry Saunders MD;  Location: [facility];  Service:      OTHER SURGICAL HISTORY  11/01/2018    left lower lobe lobectomy      OTHER SURGICAL HISTORY Right 02/28/2020    wedge resection     PAROTIDECTOMY       [place] LAP,DIAGNOSTIC ABDOMEN N/A 11/7/2017    Procedure: DIAGNOSTIC LAPAROSCOPY,  LYSIS OF ADHESIONS, SMALL BOWEL RESECTION;  Surgeon: Brian Granados MD;  Location: [facility] [place];  Service: General     SPLENECTOMY, TOTAL  1973     THORACOSCOPIC WEDGE RESECTION LUNG Right 2/28/2020    Procedure: RIGHT THORACOSCOPIC WEDGE RESECTION;  Surgeon: Bry Saunders MD;  Location: [facility] OR     WISDOM TOOTH EXTRACTION         Social History   I have reviewed this patient's social history and updated it with pertinent information if needed.  Social History     Tobacco Use     Smoking status: Never Smoker     Smokeless tobacco: Never Used   Vaping Use     Vaping Use: Never used   Substance Use Topics     Alcohol use: Not Currently     Drug use: No       Family History   I have reviewed this patient's family history and  updated it with pertinent information if needed.  Family History   Problem Relation Age of Onset     Dementia Mother      Cancer Father 67.00        Thinks of the diaphragm, history of working with noxious chemicals     Kidney Disease Brother      No Known Problems Maternal Grandmother      No Known Problems Maternal Grandfather      No Known Problems Paternal Grandmother      Heart Disease Paternal Grandfather      No Known Problems Son      No Known Problems Daughter        Prior to Admission Medications   Prior to Admission Medications   Prescriptions Last Dose Informant Patient Reported? Taking?   atorvastatin (LIPITOR) 40 MG tablet 1/8/2022 at 1200  No Yes   Sig: Take 1 tablet (40 mg) by mouth daily   dexamethasone (DECADRON) 4 MG tablet Not Taking  No No   Sig: Take 1 tablet (4 mg) by mouth 2 times daily (with meals) Take 6 mg BID day one, Take 4 mg BID day 2-7, Take 4 mg daily day 8-14, Take 2 mg daily day 15-20   Patient not taking: Reported on 1/9/2022   dexamethasone (DECADRON) 4 MG tablet Past Week  No Yes   Sig: Take 2 tablets (8 mg) by mouth 2 times daily (with meals) for 6 doses Take the evening prior and morning of docetaxel, then for 4 more doses   levothyroxine (SYNTHROID/LEVOTHROID) 112 MCG tablet 1/8/2022 at 1200  No Yes   Sig: Take 1 tablet (112 mcg) by mouth daily   metoprolol succinate ER (TOPROL-XL) 25 MG 24 hr tablet 1/8/2022 at 1200  No Yes   Sig: Take 0.5 tablets (12.5 mg) by mouth daily   pantoprazole (PROTONIX) 40 MG EC tablet 1/8/2022 at 1200  No Yes   Sig: Take 1 tablet (40 mg) by mouth daily   prochlorperazine (COMPAZINE) 10 MG tablet Past Month  No Yes   Sig: Take 1 tablet (10 mg) by mouth every 6 hours as needed (Nausea/Vomiting)   sodium fluoride dental gel (PREVIDENT) 1.1 % GEL topical gel Past Week  No Yes   Sig: Apply to affected area daily      Facility-Administered Medications: None     Allergies   Allergies   Allergen Reactions     Penicillins Rash       Physical Exam   Vital  Signs: Temp: 98  F (36.7  C) Temp src: Temporal BP: 136/68 Pulse: 108   Resp: (!) 32 SpO2: 92 %      Weight: 185 lbs 0 oz    General appearance: not in acute distress  HEENT: PERRL, EOMI  Lungs: Clear breath sounds in bilateral lung fields  Cardiovascular: Regular rate and rhythm, normal S1-S2  Abdomen: Soft, non tender, no distension, normal bowel sound  Musculoskeletal: No joint swelling  Skin: No rash and no edema  Neurology: AAO ×3.  Cranial nerves II - XII normal.  Normal muscle strength in all four extremities.    Data   Data reviewed today: I reviewed all medications, new labs and imaging results over the last 24 hours.     CT chest:  IMPRESSION:   1.  A few small patchy nodular opacities are scattered within both lungs, mostly new since 12/14/2021. These are nonspecific, but are most likely infectious or inflammatory in etiology.  2.  Moderate-sized right pleural effusion and tiny left pleural effusion, unchanged.  3.  No visualized pulmonary embolus.       Recent Labs   Lab 01/09/22  0355 01/04/22  0827   WBC 8.9 18.9*   HGB 14.9 13.3   MCV 90 93    305   INR 1.00  --     142   POTASSIUM 4.8 4.3   CHLORIDE 109* 108*   CO2 24 25   BUN 31* 38*   CR 1.00 1.11   ANIONGAP 6 9   ARNAUD 8.5 9.3   * 127*   ALBUMIN 2.8* 2.9*   PROTTOTAL 6.0 6.1   BILITOTAL 1.0 0.6   ALKPHOS 102 99   ALT 18 15   AST 28 20

## 2022-01-09 NOTE — PHARMACY-VANCOMYCIN DOSING SERVICE
Pharmacy Vancomycin Initial Note  Date of Service 2022  Patient's  1955  66 year old, male    Indication: Community Acquired Pneumonia    Current estimated CrCl = Estimated Creatinine Clearance: 86.2 mL/min (based on SCr of 1 mg/dL).    Creatinine for last 3 days  2022:  3:55 AM Creatinine 1.00 mg/dL    Recent Vancomycin Level(s) for last 3 days  No results found for requested labs within last 72 hours.      Vancomycin IV Administrations (past 72 hours)      No vancomycin orders with administrations in past 72 hours.                Nephrotoxins and other renal medications (From now, onward)    Start     Dose/Rate Route Frequency Ordered Stop    22 0800  vancomycin (VANCOCIN) 1,750 mg in sodium chloride 0.9 % 500 mL intermittent infusion         1,750 mg  over 2 Hours Intravenous ONCE 22 0739      22 0630  piperacillin-tazobactam (ZOSYN) 3.375 g vial to attach to  mL bag         3.375 g  over 30 Minutes Intravenous ONCE 22 0628            Contrast Orders - past 72 hours (72h ago, onward)            Start     Dose/Rate Route Frequency Ordered Stop    22 0530  iopamidol (ISOVUE-370) solution 100 mL         100 mL Intravenous ONCE 22 0524 22 0525              Plan:  1. Vancomycin load of 20.1 mg/kg (actual body weight) - 1750 mg - IV ONE TIME.  2. Please consult pharmacy if vancomycin is to continue  Poncho Mullen, [facility]

## 2022-01-10 NOTE — PROGRESS NOTES
This is a recent snapshot of the patient's [facility] medical record.  For current drug dose and complete information and questions, call [phone] or In Basket pool, [facility] home infusion ([id number])  CSN Number:  [id number]

## 2022-01-10 NOTE — PLAN OF CARE
Problem: Adult Inpatient Plan of Care  Goal: Optimal Comfort and Wellbeing  Outcome: No Change     Problem: Gas Exchange Impaired  Goal: Optimal Gas Exchange  Outcome: No Change  Intervention: Optimize Oxygenation and Ventilation  Recent Flowsheet Documentation  Taken 1/9/2022 2300 by Lana Varma, RN  Head of Bed (HOB) Positioning: HOB at 20-30 degrees     Pt on 2 L of oxygen, sating at 93%. Pt denies pain but pressure in chest, stated this is normal. Pt coughed up approximately 20 mL of blood, called house on-call to inform. PT admitted to floor at 2330 1/9/22.     Lana Varma, RN

## 2022-01-10 NOTE — ED NOTES
Pt put call light on, RN found pt sitting up right in bed with multiple tissues saturated in blood. Pt states he just started coughing up blood while at rest. Pt found to be hypoxic to mid 80s, /73, HR 110s-120s. Placed on 4L NC, sats now low 90s. Hospitalist paged.    Event was discharge with RN. No recurrence of hematemesis/hemopthysis.  /73   Pulse 114   Temp 98  F (36.7  C) (Temporal)   Resp (!) 32   Wt 83.9 kg (185 lb)   SpO2 91%   BMI 26.93 kg/m    S/p 1 liter right thoracentesis earlier today.  -hold lovenox  -20mg X 1 time IV lasix  -check HgB, it was 14.9.at 3:55 am today  -stat CXR to eval for PTX-new hypoxia  -monitor VS  -consult pulmonology if PTX/worsening hypoxia/recurrent hemophysis.    Lucero Gonzales MD

## 2022-01-10 NOTE — CONSULTS
PULMONARY CONSULT NOTE    Date / Time of Admission:  1/9/2022  3:54 AM  Assessment:   66yoM with stage IV lung cancer that has recurred in form of pleural effusion who presents with acute hemoptysis that seems to be large volume without obvious etiology. The hemoptysis predated the lovenox he received on admission and platelets still >100.        Active Problems:    Infection due to 2019 novel coronavirus    Hemoptysis    Pneumonia of both lungs due to infectious organism, unspecified part of lung    Dyspnea, unspecified type      Plan:   Low threshold for bronchoscopy. Unclear how this would  unless needed embolization as patient has known cancer and is already being treated with antibiotics.   Discussed with patient who would prefer to avoid procedure if possible.  Also still COVID positive so would need to be case at the end of the day. Unclear if this is just him unable to clear previous infection but he did report feeling better and now worse again.   Patient declined cough suppression as he reports he is not coughing much.   If another episode of hemoptysis will plan for bronchoscopy and should be made NPO.        Subjective:    Cc: hemoptysis    HPI: 66 year old male with history of stage IV lung cancer and recent COVID-19 infection presents after episode of hemoptysis.    Patient hospitalized in early December 2021 for COVID infection despite Booster 11/2021. He was given monoclonal antibodies and remdesivir x2 days, weaned off O2 and discharged home. Symptoms improved and he was initiated on chemotherapy (Taxotere and Ramucirumab) 1/4/22 then developed hemoptysis the night prior to admission, lots of bright red blood. Presented to ED, underwent another thoracentesis 1L removed, O2 weaned off but patient subsequently had another large volume episode of hemoptysis. Feels it gurgling in his trachea. There has no been another episode over the past 12 hours. His breathing is at baseline, no  increase in cough.     Past Medical History:   Diagnosis Date     Anemia      Cancer (H)      Coronary artery disease     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     Esophageal reflux      Heart disease      History of blood transfusion      Hodgkin's lymphoma (H)     s/p radiation to the mediastinum at age 17     Hyperlipemia      Hypertension      Hypertension      Hypothyroidism     hx nodules     Lung cancer (H)      MI, old 12 years ago, 2008     Thyroid disease        Social History     Tobacco Use     Smoking status: Never Smoker     Smokeless tobacco: Never Used   Substance Use Topics     Alcohol use: Not Currently       Family History   Problem Relation Age of Onset     Dementia Mother      Cancer Father 67.00        Thinks of the diaphragm, history of working with noxious chemicals     Kidney Disease Brother      No Known Problems Maternal Grandmother      No Known Problems Maternal Grandfather      No Known Problems Paternal Grandmother      Heart Disease Paternal Grandfather      No Known Problems Son      No Known Problems Daughter        Current Facility-Administered Medications   Medication     0.9% sodium chloride BOLUS     acetaminophen (TYLENOL) tablet 650 mg     albuterol (PROVENTIL HFA/VENTOLIN HFA) inhaler     atorvastatin (LIPITOR) tablet 40 mg     levothyroxine (SYNTHROID/LEVOTHROID) tablet 112 mcg     lidocaine (LMX4) cream     lidocaine 1 % 0.1-1 mL     melatonin tablet 1 mg     metoprolol succinate ER (TOPROL-XL) 24 hr half-tab 12.5 mg     ondansetron (ZOFRAN) injection 4 mg     pantoprazole (PROTONIX) EC tablet 40 mg     piperacillin-tazobactam (ZOSYN) 3.375 g vial to attach to  mL bag     sodium chloride (PF) 0.9% PF flush 3 mL     sodium chloride (PF) 0.9% PF flush 3 mL     vancomycin (VANCOCIN) 750 mg in sodium chloride 0.9 % 250 mL intermittent infusion         Review of Systems: Reviewed and negative aside from that noted in HPI.    Objective:    Vital signs:  /58    Pulse 86   Temp 98.6  F (37  C) (Oral)   Resp 20   Wt 83.9 kg (185 lb)   SpO2 96%   BMI 26.93 kg/m      General appearance: alert, appears stated age and cooperative  Neck: no adenopathy and supple, symmetrical, trachea midline  Lungs: clear to auscultation bilaterally  Heart: RRR< S1 and S2  Abdomen: soft, non-tender; bowel sounds normal; no masses,  no organomegaly  Extremities: No edema  Skin: Skin color, texture, turgor normal. No rashes or lesions  Neurologic: Grossly normal        Data    CT chest: personally reviewed  EXAM: CT CHEST WITH CONTRAST - PULMONARY EMBOLISM PROTOCOL   LOCATION: [facility]  DATE/TIME: 01/09/2022, 5:24 AM     INDICATION: Shortness of breath and tachycardia. On chemotherapy.  COMPARISON: 12/14/2021 - PET/CT scan whole body.     TECHNIQUE: CT angiogram chest during pulmonary arterial phase injection IV contrast. Dose reduction techniques were used.   CONTRAST: 100 mL Isovue-370.     FINDINGS:     ANGIOGRAM CHEST: No visualized pulmonary embolus. The thoracic aorta is normal in caliber without dissection. Atherosclerotic calcification in the thoracic aorta.     LUNGS AND PLEURA: Moderate-sized right pleural effusion and tiny left pleural effusion. A few small patchy nodular opacities scattered within both lungs (for example, in the posterior aspect of the left lobe on series 401.2 image 120), mostly new since   12/14/2021. Several curvilinear opacities scattered within both lungs, most prominent in the upper right hemithorax, unchanged and likely representing scarring. Mild pleural thickening in the inferior aspects of bilateral hemithoraces again noted.     MEDIASTINUM/AXILLAE: A few nonspecific nonenlarged mediastinal lymph nodes.     CORONARY ARTERY CALCIFICATION: Present.     MUSCULOSKELETAL: No acute findings.     UPPER ABDOMEN: Partial visualization of a mildly distended gallbladder. Partial visualization of a 5.6 cm cyst in the upper pole of the  right kidney. No follow-up is necessary.                                                                      IMPRESSION:   1.  A few small patchy nodular opacities are scattered within both lungs, mostly new since 12/14/2021. These are nonspecific, but are most likely infectious or inflammatory in etiology.  2.  Moderate-sized right pleural effusion and tiny left pleural effusion, unchanged.  3.  No visualized pulmonary embolus.      Laboratory:  Results for orders placed or performed during the hospital encounter of 01/09/22   Basic metabolic panel   Result Value Ref Range    Sodium 135 (L) 136 - 145 mmol/L    Potassium 3.9 3.5 - 5.0 mmol/L    Chloride 104 98 - 107 mmol/L    Carbon Dioxide (CO2) 26 22 - 31 mmol/L    Anion Gap 5 5 - 18 mmol/L    Urea Nitrogen 28 (H) 8 - 22 mg/dL    Creatinine 1.21 0.70 - 1.30 mg/dL    Calcium 8.0 (L) 8.5 - 10.5 mg/dL    Glucose 87 70 - 125 mg/dL    GFR Estimate 66 >60 mL/min/1.73m2     Lab Results   Component Value Date    WBC 3.0 (L) 01/10/2022    HGB 12.9 (L) 01/10/2022    HCT 38.2 (L) 01/10/2022    MCV 91 01/10/2022     (L) 01/10/2022

## 2022-01-10 NOTE — PLAN OF CARE
Problem: Gas Exchange Impaired  Goal: Optimal Gas Exchange  Outcome: Improving   Pts. Oxygen saturations adequate while on room air. Pt. Continues to cough up bright red blood. Pulmonology consulted.       Problem: Adult Inpatient Plan of Care  Goal: Optimal Comfort and Wellbeing  Outcome: Improving   Pt. Independent while in room. As needed tylenol given for bilateral chest pain from coughing.     Betsy Jensen RN

## 2022-01-10 NOTE — ED NOTES
[facility] ED Handoff Report    ED Chief Complaint: hemoptysis    ED Diagnosis:  (J18.9) Pneumonia of both lungs due to infectious organism, unspecified part of lung  Comment: pt having episodes of coughing up BRB, pt desats during episodes PT IS COVID + diagnosed 12/5 and cont. To test +  Plan: IV ABX, therapeutic thoracentesis    (R06.00) Dyspnea, unspecified type  Comment: bilateral diminished lung sounds  Plan: ABX    (R04.2) Hemoptysis  Comment: Provider aware 1 episode of hemoptysis 1900 hours this evening.   Plan: holding lovenox, given lasix    (U07.1) Infection due to 2019 novel coronavirus  Comment: dx 12/5, tested + again this visit  Plan:        PMH:    Past Medical History:   Diagnosis Date     Anemia      Cancer (H)      Coronary artery disease     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     Esophageal reflux      Heart disease      History of blood transfusion      Hodgkin's lymphoma (H)     s/p radiation to the mediastinum at age 17     Hyperlipemia      Hypertension      Hypertension      Hypothyroidism     hx nodules     Lung cancer (H)      MI, old 12 years ago, 2008     Thyroid disease         Code Status:  Full Code     Falls Risk: Yes Band: Applied    Current Living Situation/Residence: lives in a house     Elimination Status: Continent: Yes     Activity Level: Independent    Patients Preferred Language:  English     Needed: No    Vital Signs:  /73   Pulse 114   Temp 98  F (36.7  C) (Temporal)   Resp (!) 32   Wt 83.9 kg (185 lb)   SpO2 91%   BMI 26.93 kg/m       Cardiac Rhythm:     Pain Score: 0/10    Is the Patient Confused:  No    Last Food or Drink: 01/09/22 at     Focused Assessment:      Tests Performed: Done: Labs and Imaging    Treatments Provided:      Family Dynamics/Concerns: No    Family Updated On Visitor Policy: Yes    Plan of Care Communicated to Family: Yes    Who Was Updated about Plan of Care: pt spoke with family member on personal  cell    Belongings Checklist Done and Signed by Patient: No    Covid: symptomatic, positive    Additional Information: pt arrived with hemoptysis with a previous lung CA diagnosis.    RN: Rhiannon Hathaway   1/9/2022 10:52 PM

## 2022-01-10 NOTE — PROGRESS NOTES
[facility]    Medicine Progress Note - Hospitalist Service       Date of Admission:  1/9/2022    Assessment & Plan      Pardeep Wilson is a 66 year old male with a history of CAD, HTN, stage IV lung cancer, pleural effusion, and hypothyroidism, who presents to the ED for the evaluation of shortness of breath. Admitted for pneumonia.     Community acquired pneumonia: Patient presented with fever, SOB, hemoptysis and right sided chest pain. CT chest reveals no pulmonary embolus, but a few small patchy nodular opacities are scattered within both lungs, mostly new since 12/14/2021.  - Started vancomycin and Zosyn in ER. Will continue  - Patient currently is not hypoxic. Will monitor respiratory status and start supplemental oxygen as indicated  - Albuterol inh.    Recurrent hemoptysis: No PE on chest CT. Possible causes include pneumonia, lung cancer. Hemodynamically stable.   - Pulmonary consult     Stage IV lung cancer of right upper lobe with recurrent right sided pleural effusion: s/p wedge resection 2/28/2020. Had thoracentesis for recurrent right sided pleural effusion on 12/5/21 and 12/29/21. Started palliative chemotherapy with Taxotere and RAMucirumab since 1/4/22. Plan cycle 2 in 3 weeks. s/p US thoracentesis on 1/9 and drained 1 liter of clear yellow fluid.   - Monitor blood counts  - Outpatient follow up     COVID19 infection: found positive on 12/5/21. Remains positive on 1/9/22. Received monoclonal antibody treatment on 12/4/21, Remdesivir for 2 days and completed a tapering course of dexamethasone.   - Continue COVID-19 special precautions since patient is immunocompromised  - Continue continuous pulse-ox  - No indication to repeat dexamethasone and Remedesivir.      CAD: no anginal chest pain. Continue PTA metoprolol and statin     Hypothyroidism: continue PTA levothyroxine     History of ITP: platelet currently within normal range. Monitor platelet count.       Diet: Regular  Diet Adult    DVT Prophylaxis: Pneumatic Compression Devices  Martinez Catheter: Not present  Central Lines: None  Code Status: Full Code      Disposition Plan   Expected Discharge: 01/11/2022     Anticipated discharge location:  Awaiting care coordination huddle  Delays:     Administering IV medications            The patient's care was discussed with the Bedside Nurse, Care Coordinator/ and Patient.    Gamal Phan MD  Hospitalist Service  [facility]  Securely message with the Vocera Web Console (learn more here)  Text page via [employer] Paging/Directory      ______________________________________________________________________    Interval History   Overnight event noted. Patient had an episode of hemoptysis.  Patient reports that his SOB significantly improved. He continues to feel erin right sided chest pain, but that is chronic. He concerns about his intermittent hemoptysis. Possible causes discussed.     Data reviewed today: I reviewed all medications, new labs and imaging results over the last 24 hours.     Physical Exam   Vital Signs: Temp: 98.6  F (37  C) Temp src: Oral BP: 116/58 Pulse: 86   Resp: 20 SpO2: 96 % O2 Device: None (Room air) Oxygen Delivery: 2 LPM  Weight: 185 lbs 0 oz    General appearance: not in acute distress  HEENT: PERRL, EOMI  Lungs: Clear breath sounds in bilateral lung fields  Cardiovascular: Regular rate and rhythm, normal S1-S2  Abdomen: Soft, non tender, no distension  Musculoskeletal: No joint swelling  Skin: No rash and no edema  Neurology: AAO ×3.  Cranial nerves II - XII normal.  Normal muscle strength in all four extremities.    Data   Recent Labs   Lab 01/10/22  0655 01/09/22  2342 01/09/22  0355 01/04/22  0827   WBC 3.0* 4.3 8.9 18.9*   HGB 12.9* 14.2 14.9 13.3   MCV 91 92 90 93   * 144* 159 305   INR  --   --  1.00  --    *  --  139 142   POTASSIUM 3.9  --  4.8 4.3   CHLORIDE 104  --  109* 108*   CO2 26  --  24 25   BUN 28*  --   31* 38*   CR 1.21  --  1.00 1.11   ANIONGAP 5  --  6 9   ARNAUD 8.0*  --  8.5 9.3   GLC 87  --  143* 127*   ALBUMIN  --   --  2.8* 2.9*   PROTTOTAL  --   --  6.0 6.1   BILITOTAL  --   --  1.0 0.6   ALKPHOS  --   --  102 99   ALT  --   --  18 15   AST  --   --  28 20

## 2022-01-11 ASSESSMENT — ACTIVITIES OF DAILY LIVING (ADL): ADLS_ACUITY_SCORE: 7

## 2022-01-11 NOTE — PROGRESS NOTES
Responed to code blue, pt intubated with 8.0 ETT secured at 23 at teeth.  Lg amounts of blood with clots coming from oral airway as well as ETT. Bronchoscope inseted via ETT to identify source off bleeding, unable to see due to frequent clotting of blood.  Unable to oxygenate pt on ventilator or with ambu at 100%.  See intensivist note.

## 2022-01-11 NOTE — PROGRESS NOTES
Rapid called on patient who was coughing up blood and oxygen saturation in low 70's. Changed to forehead probe and Anesthesia was called to intubate for airway protection. Patient bagged with 100% FiO2 and PEEP [id number]. Continued to bag until ventilator was brought to the room. Suctioning large amount talisha blood from ETT.

## 2022-01-11 NOTE — SIGNIFICANT EVENT
Significant Event Note    Time of event: 6:40 PM    Description of event:  Called to rapid response for patient with hemoptysis.  Is admitted for recurrent lung cancer and hemoptysis has been an issue for him this hospital stay.  We arrived to the room, he was on the edge of the bed spitting up blood.  We placed a Rhino Rocket in either nostril in case there was any nasal source.  Also placed a nonrebreather mask on him, as his sats were dropping into the high 60s.  He appeared to not be protecting his airway, so I paged out to anesthesia.  He then lost his pulse, and we called a CODE BLUE and started CPR.  At the time, his sats were around 70, his blood pressure was 190s over 80s.  Dr. Powers, intensivist, arrived at that point and began running the code.  See her note for the remainder of the code documentation.      Billy Fenton MD

## 2022-01-11 NOTE — PLAN OF CARE
Writer went to patient's room to put SCD on, patient said he want he wanted to use the bathroom prior to putting the SCD on, patient started coughing out blood when he sat up in bed, he said he could not breath, writer started 02 on patient and called RRT, RRT, team came and took over, Patient became unresponsive and Code blue was called, See RRT and MD's note.

## 2022-01-11 NOTE — PROGRESS NOTES
Contacted  home [facility] and  services  Phone # [phone].  Spoke with answering service. Plan to  the  this evening.

## 2022-01-11 NOTE — ADDENDUM NOTE
Addendum  created 01/10/22 2211 by Olman Andre APRN CRNA    Intraprocedure Event edited, Intraprocedure Staff edited

## 2022-01-11 NOTE — SIGNIFICANT EVENT
Significant Event Note    Time of event: 7:59 PM January 10, 2022    Description of event:  Code blue called. We arrived with CPR in progress. Pulse palpated but slow, concern it was not perfusing. Blood in airway and all over patient's front. Per report had been sitting at side of bed and started bleeding.    Norepi given with good response, BP 180s, HR 150s with pulse. Patient bagged but ineffectively. Anesthesia arrived. Significant difficulty with airway given massive bleeding in posterior pharynx. Eventually with direct laryngoscopy was able to get 8.0 ET tube in place.     Immediate blood from ET tube. Suctioned frequently. O2 sat up to 100%. Continued to bag, then placed on ventilator.     Over course of code, lost pulse again, additional epi pushed. IO placed and labs drawn. Bicarb given for presumed acidosis.    Bronchoscope inserted via ET tube for emergent airway inspection. Hope was to find source of bleeding and get patient to IR for embolization. Unfortunately unable to see, frequent clotting of blood. Numerous iced saline administered. Each time bronchoscope removed, clot occluded airway and was impossible to bag and became hypoxic again. This occurred over 30 minutes without improvement in oxygenation for long periods of time. Lost pulse again, CPR resumed, 3 additional Epis given and bicarb, calcium as well.    Family arrived. Dr. Mccann met with them, explained inability to keep pulse or oxygenate him despite heroic efforts. Time of death called at 19:39.    Lay Powers MD

## 2022-01-11 NOTE — ANESTHESIA PROCEDURE NOTES
Airway       Patient location during procedure: Floor       Procedure Start/Stop Times: 1/10/2022 6:49 PM  Staff -        CRNA: Harris Rice APRN CRNA       Performed By: CRNA  Consent for Airway        Urgency: emergent       Consent: The procedure was performed in an emergent situation.  Report Obtained from Primary Care Team       History regarding most recent potassium obtained: Yes       History regarding presence/absence of renal failure obtained:Yes       History regarding stroke/CVA obtained:Yes       History regarding presence/absence of NM disorder: YesIndications and Patient Condition       Indications for airway management: cardiovascular arrest       Mallampati: Not Assessed     Induction type:other (comments) (Intubation during the resusitation of the patient.)       Mask difficulty assessment: 3 - difficult mask (inadequate, unstable, or two providers) +/- NMBA    Final Airway Details       Final airway type: endotracheal airway       Successful airway: Single subglottic suction  Endotracheal Airway Details        ETT size (mm): 8.0       Cuffed: yes       Successful intubation technique: direct laryngoscopy       DL Blade Type: Ignacio 2       Grade View of Cords: 1       Adjucts: stylet    Post intubation assessment        ETT secured, Vent settings by primary/ICU team, Primary/ICU team to review CXR and Sedation to be ordered by primary/ICU team       Placement verified by: capnometry, equal breath sounds and chest rise        Number of attempts at approach: 3       Number of other approaches attempted: 1       Secured with: commercial tube shaw       Ease of procedure: difficult (Bleeding into the pharynx quickly covered the optics of the glide scope.  Visualization was much better using a ignacio #2 blade with direct laryngoscopy.)       Dentition: Intact and Unchanged    Additional Comments       Initial attempts times 2 to intubate using a glide scope.  Copious bleeding prevented any good  visualization of the vocal cords.  I was able to visualize the vocal cords on the third attempt.  Lifting the epiglottis with the #2 Ignacio blade gave a grade one view.  Please refer to the anesthesia record for quick notes on the time line.  Samy patients airway was well maintained between attempts using an Ambu bag and mask.  Extra effort and care was taken because of the patients COVID status.

## 2022-01-13 PROBLEM — C34.11 MALIGNANT NEOPLASM OF UPPER LOBE OF RIGHT LUNG (H): Status: ACTIVE | Noted: 2021-01-01

## 2022-01-13 PROBLEM — J91.0 MALIGNANT PLEURAL EFFUSION (H): Status: ACTIVE | Noted: 2021-01-01

## 2022-01-13 PROBLEM — I25.10 CORONARY ARTERY DISEASE INVOLVING NATIVE CORONARY ARTERY OF NATIVE HEART WITHOUT ANGINA PECTORIS: Status: ACTIVE | Noted: 2019-10-30

## 2022-01-13 PROBLEM — J90 PLEURAL EFFUSION: Status: ACTIVE | Noted: 2021-01-01

## 2022-01-13 PROBLEM — J96.01 ACUTE RESPIRATORY FAILURE WITH HYPOXIA (H): Status: ACTIVE | Noted: 2022-01-13

## 2022-01-13 NOTE — DISCHARGE SUMMARY
[facility]    Death Summary - Hospitalist Service     Date of Admission:  1/9/2022  Date of Death: 1/10/2022  Discharging Provider: Gamal Phan MD    Discharge Diagnoses   Principal Problem:    Pneumonia of both lungs due to infectious organism, unspecified part of lung  Active Problems:    Malignant neoplasm of upper lobe of right lung (H)    Coronary artery disease involving native coronary artery of native heart without angina pectoris    Malignant pleural effusion    Infection due to 2019 novel coronavirus    Massive hemoptysis    Dyspnea, unspecified type    Acute respiratory failure with hypoxia (H)      Cause of death: Massive hemoptysis due to stage IV lung cancer of right upper lobe    Hospital Course     Pardeep Wilson is a 66 year old male with a history of CAD, HTN, stage IV lung cancer, malignant right pleural effusion, and hypothyroidism. He presented to the ED on 1/9/22 for evaluation of shortness of breath and fever. CT chest reveals no pulmonary embolus, but a few small patchy nodular opacities are scattered within both lungs, mostly new since 12/14/2021. Patient was started Vancomycin and Zosyn for empirical treatment of community acquired pneumonia. Patient was hospitalized for COVID19 pneumonia from 12/5/21 to 12/7/21. He remained COVID positive on admission. Patient reports that his COVID symptoms did improve. Patietnt had recurrent malignant right sided pleural effusion. He received US thoracentesis and one liter of clear yellow fluid was removed. On 1/9/22, patient developed one episode of hemoptysis and it then stopped. Pulmonary was consulted. In the evening of 1/10/22, patient had an episode of massive hemoptysis. Patient had cardiac arrest and was intubated. Emergent bronchoscopy was performed. Unfortunately, it was not able to identify the source of bleeding. Numerous iced saline was administered. Each time bronchoscope was removed, clot occluded airway and was  impossible to bag and patient became hypoxic. This occurred over 30 minutes without improvement in oxygenation for long periods of time. Despite continuous CPR, patient  on 19:39 1/10/22.       Gamal Phan MD  [facility]  ______________________________________________________________________      Significant Results and Procedures   Most Recent 3 CBC's:Recent Labs   Lab Test 01/10/22  1906 01/10/22  0655 22  2342   WBC 6.4 3.0* 4.3   HGB 11.7* 12.9* 14.2   MCV 98 91 92   * 118* 144*     Most Recent 3 BMP's:Recent Labs   Lab Test 01/10/22  1906 01/10/22  0655 22  0355    135* 139   POTASSIUM 3.7 3.9 4.8   CHLORIDE 106 104 109*   CO2 18* 26 24   BUN 32* 28* 31*   CR 1.68* 1.21 1.00   ANIONGAP 16 5 6   ARNAUD 8.1* 8.0* 8.5   * 87 143*     CT chest:    FINDINGS:     ANGIOGRAM CHEST: No visualized pulmonary embolus. The thoracic aorta is normal in caliber without dissection. Atherosclerotic calcification in the thoracic aorta.     LUNGS AND PLEURA: Moderate-sized right pleural effusion and tiny left pleural effusion. A few small patchy nodular opacities scattered within both lungs (for example, in the posterior aspect of the left lobe on series 401.2 image 120), mostly new since   2021. Several curvilinear opacities scattered within both lungs, most prominent in the upper right hemithorax, unchanged and likely representing scarring. Mild pleural thickening in the inferior aspects of bilateral hemithoraces again noted.     MEDIASTINUM/AXILLAE: A few nonspecific nonenlarged mediastinal lymph nodes.     CORONARY ARTERY CALCIFICATION: Present.     MUSCULOSKELETAL: No acute findings.     UPPER ABDOMEN: Partial visualization of a mildly distended gallbladder. Partial visualization of a 5.6 cm cyst in the upper pole of the right kidney. No follow-up is necessary.                                                                      IMPRESSION:   1.  A few small  patchy nodular opacities are scattered within both lungs, mostly new since 12/14/2021. These are nonspecific, but are most likely infectious or inflammatory in etiology.  2.  Moderate-sized right pleural effusion and tiny left pleural effusion, unchanged.  3.  No visualized pulmonary embolus.         Consultations This Hospital Stay   PHARMACY TO DOSE VANCO  PHARMACY TO DOSE VANCO  PULMONARY IP CONSULT    Primary Care Physician   Dov Morales

## 2022-01-14 LAB
BACTERIA BLD CULT: NO GROWTH
BACTERIA BLD CULT: NO GROWTH

## 2022-01-24 ENCOUNTER — TELEPHONE (OUTPATIENT)
Dept: FAMILY MEDICINE | Facility: CLINIC | Age: 67
End: 2022-01-24
Payer: MEDICARE

## 2022-01-24 NOTE — TELEPHONE ENCOUNTER
Reason for Call:  Other call back    Detailed comments: [facility] calling for death certificate.    Phone Number Patient can be reached at: [phone]    Best Time: As soon as possible    Can we leave a detailed message on this number? YES    Call taken on 2022 at 2:00 PM by Hodan Akins

## 2022-01-25 NOTE — TELEPHONE ENCOUNTER
Per [facility] at  home he was instructed by hospital to contact primary to have this done  Please complete death cert.

## 2022-01-25 NOTE — TELEPHONE ENCOUNTER
Patient passed while in hospital. Please check with hospital to see if already submitted. If not, let me know.

## 2022-04-06 NOTE — PROGRESS NOTES
This is a recent snapshot of the patient's [facility] medical record.  For current drug dose and complete information and questions, call [phone] or In Basket pool, [facility] ([id number])  CSN Number:  [id number]

## 2024-01-30 ENCOUNTER — PATIENT OUTREACH (OUTPATIENT)
Dept: ONCOLOGY | Facility: HOSPITAL | Age: 69
End: 2024-01-30
Payer: MEDICARE

## (undated) DEVICE — DRAIN JACKSON PRATT ROUND SIL 19FR W/TROCAR LF JP-2232

## (undated) DEVICE — GLOVE PROTEXIS POWDER FREE 8.0 ORTHOPEDIC 2D73ET80

## (undated) DEVICE — DRAPE IOBAN INCISE 23X17" 6650EZ

## (undated) DEVICE — TIES BANDING T50R

## (undated) DEVICE — ESU PENCIL W/COATED BLADE E2450H

## (undated) DEVICE — ENDO VALVE SUCTION BRONCH EVIS MAJ-209

## (undated) DEVICE — ESU ELEC BLADE 2.75" COATED/INSULATED E1455

## (undated) DEVICE — Device

## (undated) DEVICE — ANTIFOG SOLUTION W/FOAM PAD 31142527

## (undated) DEVICE — SYR 30ML SLIP TIP W/O NDL 302833

## (undated) DEVICE — SU VICRYL 2-0 SH 27" UND J417H

## (undated) DEVICE — SU SILK 0 SH 30" K834H

## (undated) DEVICE — TAPE MEDIPORE 4"X2YD 2864

## (undated) DEVICE — ESU ELEC BLADE 6" COATED/INSULATED E1455-6

## (undated) DEVICE — POUCH ENDOSCOPIC 20X25CM W/O HANDLE DISP LAP SAC G16497

## (undated) DEVICE — SU VICRYL 0 UR-6 27" J603H

## (undated) DEVICE — STPL ENDO LINEAR CUT ARTICULATING 45MM ATS45

## (undated) DEVICE — LINEN TOWEL PACK X6 WHITE 5487

## (undated) DEVICE — ADH SKIN CLOSURE PREMIERPRO EXOFIN 1.0ML 3470

## (undated) DEVICE — TUBING SUCTION 10'X3/16" N510

## (undated) DEVICE — LINEN GOWN XLG 5407

## (undated) DEVICE — CONNECTOR BLAKE DRAIN SGL BCC1

## (undated) DEVICE — SU VICRYL 4-0 PS-2 18" UND J496H

## (undated) DEVICE — PREP CHLORAPREP 26ML TINTED ORANGE  260815

## (undated) DEVICE — LINEN TOWEL PACK X30 5481

## (undated) DEVICE — DRSG DRAIN 2X2" 7087

## (undated) DEVICE — BLADE CLIPPER SGL USE 9680

## (undated) DEVICE — SUCTION MANIFOLD DORNOCH ULTRA CART UL-CL500

## (undated) DEVICE — ESU GROUND PAD ADULT W/CORD E7507

## (undated) DEVICE — DRSG PRIMAPORE 02X3" 7133

## (undated) DEVICE — STPL ENDO RELOAD 45X4.1MM GREEN TR45G

## (undated) DEVICE — SUCTION DRY CHEST DRAIN OASIS 3600-100

## (undated) DEVICE — ENDO VALVE BX EVIS MAJ-210

## (undated) RX ORDER — FENTANYL CITRATE 50 UG/ML
INJECTION, SOLUTION INTRAMUSCULAR; INTRAVENOUS
Status: DISPENSED
Start: 2020-02-28

## (undated) RX ORDER — HYDROMORPHONE HYDROCHLORIDE 1 MG/ML
INJECTION, SOLUTION INTRAMUSCULAR; INTRAVENOUS; SUBCUTANEOUS
Status: DISPENSED
Start: 2020-02-28